# Patient Record
Sex: FEMALE | Race: WHITE | NOT HISPANIC OR LATINO | Employment: OTHER | URBAN - METROPOLITAN AREA
[De-identification: names, ages, dates, MRNs, and addresses within clinical notes are randomized per-mention and may not be internally consistent; named-entity substitution may affect disease eponyms.]

---

## 2017-02-14 RX ORDER — POTASSIUM CHLORIDE 750 MG/1
TABLET, EXTENDED RELEASE ORAL
Qty: 30 TABLET | Refills: 3 | Status: SHIPPED | OUTPATIENT
Start: 2017-02-14 | End: 2017-06-15 | Stop reason: SDUPTHER

## 2017-06-15 RX ORDER — POTASSIUM CHLORIDE 750 MG/1
TABLET, EXTENDED RELEASE ORAL
Qty: 30 TABLET | Refills: 2 | Status: SHIPPED | OUTPATIENT
Start: 2017-06-15 | End: 2017-09-17 | Stop reason: SDUPTHER

## 2017-09-18 RX ORDER — POTASSIUM CHLORIDE 750 MG/1
TABLET, EXTENDED RELEASE ORAL
Qty: 30 TABLET | Refills: 1 | Status: SHIPPED | OUTPATIENT
Start: 2017-09-18 | End: 2017-12-01 | Stop reason: SDUPTHER

## 2017-10-09 ENCOUNTER — OFFICE VISIT (OUTPATIENT)
Dept: CARDIOLOGY | Facility: CLINIC | Age: 82
End: 2017-10-09

## 2017-10-09 VITALS
BODY MASS INDEX: 32.82 KG/M2 | HEIGHT: 63 IN | DIASTOLIC BLOOD PRESSURE: 82 MMHG | WEIGHT: 185.2 LBS | HEART RATE: 60 BPM | SYSTOLIC BLOOD PRESSURE: 138 MMHG | RESPIRATION RATE: 16 BRPM

## 2017-10-09 DIAGNOSIS — G47.33 OBSTRUCTIVE SLEEP APNEA SYNDROME: ICD-10-CM

## 2017-10-09 DIAGNOSIS — I10 ESSENTIAL HYPERTENSION: ICD-10-CM

## 2017-10-09 DIAGNOSIS — I34.0 NON-RHEUMATIC MITRAL REGURGITATION: ICD-10-CM

## 2017-10-09 DIAGNOSIS — E11.9 TYPE 2 DIABETES MELLITUS WITHOUT COMPLICATION, WITHOUT LONG-TERM CURRENT USE OF INSULIN (HCC): ICD-10-CM

## 2017-10-09 DIAGNOSIS — I48.0 PAROXYSMAL ATRIAL FIBRILLATION (HCC): Primary | ICD-10-CM

## 2017-10-09 PROCEDURE — 99214 OFFICE O/P EST MOD 30 MIN: CPT | Performed by: INTERNAL MEDICINE

## 2017-10-09 PROCEDURE — 93000 ELECTROCARDIOGRAM COMPLETE: CPT | Performed by: INTERNAL MEDICINE

## 2017-10-09 RX ORDER — DABIGATRAN ETEXILATE 150 MG/1
150 CAPSULE ORAL 2 TIMES DAILY
Qty: 180 CAPSULE | Refills: 3 | Status: SHIPPED | OUTPATIENT
Start: 2017-10-09 | End: 2017-12-21

## 2017-10-09 RX ORDER — DABIGATRAN ETEXILATE 150 MG/1
150 CAPSULE ORAL 2 TIMES DAILY
Qty: 60 CAPSULE | Refills: 1 | Status: SHIPPED | OUTPATIENT
Start: 2017-10-09 | End: 2017-12-01 | Stop reason: SDUPTHER

## 2017-10-09 NOTE — PROGRESS NOTES
PATIENTINFORMATION    Date of Office Visit: 10/09/2017  Encounter Provider: Monserrat Marsh MD  Place of Service: [facility] CARDIOLOGY  Patient Name: Gela Thompson  : 1934    Subjective:     Encounter Date:10/09/2017      Patient ID: Gela Thompson is a 82 y.o. female.      History of Present Illness    Gela Thompson is  to one my patients, Dhaval Thompson. I saw her in 2011 for complaints of episodes of shortness of breath  which had been occurring for the last couple of months. I did a nuclear stress test which showed she is only able to exercise for 4 minutes 30 seconds on the Saeid protocol, but there was no evidence of ischemia. The echo showed normal LV function with an ejection fraction of 61% and stage I diastolic dysfunction with trace tricuspid regurgitation. She wore a 30-day event monitor during which she had none of her shortness of breath episodes.       She was following up with her primary doctor, Lyly Mora, and complained again of shortness of breath with exertion. Dr. Mora sent her for another echo, which was pretty unremarkable. She also had a CT scan of the chest which was noncontrasted and she had coronary artery calcification. These tests were performed in the summer of .      In the , she continued to complain of dyspnea and so I did a nuclear stress test on her which showed no evidence of ischemia. She had recent repeat echo at [facility] in 2015, which continues to show normal LV systolic function. No comment was made on diastolic function. There was mild aortic regurgitation, moderate mitral regurgitation, and mild tricuspid regurgitation with a normal right ventricular systolic pressure. The prior years' echo showed no evidence of mitral regurgitation.     She comes in today for her yearly appointment.  She's been feeling well.  She denies palpitations, edema,  "lightheadedness, chest pain or fatigue.  She is taking Lasix every other day.  She finds if she takes it daily she gets muscle cramps.  If she goes longer than every other day she gets short of breath.  She did have a TIA about a year ago and a significant stroke in the more remote past.    Review of Systems   Constitution: Negative for fever, malaise/fatigue, weight gain and weight loss.   HENT: Negative for ear pain, hearing loss, nosebleeds and sore throat.    Eyes: Negative for double vision, pain, vision loss in left eye and vision loss in right eye.   Cardiovascular:        See history of present illness.   Respiratory: Positive for shortness of breath. Negative for cough, sleep disturbances due to breathing, snoring and wheezing.    Endocrine: Negative for cold intolerance, heat intolerance and polyuria.   Skin: Positive for itching and rash. Negative for poor wound healing.   Musculoskeletal: Positive for joint pain, joint swelling and myalgias.   Gastrointestinal: Negative for abdominal pain, diarrhea, hematochezia, nausea and vomiting.   Genitourinary: Negative for hematuria and hesitancy.   Neurological: Positive for numbness. Negative for paresthesias and seizures.   Psychiatric/Behavioral: Negative for depression. The patient is not nervous/anxious.            ECG 12 Lead  Date/Time: 10/9/2017 2:52 PM  Performed by: BALDEV PIERCE  Authorized by: BALDEV PIERCE   Comparison: compared with previous ECG from 10/7/2016  Comparison to previous ECG: Atrial fibrillation is new  Rhythm: atrial fibrillation  BPM: 60  T wave flattening noted on lead: Nonspecific ST changes are noted throughout.  Other findings: PRWP  Clinical impression: abnormal ECG               Objective:     /82 (BP Location: Right arm, Patient Position: Sitting, Cuff Size: Adult)  Pulse 60  Resp 16  Ht 63\" (160 cm)  Wt 185 lb 3.2 oz (84 kg)  BMI 32.81 kg/m2 Body mass index is 32.81 kg/(m^2).     Physical Exam "   Constitutional: She appears well-developed.   HENT:   Head: Normocephalic and atraumatic.   Eyes: Conjunctivae and lids are normal. Pupils are equal, round, and reactive to light. Lids are everted and swept, no foreign bodies found.   Neck: Normal range of motion. No JVD present. Carotid bruit is not present. No tracheal deviation present. No thyroid mass present.   Cardiovascular: Normal rate and normal heart sounds.  An irregularly irregular rhythm present.   Pulses:       Dorsalis pedis pulses are 2+ on the right side, and 2+ on the left side.   Pulmonary/Chest: Effort normal and breath sounds normal.   Abdominal: Normal appearance and bowel sounds are normal.   Musculoskeletal: Normal range of motion.   Neurological: She is alert. She has normal strength.   Skin: Skin is warm, dry and intact.   Psychiatric: She has a normal mood and affect. Her behavior is normal.       Procedure      1. Echocardiogram performed 03/08/2012 showed normal LV size and function, EF 61%, stage I  diastolic dysfunction, trace tricuspid regurgitation.  2. Exercise nuclear stress test in March 2012 without evidence of ischemia. She exercised for 4 minutes 30 minutes on the Saeid protocol.  3. Pulmonary function tests, March 2012, were normal.   4. Transesophageal echocardiogram, May 2011, at [facility] showed normal LV systolic function, ejection fraction 50 to 60%, borderline left atrial enlargement, no significant valvular disease, and no cardiac source of embolus identified for her stroke.  5. Echocardiogram on 06/27/2014. Normal left ventricular size with an ejection fraction of 64%. No comment on diastolic function. No valvular heart disease.   6. CT of the chest on 09/18/2014. Heart size was normal and without effusion. Moderate atherosclerosis was noted of the coronary arteries. There was old healed granulomatous disease.   7. Nuclear stress test October 27, 2014. Normal scan showing no evidence of ischemia.  8.  Echo from October 2015 at [facility]. Normal left ventricular systolic function. Ejection fraction 6065%. Mild aortic regurgitation. Moderate mitral regurgitation. Mild tricuspid regurgitation with a right ventricular systolic pressure of 28 mmHg.       Assessment/Plan:       1. Atrial Fibrillation and Atrial Flutter  Assessment  • The patient has paroxysmal atrial fibrillation  • This is non-valvular in etiology  • The patient's CHADS2-VASc score is 7  • A [id number] score of 2 or more is considered a high risk for a thromboembolic event    Plan  • Continue in atrial fibrillation with rate control  • Add dabigatran for antithrombotic therapy  • Continue verapamil for rate control  2.  Dyspnea on exertion.  This is controlled with Lasix.  3.  Hypertension.  Blood pressure controlled today.  4.  Obstructive sleep apnea.  Compliant with CPAP and sign 5.  History of stroke in 2011 and TIA in 2016.  5.  Diabetes.    I started her on Pradaxa today for atrial fibrillation which is new.  She does have a history of falls so I am concerned.  However, she has such a high CHADS2-Vasc score with a TIA a year ago and stroke in 2011 that I feel the benefits outweigh the risks at this point in time.    She is going to follow up with Deja in 6 weeks to make sure she is tolerating Pradaxa. If she is doing fine, I think we can go back to yearly visits.    Orders Placed This Encounter   Procedures   • ECG 12 Lead     This order was created via procedure documentation      Gela Thompson   Home Medication Instructions JR:    Printed on:10/09/17 4821   Medication Information                      BIOTIN PO  Take  by mouth Daily.             Calcium Carb-Cholecalciferol (CALCIUM 1000 + D PO)  Take 1 tablet by mouth Daily.             celecoxib (CELEBREX) 100 MG capsule  Take 1 capsule by mouth Daily.             Cholecalciferol (VITAMIN D PO)  Take  by mouth.             dabigatran etexilate (PRADAXA) 150 MG capsu  Take 1 capsule by  mouth 2 (Two) Times a Day.             dabigatran etexilate (PRADAXA) 150 MG capsu  Take 1 capsule by mouth 2 (Two) Times a Day.             folic acid (FOLVITE) 800 MCG tablet  Take 1 tablet by mouth Daily. As directed             furosemide (LASIX) 20 MG tablet  Take 1 tablet by mouth Daily. As needed             L-Lysine 500 MG tablet  Take 1 tablet by mouth Daily.             metFORMIN (GLUCOPHAGE) 500 MG tablet  Take 500 mg by mouth.             Multiple Vitamin (MULTIVITAMINS PO)  Take 1 tablet by mouth Daily.             potassium chloride (K-DUR,KLOR-CON) 10 MEQ CR tablet  Take 1 tablet by mouth Daily As Needed.             potassium chloride (K-DUR,KLOR-CON) 10 MEQ CR tablet  TAKE ONE TABLET BY MOUTH DAILY AS NEEDED             traMADol (ULTRAM) 50 MG tablet  Take 1 tablet by mouth Daily As Needed.             trandolapril (MAVIK) 4 MG tablet  Take 1 tablet by mouth Daily.             verapamil SR (CALAN-SR) 240 MG CR tablet  Take 1 tablet by mouth Daily.             vitamin B-12 (CYANOCOBALAMIN) 1000 MCG tablet  Take 1 tablet by mouth Daily. As directed                        Monserrat Marsh MD  10/09/17  2:53 PM

## 2017-11-15 RX ORDER — POTASSIUM CHLORIDE 750 MG/1
TABLET, EXTENDED RELEASE ORAL
Qty: 30 TABLET | Refills: 0 | Status: SHIPPED | OUTPATIENT
Start: 2017-11-15 | End: 2017-12-01 | Stop reason: SDUPTHER

## 2017-12-01 ENCOUNTER — OFFICE VISIT (OUTPATIENT)
Dept: CARDIOLOGY | Facility: CLINIC | Age: 82
End: 2017-12-01

## 2017-12-01 VITALS
WEIGHT: 184 LBS | HEART RATE: 61 BPM | DIASTOLIC BLOOD PRESSURE: 68 MMHG | SYSTOLIC BLOOD PRESSURE: 130 MMHG | HEIGHT: 63 IN | BODY MASS INDEX: 32.6 KG/M2

## 2017-12-01 DIAGNOSIS — I48.0 PAROXYSMAL ATRIAL FIBRILLATION (HCC): Primary | ICD-10-CM

## 2017-12-01 PROCEDURE — 93000 ELECTROCARDIOGRAM COMPLETE: CPT | Performed by: NURSE PRACTITIONER

## 2017-12-01 PROCEDURE — 99213 OFFICE O/P EST LOW 20 MIN: CPT | Performed by: NURSE PRACTITIONER

## 2017-12-01 RX ORDER — CHLORAL HYDRATE 500 MG
1280 CAPSULE ORAL DAILY
COMMUNITY
End: 2020-01-03

## 2017-12-01 NOTE — PROGRESS NOTES
Date of Office Visit: 2017  Encounter Provider: ELEUTERIO Banuelos  Place of Service: [facility] CARDIOLOGY  Patient Name: Gela Thompson  :1934    Chief Complaint   Patient presents with   • Atrial Fibrillation   :     HPI: Gela Thompson is a 83 y.o. female comes in today for 6 week follow up. She is a patient of Dr. Marsh. I am seeing her for the first time today and have reviewed her records. She has a history of atrial fibrillation, stroke and hyperlipidemia.     She was seen in  for complaints of shortness of breath. She had a nuclear perfusion study showing no evidence of ischemia. An echocardiogram showed an EF of 61% and grade I diastolic dysfunction with trace tricuspid regurgitation. She wore a 30-day event monitor but did not have any episodes during that time.     In the , she was complaining of dyspnea and her stress test was repeated, echocardiogram again stable.     She came in to see Dr. Marsh at the beginning of October for her yearly appointment. She had been feeling well. She was taking Lasix every other day. She had had a TIA 1 year previous. She was found to be in atrial fibrillation during that office visit. She was started on Pradaxa.     She comes in today reporting that she has been feeling well since starting on the Pradaxa. She was on Plavix before going on Pradaxa due to a history of TIA and stroke. She is asymptomatic of her atrial fibrillation. She has chronic shortness of breath. Denies edema, dizziness, chest pain, orthopnea or PND. No signs of bleeding.           Past Medical History:   Diagnosis Date   • Abnormal echocardiogram    • Anemia    • Dyspnea    • H/O echocardiogram 2014   • Health care maintenance    • History of EKG 10/29/2015   • Hyperlipidemia    • Hypertension    • Non-rheumatic mitral regurgitation    • ROMAN (obstructive sleep apnea)        Past Surgical History:   Procedure Laterality Date   •  "CHOLECYSTECTOMY     • HYSTERECTOMY     • KNEE SURGERY Bilateral    • NEUROPLASTY      deompression median nerve at carpal tunnel bilateral   • SHOULDER SURGERY Left            Review of Systems   Constitution: Negative for fever, malaise/fatigue, weight gain and weight loss.   HENT: Negative for ear pain, hearing loss, nosebleeds and sore throat.    Eyes: Negative for double vision, pain, vision loss in left eye and vision loss in right eye.   Cardiovascular:        See history of present illness.   Respiratory: Positive for shortness of breath. Negative for cough, sleep disturbances due to breathing, snoring and wheezing.    Endocrine: Negative for cold intolerance, heat intolerance and polyuria.   Skin: Positive for itching and rash. Negative for poor wound healing.   Musculoskeletal: Positive for joint pain, joint swelling and myalgias.   Gastrointestinal: Negative for abdominal pain, diarrhea, hematochezia, nausea and vomiting.   Genitourinary: Negative for hematuria and hesitancy.   Neurological: Positive for numbness. Negative for paresthesias and seizures.   Psychiatric/Behavioral: Negative for depression. The patient is not nervous/anxious.      All other systems reviewed and are negative    Allergies   Allergen Reactions   • Morphine    • Other    • Penicillins    • Statins        All aspects of family and social history reviewed.          Objective:     Vitals:    12/01/17 1322   BP: 130/68   BP Location: Left arm   Pulse: 61   Weight: 184 lb (83.5 kg)   Height: 63\" (160 cm)     Body mass index is 32.59 kg/(m^2).    PHYSICAL EXAM:  Physical Exam   Constitutional: She appears well-developed.   HENT:   Head: Normocephalic and atraumatic.   Eyes: Conjunctivae and lids are normal. Pupils are equal, round, and reactive to light. Lids are everted and swept, no foreign bodies found.   Neck: Normal range of motion. No JVD present. Carotid bruit is not present. No tracheal deviation present. No thyroid mass " present.   Cardiovascular: Normal rate and normal heart sounds.  An irregularly irregular rhythm present.   Pulses:       Dorsalis pedis pulses are 2+ on the right side, and 2+ on the left side.   Pulmonary/Chest: Effort normal and breath sounds normal.   Abdominal: Normal appearance and bowel sounds are normal.   Musculoskeletal: Normal range of motion.   Neurological: She is alert. She has normal strength.   Skin: Skin is warm, dry and intact.   Psychiatric: She has a normal mood and affect. Her behavior is normal.         ECG 12 Lead  Date/Time: 12/1/2017 1:49 PM  Performed by: BETO COTO  Authorized by: BETO COTO   Comparison: compared with previous ECG from 10/9/2017  Similar to previous ECG  Rhythm: sinus rhythm  Rate: normal  BPM: 61  Conduction: conduction normal  QRS axis: normal  Clinical impression: abnormal ECG  Comments: Indication: afib                Assessment:       Diagnosis Plan   1. Paroxysmal atrial fibrillation  Holter Monitor - 24 Hour        Orders Placed This Encounter   Procedures   • Holter Monitor - 24 Hour     Standing Status:   Future     Standing Expiration Date:   12/1/2018     Order Specific Question:   Reason for exam?     Answer:   Palpitations       Current Outpatient Prescriptions   Medication Sig Dispense Refill   • BIOTIN PO Take  by mouth Daily.     • Calcium Carb-Cholecalciferol (CALCIUM 1000 + D PO) Take 1 tablet by mouth Daily.     • celecoxib (CELEBREX) 100 MG capsule Take 1 capsule by mouth Daily.     • Cholecalciferol (VITAMIN D PO) Take 400 mg by mouth Daily.     • dabigatran etexilate (PRADAXA) 150 MG capsu Take 1 capsule by mouth 2 (Two) Times a Day. 180 capsule 3   • furosemide (LASIX) 20 MG tablet Take 1 tablet by mouth Daily. As needed 90 tablet 3   • L-Lysine 500 MG tablet Take 1 tablet by mouth Daily.     • metFORMIN (GLUCOPHAGE) 500 MG tablet Take 500 mg by mouth 2 (Two) Times a Day With Meals.     • Multiple Vitamin (MULTIVITAMINS PO) Take 1  tablet by mouth Daily.     • potassium chloride (K-DUR,KLOR-CON) 10 MEQ CR tablet Take 1 tablet by mouth Daily As Needed.     • traMADol (ULTRAM) 50 MG tablet Take 1 tablet by mouth Daily As Needed.     • trandolapril (MAVIK) 4 MG tablet Take 1 tablet by mouth Daily.     • verapamil SR (CALAN-SR) 240 MG CR tablet Take 1 tablet by mouth Daily.     • vitamin B-12 (CYANOCOBALAMIN) 1000 MCG tablet Take 1 tablet by mouth Daily. As directed     • Omega-3 Fatty Acids (FISH OIL) 1000 MG capsule capsule Take 1,280 mg by mouth Daily.       No current facility-administered medications for this visit.             Plan:       Patient comes in today for follow-up after being started on Pradaxa for new onset atrial fibrillation her last office visit.  Her heart rate was controlled at a rate of 60.  She is tolerating Pradaxa well.  She had previously been on Plavix due to a history of stroke in a TIA.  Her EKG today shows normal sinus rhythm with escape beats in between.  On that her place a 24-hour Holter monitor as advised by Dr. turcios to determine if she truly has atrial fibrillation.  If she does not, she may be oh to come off Pradaxa and back on Plavix.        Follow up in office in one year. Will discuss holter after results comes back    As always, it has been a pleasure to participate in this patient's care.      Sincerely,      ELEUTERIO Banuelos

## 2017-12-13 RX ORDER — POTASSIUM CHLORIDE 750 MG/1
TABLET, EXTENDED RELEASE ORAL
Qty: 30 TABLET | Refills: 0 | Status: SHIPPED | OUTPATIENT
Start: 2017-12-13 | End: 2018-01-10 | Stop reason: SDUPTHER

## 2017-12-15 ENCOUNTER — TELEPHONE (OUTPATIENT)
Dept: CARDIOLOGY | Facility: CLINIC | Age: 82
End: 2017-12-15

## 2017-12-21 NOTE — TELEPHONE ENCOUNTER
I called and spoke with her.  I told her that I reviewed her strips with Dr. Gibson who does not feel she has atrial fibrillation.  I told her to discontinue Pradaxa which was causing her GI upset anyway.

## 2018-01-02 RX ORDER — FUROSEMIDE 20 MG/1
TABLET ORAL
Qty: 90 TABLET | Refills: 2 | Status: SHIPPED | OUTPATIENT
Start: 2018-01-02 | End: 2020-05-27 | Stop reason: HOSPADM

## 2018-01-10 RX ORDER — POTASSIUM CHLORIDE 750 MG/1
TABLET, EXTENDED RELEASE ORAL
Qty: 30 TABLET | Refills: 0 | Status: SHIPPED | OUTPATIENT
Start: 2018-01-10 | End: 2018-05-08 | Stop reason: SDUPTHER

## 2018-05-09 RX ORDER — POTASSIUM CHLORIDE 750 MG/1
TABLET, EXTENDED RELEASE ORAL
Qty: 30 TABLET | Refills: 0 | Status: SHIPPED | OUTPATIENT
Start: 2018-05-09 | End: 2018-07-21 | Stop reason: SDUPTHER

## 2018-07-23 RX ORDER — POTASSIUM CHLORIDE 750 MG/1
TABLET, EXTENDED RELEASE ORAL
Qty: 30 TABLET | Refills: 0 | Status: SHIPPED | OUTPATIENT
Start: 2018-07-23 | End: 2020-05-27 | Stop reason: HOSPADM

## 2018-10-12 ENCOUNTER — HOSPITAL ENCOUNTER (EMERGENCY)
Facility: HOSPITAL | Age: 83
Discharge: HOME | End: 2018-10-12
Attending: EMERGENCY MEDICINE | Admitting: EMERGENCY MEDICINE

## 2018-10-12 ENCOUNTER — APPOINTMENT (OUTPATIENT)
Dept: GENERAL RADIOLOGY | Facility: HOSPITAL | Age: 83
End: 2018-10-12

## 2018-10-12 VITALS
RESPIRATION RATE: 17 BRPM | OXYGEN SATURATION: 95 % | TEMPERATURE: 98.2 F | WEIGHT: 183.19 LBS | BODY MASS INDEX: 32.46 KG/M2 | HEART RATE: 55 BPM | SYSTOLIC BLOOD PRESSURE: 160 MMHG | HEIGHT: 63 IN | DIASTOLIC BLOOD PRESSURE: 76 MMHG

## 2018-10-12 DIAGNOSIS — R07.9 CHEST PAIN, UNSPECIFIED TYPE: Primary | ICD-10-CM

## 2018-10-12 LAB
ALBUMIN SERPL-MCNC: 4.6 G/DL (ref 3.5–5.2)
ALBUMIN/GLOB SERPL: 1.5 G/DL
ALP SERPL-CCNC: 88 U/L (ref 39–117)
ALT SERPL W P-5'-P-CCNC: 26 U/L (ref 1–33)
ANION GAP SERPL CALCULATED.3IONS-SCNC: 17.2 MMOL/L
AST SERPL-CCNC: 20 U/L (ref 1–32)
BASOPHILS # BLD AUTO: 0.07 10*3/MM3 (ref 0–0.2)
BASOPHILS NFR BLD AUTO: 0.7 % (ref 0–1.5)
BILIRUB SERPL-MCNC: 0.3 MG/DL (ref 0.1–1.2)
BUN BLD-MCNC: 22 MG/DL (ref 8–23)
BUN/CREAT SERPL: 28.9 (ref 7–25)
CALCIUM SPEC-SCNC: 9.8 MG/DL (ref 8.6–10.5)
CHLORIDE SERPL-SCNC: 102 MMOL/L (ref 98–107)
CO2 SERPL-SCNC: 20.8 MMOL/L (ref 22–29)
CREAT BLD-MCNC: 0.76 MG/DL (ref 0.57–1)
DEPRECATED RDW RBC AUTO: 49 FL (ref 37–54)
EOSINOPHIL # BLD AUTO: 0.28 10*3/MM3 (ref 0–0.7)
EOSINOPHIL NFR BLD AUTO: 3 % (ref 0.3–6.2)
ERYTHROCYTE [DISTWIDTH] IN BLOOD BY AUTOMATED COUNT: 13.4 % (ref 11.7–13)
GFR SERPL CREATININE-BSD FRML MDRD: 73 ML/MIN/1.73
GLOBULIN UR ELPH-MCNC: 3 GM/DL
GLUCOSE BLD-MCNC: 181 MG/DL (ref 65–99)
HCT VFR BLD AUTO: 39.8 % (ref 35.6–45.5)
HGB BLD-MCNC: 13 G/DL (ref 11.9–15.5)
HOLD SPECIMEN: NORMAL
HOLD SPECIMEN: NORMAL
IMM GRANULOCYTES # BLD: 0.03 10*3/MM3 (ref 0–0.03)
IMM GRANULOCYTES NFR BLD: 0.3 % (ref 0–0.5)
LYMPHOCYTES # BLD AUTO: 3.14 10*3/MM3 (ref 0.9–4.8)
LYMPHOCYTES NFR BLD AUTO: 33.1 % (ref 19.6–45.3)
MCH RBC QN AUTO: 32.5 PG (ref 26.9–32)
MCHC RBC AUTO-ENTMCNC: 32.7 G/DL (ref 32.4–36.3)
MCV RBC AUTO: 99.5 FL (ref 80.5–98.2)
MONOCYTES # BLD AUTO: 0.76 10*3/MM3 (ref 0.2–1.2)
MONOCYTES NFR BLD AUTO: 8 % (ref 5–12)
NEUTROPHILS # BLD AUTO: 5.2 10*3/MM3 (ref 1.9–8.1)
NEUTROPHILS NFR BLD AUTO: 54.9 % (ref 42.7–76)
NRBC BLD MANUAL-RTO: 0 /100 WBC (ref 0–0)
NT-PROBNP SERPL-MCNC: 100.8 PG/ML (ref 0–1800)
PLATELET # BLD AUTO: 330 10*3/MM3 (ref 140–500)
PMV BLD AUTO: 10.3 FL (ref 6–12)
POTASSIUM BLD-SCNC: 4.1 MMOL/L (ref 3.5–5.2)
PROT SERPL-MCNC: 7.6 G/DL (ref 6–8.5)
RBC # BLD AUTO: 4 10*6/MM3 (ref 3.9–5.2)
SODIUM BLD-SCNC: 140 MMOL/L (ref 136–145)
TROPONIN T SERPL-MCNC: <0.01 NG/ML (ref 0–0.03)
WBC NRBC COR # BLD: 9.48 10*3/MM3 (ref 4.5–10.7)
WHOLE BLOOD HOLD SPECIMEN: NORMAL
WHOLE BLOOD HOLD SPECIMEN: NORMAL

## 2018-10-12 PROCEDURE — 93010 ELECTROCARDIOGRAM REPORT: CPT | Performed by: INTERNAL MEDICINE

## 2018-10-12 PROCEDURE — 80053 COMPREHEN METABOLIC PANEL: CPT | Performed by: NURSE PRACTITIONER

## 2018-10-12 PROCEDURE — 93005 ELECTROCARDIOGRAM TRACING: CPT | Performed by: EMERGENCY MEDICINE

## 2018-10-12 PROCEDURE — 36415 COLL VENOUS BLD VENIPUNCTURE: CPT

## 2018-10-12 PROCEDURE — 71046 X-RAY EXAM CHEST 2 VIEWS: CPT

## 2018-10-12 PROCEDURE — 85025 COMPLETE CBC W/AUTO DIFF WBC: CPT | Performed by: NURSE PRACTITIONER

## 2018-10-12 PROCEDURE — 84484 ASSAY OF TROPONIN QUANT: CPT | Performed by: NURSE PRACTITIONER

## 2018-10-12 PROCEDURE — 83880 ASSAY OF NATRIURETIC PEPTIDE: CPT | Performed by: NURSE PRACTITIONER

## 2018-10-12 PROCEDURE — 93005 ELECTROCARDIOGRAM TRACING: CPT

## 2018-10-12 PROCEDURE — 99284 EMERGENCY DEPT VISIT MOD MDM: CPT

## 2018-10-12 RX ORDER — ASPIRIN 81 MG/1
324 TABLET, CHEWABLE ORAL ONCE
Status: COMPLETED | OUTPATIENT
Start: 2018-10-12 | End: 2018-10-12

## 2018-10-12 RX ORDER — OMEPRAZOLE 40 MG/1
40 CAPSULE, DELAYED RELEASE ORAL 2 TIMES DAILY
Qty: 60 CAPSULE | Refills: 0 | Status: SHIPPED | OUTPATIENT
Start: 2018-10-12 | End: 2020-01-03

## 2018-10-12 RX ORDER — SODIUM CHLORIDE 0.9 % (FLUSH) 0.9 %
10 SYRINGE (ML) INJECTION AS NEEDED
Status: DISCONTINUED | OUTPATIENT
Start: 2018-10-12 | End: 2018-10-12 | Stop reason: HOSPADM

## 2018-10-12 RX ADMIN — ASPIRIN 324 MG: 81 TABLET, CHEWABLE ORAL at 15:57

## 2018-10-12 NOTE — ED PROVIDER NOTES
" EMERGENCY DEPARTMENT ENCOUNTER    CHIEF COMPLAINT  Chief Complaint: chest pain  History given by: patient  History limited by: nothing  Room Number: 11/11  PMD: Karla Mora MD  Cardiologist: Dr. Monserrat Marsh    HPI:  Pt is a 83 y.o. female who presents to the ED [with her  bedside] complaining of intermittent centralized chest pain that began three days ago. Pt describes the pain as \"tightness and pressure\". Pt reports that the episodes of pain \"usually last five minutes\" but that she had an episode that occurred around 2100 yesterday night which lasted for three hours. Pt states that the pain worsens with eating. Pt reports that she had a large meal about three hours prior to her episode of pain yesterday night. Pt also complains of a mild headache and mild shortness of air. Pt denies fever, abdominal pain, nausea, vomiting, diarrhea, rash, blood in stool, worsening leg pain, or leg swelling. Pt denies smoking or EtOH consumption. Pt has a hx of hypertension and hyperlipidemia. The patient is currently on Plavix d/t a prior stroke. There are no other complaints at this time.    Duration/Onset/Timing: three days/gradual/intermittent  Location: center of chest  Radiation: none specified  Quality: \"tightness and pressure\"  Intensity/Severity: moderate  Associated Symptoms: mild headache and mild shortness of air  Aggravating or Alleviating Factors: pt reports that the pain worsens with eating.  Previous Episodes: none specified      PAST MEDICAL HISTORY  Active Ambulatory Problems     Diagnosis Date Noted   • Anemia 07/22/2013   • Chronic low back pain 10/07/2016   • Diabetes mellitus (CMS/[facility]) 08/22/2013   • Dyslipidemia 10/07/2016   • Gastroesophageal reflux disease 04/07/2015   • Hypertension 10/07/2016   • Mitral valve insufficiency 10/14/2015   • Osteoarthritis 10/07/2016   • Adiposity 10/07/2016   • Obstructive sleep apnea syndrome 10/07/2016   • Cerebrovascular accident (CMS/[facility]) 05/01/2011   • " "Paroxysmal atrial fibrillation (CMS/[facility]) 12/01/2017     Resolved Ambulatory Problems     Diagnosis Date Noted   • No Resolved Ambulatory Problems     Past Medical History:   Diagnosis Date   • Abnormal echocardiogram    • Anemia    • Dyspnea    • H/O echocardiogram 06/27/2014   • Health care maintenance    • History of EKG 10/29/2015   • Hyperlipidemia    • Hypertension    • Non-rheumatic mitral regurgitation    • ROMAN (obstructive sleep apnea)        PAST SURGICAL HISTORY  Past Surgical History:   Procedure Laterality Date   • CHOLECYSTECTOMY     • HYSTERECTOMY     • KNEE SURGERY Bilateral    • NEUROPLASTY      deompression median nerve at carpal tunnel bilateral   • SHOULDER SURGERY Left        FAMILY HISTORY  Family History   Problem Relation Age of Onset   • Heart disease Mother    • Heart attack Mother    • Heart disease Sister        SOCIAL HISTORY  Social History     Social History   • Marital status:      Spouse name: N/A   • Number of children: N/A   • Years of education: N/A     Occupational History   • Not on file.     Social History Main Topics   • Smoking status: Never Smoker   • Smokeless tobacco: Never Used      Comment: caffeine use   • Alcohol use No   • Drug use: No   • Sexual activity: Defer     Other Topics Concern   • Not on file     Social History Narrative   • No narrative on file       ALLERGIES  Morphine; Penicillins; Statins; and Tape    REVIEW OF SYSTEMS  Review of Systems   Constitutional: Negative.  Negative for fever.   HENT: Negative for sore throat.    Eyes: Negative.    Respiratory: Positive for shortness of breath (mild). Negative for cough.    Cardiovascular: Positive for chest pain (\"tightness and pressure\"). Negative for leg swelling.   Gastrointestinal: Negative.  Negative for abdominal pain, blood in stool, diarrhea, nausea and vomiting.   Genitourinary: Negative.  Negative for dysuria.   Musculoskeletal: Negative.  Negative for back pain and myalgias.   Skin: Negative.  " Negative for rash.   Neurological: Positive for headaches (mild).   All other systems reviewed and are negative.      PHYSICAL EXAM  ED Triage Vitals   Temp Heart Rate Resp BP SpO2   10/12/18 1426 10/12/18 1426 10/12/18 1426 10/12/18 1453 10/12/18 1426   98.2 °F (36.8 °C) 95 18 147/56 98 %      Temp src Heart Rate Source Patient Position BP Location FiO2 (%)   10/12/18 1426 10/12/18 1426 10/12/18 1453 10/12/18 1453 --   Tympanic Monitor Sitting Right arm        Physical Exam   Constitutional: No distress.   HENT:   Head: Normocephalic and atraumatic.   Mouth/Throat: Oropharynx is clear and moist.   Eyes: Conjunctivae are normal.   Cardiovascular: Normal rate.  An irregularly irregular rhythm present. Exam reveals no gallop and no friction rub.    No murmur heard.  Pulmonary/Chest: Breath sounds normal. No respiratory distress. She has no decreased breath sounds. She has no wheezes. She has no rhonchi. She has no rales. She exhibits no tenderness.   Abdominal: There is no tenderness.   Musculoskeletal: She exhibits no edema or tenderness.   Neurological: She is alert.   Skin: No rash noted.   Nursing note and vitals reviewed.      LAB RESULTS  Lab Results (last 24 hours)     Procedure Component Value Units Date/Time    CBC & Differential [[id number]] Collected:  10/12/18 1457    Specimen:  Blood Updated:  10/12/18 1527    Narrative:       The following orders were created for panel order CBC & Differential.  Procedure                               Abnormality         Status                     ---------                               -----------         ------                     CBC Auto Differential[[id number]]        Abnormal            Final result                 Please view results for these tests on the individual orders.    Comprehensive Metabolic Panel [[id number]]  (Abnormal) Collected:  10/12/18 1457    Specimen:  Blood Updated:  10/12/18 1536     Glucose 181 (H) mg/dL      BUN 22 mg/dL      Creatinine 0.76  mg/dL      Sodium 140 mmol/L      Potassium 4.1 mmol/L      Chloride 102 mmol/L      CO2 20.8 (L) mmol/L      Calcium 9.8 mg/dL      Total Protein 7.6 g/dL      Albumin 4.60 g/dL      ALT (SGPT) 26 U/L      AST (SGOT) 20 U/L      Alkaline Phosphatase 88 U/L      Total Bilirubin 0.3 mg/dL      eGFR Non African Amer 73 mL/min/1.73      Globulin 3.0 gm/dL      A/G Ratio 1.5 g/dL      BUN/Creatinine Ratio 28.9 (H)     Anion Gap 17.2 mmol/L     Narrative:       The MDRD GFR formula is only valid for adults with stable renal function between ages 18 and 70.    BNP [[id number]]  (Normal) Collected:  10/12/18 1457    Specimen:  Blood Updated:  10/12/18 1541     proBNP 100.8 pg/mL     Narrative:       Among patients with dyspnea, NT-proBNP is highly sensitive for the detection of acute congestive heart failure. In addition NT-proBNP of <300 pg/ml effectively rules out acute congestive heart failure with 99% negative predictive value.    Troponin [[id number]]  (Normal) Collected:  10/12/18 1457    Specimen:  Blood Updated:  10/12/18 1541     Troponin T <0.010 ng/mL     Narrative:       Troponin T Reference Ranges:  Less than 0.03 ng/mL:    Negative for AMI  0.03 to 0.09 ng/mL:      Indeterminant for AMI  Greater than 0.09 ng/mL: Positive for AMI    CBC Auto Differential [[id number]]  (Abnormal) Collected:  10/12/18 1457    Specimen:  Blood Updated:  10/12/18 1527     WBC 9.48 10*3/mm3      RBC 4.00 10*6/mm3      Hemoglobin 13.0 g/dL      Hematocrit 39.8 %      MCV 99.5 (H) fL      MCH 32.5 (H) pg      MCHC 32.7 g/dL      RDW 13.4 (H) %      RDW-SD 49.0 fl      MPV 10.3 fL      Platelets 330 10*3/mm3      Neutrophil % 54.9 %      Lymphocyte % 33.1 %      Monocyte % 8.0 %      Eosinophil % 3.0 %      Basophil % 0.7 %      Immature Grans % 0.3 %      Neutrophils, Absolute 5.20 10*3/mm3      Lymphocytes, Absolute 3.14 10*3/mm3      Monocytes, Absolute 0.76 10*3/mm3      Eosinophils, Absolute 0.28 10*3/mm3      Basophils, Absolute  0.07 10*3/mm3      Immature Grans, Absolute 0.03 10*3/mm3      nRBC 0.0 /100 WBC           I ordered the above labs and reviewed the results    RADIOLOGY  XR Chest 2 View   Final Result   No focal pulmonary consolidation. Borderline heart size.   Tortuous aorta. Follow-up as clinically indicated.       .       This report was finalized on 10/12/2018 3:19 PM by Dr. Chuck Reed M.D.               I ordered the above noted radiological studies. Interpreted by radiologist. Reviewed by me in PACS.       PROCEDURES  Procedures    EKG          Initial EKG interpretation time: 1430  EKG time: 1530  Rhythm/Rate: a-fib, 69  P waves and ME: no P waves noted  QRS, axis: nml; nml  ST and T waves: T wave flattening     Interpreted Contemporaneously by me, independently viewed and is unchanged compared to prior in 12/2017.    HEARTSCORE    History  Highly suspicious              2    Moderately suspicious             1    Slightly or non-suspicious             0    ECG  Significant ST depression              2    Nonspecific repol disturbance            1    Normal                           0    Age  > or = 65                          2     46-65                           1    < or = 45                          0    Risk factors (hypercholesterolemia, HTN, DM, smoking, pos fam hx, obesity)                            > or = to 3 RF for atherosclerotic dx   2    1 or 2                 1    No risk factors                0    Troponin > or = 3x normal limit               2    1-3x normal limit    1    < or = Normal limit    0    Score  0 - 3 is low risk (0.9-1.7% risk of adverse cardiac event)  Score  4 - 6 is moderate risk (12-16.6% risk of adverse cardiac event)  Score  6 - 9 is high risk (50-65% risk of adverse cardiac event)    This patient's HEART score is 5.      PROGRESS AND CONSULTS  ED Course as of Oct 12 1701   Fri Oct 12, 2018   1459 Chest pain for 3 days.   [EP]      ED Course User Index  [EP] Susy Russell,  APRN     1653 Rechecked the patient who is resting comfortably and in NAD. The patient reports that she had an episode of chest pain while she was in the ED but is currently pain free. Discussed the unremarkable Troponin and CXR. Discussed with the patient that her HEART score is a 5 which is moderate risk. Offered the patient the plan for admission for overnight observation but the patient declined. I then discussed the plan to discharge the patient with a prescription for Prilosec BID and to have her f/u with her cardiologist. Advised the patient to return to the ER if her pain returns or worsens. Pt understands and agrees with the plan, all questions answered.    MEDICAL DECISION MAKING  Results were reviewed/discussed with the patient and they were also made aware of online access. Pt also made aware that some labs, such as cultures, will not be resulted during ER visit and follow up with PMD is necessary.     MDM  Number of Diagnoses or Management Options     Amount and/or Complexity of Data Reviewed  Clinical lab tests: ordered and reviewed (Unremarkable)  Tests in the radiology section of CPT®: ordered and reviewed (CXR was unremarkable.)  Tests in the medicine section of CPT®: ordered and reviewed (See procedure notes for EKG interpretation.)  Decide to obtain previous medical records or to obtain history from someone other than the patient: yes  Review and summarize past medical records: yes (The patient had a negative stress test in 2014.)  Independent visualization of images, tracings, or specimens: yes    Patient Progress  Patient progress: stable         DIAGNOSIS  Final diagnoses:   Chest pain, unspecified type       DISPOSITION  DISCHARGE    Patient discharged in stable condition.    Reviewed implications of results, diagnosis, meds, responsibility to follow up, warning signs and symptoms of possible worsening, potential complications and reasons to return to ER, including any new or worsening  symptoms.    Patient/Family voiced understanding of above instructions.    Discussed plan for discharge, as there is no emergent indication for admission. Patient referred to primary care provider for BP management due to today's BP. Pt/family is agreeable and understands need for follow up and repeat testing.  Pt is aware that discharge does not mean that nothing is wrong but it indicates no emergency is present that requires admission and they must continue care with follow-up as given below or physician of their choice.     FOLLOW-UP  Monserrat Marsh MD  [address] [address]  [phone]    Schedule an appointment as soon as possible for a visit            Medication List      New Prescriptions    omeprazole 40 MG capsule  Commonly known as:  priLOSEC  Take 1 capsule by mouth 2 (Two) Times a Day.              Latest Documented Vital Signs:  As of 5:01 PM  BP- 161/74 HR- 60 Temp- 98.2 °F (36.8 °C) (Tympanic) O2 sat- 97%    --  Documentation assistance provided by becky Shearer for Dr. Luz Elena MD.  Information recorded by the scribe was done at my direction and has been verified and validated by me.       Nayla Shearer  10/12/18 1545       Andrae Laguna MD  10/12/18 4568

## 2018-10-12 NOTE — ED PROVIDER NOTES
Subjective   History of Present Illness    Review of Systems    Past Medical History:   Diagnosis Date   • Abnormal echocardiogram    • Anemia    • Dyspnea    • H/O echocardiogram 06/27/2014   • Health care maintenance    • History of EKG 10/29/2015   • Hyperlipidemia    • Hypertension    • Non-rheumatic mitral regurgitation    • ROMAN (obstructive sleep apnea)        Allergies   Allergen Reactions   • Morphine    • Penicillins    • Statins    • Tape Rash       Past Surgical History:   Procedure Laterality Date   • CHOLECYSTECTOMY     • HYSTERECTOMY     • KNEE SURGERY Bilateral    • NEUROPLASTY      deompression median nerve at carpal tunnel bilateral   • SHOULDER SURGERY Left        Family History   Problem Relation Age of Onset   • Heart disease Mother    • Heart attack Mother    • Heart disease Sister        Social History     Social History   • Marital status:      Social History Main Topics   • Smoking status: Never Smoker   • Smokeless tobacco: Never Used      Comment: caffeine use   • Alcohol use No   • Drug use: No   • Sexual activity: Defer     Other Topics Concern   • Not on file         Objective   Physical Exam    Procedures         ED Course  ED Course as of Oct 12 1504   Fri Oct 12, 2018   1459 Chest pain for 3 days.   [EP]      ED Course User Index  [EP] Susy Russell APRN                     [facility]    Final diagnoses:   None       Documentation assistance provided by becky Frey.  Information recorded by the becky was done at my direction and has been verified and validated by me.     Kalli Frey  10/12/18 2443

## 2018-10-12 NOTE — ED TRIAGE NOTES
"Pt reports intermittent chest pain for three days that she does not recognize any significant event that brings on pain. Pain described as tight and \"has pressure to it\". Pt denies shortness of breath or nausea. Pt has not taken Aspirin today but she did her take her Plavix today.   "

## 2018-10-12 NOTE — ED TRIAGE NOTES
Pt reports midsternal chest pain started 3 days ago intermittent. Pt reports pain more constant and severe today. Went to [facility], told to come to ER.

## 2018-12-05 ENCOUNTER — OFFICE VISIT (OUTPATIENT)
Dept: CARDIOLOGY | Facility: CLINIC | Age: 83
End: 2018-12-05

## 2018-12-05 VITALS
BODY MASS INDEX: 32.5 KG/M2 | WEIGHT: 183.4 LBS | HEART RATE: 59 BPM | DIASTOLIC BLOOD PRESSURE: 64 MMHG | RESPIRATION RATE: 16 BRPM | SYSTOLIC BLOOD PRESSURE: 116 MMHG | HEIGHT: 63 IN

## 2018-12-05 DIAGNOSIS — I10 ESSENTIAL HYPERTENSION: ICD-10-CM

## 2018-12-05 DIAGNOSIS — R07.2 PRECORDIAL PAIN: Primary | ICD-10-CM

## 2018-12-05 DIAGNOSIS — K21.9 GASTROESOPHAGEAL REFLUX DISEASE, ESOPHAGITIS PRESENCE NOT SPECIFIED: ICD-10-CM

## 2018-12-05 DIAGNOSIS — I63.9 CEREBROVASCULAR ACCIDENT (CVA), UNSPECIFIED MECHANISM (HCC): ICD-10-CM

## 2018-12-05 DIAGNOSIS — G47.33 OBSTRUCTIVE SLEEP APNEA SYNDROME: ICD-10-CM

## 2018-12-05 PROCEDURE — 93000 ELECTROCARDIOGRAM COMPLETE: CPT | Performed by: INTERNAL MEDICINE

## 2018-12-05 PROCEDURE — 99214 OFFICE O/P EST MOD 30 MIN: CPT | Performed by: INTERNAL MEDICINE

## 2018-12-05 NOTE — PROGRESS NOTES
PATIENTINFORMATION    Date of Office Visit: 2018  Encounter Provider: Monserrat Marsh MD  Place of Service: [facility] CARDIOLOGY  Patient Name: Gela Thompson  : 1934    Subjective:     Encounter Date:2018      Patient ID: Gela Thompson is a 84 y.o. female.      History of Present Illness    I saw her in 2011 for complaints of episodes of shortness of breath  which had been occurring for the last couple of months. I did a nuclear stress test which showed she is only able to exercise for 4 minutes 30 seconds on the Saeid protocol, but there was no evidence of ischemia. The echo showed normal LV function with an ejection fraction of 61% and stage I diastolic dysfunction with trace tricuspid regurgitation. She wore a 30-day event monitor during which she had none of her shortness of breath episodes.       She was following up with her primary doctor, Lyly Mora, and complained again of shortness of breath with exertion. Dr. Mora sent her for another echo, which was pretty unremarkable. She also had a CT scan of the chest which was noncontrasted and she had coronary artery calcification. These tests were performed in the summer of 2014.      In the , she continued to complain of dyspnea and so I did a nuclear stress test on her which showed no evidence of ischemia. She had recent repeat echo at [facility] in 2015, which continues to show normal LV systolic function. No comment was made on diastolic function. There was mild aortic regurgitation, moderate mitral regurgitation, and mild tricuspid regurgitation with a normal right ventricular systolic pressure. The prior years' echo showed no evidence of mitral regurgitation.      She followed up with Deja in Summary 2017 and was doing well on Pradaxa.  She wore a 24 hour monitor in 2017 which was normal.  I had reviewed her strips with   "Mandrola and he did not feel like she had atrial fibrillation and we discontinued Pradaxa, which was causing her GI upset.    She is here for a yearly follow up.  She denies palpitations, shortness of breath, edema, chest pain or fatigue.  She has had some recurrent episodes of vertigo.    She went to the emergency room in October 2018 with chest pain which ended up being acid reflux.  She has not had a recurrence.  She says her balance is off.  She is going to see a physical therapist regarding vertigo.  A lot of success with this therapist in the past.    Review of Systems   Constitution: Negative for fever, malaise/fatigue, weight gain and weight loss.   HENT: Negative for ear pain, hearing loss, nosebleeds and sore throat.    Eyes: Negative for double vision, pain, vision loss in left eye and vision loss in right eye.   Cardiovascular:        See history of present illness.   Respiratory: Negative for cough, shortness of breath, sleep disturbances due to breathing, snoring and wheezing.    Endocrine: Negative for cold intolerance, heat intolerance and polyuria.   Skin: Negative for itching, poor wound healing and rash.   Musculoskeletal: Negative for joint pain, joint swelling and myalgias.   Gastrointestinal: Negative for abdominal pain, diarrhea, hematochezia, nausea and vomiting.   Genitourinary: Negative for hematuria and hesitancy.   Neurological: Negative for numbness, paresthesias and seizures.   Psychiatric/Behavioral: Negative for depression. The patient is not nervous/anxious.            ECG 12 Lead  Date/Time: 12/5/2018 1:41 PM  Performed by: Monserrat Marsh MD  Authorized by: Monserrat Marsh MD   Comparison: compared with previous ECG from 10/12/2018  Similar to previous ECG  Rhythm: sinus rhythm  BPM: 59  Clinical impression: normal ECG               Objective:     /64 (BP Location: Right arm, Patient Position: Sitting, Cuff Size: Adult)   Pulse 59   Resp 16   Ht 160 cm (63\")   Wt 83.2 " kg (183 lb 6.4 oz)   BMI 32.49 kg/m²  Body mass index is 32.49 kg/m².     Physical Exam   Constitutional: She appears well-developed.   HENT:   Head: Normocephalic and atraumatic.   Eyes: Conjunctivae and lids are normal. Pupils are equal, round, and reactive to light. Lids are everted and swept, no foreign bodies found.   Neck: Normal range of motion. No JVD present. Carotid bruit is not present. No tracheal deviation present. No thyroid mass present.   Cardiovascular: Normal rate, regular rhythm and normal heart sounds.   Pulses:       Dorsalis pedis pulses are 2+ on the right side, and 2+ on the left side.   Pulmonary/Chest: Effort normal and breath sounds normal.   Abdominal: Normal appearance and bowel sounds are normal.   Musculoskeletal: Normal range of motion.   Neurological: She is alert. She has normal strength.   Skin: Skin is warm, dry and intact.   Psychiatric: She has a normal mood and affect. Her behavior is normal.   Vitals reviewed.      Lab Review:     1. Echocardiogram performed 03/08/2012 showed normal LV size and function, EF 61%, stage I  diastolic dysfunction, trace tricuspid regurgitation.  2. Exercise nuclear stress test in March 2012 without evidence of ischemia. She exercised for 4 minutes 30 minutes on the Saeid protocol.  3. Pulmonary function tests, March 2012, were normal.   4. Transesophageal echocardiogram, May 2011, at [facility] showed normal LV systolic function, ejection fraction 50 to 60%, borderline left atrial enlargement, no significant valvular disease, and no cardiac source of embolus identified for her stroke.  5. Echocardiogram on 06/27/2014. Normal left ventricular size with an ejection fraction of 64%. No comment on diastolic function. No valvular heart disease.   6. CT of the chest on 09/18/2014. Heart size was normal and without effusion. Moderate atherosclerosis was noted of the coronary arteries. There was old healed granulomatous disease.   7. Nuclear  stress test October 27, 2014. Normal scan showing no evidence of ischemia.  8. Echo from October 2015 at [facility]. Normal left ventricular systolic function. Ejection fraction 6065%. Mild aortic regurgitation. Moderate mitral regurgitation. Mild tricuspid regurgitation with a right ventricular systolic pressure of 28 mmHg.      Assessment/Plan:       1.  Dyspnea on exertion.  This is controlled with Lasix.  She does not have problems with lower extremity edema.  It has been a while since we have looked at her echocardiogram some going to repeat that.  2.  Hypertension.  Blood pressure well-controlled.  3.  Obstructive sleep apnea compliant with CPAP  4.  History of stroke in 2011 with a TIA in 2016  5.  Diabetes.  6.  Chest pain.  Most likely due to acid reflux.  Check echocardiogram.    There was a question of whether or not she was having atrial fibrillation but I reviewed the strips in 2017 with Dr. Gibson and he did not believe she was having atrial fibrillation.    Orders Placed This Encounter   Procedures   • ECG 12 Lead     This order was created via procedure documentation   • Adult Transthoracic Echo Complete W/ Cont if Necessary Per Protocol     Standing Status:   Future     Standing Expiration Date:   12/5/2019     Order Specific Question:   Reason for exam?     Answer:   Chest Pain        Discharge Medications           Accurate as of 12/5/18  2:24 PM. If you have any questions, ask your nurse or doctor.               Continue These Medications      Instructions Start Date   ALLEGRA PO   Oral, Daily      CALCIUM 1000 + D PO   1 tablet, Oral, Daily      CELEBREX 100 MG capsule  Generic drug:  celecoxib   1 capsule, Oral, Daily      fish oil 1000 MG capsule capsule   1,280 mg, Oral, Daily      furosemide 20 MG tablet  Commonly known as:  LASIX   TAKE ONE TABLET BY MOUTH DAILY AS NEEDED      L-Lysine 500 MG tablet tablet   1 tablet, Oral, Daily      MULTIVITAMINS PO   1 tablet, Oral, Daily      omeprazole  40 MG capsule  Commonly known as:  priLOSEC   40 mg, Oral, 2 Times Daily      PLAVIX PO   Oral, Daily      potassium chloride 10 MEQ CR tablet  Commonly known as:  K-DURKLOR-CON   TAKE ONE TABLET BY MOUTH DAILY AS NEEDED      traMADol 50 MG tablet  Commonly known as:  ULTRAM   1 tablet, Oral, Daily PRN      trandolapril 4 MG tablet  Commonly known as:  MAVIK   1 tablet, Oral, Daily      verapamil  MG CR tablet  Commonly known as:  CALAN-SR   1 tablet, Oral, Daily      vitamin B-12 1000 MCG tablet  Commonly known as:  CYANOCOBALAMIN   1 tablet, Oral, Daily, As directed      VITAMIN D PO   400 mg, Oral, Daily                    Monserrat Marsh MD  12/05/18  2:24 PM

## 2018-12-13 ENCOUNTER — HOSPITAL ENCOUNTER (OUTPATIENT)
Dept: CARDIOLOGY | Facility: HOSPITAL | Age: 83
Discharge: HOME | End: 2018-12-13
Attending: INTERNAL MEDICINE | Admitting: INTERNAL MEDICINE

## 2018-12-13 VITALS
HEIGHT: 63 IN | HEART RATE: 68 BPM | WEIGHT: 180 LBS | DIASTOLIC BLOOD PRESSURE: 60 MMHG | BODY MASS INDEX: 31.89 KG/M2 | SYSTOLIC BLOOD PRESSURE: 180 MMHG

## 2018-12-13 DIAGNOSIS — R07.2 PRECORDIAL PAIN: ICD-10-CM

## 2018-12-13 LAB
BH CV ECHO MEAS - ACS: 2.1 CM
BH CV ECHO MEAS - AI DEC SLOPE: 283.4 CM/SEC^2
BH CV ECHO MEAS - AI MAX PG: 74.9 MMHG
BH CV ECHO MEAS - AI MAX VEL: 432.7 CM/SEC
BH CV ECHO MEAS - AI P1/2T: 447.2 MSEC
BH CV ECHO MEAS - AO MAX PG (FULL): 5.1 MMHG
BH CV ECHO MEAS - AO MAX PG: 10.1 MMHG
BH CV ECHO MEAS - AO MEAN PG (FULL): 1.8 MMHG
BH CV ECHO MEAS - AO MEAN PG: 4.1 MMHG
BH CV ECHO MEAS - AO ROOT AREA (BSA CORRECTED): 1.5
BH CV ECHO MEAS - AO ROOT AREA: 6.2 CM^2
BH CV ECHO MEAS - AO ROOT DIAM: 2.8 CM
BH CV ECHO MEAS - AO V2 MAX: 158.7 CM/SEC
BH CV ECHO MEAS - AO V2 MEAN: 91.9 CM/SEC
BH CV ECHO MEAS - AO V2 VTI: 33 CM
BH CV ECHO MEAS - AVA(I,A): 2.9 CM^2
BH CV ECHO MEAS - AVA(I,D): 2.9 CM^2
BH CV ECHO MEAS - AVA(V,A): 2.7 CM^2
BH CV ECHO MEAS - AVA(V,D): 2.7 CM^2
BH CV ECHO MEAS - BSA(HAYCOCK): 1.9 M^2
BH CV ECHO MEAS - BSA: 1.8 M^2
BH CV ECHO MEAS - BZI_BMI: 31.9 KILOGRAMS/M^2
BH CV ECHO MEAS - BZI_METRIC_HEIGHT: 160 CM
BH CV ECHO MEAS - BZI_METRIC_WEIGHT: 81.6 KG
BH CV ECHO MEAS - EDV(CUBED): 183.7 ML
BH CV ECHO MEAS - EDV(MOD-SP2): 54 ML
BH CV ECHO MEAS - EDV(MOD-SP4): 72 ML
BH CV ECHO MEAS - EDV(TEICH): 159.1 ML
BH CV ECHO MEAS - EF(CUBED): 89.4 %
BH CV ECHO MEAS - EF(MOD-SP2): 66.7 %
BH CV ECHO MEAS - EF(MOD-SP4): 70.8 %
BH CV ECHO MEAS - EF(TEICH): 83.2 %
BH CV ECHO MEAS - ESV(CUBED): 19.4 ML
BH CV ECHO MEAS - ESV(MOD-SP2): 18 ML
BH CV ECHO MEAS - ESV(MOD-SP4): 21 ML
BH CV ECHO MEAS - ESV(TEICH): 26.7 ML
BH CV ECHO MEAS - IVS/LVPW: 0.84
BH CV ECHO MEAS - IVSD: 1 CM
BH CV ECHO MEAS - LAT PEAK E' VEL: 8 CM/SEC
BH CV ECHO MEAS - LV DIASTOLIC VOL/BSA (35-75): 38.9 ML/M^2
BH CV ECHO MEAS - LV MASS(C)D: 254 GRAMS
BH CV ECHO MEAS - LV MASS(C)DI: 137.3 GRAMS/M^2
BH CV ECHO MEAS - LV MAX PG: 4.9 MMHG
BH CV ECHO MEAS - LV MEAN PG: 2.3 MMHG
BH CV ECHO MEAS - LV SYSTOLIC VOL/BSA (12-30): 11.4 ML/M^2
BH CV ECHO MEAS - LV V1 MAX: 111.1 CM/SEC
BH CV ECHO MEAS - LV V1 MEAN: 68.3 CM/SEC
BH CV ECHO MEAS - LV V1 VTI: 24.8 CM
BH CV ECHO MEAS - LVIDD: 5.7 CM
BH CV ECHO MEAS - LVIDS: 2.7 CM
BH CV ECHO MEAS - LVLD AP2: 6.6 CM
BH CV ECHO MEAS - LVLD AP4: 6.9 CM
BH CV ECHO MEAS - LVLS AP2: 5.9 CM
BH CV ECHO MEAS - LVLS AP4: 5.9 CM
BH CV ECHO MEAS - LVOT AREA (M): 3.8 CM^2
BH CV ECHO MEAS - LVOT AREA: 3.8 CM^2
BH CV ECHO MEAS - LVOT DIAM: 2.2 CM
BH CV ECHO MEAS - LVPWD: 1.2 CM
BH CV ECHO MEAS - MED PEAK E' VEL: 6 CM/SEC
BH CV ECHO MEAS - MR MAX PG: 35 MMHG
BH CV ECHO MEAS - MR MAX VEL: 295.7 CM/SEC
BH CV ECHO MEAS - MV A DUR: 0.12 SEC
BH CV ECHO MEAS - MV A MAX VEL: 91.2 CM/SEC
BH CV ECHO MEAS - MV DEC SLOPE: 293.1 CM/SEC^2
BH CV ECHO MEAS - MV DEC TIME: 0.3 SEC
BH CV ECHO MEAS - MV E MAX VEL: 99.4 CM/SEC
BH CV ECHO MEAS - MV E/A: 1.1
BH CV ECHO MEAS - MV MAX PG: 4.6 MMHG
BH CV ECHO MEAS - MV MEAN PG: 2.3 MMHG
BH CV ECHO MEAS - MV P1/2T MAX VEL: 90.2 CM/SEC
BH CV ECHO MEAS - MV P1/2T: 90.1 MSEC
BH CV ECHO MEAS - MV V2 MAX: 106.9 CM/SEC
BH CV ECHO MEAS - MV V2 MEAN: 73.3 CM/SEC
BH CV ECHO MEAS - MV V2 VTI: 42.6 CM
BH CV ECHO MEAS - MVA P1/2T LCG: 2.4 CM^2
BH CV ECHO MEAS - MVA(P1/2T): 2.4 CM^2
BH CV ECHO MEAS - MVA(VTI): 2.2 CM^2
BH CV ECHO MEAS - PA MAX PG (FULL): 1.5 MMHG
BH CV ECHO MEAS - PA MAX PG: 5.3 MMHG
BH CV ECHO MEAS - PA V2 MAX: 115.4 CM/SEC
BH CV ECHO MEAS - PULM A REVS DUR: 0.13 SEC
BH CV ECHO MEAS - PULM A REVS VEL: 31.5 CM/SEC
BH CV ECHO MEAS - PULM DIAS VEL: 54.3 CM/SEC
BH CV ECHO MEAS - PULM S/D: 1.1
BH CV ECHO MEAS - PULM SYS VEL: 59.7 CM/SEC
BH CV ECHO MEAS - PVA(V,A): 3 CM^2
BH CV ECHO MEAS - PVA(V,D): 3 CM^2
BH CV ECHO MEAS - QP/QS: 0.77
BH CV ECHO MEAS - RV MAX PG: 3.8 MMHG
BH CV ECHO MEAS - RV MEAN PG: 1.7 MMHG
BH CV ECHO MEAS - RV V1 MAX: 97.5 CM/SEC
BH CV ECHO MEAS - RV V1 MEAN: 60.7 CM/SEC
BH CV ECHO MEAS - RV V1 VTI: 20.3 CM
BH CV ECHO MEAS - RVOT AREA: 3.6 CM^2
BH CV ECHO MEAS - RVOT DIAM: 2.1 CM
BH CV ECHO MEAS - SI(AO): 110 ML/M^2
BH CV ECHO MEAS - SI(CUBED): 88.8 ML/M^2
BH CV ECHO MEAS - SI(LVOT): 51.3 ML/M^2
BH CV ECHO MEAS - SI(MOD-SP2): 19.5 ML/M^2
BH CV ECHO MEAS - SI(MOD-SP4): 27.6 ML/M^2
BH CV ECHO MEAS - SI(TEICH): 71.6 ML/M^2
BH CV ECHO MEAS - SV(AO): 203.3 ML
BH CV ECHO MEAS - SV(CUBED): 164.3 ML
BH CV ECHO MEAS - SV(LVOT): 94.9 ML
BH CV ECHO MEAS - SV(MOD-SP2): 36 ML
BH CV ECHO MEAS - SV(MOD-SP4): 51 ML
BH CV ECHO MEAS - SV(RVOT): 72.8 ML
BH CV ECHO MEAS - SV(TEICH): 132.3 ML
BH CV ECHO MEAS - TAPSE (>1.6): 2.6 CM2
BH CV ECHO MEASUREMENTS AVERAGE E/E' RATIO: 14.2
BH CV XLRA - RV BASE: 2.8 CM
BH CV XLRA - TDI S': 13 CM/SEC
LEFT ATRIUM VOLUME INDEX: 29 ML/M2
LEFT ATRIUM VOLUME: 55 CM3
LV EF 2D ECHO EST: 70 %
MAXIMAL PREDICTED HEART RATE: 136 BPM
SINUS: 3.2 CM
STJ: 2.4 CM
STRESS TARGET HR: 116 BPM

## 2018-12-13 PROCEDURE — 93306 TTE W/DOPPLER COMPLETE: CPT

## 2018-12-13 PROCEDURE — 25010000002 PERFLUTREN (DEFINITY) 8.476 MG IN SODIUM CHLORIDE 0.9 % 10 ML INJECTION: Performed by: INTERNAL MEDICINE

## 2018-12-13 PROCEDURE — 93306 TTE W/DOPPLER COMPLETE: CPT | Performed by: INTERNAL MEDICINE

## 2018-12-13 RX ADMIN — PERFLUTREN 1.5 ML: 6.52 INJECTION, SUSPENSION INTRAVENOUS at 13:19

## 2018-12-14 ENCOUNTER — TELEPHONE (OUTPATIENT)
Dept: CARDIOLOGY | Facility: CLINIC | Age: 83
End: 2018-12-14

## 2018-12-14 NOTE — TELEPHONE ENCOUNTER
· Left ventricular wall thickness is consistent with hypertrophy. Sigmoid-shaped ventricular septum is present.  · Estimated EF = 70%.  · Left ventricular systolic function is normal.  · Left ventricular diastolic dysfunction (grade II) consistent with pseudonormalization.  · There is calcification of the aortic valve.  · Mild aortic valve regurgitation is present.      I called and spoke with her.  Echo really unchanged from prior.  Diastolic dysfunction noted.  Normal systolic function.  No valve disease.  She will keep her usually scheduled follow-up.

## 2020-01-03 ENCOUNTER — TELEPHONE (OUTPATIENT)
Dept: CARDIOLOGY | Facility: CLINIC | Age: 85
End: 2020-01-03

## 2020-01-03 ENCOUNTER — HOSPITAL ENCOUNTER (INPATIENT)
Facility: HOSPITAL | Age: 85
LOS: 4 days | Discharge: HOME | End: 2020-01-07
Attending: EMERGENCY MEDICINE | Admitting: INTERNAL MEDICINE

## 2020-01-03 DIAGNOSIS — I10 UNCONTROLLED HYPERTENSION: Primary | ICD-10-CM

## 2020-01-03 LAB
ALBUMIN SERPL-MCNC: 4.4 G/DL (ref 3.5–5.2)
ALBUMIN/GLOB SERPL: 1.6 G/DL
ALP SERPL-CCNC: 90 U/L (ref 39–117)
ALT SERPL W P-5'-P-CCNC: 23 U/L (ref 1–33)
ANION GAP SERPL CALCULATED.3IONS-SCNC: 12.1 MMOL/L (ref 5–15)
AST SERPL-CCNC: 21 U/L (ref 1–32)
BASOPHILS # BLD AUTO: 0.09 10*3/MM3 (ref 0–0.2)
BASOPHILS NFR BLD AUTO: 1.4 % (ref 0–1.5)
BILIRUB SERPL-MCNC: 0.3 MG/DL (ref 0.2–1.2)
BUN BLD-MCNC: 17 MG/DL (ref 8–23)
BUN/CREAT SERPL: 25.8 (ref 7–25)
CALCIUM SPEC-SCNC: 9.4 MG/DL (ref 8.6–10.5)
CHLORIDE SERPL-SCNC: 103 MMOL/L (ref 98–107)
CO2 SERPL-SCNC: 22.9 MMOL/L (ref 22–29)
CREAT BLD-MCNC: 0.66 MG/DL (ref 0.57–1)
DEPRECATED RDW RBC AUTO: 41.4 FL (ref 37–54)
EOSINOPHIL # BLD AUTO: 0.13 10*3/MM3 (ref 0–0.4)
EOSINOPHIL NFR BLD AUTO: 2 % (ref 0.3–6.2)
ERYTHROCYTE [DISTWIDTH] IN BLOOD BY AUTOMATED COUNT: 12.1 % (ref 12.3–15.4)
GFR SERPL CREATININE-BSD FRML MDRD: 85 ML/MIN/1.73
GLOBULIN UR ELPH-MCNC: 2.8 GM/DL
GLUCOSE BLD-MCNC: 163 MG/DL (ref 65–99)
HCT VFR BLD AUTO: 34.6 % (ref 34–46.6)
HGB BLD-MCNC: 12.1 G/DL (ref 12–15.9)
IMM GRANULOCYTES # BLD AUTO: 0.02 10*3/MM3 (ref 0–0.05)
IMM GRANULOCYTES NFR BLD AUTO: 0.3 % (ref 0–0.5)
LYMPHOCYTES # BLD AUTO: 2 10*3/MM3 (ref 0.7–3.1)
LYMPHOCYTES NFR BLD AUTO: 30.3 % (ref 19.6–45.3)
MCH RBC QN AUTO: 33 PG (ref 26.6–33)
MCHC RBC AUTO-ENTMCNC: 35 G/DL (ref 31.5–35.7)
MCV RBC AUTO: 94.3 FL (ref 79–97)
MONOCYTES # BLD AUTO: 0.59 10*3/MM3 (ref 0.1–0.9)
MONOCYTES NFR BLD AUTO: 8.9 % (ref 5–12)
NEUTROPHILS # BLD AUTO: 3.77 10*3/MM3 (ref 1.7–7)
NEUTROPHILS NFR BLD AUTO: 57.1 % (ref 42.7–76)
NRBC BLD AUTO-RTO: 0 /100 WBC (ref 0–0.2)
PLATELET # BLD AUTO: 298 10*3/MM3 (ref 140–450)
PMV BLD AUTO: 9.9 FL (ref 6–12)
POTASSIUM BLD-SCNC: 3.7 MMOL/L (ref 3.5–5.2)
PROT SERPL-MCNC: 7.2 G/DL (ref 6–8.5)
RBC # BLD AUTO: 3.67 10*6/MM3 (ref 3.77–5.28)
SODIUM BLD-SCNC: 138 MMOL/L (ref 136–145)
WBC NRBC COR # BLD: 6.6 10*3/MM3 (ref 3.4–10.8)

## 2020-01-03 PROCEDURE — 99223 1ST HOSP IP/OBS HIGH 75: CPT | Performed by: INTERNAL MEDICINE

## 2020-01-03 PROCEDURE — 85025 COMPLETE CBC W/AUTO DIFF WBC: CPT | Performed by: PHYSICIAN ASSISTANT

## 2020-01-03 PROCEDURE — 36415 COLL VENOUS BLD VENIPUNCTURE: CPT | Performed by: PHYSICIAN ASSISTANT

## 2020-01-03 PROCEDURE — 80053 COMPREHEN METABOLIC PANEL: CPT | Performed by: PHYSICIAN ASSISTANT

## 2020-01-03 PROCEDURE — 99284 EMERGENCY DEPT VISIT MOD MDM: CPT

## 2020-01-03 PROCEDURE — 25010000002 ENOXAPARIN PER 10 MG: Performed by: INTERNAL MEDICINE

## 2020-01-03 PROCEDURE — 25010000002 HYDRALAZINE PER 20 MG: Performed by: EMERGENCY MEDICINE

## 2020-01-03 RX ORDER — HYDRALAZINE HYDROCHLORIDE 10 MG/1
10 TABLET, FILM COATED ORAL 4 TIMES DAILY
Status: ON HOLD | COMMUNITY
End: 2020-01-07 | Stop reason: SDUPTHER

## 2020-01-03 RX ORDER — HYDRALAZINE HYDROCHLORIDE 20 MG/ML
10 INJECTION INTRAMUSCULAR; INTRAVENOUS EVERY 4 HOURS PRN
Status: DISCONTINUED | OUTPATIENT
Start: 2020-01-03 | End: 2020-01-07 | Stop reason: HOSPADM

## 2020-01-03 RX ORDER — SODIUM CHLORIDE 0.9 % (FLUSH) 0.9 %
10 SYRINGE (ML) INJECTION AS NEEDED
Status: DISCONTINUED | OUTPATIENT
Start: 2020-01-03 | End: 2020-01-07 | Stop reason: HOSPADM

## 2020-01-03 RX ORDER — ONDANSETRON 2 MG/ML
4 INJECTION INTRAMUSCULAR; INTRAVENOUS EVERY 6 HOURS PRN
Status: DISCONTINUED | OUTPATIENT
Start: 2020-01-03 | End: 2020-01-07 | Stop reason: HOSPADM

## 2020-01-03 RX ORDER — SODIUM CHLORIDE 0.9 % (FLUSH) 0.9 %
10 SYRINGE (ML) INJECTION EVERY 12 HOURS SCHEDULED
Status: DISCONTINUED | OUTPATIENT
Start: 2020-01-03 | End: 2020-01-07 | Stop reason: HOSPADM

## 2020-01-03 RX ORDER — CLOPIDOGREL BISULFATE 75 MG/1
75 TABLET ORAL DAILY
Status: DISCONTINUED | OUTPATIENT
Start: 2020-01-04 | End: 2020-01-07 | Stop reason: HOSPADM

## 2020-01-03 RX ORDER — HYDRALAZINE HYDROCHLORIDE 20 MG/ML
10 INJECTION INTRAMUSCULAR; INTRAVENOUS ONCE
Status: COMPLETED | OUTPATIENT
Start: 2020-01-03 | End: 2020-01-03

## 2020-01-03 RX ORDER — HYDRALAZINE HYDROCHLORIDE 25 MG/1
25 TABLET, FILM COATED ORAL EVERY 8 HOURS SCHEDULED
Status: DISCONTINUED | OUTPATIENT
Start: 2020-01-03 | End: 2020-01-05

## 2020-01-03 RX ORDER — CELECOXIB 100 MG/1
100 CAPSULE ORAL DAILY
Status: DISCONTINUED | OUTPATIENT
Start: 2020-01-04 | End: 2020-01-07 | Stop reason: HOSPADM

## 2020-01-03 RX ORDER — ACETAMINOPHEN 325 MG/1
650 TABLET ORAL EVERY 6 HOURS PRN
Status: DISCONTINUED | OUTPATIENT
Start: 2020-01-03 | End: 2020-01-07 | Stop reason: HOSPADM

## 2020-01-03 RX ORDER — TEMAZEPAM 15 MG/1
15 CAPSULE ORAL NIGHTLY PRN
Status: DISCONTINUED | OUTPATIENT
Start: 2020-01-03 | End: 2020-01-07 | Stop reason: HOSPADM

## 2020-01-03 RX ORDER — TRAMADOL HYDROCHLORIDE 50 MG/1
50 TABLET ORAL DAILY PRN
Status: DISCONTINUED | OUTPATIENT
Start: 2020-01-03 | End: 2020-01-07 | Stop reason: HOSPADM

## 2020-01-03 RX ORDER — CHOLECALCIFEROL (VITAMIN D3) 125 MCG
1000 CAPSULE ORAL DAILY
Status: DISCONTINUED | OUTPATIENT
Start: 2020-01-04 | End: 2020-01-07 | Stop reason: HOSPADM

## 2020-01-03 RX ORDER — CETIRIZINE HYDROCHLORIDE 10 MG/1
5 TABLET ORAL DAILY
Status: DISCONTINUED | OUTPATIENT
Start: 2020-01-04 | End: 2020-01-07 | Stop reason: HOSPADM

## 2020-01-03 RX ORDER — LISINOPRIL 40 MG/1
40 TABLET ORAL
Status: DISCONTINUED | OUTPATIENT
Start: 2020-01-04 | End: 2020-01-07 | Stop reason: HOSPADM

## 2020-01-03 RX ORDER — VERAPAMIL HYDROCHLORIDE 240 MG/1
240 TABLET, FILM COATED, EXTENDED RELEASE ORAL NIGHTLY
Status: DISCONTINUED | OUTPATIENT
Start: 2020-01-03 | End: 2020-01-07 | Stop reason: HOSPADM

## 2020-01-03 RX ORDER — SPIRONOLACTONE 25 MG/1
25 TABLET ORAL DAILY
Status: DISCONTINUED | OUTPATIENT
Start: 2020-01-03 | End: 2020-01-06

## 2020-01-03 RX ADMIN — SPIRONOLACTONE 25 MG: 25 TABLET, FILM COATED ORAL at 22:39

## 2020-01-03 RX ADMIN — HYDRALAZINE HYDROCHLORIDE 25 MG: 25 TABLET, FILM COATED ORAL at 22:19

## 2020-01-03 RX ADMIN — VERAPAMIL HYDROCHLORIDE 240 MG: 240 TABLET, FILM COATED, EXTENDED RELEASE ORAL at 22:19

## 2020-01-03 RX ADMIN — HYDRALAZINE HYDROCHLORIDE 10 MG: 20 INJECTION INTRAMUSCULAR; INTRAVENOUS at 18:33

## 2020-01-03 RX ADMIN — SODIUM CHLORIDE, PRESERVATIVE FREE 10 ML: 5 INJECTION INTRAVENOUS at 22:20

## 2020-01-03 RX ADMIN — ENOXAPARIN SODIUM 40 MG: 40 INJECTION SUBCUTANEOUS at 22:39

## 2020-01-03 NOTE — ED NOTES
Patient to er with c/o hypertension was seen at [facility] for this twice for this already. Patient reported she was not happy with their treatment on this matter. Patient reported her b/p is still high today.      Meliza Bridges, RN  01/03/20 1520

## 2020-01-03 NOTE — TELEPHONE ENCOUNTER
01/03/20  2:30 PM  Gela Thompson  1934  Home Phone [phone]   Mobile [phone]       Gela Thompson is a patient of Dr Marsh, who has not been seen in our office in about a year.  She proceeds to tell me that she has been to the ER twice at [place] due to her bp. She said that they bring it down and send her home then it is elevated again the next day.  She has followed up with her PCP and they are suggesting she go back to the Er again today and she said she is not going back to [facility] the issue doesn't get resolved.    Her bp now is 214/84 in the left arm and 209/76 in the right.  She is symptomatic, feeling dizzy and experiencing blurred vision.  She is agreeable to go to [facility] ER so that her cardiologist can be called and this can get resolved.    Thanks  Renetta Frias RN  Triage nurse

## 2020-01-03 NOTE — ED PROVIDER NOTES
EMERGENCY DEPARTMENT ENCOUNTER    Room Number:  15/15  Date of encounter:  1/3/2020  PCP: Karla Mora MD  Historian: patient  Cardiologist: Dr. Marsh      HPI:  Chief Complaint: hypertension  Context: Gela Thompson is a 85 y.o. female who presents to the ED c/o HTN (systolic 180s) that was first noticed while she was at her PCP's office about 4 days ago. Accompanying the HTN, the pt reports having a HA and blurry vision in her R eye. She was seen at [place] and had a MRI brain that was negative acute. Her BP was fine after discharge, but the next morning her BP was again elevated. The pt then went back that day and where she had a her BP controlled and was discharged again. Her BP was elevated again today (204/93), even after taking her hydralazine. She confirms increased anxiety due to this HTN. Denies dizziness, CP, and SOA. Pt also notes L sided neck pain that began yesterday. Denies fever and chills. Denies nausea and vomiting. Non-smoker. Besides, the hydralazine, no other medication changes. There are no other complaints.     MEDICAL RECORD REVIEW  Pt had a MRI brain on 12/30/19 that was negative. She also had a CT head on 12/30/19, which was negative acute. She saw her PCP on 1/2/2020, the note shows that she was start on hydralazine 10 mg TID. She is also on verapamil 240 mg and Lasix 20 mg.     PAST MEDICAL HISTORY  Active Ambulatory Problems     Diagnosis Date Noted   • Anemia 07/22/2013   • Chronic low back pain 10/07/2016   • Diabetes mellitus (CMS/[facility]) 08/22/2013   • Dyslipidemia 10/07/2016   • Gastroesophageal reflux disease 04/07/2015   • Hypertension 10/07/2016   • Mitral valve insufficiency 10/14/2015   • Osteoarthritis 10/07/2016   • Adiposity 10/07/2016   • Obstructive sleep apnea syndrome 10/07/2016   • Cerebrovascular accident (CMS/[facility]) 05/01/2011     Resolved Ambulatory Problems     Diagnosis Date Noted   • No Resolved Ambulatory Problems     Past Medical History:   Diagnosis Date   •  Abnormal echocardiogram    • Chest pain    • Dyspnea    • H/O echocardiogram 06/27/2014   • Health care maintenance    • History of EKG 10/29/2015   • Hyperlipidemia    • Non-rheumatic mitral regurgitation    • ROMAN (obstructive sleep apnea)    • PAF (paroxysmal atrial fibrillation) (CMS/[facility])          PAST SURGICAL HISTORY  Past Surgical History:   Procedure Laterality Date   • CHOLECYSTECTOMY     • HYSTERECTOMY     • KNEE SURGERY Bilateral    • NEUROPLASTY      deompression median nerve at carpal tunnel bilateral   • SHOULDER SURGERY Left          FAMILY HISTORY  Family History   Problem Relation Age of Onset   • Heart disease Mother    • Heart attack Mother    • Heart disease Sister          SOCIAL HISTORY  Social History     Socioeconomic History   • Marital status:      Spouse name: Not on file   • Number of children: Not on file   • Years of education: Not on file   • Highest education level: Not on file   Tobacco Use   • Smoking status: Never Smoker   • Smokeless tobacco: Never Used   • Tobacco comment: caffeine use   Substance and Sexual Activity   • Alcohol use: No   • Drug use: No   • Sexual activity: Defer         ALLERGIES  Morphine; Penicillins; Statins; and Tape        REVIEW OF SYSTEMS  Review of Systems   Constitutional: Negative for fever.   HENT: Negative for sore throat.    Eyes: Positive for visual disturbance (R eye blurriness).   Respiratory: Negative for cough and shortness of breath.    Cardiovascular: Negative for chest pain.        (+) HTN   Gastrointestinal: Negative for abdominal pain, diarrhea and vomiting.   Genitourinary: Negative for dysuria.   Musculoskeletal: Positive for neck pain.   Skin: Negative for rash.   Allergic/Immunologic: Negative.    Neurological: Positive for headaches. Negative for weakness and numbness.   Hematological: Negative.    Psychiatric/Behavioral: The patient is nervous/anxious.    All other systems reviewed and are negative.     PHYSICAL EXAM    I have  reviewed the triage vital signs and nursing notes.    ED Triage Vitals   Temp Heart Rate Resp BP SpO2   01/03/20 1529 01/03/20 1529 01/03/20 1540 01/03/20 1538 01/03/20 1529   98 °F (36.7 °C) 101 16 (!) 199/76 95 %      Temp src Heart Rate Source Patient Position BP Location FiO2 (%)   01/03/20 1529 -- 01/03/20 1538 01/03/20 1538 --   Tympanic  Sitting Left arm          Physical Exam   Constitutional: She is oriented to person, place, and time. No distress.   HENT:   Head: Normocephalic and atraumatic.   Mouth/Throat: Oropharynx is clear and moist.   Eyes: Pupils are equal, round, and reactive to light. EOM are normal.   Neck: Normal range of motion. Neck supple.   Cardiovascular: Normal rate, regular rhythm and normal heart sounds.   Pulmonary/Chest: Effort normal and breath sounds normal. No respiratory distress.   Abdominal: Soft. Bowel sounds are normal. There is no tenderness. There is no rebound and no guarding.   Musculoskeletal: Normal range of motion. She exhibits no edema.   No pedal edema. No calf tenderness.    Lymphadenopathy:     She has no cervical adenopathy.   Neurological: She is alert and oriented to person, place, and time. She has normal sensation and normal strength.   Skin: Skin is warm and dry. No rash noted.   Psychiatric: Mood and affect normal.   Nursing note and vitals reviewed.    LAB RESULTS  Recent Results (from the past 24 hour(s))   Comprehensive Metabolic Panel    Collection Time: 01/03/20  3:57 PM   Result Value Ref Range    Glucose 163 (H) 65 - 99 mg/dL    BUN 17 8 - 23 mg/dL    Creatinine 0.66 0.57 - 1.00 mg/dL    Sodium 138 136 - 145 mmol/L    Potassium 3.7 3.5 - 5.2 mmol/L    Chloride 103 98 - 107 mmol/L    CO2 22.9 22.0 - 29.0 mmol/L    Calcium 9.4 8.6 - 10.5 mg/dL    Total Protein 7.2 6.0 - 8.5 g/dL    Albumin 4.40 3.50 - 5.20 g/dL    ALT (SGPT) 23 1 - 33 U/L    AST (SGOT) 21 1 - 32 U/L    Alkaline Phosphatase 90 39 - 117 U/L    Total Bilirubin 0.3 0.2 - 1.2 mg/dL    eGFR Non   Amer 85 >60 mL/min/1.73    Globulin 2.8 gm/dL    A/G Ratio 1.6 g/dL    BUN/Creatinine Ratio 25.8 (H) 7.0 - 25.0    Anion Gap 12.1 5.0 - 15.0 mmol/L   CBC Auto Differential    Collection Time: 01/03/20  3:57 PM   Result Value Ref Range    WBC 6.60 3.40 - 10.80 10*3/mm3    RBC 3.67 (L) 3.77 - 5.28 10*6/mm3    Hemoglobin 12.1 12.0 - 15.9 g/dL    Hematocrit 34.6 34.0 - 46.6 %    MCV 94.3 79.0 - 97.0 fL    MCH 33.0 26.6 - 33.0 pg    MCHC 35.0 31.5 - 35.7 g/dL    RDW 12.1 (L) 12.3 - 15.4 %    RDW-SD 41.4 37.0 - 54.0 fl    MPV 9.9 6.0 - 12.0 fL    Platelets 298 140 - 450 10*3/mm3    Neutrophil % 57.1 42.7 - 76.0 %    Lymphocyte % 30.3 19.6 - 45.3 %    Monocyte % 8.9 5.0 - 12.0 %    Eosinophil % 2.0 0.3 - 6.2 %    Basophil % 1.4 0.0 - 1.5 %    Immature Grans % 0.3 0.0 - 0.5 %    Neutrophils, Absolute 3.77 1.70 - 7.00 10*3/mm3    Lymphocytes, Absolute 2.00 0.70 - 3.10 10*3/mm3    Monocytes, Absolute 0.59 0.10 - 0.90 10*3/mm3    Eosinophils, Absolute 0.13 0.00 - 0.40 10*3/mm3    Basophils, Absolute 0.09 0.00 - 0.20 10*3/mm3    Immature Grans, Absolute 0.02 0.00 - 0.05 10*3/mm3    nRBC 0.0 0.0 - 0.2 /100 WBC       Ordered the above labs and independently reviewed the results.      PROCEDURES    Procedures      MEDICATIONS GIVEN IN ER    Medications   sodium chloride 0.9 % flush 10 mL (has no administration in time range)   hydrALAZINE (APRESOLINE) injection 10 mg (10 mg Intravenous Given 1/3/20 1213)         PROGRESS, DATA ANALYSIS, CONSULTS, AND MEDICAL DECISION MAKING    All labs have been independently reviewed by me.  All radiology studies have been reviewed by me and discussed with radiologist dictating the report.   EKG's independently viewed and interpreted by me.  Discussion below represents my analysis of pertinent findings related to patient's condition, differential diagnosis, treatment plan and final disposition.         1813- Initial encounter. Informed pt of her unremarkable lab results. Discussed plan to  consult cardiology. Pt understands and agrees with the plan, all questions answered.    1825- Placed call to [facility].     1841- Discussed pt with Dr. Gutierrez (Cardiologist). He states that the pt will need to be admitted since she has failed outpatient treatment for HTN. He will admit the pt.     1852- Rechecked pt. Pt is resting comfortably. Notified pt of my discussion with Dr. Gutierrez. Discussed the plan to admit the pt to Dr. Gutierrez for further evaluation and treatment. Pt understands and agrees with the plan, all questions answered.  --  AS OF 6:52 PM VITALS:    BP - (!) 216/77  HR - 70  TEMP - 98 °F (36.7 °C) (Tympanic)  O2 SATS - 99%  --    DIAGNOSIS  Final diagnoses:   Uncontrolled hypertension         DISPOSITION  ADMISSION     Discussed treatment plan and reason for admission with pt/family and admitting physician.  Pt/family voiced understanding of the plan for admission for further testing/treatment as needed.     --  Documentation assistance provided by becky Reyes for Dr. Jennifer MD.  Information recorded by the becky was done at my direction and has been verified and validated by me.       Saúl Reyes  01/03/20 7737       Hosea Rodriguez MD  01/03/20 9614

## 2020-01-03 NOTE — ED NOTES
Pt states she has had a headache and right eye blurry since Monday was seen at [facility] twice since symptoms began has had ct scans and MRI, she has seen her cardiologist DR Marsh as well. Pt alerta nd orient times 4, NAD, Perrla, equal and unlabored respirations. Blood work ordered all care explained to pt. Awaiting room placement     Uzma Moore RN  01/03/20 2158

## 2020-01-04 ENCOUNTER — APPOINTMENT (OUTPATIENT)
Dept: CARDIOLOGY | Facility: HOSPITAL | Age: 85
End: 2020-01-04

## 2020-01-04 LAB
ANION GAP SERPL CALCULATED.3IONS-SCNC: 13.4 MMOL/L (ref 5–15)
AORTIC DIMENSIONLESS INDEX: 0.8 (DI)
BH CV ECHO MEAS - ACS: 1.7 CM
BH CV ECHO MEAS - AO MAX PG (FULL): 1.2 MMHG
BH CV ECHO MEAS - AO MAX PG: 6.3 MMHG
BH CV ECHO MEAS - AO MEAN PG (FULL): 1 MMHG
BH CV ECHO MEAS - AO MEAN PG: 3 MMHG
BH CV ECHO MEAS - AO ROOT AREA (BSA CORRECTED): 2.1
BH CV ECHO MEAS - AO ROOT AREA: 11.9 CM^2
BH CV ECHO MEAS - AO ROOT DIAM: 3.9 CM
BH CV ECHO MEAS - AO V2 MAX: 125 CM/SEC
BH CV ECHO MEAS - AO V2 MEAN: 80.4 CM/SEC
BH CV ECHO MEAS - AO V2 VTI: 27.1 CM
BH CV ECHO MEAS - AVA(I,A): 2.9 CM^2
BH CV ECHO MEAS - AVA(I,D): 2.9 CM^2
BH CV ECHO MEAS - AVA(V,A): 3.1 CM^2
BH CV ECHO MEAS - AVA(V,D): 3.1 CM^2
BH CV ECHO MEAS - BSA(HAYCOCK): 1.9 M^2
BH CV ECHO MEAS - BSA: 1.8 M^2
BH CV ECHO MEAS - BZI_BMI: 31.6 KILOGRAMS/M^2
BH CV ECHO MEAS - BZI_METRIC_HEIGHT: 160 CM
BH CV ECHO MEAS - BZI_METRIC_WEIGHT: 81 KG
BH CV ECHO MEAS - EDV(CUBED): 157.5 ML
BH CV ECHO MEAS - EDV(MOD-SP2): 30 ML
BH CV ECHO MEAS - EDV(MOD-SP4): 52 ML
BH CV ECHO MEAS - EDV(TEICH): 141.3 ML
BH CV ECHO MEAS - EF(CUBED): 81.1 %
BH CV ECHO MEAS - EF(MOD-SP2): 50 %
BH CV ECHO MEAS - EF(MOD-SP4): 67.3 %
BH CV ECHO MEAS - EF(TEICH): 73.2 %
BH CV ECHO MEAS - ESV(CUBED): 29.8 ML
BH CV ECHO MEAS - ESV(MOD-SP2): 15 ML
BH CV ECHO MEAS - ESV(MOD-SP4): 17 ML
BH CV ECHO MEAS - ESV(TEICH): 37.9 ML
BH CV ECHO MEAS - FS: 42.6 %
BH CV ECHO MEAS - IVS/LVPW: 1
BH CV ECHO MEAS - IVSD: 1 CM
BH CV ECHO MEAS - LAT PEAK E' VEL: 6.2 CM/SEC
BH CV ECHO MEAS - LV DIASTOLIC VOL/BSA (35-75): 28.2 ML/M^2
BH CV ECHO MEAS - LV MASS(C)D: 206.7 GRAMS
BH CV ECHO MEAS - LV MASS(C)DI: 112.2 GRAMS/M^2
BH CV ECHO MEAS - LV MAX PG: 5 MMHG
BH CV ECHO MEAS - LV MEAN PG: 2 MMHG
BH CV ECHO MEAS - LV SYSTOLIC VOL/BSA (12-30): 9.2 ML/M^2
BH CV ECHO MEAS - LV V1 MAX: 112 CM/SEC
BH CV ECHO MEAS - LV V1 MEAN: 69.4 CM/SEC
BH CV ECHO MEAS - LV V1 VTI: 22.8 CM
BH CV ECHO MEAS - LVIDD: 5.4 CM
BH CV ECHO MEAS - LVIDS: 3.1 CM
BH CV ECHO MEAS - LVLD AP2: 5.9 CM
BH CV ECHO MEAS - LVLD AP4: 7 CM
BH CV ECHO MEAS - LVLS AP2: 4.5 CM
BH CV ECHO MEAS - LVLS AP4: 5.1 CM
BH CV ECHO MEAS - LVOT AREA (M): 3.5 CM^2
BH CV ECHO MEAS - LVOT AREA: 3.5 CM^2
BH CV ECHO MEAS - LVOT DIAM: 2.1 CM
BH CV ECHO MEAS - LVPWD: 1 CM
BH CV ECHO MEAS - MED PEAK E' VEL: 6 CM/SEC
BH CV ECHO MEAS - MV A DUR: 137 SEC
BH CV ECHO MEAS - MV A MAX VEL: 69.4 CM/SEC
BH CV ECHO MEAS - MV DEC SLOPE: 491 CM/SEC^2
BH CV ECHO MEAS - MV DEC TIME: 161 SEC
BH CV ECHO MEAS - MV E MAX VEL: 45.1 CM/SEC
BH CV ECHO MEAS - MV E/A: 0.65
BH CV ECHO MEAS - MV MAX PG: 4.2 MMHG
BH CV ECHO MEAS - MV MEAN PG: 2 MMHG
BH CV ECHO MEAS - MV P1/2T MAX VEL: 96.4 CM/SEC
BH CV ECHO MEAS - MV P1/2T: 57.5 MSEC
BH CV ECHO MEAS - MV V2 MAX: 103 CM/SEC
BH CV ECHO MEAS - MV V2 MEAN: 67.3 CM/SEC
BH CV ECHO MEAS - MV V2 VTI: 34.9 CM
BH CV ECHO MEAS - MVA P1/2T LCG: 2.3 CM^2
BH CV ECHO MEAS - MVA(P1/2T): 3.8 CM^2
BH CV ECHO MEAS - MVA(VTI): 2.3 CM^2
BH CV ECHO MEAS - PA ACC TIME: 0.12 SEC
BH CV ECHO MEAS - PA MAX PG (FULL): 1 MMHG
BH CV ECHO MEAS - PA MAX PG: 5.2 MMHG
BH CV ECHO MEAS - PA PR(ACCEL): 26.8 MMHG
BH CV ECHO MEAS - PA V2 MAX: 114 CM/SEC
BH CV ECHO MEAS - PVA(V,A): 2.8 CM^2
BH CV ECHO MEAS - PVA(V,D): 2.8 CM^2
BH CV ECHO MEAS - QP/QS: 0.92
BH CV ECHO MEAS - RV MAX PG: 4.2 MMHG
BH CV ECHO MEAS - RV MEAN PG: 3 MMHG
BH CV ECHO MEAS - RV V1 MAX: 102 CM/SEC
BH CV ECHO MEAS - RV V1 MEAN: 76.7 CM/SEC
BH CV ECHO MEAS - RV V1 VTI: 23.1 CM
BH CV ECHO MEAS - RVOT AREA: 3.1 CM^2
BH CV ECHO MEAS - RVOT DIAM: 2 CM
BH CV ECHO MEAS - SI(AO): 175.7 ML/M^2
BH CV ECHO MEAS - SI(CUBED): 69.3 ML/M^2
BH CV ECHO MEAS - SI(LVOT): 42.9 ML/M^2
BH CV ECHO MEAS - SI(MOD-SP2): 8.1 ML/M^2
BH CV ECHO MEAS - SI(MOD-SP4): 19 ML/M^2
BH CV ECHO MEAS - SI(TEICH): 56.1 ML/M^2
BH CV ECHO MEAS - SV(AO): 323.7 ML
BH CV ECHO MEAS - SV(CUBED): 127.7 ML
BH CV ECHO MEAS - SV(LVOT): 79 ML
BH CV ECHO MEAS - SV(MOD-SP2): 15 ML
BH CV ECHO MEAS - SV(MOD-SP4): 35 ML
BH CV ECHO MEAS - SV(RVOT): 72.6 ML
BH CV ECHO MEAS - SV(TEICH): 103.4 ML
BH CV ECHO MEAS - TAPSE (>1.6): 2.7 CM2
BH CV ECHO MEASUREMENTS AVERAGE E/E' RATIO: 7.39
BH CV XLRA - RV BASE: 2.8 CM
BH CV XLRA - TDI S': 14.5 CM/SEC
BUN BLD-MCNC: 17 MG/DL (ref 8–23)
BUN/CREAT SERPL: 22.7 (ref 7–25)
CALCIUM SPEC-SCNC: 9.5 MG/DL (ref 8.6–10.5)
CHLORIDE SERPL-SCNC: 103 MMOL/L (ref 98–107)
CO2 SERPL-SCNC: 22.6 MMOL/L (ref 22–29)
CREAT BLD-MCNC: 0.75 MG/DL (ref 0.57–1)
DEPRECATED RDW RBC AUTO: 41.1 FL (ref 37–54)
ERYTHROCYTE [DISTWIDTH] IN BLOOD BY AUTOMATED COUNT: 12 % (ref 12.3–15.4)
GFR SERPL CREATININE-BSD FRML MDRD: 73 ML/MIN/1.73
GLUCOSE BLD-MCNC: 124 MG/DL (ref 65–99)
GLUCOSE BLDC GLUCOMTR-MCNC: 136 MG/DL (ref 70–130)
GLUCOSE BLDC GLUCOMTR-MCNC: 172 MG/DL (ref 70–130)
GLUCOSE BLDC GLUCOMTR-MCNC: 182 MG/DL (ref 70–130)
HCT VFR BLD AUTO: 34.2 % (ref 34–46.6)
HGB BLD-MCNC: 12 G/DL (ref 12–15.9)
LEFT ATRIUM VOLUME INDEX: 26 ML/M2
MAXIMAL PREDICTED HEART RATE: 135 BPM
MCH RBC QN AUTO: 32.7 PG (ref 26.6–33)
MCHC RBC AUTO-ENTMCNC: 35.1 G/DL (ref 31.5–35.7)
MCV RBC AUTO: 93.2 FL (ref 79–97)
PLATELET # BLD AUTO: 306 10*3/MM3 (ref 140–450)
PMV BLD AUTO: 10.4 FL (ref 6–12)
POTASSIUM BLD-SCNC: 3.7 MMOL/L (ref 3.5–5.2)
RBC # BLD AUTO: 3.67 10*6/MM3 (ref 3.77–5.28)
SODIUM BLD-SCNC: 139 MMOL/L (ref 136–145)
STRESS TARGET HR: 115 BPM
T4 FREE SERPL-MCNC: 1.21 NG/DL (ref 0.93–1.7)
TSH SERPL DL<=0.05 MIU/L-ACNC: 1.14 UIU/ML (ref 0.27–4.2)
WBC NRBC COR # BLD: 7.58 10*3/MM3 (ref 3.4–10.8)

## 2020-01-04 PROCEDURE — 82962 GLUCOSE BLOOD TEST: CPT

## 2020-01-04 PROCEDURE — 83497 ASSAY OF 5-HIAA: CPT | Performed by: INTERNAL MEDICINE

## 2020-01-04 PROCEDURE — 80048 BASIC METABOLIC PNL TOTAL CA: CPT | Performed by: INTERNAL MEDICINE

## 2020-01-04 PROCEDURE — 81050 URINALYSIS VOLUME MEASURE: CPT | Performed by: INTERNAL MEDICINE

## 2020-01-04 PROCEDURE — 84443 ASSAY THYROID STIM HORMONE: CPT | Performed by: INTERNAL MEDICINE

## 2020-01-04 PROCEDURE — 93306 TTE W/DOPPLER COMPLETE: CPT

## 2020-01-04 PROCEDURE — 93306 TTE W/DOPPLER COMPLETE: CPT | Performed by: INTERNAL MEDICINE

## 2020-01-04 PROCEDURE — 85027 COMPLETE CBC AUTOMATED: CPT | Performed by: INTERNAL MEDICINE

## 2020-01-04 PROCEDURE — 99232 SBSQ HOSP IP/OBS MODERATE 35: CPT | Performed by: NURSE PRACTITIONER

## 2020-01-04 PROCEDURE — 82384 ASSAY THREE CATECHOLAMINES: CPT | Performed by: INTERNAL MEDICINE

## 2020-01-04 PROCEDURE — 63710000001 INSULIN LISPRO (HUMAN) PER 5 UNITS: Performed by: NURSE PRACTITIONER

## 2020-01-04 PROCEDURE — 25010000002 HYDRALAZINE PER 20 MG: Performed by: INTERNAL MEDICINE

## 2020-01-04 PROCEDURE — 25010000002 ENOXAPARIN PER 10 MG: Performed by: INTERNAL MEDICINE

## 2020-01-04 PROCEDURE — 84439 ASSAY OF FREE THYROXINE: CPT | Performed by: INTERNAL MEDICINE

## 2020-01-04 PROCEDURE — 82088 ASSAY OF ALDOSTERONE: CPT | Performed by: INTERNAL MEDICINE

## 2020-01-04 PROCEDURE — 84585 ASSAY OF URINE VMA: CPT | Performed by: INTERNAL MEDICINE

## 2020-01-04 PROCEDURE — 84244 ASSAY OF RENIN: CPT | Performed by: INTERNAL MEDICINE

## 2020-01-04 RX ORDER — NICOTINE POLACRILEX 4 MG
15 LOZENGE BUCCAL
Status: DISCONTINUED | OUTPATIENT
Start: 2020-01-04 | End: 2020-01-07 | Stop reason: HOSPADM

## 2020-01-04 RX ORDER — DEXTROSE MONOHYDRATE 25 G/50ML
25 INJECTION, SOLUTION INTRAVENOUS
Status: DISCONTINUED | OUTPATIENT
Start: 2020-01-04 | End: 2020-01-07 | Stop reason: HOSPADM

## 2020-01-04 RX ADMIN — VERAPAMIL HYDROCHLORIDE 240 MG: 240 TABLET, FILM COATED, EXTENDED RELEASE ORAL at 21:04

## 2020-01-04 RX ADMIN — HYDRALAZINE HYDROCHLORIDE 25 MG: 25 TABLET, FILM COATED ORAL at 06:57

## 2020-01-04 RX ADMIN — INSULIN LISPRO 2 UNITS: 100 INJECTION, SOLUTION INTRAVENOUS; SUBCUTANEOUS at 21:04

## 2020-01-04 RX ADMIN — SODIUM CHLORIDE, PRESERVATIVE FREE 10 ML: 5 INJECTION INTRAVENOUS at 21:05

## 2020-01-04 RX ADMIN — SODIUM CHLORIDE, PRESERVATIVE FREE 10 ML: 5 INJECTION INTRAVENOUS at 08:48

## 2020-01-04 RX ADMIN — HYDRALAZINE HYDROCHLORIDE 25 MG: 25 TABLET, FILM COATED ORAL at 21:04

## 2020-01-04 RX ADMIN — CETIRIZINE HYDROCHLORIDE 5 MG: 10 TABLET, FILM COATED ORAL at 08:47

## 2020-01-04 RX ADMIN — Medication 1000 MCG: at 08:48

## 2020-01-04 RX ADMIN — CLOPIDOGREL 75 MG: 75 TABLET, FILM COATED ORAL at 08:47

## 2020-01-04 RX ADMIN — HYDRALAZINE HYDROCHLORIDE 25 MG: 25 TABLET, FILM COATED ORAL at 13:10

## 2020-01-04 RX ADMIN — HYDRALAZINE HYDROCHLORIDE 10 MG: 20 INJECTION INTRAMUSCULAR; INTRAVENOUS at 16:51

## 2020-01-04 RX ADMIN — INSULIN LISPRO 2 UNITS: 100 INJECTION, SOLUTION INTRAVENOUS; SUBCUTANEOUS at 13:11

## 2020-01-04 RX ADMIN — ENOXAPARIN SODIUM 40 MG: 40 INJECTION SUBCUTANEOUS at 21:05

## 2020-01-04 RX ADMIN — CELECOXIB 100 MG: 100 CAPSULE ORAL at 18:36

## 2020-01-04 RX ADMIN — LISINOPRIL 40 MG: 40 TABLET ORAL at 08:47

## 2020-01-04 NOTE — PROGRESS NOTES
Clinical Pharmacy Services: Medication History    Gela Thompson is a 85 y.o. female presenting to [facility] for Uncontrolled hypertension [I10]    She  has a past medical history of Abnormal echocardiogram, Adiposity, Anemia, Cerebrovascular accident (CMS/[facility]), Chest pain, Chronic low back pain, Diabetes mellitus (CMS/[facility]), Dyslipidemia, Dyspnea, Gastroesophageal reflux disease, H/O echocardiogram (06/27/2014), Health care maintenance, History of EKG (10/29/2015), Hyperlipidemia, Hypertension, Mitral valve insufficiency, Non-rheumatic mitral regurgitation, ROMAN (obstructive sleep apnea), Osteoarthritis, and PAF (paroxysmal atrial fibrillation) (CMS/[facility]).    Allergies as of 01/03/2020 - Reviewed 01/03/2020   Allergen Reaction Noted   • Penicillins Hives 10/05/2016   • Morphine GI Intolerance 10/05/2016   • Statins Myalgia 10/05/2016   • Tape Rash 10/12/2018       Medication information was obtained from: patient at bedside  Pharmacy and Phone Number: [facility] [phone]    Prior to Admission Medications     Prescriptions Last Dose Informant Patient Reported? Taking?    Calcium Carb-Cholecalciferol (CALCIUM 1000 + D PO) Past Week  Yes Yes    Take 1 tablet by mouth Daily.    CBD (cannabidiol) oral oil  Self Yes Yes    Take 0.5 mL by mouth 2 (Two) Times a Day. Taking 1/2 dropper (10 mg) BID for arthritis pain    celecoxib (CELEBREX) 100 MG capsule 1/1/2020  Yes Yes    Take 1 capsule by mouth Daily.    Clopidogrel Bisulfate (PLAVIX PO) 1/3/2020  Yes Yes    Take  by mouth Daily.    hydrALAZINE (APRESOLINE) 10 MG tablet 1/3/2020 Medication Bottle Yes Yes    Take 10 mg by mouth 4 (Four) Times a Day. Patient has been taking three times daily. New medication prescribed on 12/31/19 from [facility] provider    L-Lysine 500 MG tablet 1/2/2020  Yes Yes    Take 1 tablet by mouth Daily.    Multiple Vitamin (MULTIVITAMINS PO) 1/2/2020  Yes Yes    Take 1 tablet by mouth Daily.    traMADol (ULTRAM) 50 MG tablet 1/3/2020  Self Yes Yes    Take 1 tablet by mouth Daily As Needed (Arthritis pain). Patient usually takes 1/2 tab and takes very seldomly    trandolapril (MAVIK) 4 MG tablet 1/3/2020  Yes Yes    Take 1 tablet by mouth Daily.    verapamil SR (CALAN-SR) 240 MG CR tablet 1/2/2020 Self Yes Yes    Take 240 mg by mouth Every Night.    vitamin B-12 (CYANOCOBALAMIN) 1000 MCG tablet 1/3/2020  Yes Yes    Take 1 tablet by mouth Daily. As directed    Fexofenadine HCl (ALLEGRA PO) 1/1/2020  Yes No    Take  by mouth Daily.    furosemide (LASIX) 20 MG tablet More than a month Self No No    TAKE ONE TABLET BY MOUTH DAILY AS NEEDED    Patient taking differently:  Take 20 mg by mouth Daily As Needed (Leg swelling or SOB). Hasn't taken in several months    potassium chloride (K-DUR,KLOR-CON) 10 MEQ CR tablet More than a month Self No No    TAKE ONE TABLET BY MOUTH DAILY AS NEEDED    Patient taking differently:  Take 10 mEq by mouth Daily As Needed (Takes with furosemide).            Medication notes:     This medication list is complete to the best of my knowledge as of 1/3/2020    Please call if questions.    Thank you letting me participate in the care of this patient.   Maikol Carpenter, PharmD  1/3/2020 7:41 PM

## 2020-01-04 NOTE — PLAN OF CARE
Problem: Patient Care Overview  Goal: Plan of Care Review  Outcome: Ongoing (interventions implemented as appropriate)  Flowsheets (Taken 1/4/2020 4598)  Outcome Summary: Pt denies pain, n/v/d. Want down for echo this am. Sinus on monitor. Mildy hypertensive with -160s. 24 hour urine collection in process. No other needs at this time. Will continue to monitor pt.     Problem: Fall Risk (Adult)  Goal: Absence of Fall  Outcome: Ongoing (interventions implemented as appropriate)  Flowsheets (Taken 1/4/2020 0280)  Absence of Fall: making progress toward outcome

## 2020-01-04 NOTE — H&P
Date of Admission: 20     Encounter Provider: Farhan Gutierrez MD    Group of Service: [facility]     Patient Name: Gela Thompson    :1934    Chief complaint:  Headache, blurred vision, elevated blood pressure.    History of Present Illness:      This is a very pleasant 85 year-old white female who normally follows with Dr. Monserrat Marsh in our group.  She has a history of hypertension, stroke in 2011, TIA in 2016, and diabetes.  The patient had been doing relatively well up until recently.  She states that on 2019, she was seen in her physician's office, at which time she was noted to have a systolic blood pressure of over 190.  2 weeks earlier in the same office, her blood pressure had been running in the 130s.  She went to the [facility] emergency department, and was given IV hydralazine.  However, she continued to have issues with headaches and blurred vision after her discharge, and she again went to the [facility] emergency department for treatment.  She was given IV hydralazine and placed on oral hydralazine 10 mg 3 times a day in addition to her verapamil and trandolapril.  However, she again has stated that her blood pressure at home has been significantly elevated with systolic readings above 200 at times.    She presented to the [facility] emergency department, and her blood pressure has been as high as 226/93.  Her blood pressure was similar in both arms.  She does state that she has noticed that her systolic and diastolic readings have a significant gap, and I also noted this on her list of readings in the emergency department (wide pulse pressure).  She has not been more short of breath than usual, and can still perform moderate activity without any difficulty.  She has not had any chest discomfort.  She still has somewhat of a mild headache and some blurred vision on exam, although she states that neither of these symptoms are severe.  She has  been given IV hydralazine in the ER, and is being admitted for further treatment.    Past Medical History:   Diagnosis Date   • Abnormal echocardiogram    • Adiposity    • Anemia    • Cerebrovascular accident (CMS/HCC)    • Chest pain    • Chronic low back pain    • Diabetes mellitus (CMS/HCC)    • Dyslipidemia    • Dyspnea    • Gastroesophageal reflux disease    • H/O echocardiogram 06/27/2014   • Health care maintenance    • History of EKG 10/29/2015   • Hyperlipidemia    • Hypertension    • Mitral valve insufficiency    • Non-rheumatic mitral regurgitation    • ROMAN (obstructive sleep apnea)    • Osteoarthritis    • PAF (paroxysmal atrial fibrillation) (CMS/HCC)          Past Surgical History:   Procedure Laterality Date   • CHOLECYSTECTOMY     • HYSTERECTOMY     • KNEE SURGERY Bilateral    • NEUROPLASTY      deompression median nerve at carpal tunnel bilateral   • SHOULDER SURGERY Left          Allergies   Allergen Reactions   • Penicillins Hives     Patient hasn't taken PCNs for over 30 years.    • Morphine GI Intolerance   • Statins Myalgia     Pt stated that she gets muscle aches all over when she takes statins.    • Tape Rash         No current facility-administered medications on file prior to encounter.      Current Outpatient Medications on File Prior to Encounter   Medication Sig Dispense Refill   • Calcium Carb-Cholecalciferol (CALCIUM 1000 + D PO) Take 1 tablet by mouth Daily.     • CBD (cannabidiol) oral oil Take 0.5 mL by mouth 2 (Two) Times a Day. Taking 1/2 dropper (10 mg) BID for arthritis pain     • celecoxib (CELEBREX) 100 MG capsule Take 1 capsule by mouth Daily.     • Clopidogrel Bisulfate (PLAVIX PO) Take  by mouth Daily.     • hydrALAZINE (APRESOLINE) 10 MG tablet Take 10 mg by mouth 4 (Four) Times a Day. Patient has been taking three times daily. New medication prescribed on 12/31/19 from [facility] provider     • L-Lysine 500 MG tablet Take 1 tablet by mouth Daily.     • Multiple Vitamin  (MULTIVITAMINS PO) Take 1 tablet by mouth Daily.     • traMADol (ULTRAM) 50 MG tablet Take 1 tablet by mouth Daily As Needed (Arthritis pain). Patient usually takes 1/2 tab and takes very seldomly     • trandolapril (MAVIK) 4 MG tablet Take 1 tablet by mouth Daily.     • verapamil SR (CALAN-SR) 240 MG CR tablet Take 240 mg by mouth Every Night.     • vitamin B-12 (CYANOCOBALAMIN) 1000 MCG tablet Take 1 tablet by mouth Daily. As directed     • Fexofenadine HCl (ALLEGRA PO) Take  by mouth Daily.     • furosemide (LASIX) 20 MG tablet TAKE ONE TABLET BY MOUTH DAILY AS NEEDED (Patient taking differently: Take 20 mg by mouth Daily As Needed (Leg swelling or SOB). Hasn't taken in several months) 90 tablet 2   • potassium chloride (K-DUR,KLOR-CON) 10 MEQ CR tablet TAKE ONE TABLET BY MOUTH DAILY AS NEEDED (Patient taking differently: Take 10 mEq by mouth Daily As Needed (Takes with furosemide).) 30 tablet 0   • [DISCONTINUED] Cholecalciferol (VITAMIN D PO) Take 400 mg by mouth Daily.     • [DISCONTINUED] Omega-3 Fatty Acids (FISH OIL) 1000 MG capsule capsule Take 1,280 mg by mouth Daily.     • [DISCONTINUED] omeprazole (priLOSEC) 40 MG capsule Take 1 capsule by mouth 2 (Two) Times a Day. 60 capsule 0         Social History     Socioeconomic History   • Marital status:      Spouse name: Not on file   • Number of children: Not on file   • Years of education: Not on file   • Highest education level: Not on file   Tobacco Use   • Smoking status: Never Smoker   • Smokeless tobacco: Never Used   • Tobacco comment: caffeine use   Substance and Sexual Activity   • Alcohol use: No   • Drug use: No   • Sexual activity: Defer         Family History   Problem Relation Age of Onset   • Heart disease Mother    • Heart attack Mother    • Heart disease Sister        REVIEW OF SYSTEMS:   Pertinent positives were noted in the HPI above.  Otherwise, all other systems were reviewed, and are negative.     Objective:     Vitals:     "01/03/20 1735 01/03/20 1825 01/03/20 1936 01/03/20 2000   BP: (!) 226/93 (!) 216/77 (!) 203/74 (!) 190/75   BP Location:       Patient Position:       Pulse: 80 70 88 84   Resp: 14 18 18 18   Temp:       TempSrc:       SpO2: 98% 99% 97% 97%   Weight:       Height:         Body mass index is 32.35 kg/m².  Flowsheet Rows      First Filed Value   Admission Height  160 cm (63\") Documented at 01/03/2020 1538   Admission Weight  82.8 kg (182 lb 9.6 oz) Documented at 01/03/2020 1538           General:    No acute distress, alert and oriented x4, pleasant                   Head:    Normocephalic, atraumatic.   Eyes:          Conjunctivae and sclerae normal, no icterus, PERRLA   Throat:   No oral lesions, no thrush, oral mucosa moist.    Neck:   Supple, trachea midline.   Lungs:     Clear to auscultation bilaterally     Heart:    Regular rhythm and normal rate.  No murmurs, gallops, or rubs noted.   Abdomen:     Soft, non-tender, non-distended, positive bowel sounds.    Extremities:   No clubbing, cyanosis, or edema.     Pulses:   Pulses palpable and equal bilaterally.    Skin:   No bleeding or rash.   Neuro:   Non-focal.  Moves all extremities well.    Psychiatric:   Normal mood and affect.         Lab Review:                Results from last 7 days   Lab Units 01/03/20  1557   SODIUM mmol/L 138   POTASSIUM mmol/L 3.7   CHLORIDE mmol/L 103   CO2 mmol/L 22.9   BUN mg/dL 17   CREATININE mg/dL 0.66   GLUCOSE mg/dL 163*   CALCIUM mg/dL 9.4         Results from last 7 days   Lab Units 01/03/20  1557   WBC 10*3/mm3 6.60   HEMOGLOBIN g/dL 12.1   HEMATOCRIT % 34.6   PLATELETS 10*3/mm3 298     Results from last 7 days   Lab Units 12/30/19  1446   INR INR 1.0   APTT s 29.7                   EKG (reviewed by me personally): Pending      Assessment:   1. Hypertensive urgency  2. History of stroke in 2011  3. Prior TIA in 2016  4. ROMAN - on CPAP  5. Intermittent wide pulse pressure    Plan:       At this point, the patient has had " several days of persistently elevated severe hypertension with systolic blood pressures higher than 200 at times.  She has had a headache and blurry vision, although neither have been severe.  She has not had any cardiac symptoms or chest pain.    I am going to adjust her regimen.  I will keep her on the verapamil, and the trandolapril will be converted to 40 mg of lisinopril as this is the formulary ACE inhibitor at [facility].  I am going to add spironolactone 25 mg/day, as well as hydralazine 25 mg 3 times per day to her regimen.  I will give her as needed hydralazine for elevated blood pressure.  If it does not respond, I will place her on a Cardene drip.  Given the abrupt change in her blood pressure in the persistent nature of the hypertension, I am going to check a CT angiogram of her renal arteries to assess for renal artery stenosis.  I will also check a secondary hypertension work-up with 24-hour urine for catecholamines and 5-HIAA.  I will also check thyroid function tests.  An aldosterone renin ratio will be obtained, although it is going to be affected by the ACE inhibitor use.  Also, I am going to recheck an echocardiogram specifically to assess for aortic insufficiency as she has quite a wide pulse pressure at times.  I discussed the plan in detail with the patient and her son, and they were in agreement.    Vipul Gutierrez MD

## 2020-01-04 NOTE — PROGRESS NOTES
Cardiology Follow-Up Note      Patient Name: Gela Thompson  Age/Sex: 85 y.o. female  : 1934  MRN: [id number]      Day of Service: 20       Chief Complaint/Follow-up: Hypertensive urgency    Interval History: Hypertension, stroke in , TIA in  and diabetes, ROMAN/CPAP.    S: She feels better today, her headache is gone and her vision is back to normal      Temp:  [97.8 °F (36.6 °C)-98.2 °F (36.8 °C)] 98.2 °F (36.8 °C)  Heart Rate:  [] 83  Resp:  [14-20] 20  BP: (133-226)/(49-93) 164/69     PHYSICAL EXAM:    General Appearance: No acute distress, well developed and well nourished.   Eyes: Conjunctiva and lids: No erythema, swelling, or discharge. Sclera non-icteric.   HENT: Atraumatic, normocephalic. External eyes, ears, and nose normal.   Respiratory: No signs of respiratory distress. Respiration rhythm and depth normal.   Clear to auscultation. No rales, crackles, rhonchi, or wheezing auscultated.   Cardiovascular:  Jugular Venous Pressure: Normal  Heart Rate and Rhythm: Normal, Heart Sounds: Normal S1 and S2. No S3 or S4 noted.  Murmurs: No murmurs noted. No rubs, thrills, or gallops.   Arterial Pulses: Posterior tibialis and dorsalis pedis pulses normal.   Lower Extremities: No edema noted.  Gastrointestinal:  Abdomen soft, non-distended, non-tender.  Musculoskeletal: Normal movement of extremities  Skin: Warm and dry.   Psychiatric: Patient alert and oriented to person, place, and time. Speech and behavior appropriate. Normal mood and affect.       ECG/TELE:           Results from last 7 days   Lab Units 20  0431 20  1557  19  1446   SODIUM mmol/L 139 138  --  141   POTASSIUM mmol/L 3.7 3.7  --  4.2   CHLORIDE mmol/L 103 103  --  103   TOTAL CO2 mmol/L  --   --   --  26   CO2 mmol/L 22.6 22.9  --   --    BUN mg/dL 17 17  --  19   CREATININE mg/dL 0.75 0.66  --  0.5*   GLUCOSE mg/dL 124* 163*   < >  --    CALCIUM mg/dL 9.5 9.4  --  9.6    < > = values in this  interval not displayed.     Results from last 7 days   Lab Units 01/04/20  0431 01/03/20  1557 12/30/19  1446   WBC 10*3/mm3 7.58 6.60 7.70   HEMOGLOBIN g/dL 12.0 12.1 12.6   HEMATOCRIT % 34.2 34.6 37.3   PLATELETS 10*3/mm3 306 298 296     Results from last 7 days   Lab Units 12/30/19  1446   INR INR 1.0         Results from last 7 days   Lab Units 01/04/20  0431   TSH uIU/mL 1.140           Current Medications:   Scheduled Meds:  celecoxib 100 mg Oral Daily   cetirizine 5 mg Oral Daily   clopidogrel 75 mg Oral Daily   enoxaparin 40 mg Subcutaneous Q24H   hydrALAZINE 25 mg Oral Q8H   influenza vaccine 0.5 mL Intramuscular Once   insulin lispro 0-9 Units Subcutaneous 4x Daily With Meals & Nightly   lisinopril 40 mg Oral Q24H   sodium chloride 10 mL Intravenous Q12H   spironolactone 25 mg Oral Daily   verapamil  mg Oral Nightly   vitamin B-12 1,000 mcg Oral Daily           Uncontrolled hypertension       Plan:     -Hypertensive urgency/Uncontrolled HTN, improving, no headache today and vision ok, Renal angiogram pending.  Thyroid functions normal.  Her hydralazine was increased to 25 mg every 8 hours, she has now received 2 doses of that we will continue to monitor for now before making any further adjustments to medications.  -Echo in 2018 showed normal LV function with LVH and mild AI, repeat echo pending  -History of stroke/TIA, clopidogrel continued.  -Diabetes, will hold metformin for now with her CT angiogram pending and cover her with sliding scale insulin.    Vibha Bailey, ELEUTERIO  01/04/20  12:52 PM

## 2020-01-04 NOTE — NURSING NOTE
Pt stated she is on metformin at home.  Not on her home list. Pt doesn't get from pharmacy listed in [employer].  She gets the metformin from a mail order service called [facility] for Medicare Part D proscriiption. Pt has card in her possession. This nurse phoned the mail order service to verify. Pharmacist verified that pt takes metformin 500 mg twice per day. Pt last picked up the prescription in August 2019 for a 90 day supply. Notified ELEUTERIO Esets. Pt has pending CT renal angiogram, so metformin is on hold for now. Will continue to monitor pt.

## 2020-01-04 NOTE — PLAN OF CARE
Problem: Patient Care Overview  Goal: Plan of Care Review  1/4/2020 0351 by Christine Pedersen, RN  Outcome: Ongoing (interventions implemented as appropriate)  Flowsheets  Taken 1/4/2020 0350  Progress: improving  Plan of Care Reviewed With: patient  Taken 1/4/2020 0332  Outcome Summary: Pt. admitted 1/3/2020 for uncontrolled hypertension. Running NSR on heart monitor. Blood pressure was running high systolic of 200's. No complaints of chest pain. Will continue to monitor.   1/4/2020 0332 by Christine Pedersen, RN  Outcome: Ongoing (interventions implemented as appropriate)  Flowsheets (Taken 1/4/2020 0332)  Progress: improving  Plan of Care Reviewed With: patient  Outcome Summary: PT. admitted 1/3/2020 for uncontrolled hypertension. Running NSR on heart monitor. Blood pressure was running

## 2020-01-05 ENCOUNTER — APPOINTMENT (OUTPATIENT)
Dept: CT IMAGING | Facility: HOSPITAL | Age: 85
End: 2020-01-05

## 2020-01-05 LAB
ANION GAP SERPL CALCULATED.3IONS-SCNC: 14.7 MMOL/L (ref 5–15)
BUN BLD-MCNC: 21 MG/DL (ref 8–23)
BUN/CREAT SERPL: 28.4 (ref 7–25)
CALCIUM SPEC-SCNC: 9.1 MG/DL (ref 8.6–10.5)
CHLORIDE SERPL-SCNC: 104 MMOL/L (ref 98–107)
CO2 SERPL-SCNC: 21.3 MMOL/L (ref 22–29)
CREAT BLD-MCNC: 0.74 MG/DL (ref 0.57–1)
GFR SERPL CREATININE-BSD FRML MDRD: 75 ML/MIN/1.73
GLUCOSE BLD-MCNC: 202 MG/DL (ref 65–99)
GLUCOSE BLDC GLUCOMTR-MCNC: 135 MG/DL (ref 70–130)
GLUCOSE BLDC GLUCOMTR-MCNC: 135 MG/DL (ref 70–130)
GLUCOSE BLDC GLUCOMTR-MCNC: 176 MG/DL (ref 70–130)
GLUCOSE BLDC GLUCOMTR-MCNC: 227 MG/DL (ref 70–130)
POTASSIUM BLD-SCNC: 4.1 MMOL/L (ref 3.5–5.2)
SODIUM BLD-SCNC: 140 MMOL/L (ref 136–145)

## 2020-01-05 PROCEDURE — 80048 BASIC METABOLIC PNL TOTAL CA: CPT | Performed by: NURSE PRACTITIONER

## 2020-01-05 PROCEDURE — 25010000002 ENOXAPARIN PER 10 MG: Performed by: INTERNAL MEDICINE

## 2020-01-05 PROCEDURE — 63710000001 INSULIN LISPRO (HUMAN) PER 5 UNITS: Performed by: NURSE PRACTITIONER

## 2020-01-05 PROCEDURE — 25010000002 IOPAMIDOL 61 % SOLUTION: Performed by: INTERNAL MEDICINE

## 2020-01-05 PROCEDURE — 82962 GLUCOSE BLOOD TEST: CPT

## 2020-01-05 PROCEDURE — 99232 SBSQ HOSP IP/OBS MODERATE 35: CPT | Performed by: NURSE PRACTITIONER

## 2020-01-05 PROCEDURE — 74175 CTA ABDOMEN W/CONTRAST: CPT

## 2020-01-05 RX ORDER — HYDRALAZINE HYDROCHLORIDE 50 MG/1
50 TABLET, FILM COATED ORAL EVERY 8 HOURS SCHEDULED
Status: DISCONTINUED | OUTPATIENT
Start: 2020-01-05 | End: 2020-01-07 | Stop reason: HOSPADM

## 2020-01-05 RX ADMIN — SODIUM CHLORIDE, PRESERVATIVE FREE 10 ML: 5 INJECTION INTRAVENOUS at 20:53

## 2020-01-05 RX ADMIN — CELECOXIB 100 MG: 100 CAPSULE ORAL at 09:16

## 2020-01-05 RX ADMIN — INSULIN LISPRO 4 UNITS: 100 INJECTION, SOLUTION INTRAVENOUS; SUBCUTANEOUS at 21:01

## 2020-01-05 RX ADMIN — ENOXAPARIN SODIUM 40 MG: 40 INJECTION SUBCUTANEOUS at 20:50

## 2020-01-05 RX ADMIN — SPIRONOLACTONE 25 MG: 25 TABLET, FILM COATED ORAL at 09:16

## 2020-01-05 RX ADMIN — HYDRALAZINE HYDROCHLORIDE 50 MG: 50 TABLET, FILM COATED ORAL at 16:26

## 2020-01-05 RX ADMIN — SODIUM CHLORIDE, PRESERVATIVE FREE 10 ML: 5 INJECTION INTRAVENOUS at 09:00

## 2020-01-05 RX ADMIN — IOPAMIDOL 95 ML: 612 INJECTION, SOLUTION INTRAVENOUS at 14:22

## 2020-01-05 RX ADMIN — Medication 1000 MCG: at 09:16

## 2020-01-05 RX ADMIN — HYDRALAZINE HYDROCHLORIDE 25 MG: 25 TABLET, FILM COATED ORAL at 06:37

## 2020-01-05 RX ADMIN — CLOPIDOGREL 75 MG: 75 TABLET, FILM COATED ORAL at 09:16

## 2020-01-05 RX ADMIN — INSULIN LISPRO 2 UNITS: 100 INJECTION, SOLUTION INTRAVENOUS; SUBCUTANEOUS at 12:09

## 2020-01-05 RX ADMIN — VERAPAMIL HYDROCHLORIDE 240 MG: 240 TABLET, FILM COATED, EXTENDED RELEASE ORAL at 20:50

## 2020-01-05 RX ADMIN — LISINOPRIL 40 MG: 40 TABLET ORAL at 09:17

## 2020-01-05 RX ADMIN — CETIRIZINE HYDROCHLORIDE 5 MG: 10 TABLET, FILM COATED ORAL at 09:17

## 2020-01-05 RX ADMIN — HYDRALAZINE HYDROCHLORIDE 50 MG: 50 TABLET, FILM COATED ORAL at 21:01

## 2020-01-05 NOTE — PLAN OF CARE
Problem: Patient Care Overview  Goal: Plan of Care Review  Outcome: Ongoing (interventions implemented as appropriate)  Flowsheets (Taken 1/5/2020 0601)  Plan of Care Reviewed With: patient  Outcome Summary: PT SBP in the 120s-150s. 24 hour urine completed last night. CPAP at night. NS-SB on monitor. No complaints at this time. Will cont to monitor.

## 2020-01-05 NOTE — PROGRESS NOTES
Cardiology Follow-Up Note      Patient Name: Gela Thompson  Age/Sex: 85 y.o. female  : 1934  MRN: [id number]      Day of Service: 20       Chief Complaint/Follow-up: Hypertensive Urgency    Interval History: Hypertension, stroke in , TIA in  and diabetes, ROMAN/CPAP    S: Feeling better, says she woke up with a slight headache this morning but it is not bad and her vision is back to normal.      Temp:  [97.6 °F (36.4 °C)-98.3 °F (36.8 °C)] 97.6 °F (36.4 °C)  Heart Rate:  [50-83] 50  Resp:  [16-20] 16  BP: (121-191)/(55-69) 121/55     PHYSICAL EXAM:    General Appearance: No acute distress, well developed and well nourished.   Eyes: Conjunctiva and lids: No erythema, swelling, or discharge. Sclera non-icteric.   HENT: Atraumatic, normocephalic. External eyes, ears, and nose normal.   Respiratory: No signs of respiratory distress. Respiration rhythm and depth normal.   Clear to auscultation. No rales, crackles, rhonchi, or wheezing auscultated.   Cardiovascular:  Jugular Venous Pressure: Normal  Heart Rate and Rhythm: Normal, Heart Sounds: Normal S1 and S2. No S3 or S4 noted.  Murmurs: No murmurs noted. No rubs, thrills, or gallops.   Arterial Pulses: Posterior tibialis and dorsalis pedis pulses normal.   Lower Extremities: No edema noted.  Gastrointestinal:  Abdomen soft, non-distended, non-tender.  Musculoskeletal: Normal movement of extremities  Skin: Warm and dry.   Psychiatric: Patient alert and oriented to person, place, and time. Speech and behavior appropriate. Normal mood and affect.       ECG/TELE:         Results from last 7 days   Lab Units 20  0431 20  1557  19  1446   SODIUM mmol/L 139 138  --  141   POTASSIUM mmol/L 3.7 3.7  --  4.2   CHLORIDE mmol/L 103 103  --  103   TOTAL CO2 mmol/L  --   --   --  26   CO2 mmol/L 22.6 22.9  --   --    BUN mg/dL 17 17  --  19   CREATININE mg/dL 0.75 0.66  --  0.5*   GLUCOSE mg/dL 124* 163*   < >  --    CALCIUM mg/dL  9.5 9.4  --  9.6    < > = values in this interval not displayed.     Results from last 7 days   Lab Units 01/04/20  0431 01/03/20  1557 12/30/19  1446   WBC 10*3/mm3 7.58 6.60 7.70   HEMOGLOBIN g/dL 12.0 12.1 12.6   HEMATOCRIT % 34.2 34.6 37.3   PLATELETS 10*3/mm3 306 298 296     Results from last 7 days   Lab Units 12/30/19  1446   INR INR 1.0         Results from last 7 days   Lab Units 01/04/20  0431   TSH uIU/mL 1.140           Current Medications:   Scheduled Meds:  celecoxib 100 mg Oral Daily   cetirizine 5 mg Oral Daily   clopidogrel 75 mg Oral Daily   enoxaparin 40 mg Subcutaneous Q24H   hydrALAZINE 50 mg Oral Q8H   influenza vaccine 0.5 mL Intramuscular Once   insulin lispro 0-9 Units Subcutaneous 4x Daily With Meals & Nightly   lisinopril 40 mg Oral Q24H   sodium chloride 10 mL Intravenous Q12H   spironolactone 25 mg Oral Daily   verapamil  mg Oral Nightly   vitamin B-12 1,000 mcg Oral Daily     1/4/2020 Echo:    Interpretation Summary     · The left ventricular cavity is mildly dilated.  · Estimated EF appears to be in the range of 61 - 65%.  · Left ventricular wall thickness is consistent with mild concentric hypertrophy  · Left ventricular diastolic dysfunction is noted (grade I) consistent with impaired relaxation  · Normal right ventricular cavity size and systolic function noted.  · Left atrial cavity size is mild-to-moderately dilated.  · Mild aortic valve regurgitation is present.  · Borderline dilation of the aortic root is present.  · There is no evidence of pericardial effusion.          Uncontrolled hypertension       Plan:         -Hypertensive urgency/Uncontrolled HTN, improving, will increase hydralazine  -Thyroid function normal.  -Urine studies pending and Aldosterone/Renin pending.   -Echo in 2018 showed normal LV function with LVH and mild AI, repeat echo basically unchanged.  -History of stroke/TIA, clopidogrel continued.  -Diabetes, will hold metformin for now with her CT  angiogram pending and cover her with sliding scale insulin.    BMP this am pending, still awaiting CT angio, b/p improving, will increase hydralazine, ? Home tomorrow.        Vibha Bailey, APRN  01/05/20  8:16 AM

## 2020-01-06 LAB
ANION GAP SERPL CALCULATED.3IONS-SCNC: 14.7 MMOL/L (ref 5–15)
BUN BLD-MCNC: 20 MG/DL (ref 8–23)
BUN/CREAT SERPL: 32.3 (ref 7–25)
CALCIUM SPEC-SCNC: 9 MG/DL (ref 8.6–10.5)
CHLORIDE SERPL-SCNC: 106 MMOL/L (ref 98–107)
CO2 SERPL-SCNC: 21.3 MMOL/L (ref 22–29)
CREAT BLD-MCNC: 0.62 MG/DL (ref 0.57–1)
GFR SERPL CREATININE-BSD FRML MDRD: 91 ML/MIN/1.73
GLUCOSE BLD-MCNC: 127 MG/DL (ref 65–99)
GLUCOSE BLDC GLUCOMTR-MCNC: 118 MG/DL (ref 70–130)
GLUCOSE BLDC GLUCOMTR-MCNC: 129 MG/DL (ref 70–130)
GLUCOSE BLDC GLUCOMTR-MCNC: 129 MG/DL (ref 70–130)
GLUCOSE BLDC GLUCOMTR-MCNC: 158 MG/DL (ref 70–130)
POTASSIUM BLD-SCNC: 3.9 MMOL/L (ref 3.5–5.2)
SODIUM BLD-SCNC: 142 MMOL/L (ref 136–145)

## 2020-01-06 PROCEDURE — 25010000002 ENOXAPARIN PER 10 MG: Performed by: INTERNAL MEDICINE

## 2020-01-06 PROCEDURE — 25010000002 HYDRALAZINE PER 20 MG: Performed by: INTERNAL MEDICINE

## 2020-01-06 PROCEDURE — 63710000001 INSULIN LISPRO (HUMAN) PER 5 UNITS: Performed by: NURSE PRACTITIONER

## 2020-01-06 PROCEDURE — 99232 SBSQ HOSP IP/OBS MODERATE 35: CPT | Performed by: INTERNAL MEDICINE

## 2020-01-06 PROCEDURE — 80048 BASIC METABOLIC PNL TOTAL CA: CPT | Performed by: NURSE PRACTITIONER

## 2020-01-06 PROCEDURE — 82962 GLUCOSE BLOOD TEST: CPT

## 2020-01-06 RX ORDER — SPIRONOLACTONE 25 MG/1
25 TABLET ORAL ONCE
Status: COMPLETED | OUTPATIENT
Start: 2020-01-06 | End: 2020-01-06

## 2020-01-06 RX ORDER — SPIRONOLACTONE 50 MG/1
50 TABLET, FILM COATED ORAL DAILY
Status: DISCONTINUED | OUTPATIENT
Start: 2020-01-07 | End: 2020-01-07 | Stop reason: HOSPADM

## 2020-01-06 RX ADMIN — INSULIN LISPRO 3 UNITS: 100 INJECTION, SOLUTION INTRAVENOUS; SUBCUTANEOUS at 21:33

## 2020-01-06 RX ADMIN — HYDRALAZINE HYDROCHLORIDE 50 MG: 50 TABLET, FILM COATED ORAL at 21:33

## 2020-01-06 RX ADMIN — LISINOPRIL 40 MG: 40 TABLET ORAL at 09:00

## 2020-01-06 RX ADMIN — HYDRALAZINE HYDROCHLORIDE 50 MG: 50 TABLET, FILM COATED ORAL at 13:26

## 2020-01-06 RX ADMIN — VERAPAMIL HYDROCHLORIDE 240 MG: 240 TABLET, FILM COATED, EXTENDED RELEASE ORAL at 21:33

## 2020-01-06 RX ADMIN — SPIRONOLACTONE 25 MG: 25 TABLET, FILM COATED ORAL at 09:00

## 2020-01-06 RX ADMIN — SODIUM CHLORIDE, PRESERVATIVE FREE 10 ML: 5 INJECTION INTRAVENOUS at 21:36

## 2020-01-06 RX ADMIN — HYDRALAZINE HYDROCHLORIDE 10 MG: 20 INJECTION INTRAMUSCULAR; INTRAVENOUS at 22:24

## 2020-01-06 RX ADMIN — CLOPIDOGREL 75 MG: 75 TABLET, FILM COATED ORAL at 09:00

## 2020-01-06 RX ADMIN — CETIRIZINE HYDROCHLORIDE 5 MG: 10 TABLET, FILM COATED ORAL at 09:00

## 2020-01-06 RX ADMIN — Medication 1000 MCG: at 09:00

## 2020-01-06 RX ADMIN — SPIRONOLACTONE 25 MG: 25 TABLET, FILM COATED ORAL at 13:26

## 2020-01-06 RX ADMIN — ENOXAPARIN SODIUM 40 MG: 40 INJECTION SUBCUTANEOUS at 21:33

## 2020-01-06 RX ADMIN — SODIUM CHLORIDE, PRESERVATIVE FREE 10 ML: 5 INJECTION INTRAVENOUS at 09:01

## 2020-01-06 RX ADMIN — HYDRALAZINE HYDROCHLORIDE 50 MG: 50 TABLET, FILM COATED ORAL at 06:08

## 2020-01-06 RX ADMIN — CELECOXIB 100 MG: 100 CAPSULE ORAL at 09:00

## 2020-01-06 NOTE — PLAN OF CARE
Problem: Patient Care Overview  Goal: Plan of Care Review  Outcome: Ongoing (interventions implemented as appropriate)  Flowsheets (Taken 1/6/2020 0248)  Progress: improving  Plan of Care Reviewed With: patient  Outcome Summary: Pt. was admitted on 1/3/2020 for uncontrolled HTN. Adjusted pt.s medication increasing PO hydralazine and blood pressure has been more controlled. Has PRN hydralazine for systolic above 180. No complaints of chest pain. Running NSR on heart monitor. Pt. should d/c tomorrow. Will continue to monitor.

## 2020-01-06 NOTE — PROGRESS NOTES
Discharge Planning Assessment  [facility]     Patient Name: Gela Thompson  MRN: [id number]  Today's Date: 1/6/2020    Admit Date: 1/3/2020    Discharge Needs Assessment     Row Name 01/06/20 8171       Living Environment    Lives With  spouse    Name(s) of Who Lives With Patient  Dhaval Thompson, spouse (has dementia)    Current Living Arrangements  home/apartment/condo    Primary Care Provided by  self    Provides Primary Care For  spouse    Caregiving Concerns  Spouse with dementia    Family Caregiver if Needed  child(mamie), adult    Family Caregiver Names  Kia Thompson dtr    Quality of Family Relationships  helpful;involved;supportive    Able to Return to Prior Arrangements  yes       Resource/Environmental Concerns    Resource/Environmental Concerns  none       Transition Planning    Patient/Family Anticipates Transition to  home    Patient/Family Anticipated Services at Transition  none    Transportation Anticipated  family or friend will provide       Discharge Needs Assessment    Concerns to be Addressed  no discharge needs identified;denies needs/concerns at this time    Equipment Currently Used at Home  rollator;shower chair;grab bar;bath bench;commode;glucometer;bipap/cpap    Anticipated Changes Related to Illness  none    Equipment Needed After Discharge  none    Provided post acute provider list?  Refused        Discharge Plan     Row Name 01/06/20 6835       Plan    Plan  Home;  Denies needs.     Plan Comments  Spoke with Pt at bedside.  CCP introduced self and role.  Pt confirmed information on face sheet.  Pt stated she is IADL'S, retired & drives.  Pt lives in a house with three entrance steps with her spouse Dhaval Thompson whom has dementia.  Pt reports PCP is Karla Mora MD.  Pt confirms pharmacy as [address] [place].  Pt denies issues with affording her medications.  Pt reports she has used past home health but cannot recall the agency name.  Pt has been to [facility] in  "past but voices she would not return.  Pt has \"several\" rollator walker's at home.  Pt has shower chair, bath bench, glucometer, grab bars, bedside commode and CPAP at home.  Pt cannot recall where she get her CPAP supplies from.  Pt emergency contact is her daughter, Kia Thompson [phone].  Pt plans to return home at discharge.  Pt denies d/c needs.  CCP will continue to follow…ELLEN PINEDA/CCP        Destination      Coordination has not been started for this encounter.      Durable Medical Equipment      Coordination has not been started for this encounter.      Dialysis/Infusion      Coordination has not been started for this encounter.      Home Medical Care      Coordination has not been started for this encounter.      Therapy      Coordination has not been started for this encounter.      Community Resources      Coordination has not been started for this encounter.          Demographic Summary     Row Name 01/06/20 1748       General Information    Admission Type  inpatient    Arrived From  emergency department    Required Notices Provided  Important Message from Medicare    Referral Source  admission list    Reason for Consult  discharge planning    Preferred Language  English     Used During This Interaction  no        Functional Status     Row Name 01/06/20 1748       Functional Status    Usual Activity Tolerance  good    Current Activity Tolerance  good       Functional Status, IADL    Medications  independent    Meal Preparation  independent    Housekeeping  independent    Laundry  independent    Shopping  independent       Mental Status    General Appearance WDL  WDL       Mental Status Summary    Recent Changes in Mental Status/Cognitive Functioning  no changes       Employment/    Employment Status  retired        Psychosocial    No documentation.       Abuse/Neglect    No documentation.       Legal    No documentation.       Substance Abuse    No documentation.       Patient " Forms    No documentation.           Ramila Fisher RN

## 2020-01-06 NOTE — PROGRESS NOTES
"Patient Name: Gela Thompson  :1934  85 y.o.      Patient Care Team:  Karla Mora MD as PCP - General    Interval History:   Admitted for hypertensive urgency.    Subjective:  Following for elevated blood pressure.    Objective   Vital Signs  Temp:  [97.2 °F (36.2 °C)-98.2 °F (36.8 °C)] 97.6 °F (36.4 °C)  Heart Rate:  [54-81] 74  Resp:  [16-18] 17  BP: (132-182)/(44-78) 175/57    Intake/Output Summary (Last 24 hours) at 2020 0919  Last data filed at 2020 0750  Gross per 24 hour   Intake 720 ml   Output --   Net 720 ml     Flowsheet Rows      First Filed Value   Admission Height  160 cm (63\") Documented at 2020 1538   Admission Weight  82.8 kg (182 lb 9.6 oz) Documented at 2020 1538          Physical Exam:   General Appearance:    Alert, cooperative, in no acute distress   Lungs:     Clear to auscultation.  Normal respiratory effort and rate.      Heart:    Regular rhythm and normal rate, normal S1 and S2, no murmurs, gallops or rubs.     Chest Wall:    No abnormalities observed   Abdomen:     Soft, nontender, positive bowel sounds.     Extremities:   no cyanosis, clubbing or edema.  No marked joint deformities.  Adequate musculoskeletal strength.       Results Review:    Results from last 7 days   Lab Units 20  0417   SODIUM mmol/L 142   POTASSIUM mmol/L 3.9   CHLORIDE mmol/L 106   CO2 mmol/L 21.3*   BUN mg/dL 20   CREATININE mg/dL 0.62   GLUCOSE mg/dL 127*   CALCIUM mg/dL 9.0         Results from last 7 days   Lab Units 20  0431   WBC 10*3/mm3 7.58   HEMOGLOBIN g/dL 12.0   HEMATOCRIT % 34.2   PLATELETS 10*3/mm3 306     Results from last 7 days   Lab Units 19  1446   INR INR 1.0   APTT s 29.7                     Medication Review:     celecoxib 100 mg Oral Daily   cetirizine 5 mg Oral Daily   clopidogrel 75 mg Oral Daily   enoxaparin 40 mg Subcutaneous Q24H   hydrALAZINE 50 mg Oral Q8H   influenza vaccine 0.5 mL Intramuscular Once   insulin lispro 0-9 Units " Subcutaneous 4x Daily With Meals & Nightly   lisinopril 40 mg Oral Q24H   sodium chloride 10 mL Intravenous Q12H   spironolactone 25 mg Oral Daily   verapamil  mg Oral Nightly   vitamin B-12 1,000 mcg Oral Daily             Assessment/Plan     1.  Hypertensive urgency.  Her medications were adjusted.  Renal artery angiogram showed some calcification but no stenosis.  Echocardiogram showed an ejection fraction of 60 to 65%.  There was only mild aortic regurgitation.  5 HIAA is in progress.  Catecholamine work-up is in progress.  Nostra/renin labs are pending.  Thyroid labs were normal.  Blood pressures have been labile but overall better control.  -Hydralazine 50 mg every 8 hours.  Status post her first dose.  -Lisinopril 40 mg a day  -Aldactone 25 mg a day  -Verapamil 240 mg a day.  Hydralazine was just increased.  I am going to increase the Aldactone as well.  2.  Obstructive sleep apnea on CPAP  3.  History of stroke in 2011 and a TIA in 2016  4.  Diabetes    Increase Aldactone.  Monitor today.  Hopefully home tomorrow.    Monserrat Marsh MD, [facility]  01/06/20  9:19 AM

## 2020-01-07 VITALS
BODY MASS INDEX: 31.64 KG/M2 | RESPIRATION RATE: 18 BRPM | WEIGHT: 178.57 LBS | DIASTOLIC BLOOD PRESSURE: 67 MMHG | HEART RATE: 72 BPM | HEIGHT: 63 IN | TEMPERATURE: 97.8 F | SYSTOLIC BLOOD PRESSURE: 157 MMHG | OXYGEN SATURATION: 93 %

## 2020-01-07 LAB
ANION GAP SERPL CALCULATED.3IONS-SCNC: 13.5 MMOL/L (ref 5–15)
BASOPHILS # BLD AUTO: 0.12 10*3/MM3 (ref 0–0.2)
BASOPHILS NFR BLD AUTO: 1.7 % (ref 0–1.5)
BUN BLD-MCNC: 19 MG/DL (ref 8–23)
BUN/CREAT SERPL: 29.2 (ref 7–25)
CALCIUM SPEC-SCNC: 9.3 MG/DL (ref 8.6–10.5)
CHLORIDE SERPL-SCNC: 103 MMOL/L (ref 98–107)
CO2 SERPL-SCNC: 23.5 MMOL/L (ref 22–29)
CREAT BLD-MCNC: 0.65 MG/DL (ref 0.57–1)
DEPRECATED RDW RBC AUTO: 40.3 FL (ref 37–54)
EOSINOPHIL # BLD AUTO: 0.28 10*3/MM3 (ref 0–0.4)
EOSINOPHIL NFR BLD AUTO: 3.9 % (ref 0.3–6.2)
ERYTHROCYTE [DISTWIDTH] IN BLOOD BY AUTOMATED COUNT: 11.9 % (ref 12.3–15.4)
GFR SERPL CREATININE-BSD FRML MDRD: 87 ML/MIN/1.73
GLUCOSE BLD-MCNC: 134 MG/DL (ref 65–99)
GLUCOSE BLDC GLUCOMTR-MCNC: 127 MG/DL (ref 70–130)
GLUCOSE BLDC GLUCOMTR-MCNC: 133 MG/DL (ref 70–130)
GLUCOSE BLDC GLUCOMTR-MCNC: 137 MG/DL (ref 70–130)
HCT VFR BLD AUTO: 35.7 % (ref 34–46.6)
HGB BLD-MCNC: 12.6 G/DL (ref 12–15.9)
IMM GRANULOCYTES # BLD AUTO: 0.02 10*3/MM3 (ref 0–0.05)
IMM GRANULOCYTES NFR BLD AUTO: 0.3 % (ref 0–0.5)
LYMPHOCYTES # BLD AUTO: 2.94 10*3/MM3 (ref 0.7–3.1)
LYMPHOCYTES NFR BLD AUTO: 40.8 % (ref 19.6–45.3)
MCH RBC QN AUTO: 33 PG (ref 26.6–33)
MCHC RBC AUTO-ENTMCNC: 35.3 G/DL (ref 31.5–35.7)
MCV RBC AUTO: 93.5 FL (ref 79–97)
MONOCYTES # BLD AUTO: 0.67 10*3/MM3 (ref 0.1–0.9)
MONOCYTES NFR BLD AUTO: 9.3 % (ref 5–12)
NEUTROPHILS # BLD AUTO: 3.18 10*3/MM3 (ref 1.7–7)
NEUTROPHILS NFR BLD AUTO: 44 % (ref 42.7–76)
NRBC BLD AUTO-RTO: 0 /100 WBC (ref 0–0.2)
PLATELET # BLD AUTO: 293 10*3/MM3 (ref 140–450)
PMV BLD AUTO: 10.1 FL (ref 6–12)
POTASSIUM BLD-SCNC: 4 MMOL/L (ref 3.5–5.2)
RBC # BLD AUTO: 3.82 10*6/MM3 (ref 3.77–5.28)
SODIUM BLD-SCNC: 140 MMOL/L (ref 136–145)
WBC NRBC COR # BLD: 7.21 10*3/MM3 (ref 3.4–10.8)

## 2020-01-07 PROCEDURE — 82962 GLUCOSE BLOOD TEST: CPT

## 2020-01-07 PROCEDURE — 99238 HOSP IP/OBS DSCHRG MGMT 30/<: CPT | Performed by: NURSE PRACTITIONER

## 2020-01-07 PROCEDURE — 90686 IIV4 VACC NO PRSV 0.5 ML IM: CPT | Performed by: INTERNAL MEDICINE

## 2020-01-07 PROCEDURE — 85025 COMPLETE CBC W/AUTO DIFF WBC: CPT | Performed by: INTERNAL MEDICINE

## 2020-01-07 PROCEDURE — 80048 BASIC METABOLIC PNL TOTAL CA: CPT | Performed by: NURSE PRACTITIONER

## 2020-01-07 PROCEDURE — 63710000001 INSULIN LISPRO (HUMAN) PER 5 UNITS: Performed by: NURSE PRACTITIONER

## 2020-01-07 PROCEDURE — 25010000002 INFLUENZA VAC SPLIT QUAD 0.5 ML SUSPENSION PREFILLED SYRINGE: Performed by: INTERNAL MEDICINE

## 2020-01-07 PROCEDURE — G0008 ADMIN INFLUENZA VIRUS VAC: HCPCS | Performed by: INTERNAL MEDICINE

## 2020-01-07 RX ORDER — LISINOPRIL 40 MG/1
40 TABLET ORAL
Qty: 30 TABLET | Refills: 5 | Status: ON HOLD | OUTPATIENT
Start: 2020-01-08 | End: 2020-05-27 | Stop reason: SDUPTHER

## 2020-01-07 RX ORDER — HYDRALAZINE HYDROCHLORIDE 50 MG/1
50 TABLET, FILM COATED ORAL 3 TIMES DAILY
Qty: 90 TABLET | Refills: 5 | Status: SHIPPED | OUTPATIENT
Start: 2020-01-07 | End: 2020-07-30

## 2020-01-07 RX ORDER — SPIRONOLACTONE 50 MG/1
50 TABLET, FILM COATED ORAL DAILY
Qty: 30 TABLET | Refills: 5 | Status: SHIPPED | OUTPATIENT
Start: 2020-01-08 | End: 2020-05-27 | Stop reason: HOSPADM

## 2020-01-07 RX ADMIN — CELECOXIB 100 MG: 100 CAPSULE ORAL at 09:37

## 2020-01-07 RX ADMIN — CLOPIDOGREL 75 MG: 75 TABLET, FILM COATED ORAL at 09:37

## 2020-01-07 RX ADMIN — SPIRONOLACTONE 50 MG: 50 TABLET, FILM COATED ORAL at 09:37

## 2020-01-07 RX ADMIN — CETIRIZINE HYDROCHLORIDE 5 MG: 10 TABLET, FILM COATED ORAL at 09:37

## 2020-01-07 RX ADMIN — SODIUM CHLORIDE, PRESERVATIVE FREE 10 ML: 5 INJECTION INTRAVENOUS at 09:38

## 2020-01-07 RX ADMIN — INFLUENZA VIRUS VACCINE 0.5 ML: 15; 15; 15; 15 SUSPENSION INTRAMUSCULAR at 10:55

## 2020-01-07 RX ADMIN — INSULIN LISPRO 2 UNITS: 100 INJECTION, SOLUTION INTRAVENOUS; SUBCUTANEOUS at 06:51

## 2020-01-07 RX ADMIN — HYDRALAZINE HYDROCHLORIDE 50 MG: 50 TABLET, FILM COATED ORAL at 05:26

## 2020-01-07 RX ADMIN — Medication 1000 MCG: at 09:37

## 2020-01-07 RX ADMIN — LISINOPRIL 40 MG: 40 TABLET ORAL at 09:37

## 2020-01-07 NOTE — DISCHARGE SUMMARY
Hospital Discharge    Patient Name: Gela Thompson  Age/Sex: 85 y.o. female  : 1934  MRN: [id number]    Encounter Provider: ELEUTERIO Fagan  Referring Provider: Farhan Gutierrez MD  Place of Service: [facility] CARDIOLOGY  Patient Care Team:  Karla Mora MD as PCP - General         Date of Discharge:  2020   Date of Admit: 1/3/2020    Discharge Condition: Stable  Discharge Diagnosis:    Uncontrolled hypertension      Hospital Course:   Gela Thompson is a 85 y.o. female with a history of HTN, stroke, and diabetes. She presented to the emergency room on 1/3/2020 with complaints of elevated blood pressure (226/93) and head ache. She was recently seen in a [facility] and was treated with Iv hydralazine.    She was admitted for further evaluation and treatment of blood pressure. Her medications were adjusted. Hydralazine was increased to 50 mg TID. Trandolapril was changed to lisinopril as this is the formulary ACE-I at [facility]. I will continue this at discharge. She was also started on spironolactone and this was titrated to 50 mg. She remains on verapamil.     Renal artery angiogram showed some calcification but no stenosis.  Echocardiogram showed an ejection fraction of 60 to 65%.  There was only mild aortic regurgitation.  5 HIAA is in progress.  Catecholamine work-up is in progress.  Nostra/renin labs are pending.  Thyroid labs were normal    Patient's blood pressure today has been -150's. She has only received one dose of the increased dose of spironolactone. I think her blood pressure is in acceptable range for discharge and I suspect it will continue to improve as the medications reach steady state. She has been instructed to keep a blood pressure log and bring it to her follow up appointment.     Given the addition of spironolactone to ACE-I. She will have a BMP in one week. She will follow up with ELEUTERIO Argueta in one week.        Objective:  Temp:  [97.4 °F (36.3 °C)-98.2 °F (36.8 °C)] 97.8 °F (36.6 °C)  Heart Rate:  [63-82] 66  Resp:  [17-18] 18  BP: (132-193)/(50-80) 132/52    Intake/Output Summary (Last 24 hours) at 1/7/2020 1207  Last data filed at 1/6/2020 1600  Gross per 24 hour   Intake 120 ml   Output --   Net 120 ml     Body mass index is 31.64 kg/m².      01/03/20  2156 01/04/20  0527 01/04/20  0803   Weight: 81 kg (178 lb 8 oz) 80.9 kg (178 lb 6.4 oz) 81 kg (178 lb 9.2 oz)     Weight change:     Physical Exam:  Constitutional: She is oriented to person, place, and time. She appears well-developed. She does not appear ill.   Neck: No JVD present. Carotid bruit is not present. No tracheal deviation present. No thyroid mass and no thyromegaly present.   Cardiovascular: Normal rate, regular rhythm and normal heart sounds.    Pulses:       Posterior tibial pulses are 2+ on the right side, and 2+ on the left side.   Pulmonary/Chest: Effort normal and breath sounds normal.   Abdominal: Soft. Normal appearance and bowel sounds are normal. There is no tenderness.   Musculoskeletal: Normal range of motion.        Right shoulder: She exhibits no deformity.        Left shoulder: She exhibits no deformity.   Neurological: She is alert and oriented to person, place, and time. She has normal strength.   Skin: Skin is warm, dry and intact. No rash noted.   Psychiatric: She has a normal mood and affect. Her behavior is normal. Thought content normal.   Vitals reviewed      Procedures Performed  Echo 1/4/2020  · The left ventricular cavity is mildly dilated.  · Estimated EF appears to be in the range of 61 - 65%.  · Left ventricular wall thickness is consistent with mild concentric hypertrophy  · Left ventricular diastolic dysfunction is noted (grade I) consistent with impaired relaxation  · Normal right ventricular cavity size and systolic function noted.  · Left atrial cavity size is mild-to-moderately dilated.  · Mild aortic valve  regurgitation is present.  · Borderline dilation of the aortic root is present.  · There is no evidence of pericardial effusion.       Consults:  Consults     Date and Time Order Name Status Description    1/3/2020 [address] (on-call MD unless specified) Completed           Pertinent Test Results:  Results from last 7 days   Lab Units 01/07/20  0426 01/06/20  0417 01/05/20  0821 01/04/20  0431 01/03/20  1557   SODIUM mmol/L 140 142 140 139 138   POTASSIUM mmol/L 4.0 3.9 4.1 3.7 3.7   CHLORIDE mmol/L 103 106 104 103 103   CO2 mmol/L 23.5 21.3* 21.3* 22.6 22.9   BUN mg/dL 19 20 21 17 17   CREATININE mg/dL 0.65 0.62 0.74 0.75 0.66   GLUCOSE mg/dL 134* 127* 202* 124* 163*   CALCIUM mg/dL 9.3 9.0 9.1 9.5 9.4   AST (SGOT) U/L  --   --   --   --  21   ALT (SGPT) U/L  --   --   --   --  23         Results from last 7 days   Lab Units 01/07/20  0426 01/04/20  0431 01/03/20  1557   WBC 10*3/mm3 7.21 7.58 6.60   HEMOGLOBIN g/dL 12.6 12.0 12.1   HEMATOCRIT % 35.7 34.2 34.6   PLATELETS 10*3/mm3 293 306 298                   Invalid input(s): LDLCALC      Results from last 7 days   Lab Units 01/04/20  0431   TSH uIU/mL 1.140       Discharge Medications     Discharge Medications      New Medications      Instructions Start Date   lisinopril 40 MG tablet  Commonly known as:  PRINIVIL,ZESTRIL  Replaces:  trandolapril 4 MG tablet   40 mg, Oral, Every 24 Hours Scheduled   Start Date:  January 8, 2020     spironolactone 50 MG tablet  Commonly known as:  ALDACTONE   50 mg, Oral, Daily   Start Date:  January 8, 2020        Changes to Medications      Instructions Start Date   furosemide 20 MG tablet  Commonly known as:  LASIX  What changed:    · reasons to take this  · additional instructions   TAKE ONE TABLET BY MOUTH DAILY AS NEEDED      hydrALAZINE 50 MG tablet  Commonly known as:  APRESOLINE  What changed:    · medication strength  · how much to take  · when to take this   50 mg, Oral, 3 Times Daily, Patient has been taking three  times daily. New medication prescribed on 12/31/19 from [facility] provider       potassium chloride 10 MEQ CR tablet  Commonly known as:  K-DUR,KLOR-ATIYA  What changed:  reasons to take this   TAKE ONE TABLET BY MOUTH DAILY AS NEEDED         Continue These Medications      Instructions Start Date   ALLEGRA PO   60 mg, Oral, Daily      CALCIUM 1000 + D PO   1 tablet, Oral, Daily      CBD oral oil  Commonly known as:  cannabidiol   0.5 mL, Oral, 2 Times Daily, Taking 1/2 dropper (10 mg) BID for arthritis pain       CELEBREX 100 MG capsule  Generic drug:  celecoxib   1 capsule, Oral, Daily      L-Lysine 500 MG tablet tablet   1 tablet, Oral, Daily      MULTIVITAMINS PO   1 tablet, Oral, Daily      PLAVIX PO   75 mg, Oral, Daily      traMADol 50 MG tablet  Commonly known as:  ULTRAM   1 tablet, Oral, Daily PRN, Patient usually takes 1/2 tab and takes very seldomly      verapamil  MG CR tablet  Commonly known as:  CALAN-SR   240 mg, Oral, Nightly      vitamin B-12 1000 MCG tablet  Commonly known as:  CYANOCOBALAMIN   1 tablet, Oral, Daily, As directed         Stop These Medications    trandolapril 4 MG tablet  Commonly known as:  MAVIK  Replaced by:  lisinopril 40 MG tablet            Discharge Diet:    Dietary Orders (From admission, onward)     Start     Ordered    01/05/20 2140  Diet Regular; Cardiac, Consistent Carbohydrate  Diet Effective Now     Comments:  Diabetic diet please pt. Complained of a lot of carbs on tray.   Question Answer Comment   Diet Texture / Consistency Regular    Common Modifiers Cardiac    Common Modifiers Consistent Carbohydrate        01/05/20 2140                Activity at Discharge:    Activity Instructions     Measure Blood Pressure      Twice a day or if feeling poorly. Bring log to follow up appointment.           Discharge disposition: home     Discharge Instructions and Follow ups:  No future appointments.  Additional Instructions for the Follow-ups that You Need to Schedule      Discharge Follow-up with Specified Provider: ELEUTERIO Argueta; 1 Week   As directed      To:  ELEUTERIO Argueta    Follow Up:  1 Week         Basic Metabolic Panel    Jan 12, 2020 (Approximate)        Follow-up Information     Karla Mora MD .    Specialty:  Pediatrics  Contact information:  [address] [id number] [phone]                   Test Results Pending at Discharge: BMP in one week.      ELEUTERIO Fagan  01/07/20  12:07 PM

## 2020-01-07 NOTE — PLAN OF CARE
Problem: Patient Care Overview  Goal: Plan of Care Review  Outcome: Ongoing (interventions implemented as appropriate)  Flowsheets  Taken 1/6/2020 0248  Progress: improving  Taken 1/7/2020 0318  Plan of Care Reviewed With: patient  Outcome Summary: Pt. was admitted on 1/3/2020 for uncontrolled hypertension. Adjusted pts. medications again yesterday. Blood pressure ran higher tonight with systolic of 190 gave PRN hydralazine. NSR on heart monitor. No complaints of chest pain. Will continue to monitor.

## 2020-01-08 ENCOUNTER — READMISSION MANAGEMENT (OUTPATIENT)
Dept: CALL CENTER | Facility: HOSPITAL | Age: 85
End: 2020-01-08

## 2020-01-08 NOTE — OUTREACH NOTE
Prep Survey      Responses   Facility patient discharged from?  [facility]   Is patient eligible?  Yes   Discharge diagnosis  Uncontrolled hypertension   Does the patient have one of the following disease processes/diagnoses(primary or secondary)?  Other   Does the patient have Home health ordered?  No   Is there a DME ordered?  No   Prep survey completed?  Yes          Haydee Ospina RN

## 2020-01-09 LAB
5OH-INDOLEACETATE 24H UR-MCNC: 1.6 MG/L
5OH-INDOLEACETATE 24H UR-MRATE: 2.1 MG/24 HR (ref 0–14.9)
ALDOST SERPL-MCNC: <1 NG/DL (ref 0–30)
ALDOST/RENIN PLAS-RTO: <.9 {RATIO} (ref 0–30)
RENIN PLAS-CCNC: 1.06 NG/ML/HR (ref 0.17–5.38)

## 2020-01-10 ENCOUNTER — TELEPHONE (OUTPATIENT)
Dept: CARDIOLOGY | Facility: CLINIC | Age: 85
End: 2020-01-10

## 2020-01-10 ENCOUNTER — READMISSION MANAGEMENT (OUTPATIENT)
Dept: CALL CENTER | Facility: HOSPITAL | Age: 85
End: 2020-01-10

## 2020-01-10 ENCOUNTER — NURSE TRIAGE (OUTPATIENT)
Dept: CALL CENTER | Facility: HOSPITAL | Age: 85
End: 2020-01-10

## 2020-01-10 DIAGNOSIS — I50.9 CONGESTIVE HEART FAILURE, UNSPECIFIED HF CHRONICITY, UNSPECIFIED HEART FAILURE TYPE (HCC): Primary | ICD-10-CM

## 2020-01-10 LAB
DOPAMINE 24H UR-MRATE: 143 UG/24 HR (ref 0–510)
DOPAMINE UR-MCNC: 110 UG/L
EPINEPH 24H UR-MRATE: 4 UG/24 HR (ref 0–20)
EPINEPH UR-MCNC: 3 UG/L
NOREPINEPH 24H UR-MRATE: 38 UG/24 HR (ref 0–135)
NOREPINEPH UR-MCNC: 29 UG/L
VMA 24H UR-MRATE: 2.5 MG/24 HR (ref 0–7.5)
VMA UR-MCNC: 1.9 MG/L

## 2020-01-10 NOTE — TELEPHONE ENCOUNTER
01/10/20  1:56 PM  Gela Thompson  1934  Home Phone [phone]   Mobile [phone]       Gela Thompson is a patient of  with a history of HTN. She was in the ER for high blood pressure readings.     She was stared on Lisinopril 40 mg, Spironolactone 50 mg and Hydralazine 50 mg daily.     She states her blood pressure is much better. She told me her top numbers today was 152 however she couldn't remember the bottom number. However she has been having extreme leg cramps since she started taking her new medications.     She is not having any swelling anywhere and denies any chest pain or SOA.     She also stated that she was taking lidocaine before her ER visit for back pain and wants to know if that could have been the cause for her blood pressure spike. She forgot to tell the ER that she was on lidocaine.     She wants to know if hse needs a med adjustment. She is scheduled to see  1/15/2020.     She can be reached at [phone]    Thanks  ALONZO

## 2020-01-10 NOTE — TELEPHONE ENCOUNTER
"    Reason for Disposition  • [1] Caller requesting NON-URGENT health information AND [2] PCP's office is the best resource    Additional Information  • Negative: [1] Caller is not with the adult (patient) AND [2] reporting urgent symptoms  • Negative: Lab result questions  • Negative: Medication questions  • Negative: Caller can't be reached by phone  • Negative: Caller has already spoken to PCP or another triager  • Negative: RN needs further essential information from caller in order to complete triage  • Negative: Requesting regular office appointment    Answer Assessment - Initial Assessment Questions  1. REASON FOR CALL or QUESTION: \"What is your reason for calling today?\" or \"How can I best help you?\" or \"What question do you have that I can help answer?\"      She was recently in the hospital for high blood pressure and feels that new medication may be causing leg cramps. She was instructed to call cardiologist for further. She also remembered that she was using lidocaine patches for back pain prior to hospital stay and thinks this may be cause of high blood pressure. She was instructed to notify cardiologist of this.    Protocols used: INFORMATION ONLY CALL-ADULT-      "

## 2020-01-10 NOTE — TELEPHONE ENCOUNTER
She can take lidocaine patch. Shouldn't be an issue with BP. Continue same meds. I'm going to put in labs. Keep appt with FÉLIX

## 2020-01-10 NOTE — TELEPHONE ENCOUNTER
I let pt know to keep her appointment and to come in for labs with no medication changes.     Pt understood    Thanks   SW

## 2020-01-10 NOTE — OUTREACH NOTE
Medical Week 1 Survey      Responses   Facility patient discharged from?  [facility]   Does the patient have one of the following disease processes/diagnoses(primary or secondary)?  Other   Is there a successful TCM telephone encounter documented?  No   Week 1 attempt successful?  Yes   Call start time  1435   Call end time  1446   Discharge diagnosis  Uncontrolled hypertension   Meds reviewed with patient/caregiver?  Yes   Is the patient having any side effects they believe may be caused by any medication additions or changes?  Yes   Side effects comments   leg cramps   Does the patient have all medications ordered at discharge?  Yes   Is the patient taking all medications as directed (includes completed medication regime)?  Yes   Does the patient have a primary care provider?   Yes   Does the patient have an appointment with their PCP within 7 days of discharge?  Yes   Has the patient kept scheduled appointments due by today?  N/A   Has home health visited the patient within 72 hours of discharge?  N/A   Psychosocial issues?  No   Did the patient receive a copy of their discharge instructions?  Yes   Nursing interventions  Reviewed instructions with patient   What is the patient's perception of their health status since discharge?  Improving   Is the patient/caregiver able to teach back signs and symptoms related to disease process for when to call PCP?  Yes   Is the patient/caregiver able to teach back signs and symptoms related to disease process for when to call 911?  Yes   Is the patient/caregiver able to teach back the hierarchy of who to call/visit for symptoms/problems? PCP, Specialist, Home health nurse, Urgent Care, ED, 911  Yes   Additional teach back comments  Having bad leg cramps--has notified her MD. Otherwise, feeling fairly good.    Week 1 call completed?  Yes          Leslie Heart RN

## 2020-01-15 ENCOUNTER — OFFICE VISIT (OUTPATIENT)
Dept: CARDIOLOGY | Facility: CLINIC | Age: 85
End: 2020-01-15

## 2020-01-15 ENCOUNTER — LAB (OUTPATIENT)
Dept: LAB | Facility: HOSPITAL | Age: 85
End: 2020-01-15

## 2020-01-15 VITALS
HEART RATE: 86 BPM | SYSTOLIC BLOOD PRESSURE: 132 MMHG | HEIGHT: 63 IN | WEIGHT: 179.4 LBS | DIASTOLIC BLOOD PRESSURE: 60 MMHG | BODY MASS INDEX: 31.79 KG/M2

## 2020-01-15 DIAGNOSIS — G47.33 OBSTRUCTIVE SLEEP APNEA SYNDROME: ICD-10-CM

## 2020-01-15 DIAGNOSIS — I10 ESSENTIAL HYPERTENSION: Primary | ICD-10-CM

## 2020-01-15 DIAGNOSIS — I50.9 CONGESTIVE HEART FAILURE, UNSPECIFIED HF CHRONICITY, UNSPECIFIED HEART FAILURE TYPE (HCC): ICD-10-CM

## 2020-01-15 LAB
ALBUMIN SERPL-MCNC: 4.5 G/DL (ref 3.5–5.2)
ALBUMIN/GLOB SERPL: 1.5 G/DL
ALP SERPL-CCNC: 93 U/L (ref 39–117)
ALT SERPL W P-5'-P-CCNC: 23 U/L (ref 1–33)
ANION GAP SERPL CALCULATED.3IONS-SCNC: 15.9 MMOL/L (ref 5–15)
AST SERPL-CCNC: 20 U/L (ref 1–32)
BILIRUB SERPL-MCNC: 0.2 MG/DL (ref 0.2–1.2)
BUN BLD-MCNC: 25 MG/DL (ref 8–23)
BUN/CREAT SERPL: 27.8 (ref 7–25)
CALCIUM SPEC-SCNC: 9.8 MG/DL (ref 8.6–10.5)
CHLORIDE SERPL-SCNC: 95 MMOL/L (ref 98–107)
CO2 SERPL-SCNC: 24.1 MMOL/L (ref 22–29)
CREAT BLD-MCNC: 0.9 MG/DL (ref 0.57–1)
GFR SERPL CREATININE-BSD FRML MDRD: 60 ML/MIN/1.73
GLOBULIN UR ELPH-MCNC: 3 GM/DL
GLUCOSE BLD-MCNC: 86 MG/DL (ref 65–99)
POTASSIUM BLD-SCNC: 4.6 MMOL/L (ref 3.5–5.2)
PROT SERPL-MCNC: 7.5 G/DL (ref 6–8.5)
SODIUM BLD-SCNC: 135 MMOL/L (ref 136–145)

## 2020-01-15 PROCEDURE — 80053 COMPREHEN METABOLIC PANEL: CPT

## 2020-01-15 PROCEDURE — 36415 COLL VENOUS BLD VENIPUNCTURE: CPT

## 2020-01-15 PROCEDURE — 99214 OFFICE O/P EST MOD 30 MIN: CPT | Performed by: NURSE PRACTITIONER

## 2020-01-15 PROCEDURE — 93000 ELECTROCARDIOGRAM COMPLETE: CPT | Performed by: NURSE PRACTITIONER

## 2020-01-15 NOTE — PROGRESS NOTES
Date of Office Visit: 01/15/2020  Encounter Provider: Charlene Kang, ROBI, ELEUTERIO  Place of Service: [facility] CARDIOLOGY  Patient Name: Gela Thompson  :1934        Subjective:     Chief Complaint:  Follow-up, hypertension with history of hypertensive urgency      History of Present Illness:  Gela Thompson is a 85 y.o. female patient of Dr. Marsh.  This is my first time seeing this patient in the office today and I have reviewed her records.    Patient has a history of hypertension, hypertensive urgency, shortness of breath, diabetes, vertigo, stroke in , TIA in , obstructive sleep apnea on CPAP.    Patient was seen 2011 with complaints of shortness of breath which had been occurring for a few months.  Nuclear stress test showed she was only able to exercise for 4 minutes and 30 seconds on the Saeid protocol but there is no evidence of ischemia.  Echo showed normal LV function with EF of 61% and stage I diastolic dysfunction and trace tricuspid regurgitation.  She wore 30-day event monitor during which she had no shortness of breath episodes.  Later when following up with primary care provider she again complained of shortness of breath with activities.  She was sent for another echo which was unremarkable.  CT scan of the chest was noncontrasted and was done summer 2014 and showed she had some coronary artery calcification.  2014 she complained of dyspnea and a nuclear stress test showed no evidence of ischemia.  Repeat echo 2015 showed normal LV systolic function, no comment was made on diastolic function, mild aortic regurgitation, moderate mitral regurgitation, mild tricuspid regurgitation with normal RVSP were seen.  The prior year's echo had shown no evidence of mitral regurgitation.  She was seen by APRN in  and was on Pradaxa.  She wore a 24-hour monitor 2017 which was normal.  Strips were reviewed by Dr. Gibson who did  not feel that she had atrial fibrillation and Pradaxa was discontinued as it was causing her GI upset.  Patient was seen in the ER October 2018 with chest pain which ended up being acid reflux.    Patient presented to [facility] emergency room 1/3/2020 after having blood pressure readings as high as 226/93.  Blood pressures were similar in both arms.  She had noticed a wide pulse pressure.  She was having more shortness of breath than normal but could still perform moderate activities without difficulty.  She denied chest discomfort.  She reported a mild headache and some blurred vision although reported that neither were severe.  She was given IV hydralazine and admitted for further treatment.  Cardiology was consulted and blood pressure medications were adjusted.  CT of renal artery showed scattered arterial calcifications but no hemodynamically significant renal artery stenosis.  Thyroid function was normal.  Additional testing was run for secondary causes of hypertension.  Echocardiogram showed EF of 60 to 65% with only mild aortic regurgitation.  Blood pressure medications were titrated until adequate blood pressure control was achieved.      Patient presents to office today for follow-up appointment.  Patient reports she has been doing okay since ER discharge.  She is going to go to the lab today after her visit to get CMP done.  She has some chronic shortness of breath with heavier exertion which is at baseline for her and is not new or worsening.  She denies any chest pain, palpitations, racing heartbeat sensation, lower extremity edema, dizziness, syncope, near syncope, falls, or abnormal bleeding.  She has a history of sleep apnea and uses CPAP machine every night.  Her blood pressure in the office today is well controlled at 130/62.  She has a blood pressure log with her in the office today which is hard to decipher but shows blood pressures fluctuating 130s to 180s.  She is getting drastically different  blood pressure readings even when she takes her blood pressure back to back only a minute apart.  She reports that her [employer] blood pressure cuff is about 1-year-old.  She has not changed the batteries recently.  She is going to change the batteries in her blood pressure cuff and have her cuff rechecked for accuracy.  She reports she has an appointment with her primary care provider within the next couple of days and that her primary care provider already reported that they wanted to check her blood pressure cuff so she will have it checked there.  She also has not been checking her blood pressure after medications.  I am going to have her check her blood pressure at least 1 to 2 hours after morning medications and after resting calmly 10 to 15 minutes, keep a log, and call with updated readings in a couple of days or call sooner if she develops any elevated readings at which point we will adjust medications further.         Past Medical History:   Diagnosis Date   • Abnormal echocardiogram    • Adiposity    • Anemia    • Cerebrovascular accident (CMS/[facility])    • Chest pain    • Chronic low back pain    • Diabetes mellitus (CMS/[facility])    • Dyslipidemia    • Dyspnea    • Gastroesophageal reflux disease    • H/O echocardiogram 06/27/2014   • Health care maintenance    • History of EKG 10/29/2015   • Hyperlipidemia    • Hypertension    • Mitral valve insufficiency    • Murmur    • ROMAN (obstructive sleep apnea)    • Osteoarthritis      Past Surgical History:   Procedure Laterality Date   • CHOLECYSTECTOMY     • HYSTERECTOMY     • KNEE SURGERY Bilateral    • NEUROPLASTY      deompression median nerve at carpal tunnel bilateral   • SHOULDER SURGERY Left      Outpatient Medications Prior to Visit   Medication Sig Dispense Refill   • CBD (cannabidiol) oral oil Take 0.5 mL by mouth 2 (Two) Times a Day. Taking 1/2 dropper (10 mg) BID for arthritis pain  - currently only applying to her skin - not taking orally     • Calcium  Carb-Cholecalciferol (CALCIUM 1000 + D PO) Take 1 tablet by mouth Daily.     • celecoxib (CELEBREX) 100 MG capsule Take 1 capsule by mouth Daily.     • Clopidogrel Bisulfate (PLAVIX PO) Take 75 mg by mouth Daily.     • Fexofenadine HCl (ALLEGRA PO) Take 60 mg by mouth Daily.     • furosemide (LASIX) 20 MG tablet TAKE ONE TABLET BY MOUTH DAILY AS NEEDED (Patient taking differently: Take 20 mg by mouth Daily As Needed (Leg swelling or SOB). Hasn't taken in several months) 90 tablet 2   • hydrALAZINE (APRESOLINE) 50 MG tablet Take 1 tablet by mouth 3 (Three) times a day. 90 tablet 5   • L-Lysine 500 MG tablet Take 1 tablet by mouth Daily.     • lisinopril (PRINIVIL,ZESTRIL) 40 MG tablet Take 1 tablet by mouth daily. 30 tablet 5   • Multiple Vitamin (MULTIVITAMINS PO) Take 1 tablet by mouth Daily.     • potassium chloride (K-DUR,KLOR-CON) 10 MEQ CR tablet TAKE ONE TABLET BY MOUTH DAILY AS NEEDED (Patient taking differently: Take 10 mEq by mouth Daily As Needed (Takes with furosemide).) 30 tablet 0   • spironolactone (ALDACTONE) 50 MG tablet Take 1 tablet by mouth daily. 30 tablet 5   • traMADol (ULTRAM) 50 MG tablet Take 1 tablet by mouth Daily As Needed (Arthritis pain). Patient usually takes 1/2 tab and takes very seldomly     • verapamil SR (CALAN-SR) 240 MG CR tablet Take 240 mg by mouth Every Night.     • vitamin B-12 (CYANOCOBALAMIN) 1000 MCG tablet Take 1 tablet by mouth Daily. As directed       No facility-administered medications prior to visit.        Allergies as of 01/15/2020 - Reviewed 01/15/2020   Allergen Reaction Noted   • Penicillins Hives 10/05/2016   • Morphine GI Intolerance 10/05/2016   • Statins Myalgia 10/05/2016   • Tape Rash 10/12/2018     Social History     Socioeconomic History   • Marital status:      Spouse name: Not on file   • Number of children: Not on file   • Years of education: Not on file   • Highest education level: Not on file   Tobacco Use   • Smoking status: Never Smoker  "  • Smokeless tobacco: Never Used   • Tobacco comment: caffeine use   Substance and Sexual Activity   • Alcohol use: No   • Drug use: No   • Sexual activity: Defer     Family History   Problem Relation Age of Onset   • Heart disease Mother    • Heart attack Mother    • Heart disease Sister    • Stroke Father        Review of Systems   Constitution: Positive for malaise/fatigue and weight loss (6 lb). Negative for chills, fever and weight gain.   HENT: Negative for ear pain, hearing loss, nosebleeds and sore throat.    Eyes: Negative for blurred vision, double vision, redness and visual disturbance (\"when BP high\").   Cardiovascular:        SEE HPI.    Respiratory: Positive for cough. Negative for shortness of breath, snoring and wheezing.    Endocrine: Negative for cold intolerance and heat intolerance.   Skin: Positive for itching. Negative for rash and suspicious lesions.   Musculoskeletal: Positive for joint pain, joint swelling and myalgias.   Gastrointestinal: Negative for abdominal pain, diarrhea, hematemesis, melena, nausea and vomiting.   Genitourinary: Positive for frequency. Negative for dysuria and hematuria.   Neurological: Positive for headaches. Negative for dizziness, numbness and seizures.   Psychiatric/Behavioral: Negative for altered mental status and depression. The patient is not nervous/anxious.           Objective:     Vitals:    01/15/20 1401   BP: 132/60   BP Location: Right arm   Pulse: 86   Weight: 81.4 kg (179 lb 6.4 oz)   Height: 160 cm (63\")     Body mass index is 31.78 kg/m².      PHYSICAL EXAM:  Physical Exam   Constitutional: She is oriented to person, place, and time. She appears well-developed and well-nourished. No distress.   HENT:   Head: Normocephalic and atraumatic.   Eyes: Pupils are equal, round, and reactive to light.   Neck: Neck supple. No JVD present. Carotid bruit is not present.   Cardiovascular: Normal rate, regular rhythm, normal heart sounds and intact distal pulses. " Exam reveals no gallop and no friction rub.   No murmur heard.  Pulses:       Radial pulses are 2+ on the right side, and 2+ on the left side.        Posterior tibial pulses are 2+ on the right side, and 2+ on the left side.   Pulmonary/Chest: Effort normal and breath sounds normal. No respiratory distress. She has no wheezes. She has no rales.   Abdominal: Soft. Bowel sounds are normal. She exhibits no distension. There is no tenderness.   Musculoskeletal: She exhibits no edema, tenderness or deformity.   Neurological: She is alert and oriented to person, place, and time.   Skin: Skin is warm and dry. No rash noted. She is not diaphoretic. No erythema.   Psychiatric: She has a normal mood and affect. Her behavior is normal. Judgment normal.           ECG 12 Lead  Date/Time: 1/15/2020 3:30 PM  Performed by: Charlene Kang DNP, APRN  Authorized by: Charlene Kang DNP, APRN   Comparison: compared with previous ECG from 12/5/2018  Rhythm: sinus rhythm  Rate: normal  BPM: 86  Comments: No significant changes from previous ECG              Assessment:       Diagnosis Plan   1. Essential hypertension     2. Obstructive sleep apnea syndrome           Plan:     1. Hypertension: Blood pressure is well controlled in the office today.  She is getting a large fluctuation in readings at home, including readings taken minutes apart.  She did have some elevated readings the day after having high salt meals.  We discussed watching salt intake.  I am not sure her blood pressure machine is accurate.  She is going to change the batteries in her machine and bring it to her upcoming appointment with her primary care provider so the machine can be checked for accuracy.  She is going to start taking blood pressure at least 1 to 2 hours after morning medications and after resting calmly 10 to 15 minutes.  She will keep a log and call with updated readings on Monday or sooner if she has elevated readings.    2. Obstructive sleep  apnea on CPAP therapy: Using nightly.  3. History of stroke in 2011 TIA in 2016  4. Diabetes: Managed by outside providers    Patient to follow-up with ELEUTERIO in 2 weeks and Dr. Marsh in 6 months or sooner if needed for any new, recurrent, or worsening symptoms or other problems/concerns.           Your medication list           Accurate as of January 15, 2020  3:40 PM. If you have any questions, ask your nurse or doctor.               CHANGE how you take these medications      Instructions Last Dose Given Next Dose Due   furosemide 20 MG tablet  Commonly known as:  LASIX  What changed:    · reasons to take this  · additional instructions      TAKE ONE TABLET BY MOUTH DAILY AS NEEDED       potassium chloride 10 MEQ CR tablet  Commonly known as:  K-DUR,KLOR-CON  What changed:  reasons to take this      TAKE ONE TABLET BY MOUTH DAILY AS NEEDED          CONTINUE taking these medications      Instructions Last Dose Given Next Dose Due   ALLEGRA PO      Take 60 mg by mouth Daily.       CALCIUM 1000 + D PO      Take 1 tablet by mouth Daily.       CBD oral oil  Commonly known as:  cannabidiol      Take 0.5 mL by mouth 2 (Two) Times a Day. Taking 1/2 dropper (10 mg) BID for arthritis pain  - currently only applying to her skin - not taking orally       CELEBREX 100 MG capsule  Generic drug:  celecoxib      Take 1 capsule by mouth Daily.       hydrALAZINE 50 MG tablet  Commonly known as:  APRESOLINE      Take 1 tablet by mouth 3 (Three) times a day.       L-Lysine 500 MG tablet tablet      Take 1 tablet by mouth Daily.       lisinopril 40 MG tablet  Commonly known as:  PRINIVIL,ZESTRIL      Take 1 tablet by mouth daily.       MULTIVITAMINS PO      Take 1 tablet by mouth Daily.       PLAVIX PO      Take 75 mg by mouth Daily.       spironolactone 50 MG tablet  Commonly known as:  ALDACTONE      Take 1 tablet by mouth daily.       traMADol 50 MG tablet  Commonly known as:  ULTRAM      Take 1 tablet by mouth Daily As Needed  (Arthritis pain). Patient usually takes 1/2 tab and takes very seldomly       verapamil  MG CR tablet  Commonly known as:  CALAN-SR      Take 240 mg by mouth Every Night.       vitamin B-12 1000 MCG tablet  Commonly known as:  CYANOCOBALAMIN      Take 1 tablet by mouth Daily. As directed              The above medication changes may not have been made by this provider.  Patient's medication list was updated to reflect medications they are currently taking including medication changes made by other providers.            Thanks,    Charlene Kang, ROBI, APRN  01/15/2020             Dictated utilizing Dragon dictation

## 2020-01-17 ENCOUNTER — TELEPHONE (OUTPATIENT)
Dept: CARDIOLOGY | Facility: CLINIC | Age: 85
End: 2020-01-17

## 2020-01-18 ENCOUNTER — READMISSION MANAGEMENT (OUTPATIENT)
Dept: CALL CENTER | Facility: HOSPITAL | Age: 85
End: 2020-01-18

## 2020-01-18 NOTE — OUTREACH NOTE
Medical Week 2 Survey      Responses   Facility patient discharged from?  [facility]   Does the patient have one of the following disease processes/diagnoses(primary or secondary)?  Other   Week 2 attempt successful?  No   Unsuccessful attempts  Attempt 1          Lauren Jeffers RN

## 2020-01-21 ENCOUNTER — READMISSION MANAGEMENT (OUTPATIENT)
Dept: CALL CENTER | Facility: HOSPITAL | Age: 85
End: 2020-01-21

## 2020-01-21 NOTE — OUTREACH NOTE
Medical Week 2 Survey      Responses   Facility patient discharged from?  [facility]   Does the patient have one of the following disease processes/diagnoses(primary or secondary)?  Other   Week 2 attempt successful?  No   Unsuccessful attempts  Attempt 2          Lesley Lobo RN

## 2020-01-23 ENCOUNTER — READMISSION MANAGEMENT (OUTPATIENT)
Dept: CALL CENTER | Facility: HOSPITAL | Age: 85
End: 2020-01-23

## 2020-01-29 ENCOUNTER — OFFICE VISIT (OUTPATIENT)
Dept: CARDIOLOGY | Facility: CLINIC | Age: 85
End: 2020-01-29

## 2020-01-29 VITALS
HEIGHT: 63 IN | WEIGHT: 178.4 LBS | HEART RATE: 68 BPM | SYSTOLIC BLOOD PRESSURE: 140 MMHG | BODY MASS INDEX: 31.61 KG/M2 | DIASTOLIC BLOOD PRESSURE: 60 MMHG

## 2020-01-29 DIAGNOSIS — I10 ESSENTIAL HYPERTENSION: Primary | ICD-10-CM

## 2020-01-29 PROCEDURE — 99213 OFFICE O/P EST LOW 20 MIN: CPT | Performed by: NURSE PRACTITIONER

## 2020-01-29 NOTE — PROGRESS NOTES
Date of Office Visit: 2020  Encounter Provider: Charlene Kang, ROBI, ELEUTERIO  Place of Service: [facility]  Patient Name: Gela Thompson  :1934        Subjective:     Chief Complaint:  Follow-up, hypertension, history of hypertensive urgency.      History of Present Illness:  Gela Thompson is a 85 y.o. female patient of Dr. Marsh.  I am seeing this patient in the office today and I have reviewed her records.    Patient has a history of hypertension, hypertensive urgency, shortness of breath, diabetes, vertigo, stroke in , TIA in , obstructive sleep apnea on CPAP.     Patient was seen 2011 with complaints of shortness of breath which had been occurring for a few months.  Nuclear stress test showed she was only able to exercise for 4 minutes and 30 seconds on the Saeid protocol but there is no evidence of ischemia.  Echo showed normal LV function with EF of 61% and stage I diastolic dysfunction and trace tricuspid regurgitation.  She wore 30-day event monitor during which she had no shortness of breath episodes.  Later when following up with primary care provider she again complained of shortness of breath with activities.  She was sent for another echo which was unremarkable.  CT scan of the chest was noncontrasted and was done summer 2014 and showed she had some coronary artery calcification.  2014 she complained of dyspnea and a nuclear stress test showed no evidence of ischemia.  Repeat echo 2015 showed normal LV systolic function, no comment was made on diastolic function, mild aortic regurgitation, moderate mitral regurgitation, mild tricuspid regurgitation with normal RVSP were seen.  The prior year's echo had shown no evidence of mitral regurgitation.  She was seen by APRN in  and was on Pradaxa.  She wore a 24-hour monitor 2017 which was normal.  Strips were reviewed by Dr. Gibson who did not feel that she had  atrial fibrillation and Pradaxa was discontinued as it was causing her GI upset.  Patient was seen in the ER October 2018 with chest pain which ended up being acid reflux.  Patient presented to [facility] emergency room 1/3/2020 after having blood pressure readings as high as 226/93.  CT of renal artery showed scattered arterial calcifications but no hemodynamically significant renal artery stenosis.  Thyroid function was normal.  Additional testing was run for secondary causes of hypertension.  Echocardiogram showed EF of 60 to 65% with only mild aortic regurgitation.  Blood pressure medications were titrated until adequate blood pressure control was achieved.      Patient presents to office today for follow-up appointment.  Patient reports she has been doing well overall since last visit.  She has a blood pressure log with her in the office today showing blood pressure readings overall better controlled but still higher than goal and still fluctuating at times.  She is asymptomatic.  Denies chest pain, shortness of breath, shortness of breath with exertion, palpitations, racing heartbeat sensation, lower extremity edema, dizziness, syncope, near syncope.  She has blood pressure cuff with her in the office today showing it is accurate and she has recently changed her batteries.        Past Medical History:   Diagnosis Date   • Abnormal echocardiogram    • Adiposity    • Anemia    • Cerebrovascular accident (CMS/HCC)    • Chest pain    • Chronic low back pain    • Diabetes mellitus (CMS/HCC)    • Dyslipidemia    • Dyspnea    • Gastroesophageal reflux disease    • H/O echocardiogram 06/27/2014   • Health care maintenance    • History of EKG 10/29/2015   • Hyperlipidemia    • Hypertension    • Mitral valve insufficiency    • Murmur    • ROMAN (obstructive sleep apnea)    • Osteoarthritis      Past Surgical History:   Procedure Laterality Date   • CHOLECYSTECTOMY     • HYSTERECTOMY     • KNEE SURGERY Bilateral    •  NEUROPLASTY      deompression median nerve at carpal tunnel bilateral   • SHOULDER SURGERY Left      Outpatient Medications Prior to Visit   Medication Sig Dispense Refill   • Calcium Carb-Cholecalciferol (CALCIUM 1000 + D PO) Take 1 tablet by mouth Daily.     • celecoxib (CELEBREX) 100 MG capsule Take 1 capsule by mouth Daily.     • Clopidogrel Bisulfate (PLAVIX PO) Take 75 mg by mouth Daily.     • furosemide (LASIX) 20 MG tablet TAKE ONE TABLET BY MOUTH DAILY AS NEEDED (Patient taking differently: Take 20 mg by mouth Daily As Needed (Leg swelling or SOB). Hasn't taken in several months) 90 tablet 2   • hydrALAZINE (APRESOLINE) 50 MG tablet Take 1 tablet by mouth 3 (Three) times a day. 90 tablet 5   • L-Lysine 500 MG tablet Take 1 tablet by mouth Daily.     • lisinopril (PRINIVIL,ZESTRIL) 40 MG tablet Take 1 tablet by mouth daily. 30 tablet 5   • Multiple Vitamin (MULTIVITAMINS PO) Take 1 tablet by mouth Daily.     • potassium chloride (K-DUR,KLOR-CON) 10 MEQ CR tablet TAKE ONE TABLET BY MOUTH DAILY AS NEEDED (Patient taking differently: Take 10 mEq by mouth Daily As Needed (Takes with furosemide).) 30 tablet 0   • spironolactone (ALDACTONE) 50 MG tablet Take 1 tablet by mouth daily. 30 tablet 5   • traMADol (ULTRAM) 50 MG tablet Take 1 tablet by mouth Daily As Needed (Arthritis pain). Patient usually takes 1/2 tab and takes very seldomly     • verapamil SR (CALAN-SR) 240 MG CR tablet Take 240 mg by mouth Every Night.     • vitamin B-12 (CYANOCOBALAMIN) 1000 MCG tablet Take 1 tablet by mouth Daily. As directed     • CBD (cannabidiol) oral oil Take 0.5 mL by mouth 2 (Two) Times a Day. Taking 1/2 dropper (10 mg) BID for arthritis pain  - currently only applying to her skin - not taking orally     • Fexofenadine HCl (ALLEGRA PO) Take 60 mg by mouth Daily.       No facility-administered medications prior to visit.        Allergies as of 01/29/2020 - Reviewed 01/29/2020   Allergen Reaction Noted   • Penicillins Hives  "10/05/2016   • Morphine GI Intolerance 10/05/2016   • Statins Myalgia 10/05/2016   • Tape Rash 10/12/2018     Social History     Socioeconomic History   • Marital status:      Spouse name: Not on file   • Number of children: Not on file   • Years of education: Not on file   • Highest education level: Not on file   Tobacco Use   • Smoking status: Never Smoker   • Smokeless tobacco: Never Used   • Tobacco comment: caffeine use   Substance and Sexual Activity   • Alcohol use: No   • Drug use: No   • Sexual activity: Defer     Family History   Problem Relation Age of Onset   • Heart disease Mother    • Heart attack Mother    • Heart disease Sister    • Stroke Father        Review of Systems   Constitution: Negative for chills, fever, malaise/fatigue, weight gain and weight loss.   HENT: Negative for ear pain, hearing loss, nosebleeds and sore throat.    Eyes: Negative for blurred vision, double vision, redness, vision loss in left eye and vision loss in right eye.   Cardiovascular:        SEE HPI    Respiratory: Positive for cough. Negative for shortness of breath, snoring and wheezing.    Endocrine: Negative for cold intolerance and heat intolerance.   Skin: Positive for color change. Negative for itching, rash and suspicious lesions.   Musculoskeletal: Positive for joint pain and myalgias. Negative for joint swelling.   Gastrointestinal: Negative for abdominal pain, diarrhea, hematemesis, melena, nausea and vomiting.   Genitourinary: Negative for dysuria, frequency and hematuria.   Neurological: Positive for numbness and paresthesias. Negative for dizziness, headaches and seizures.   Psychiatric/Behavioral: Negative for altered mental status and depression. The patient is not nervous/anxious.           Objective:     Vitals:    01/29/20 1325   BP: 140/60   BP Location: Right arm   Pulse: 68   Weight: 80.9 kg (178 lb 6.4 oz)   Height: 160 cm (63\")     Body mass index is 31.6 kg/m².      PHYSICAL EXAM:  Physical " Exam   Constitutional: She is oriented to person, place, and time. She appears well-developed and well-nourished. No distress.   HENT:   Head: Normocephalic and atraumatic.   Eyes: Pupils are equal, round, and reactive to light.   Neck: Neck supple. No JVD present. Carotid bruit is not present.   Cardiovascular: Normal rate, regular rhythm, normal heart sounds and intact distal pulses. Exam reveals no gallop and no friction rub.   No murmur heard.  Pulmonary/Chest: Effort normal and breath sounds normal. No respiratory distress.   Abdominal: Soft. Bowel sounds are normal. She exhibits no distension. There is no tenderness.   Musculoskeletal: She exhibits no edema, tenderness or deformity.   Neurological: She is alert and oriented to person, place, and time.   Skin: Skin is warm and dry. No rash noted. She is not diaphoretic. No erythema.   Psychiatric: She has a normal mood and affect. Her behavior is normal. Judgment normal.         Assessment:       Diagnosis Plan   1. Essential hypertension  verapamil SR (CALAN-SR) 120 MG CR tablet         Plan:     1. Hypertension: Blood pressure overall better controlled however still higher than goal and still fluctuating.  We will have her continue that to 40 mg of verapamil in the evening but add a 120 mg tablet in the morning as well.  Patient to continue to monitor blood pressure after morning and evening meds and provide updating readings in 2 weeks or sooner if she has issues or concerns.      Patient to keep 7/20/2020 follow-up with Dr. Marsh as scheduled or follow-up sooner if needed for any new, recurrent, or worsening symptoms or other problems/concerns.             Your medication list           Accurate as of January 29, 2020  1:53 PM. If you have any questions, ask your nurse or doctor.               CHANGE how you take these medications      Instructions Last Dose Given Next Dose Due   furosemide 20 MG tablet  Commonly known as:  LASIX  What changed:    · reasons  to take this  · additional instructions      TAKE ONE TABLET BY MOUTH DAILY AS NEEDED       potassium chloride 10 MEQ CR tablet  Commonly known as:  K-DUR,KLOR-CON  What changed:  reasons to take this      TAKE ONE TABLET BY MOUTH DAILY AS NEEDED       verapamil  MG CR tablet  Commonly known as:  CALAN-SR  What changed:  Another medication with the same name was added. Make sure you understand how and when to take each.  Changed by:  Charlene Kang DNP, APRN      Take 240 mg by mouth Every Night.       verapamil  MG CR tablet  Commonly known as:  CALAN-SR  What changed:  You were already taking a medication with the same name, and this prescription was added. Make sure you understand how and when to take each.  Changed by:  Charlene Kang DNP, APRN      Take 1 tablet by mouth Daily. In the morning          CONTINUE taking these medications      Instructions Last Dose Given Next Dose Due   ALLEGRA PO      Take 60 mg by mouth Daily.       CALCIUM 1000 + D PO      Take 1 tablet by mouth Daily.       CBD oral oil  Commonly known as:  cannabidiol      Take 0.5 mL by mouth 2 (Two) Times a Day. Taking 1/2 dropper (10 mg) BID for arthritis pain  - currently only applying to her skin - not taking orally       CELEBREX 100 MG capsule  Generic drug:  celecoxib      Take 1 capsule by mouth Daily.       hydrALAZINE 50 MG tablet  Commonly known as:  APRESOLINE      Take 1 tablet by mouth 3 (Three) times a day.       L-Lysine 500 MG tablet tablet      Take 1 tablet by mouth Daily.       lisinopril 40 MG tablet  Commonly known as:  PRINIVIL,ZESTRIL      Take 1 tablet by mouth daily.       MULTIVITAMINS PO      Take 1 tablet by mouth Daily.       PLAVIX PO      Take 75 mg by mouth Daily.       spironolactone 50 MG tablet  Commonly known as:  ALDACTONE      Take 1 tablet by mouth daily.       traMADol 50 MG tablet  Commonly known as:  ULTRAM      Take 1 tablet by mouth Daily As Needed (Arthritis pain). Patient  usually takes 1/2 tab and takes very seldomly       vitamin B-12 1000 MCG tablet  Commonly known as:  CYANOCOBALAMIN      Take 1 tablet by mouth Daily. As directed             Where to Get Your Medications      These medications were sent to [facility] - [address] [facility] - [phone]  - [phone]   [address] [facility] [address]    Phone:  [phone]   · verapamil  MG CR tablet         The above medication changes may not have been made by this provider.  Patient's medication list was updated to reflect medications they are currently taking including medication changes made by other providers.        Thanks,    Charlene Kang, ROBI, APRN  01/29/2020           Dictated utilizing Dragon dictation

## 2020-02-01 ENCOUNTER — READMISSION MANAGEMENT (OUTPATIENT)
Dept: CALL CENTER | Facility: HOSPITAL | Age: 85
End: 2020-02-01

## 2020-02-01 NOTE — OUTREACH NOTE
Medical Week 4 Survey      Responses   Facility patient discharged from?  [place]   Does the patient have one of the following disease processes/diagnoses(primary or secondary)?  Other   Week 4 attempt successful?  No          Cary Wells RN

## 2020-03-07 DIAGNOSIS — I10 ESSENTIAL HYPERTENSION: ICD-10-CM

## 2020-05-05 ENCOUNTER — APPOINTMENT (OUTPATIENT)
Dept: CT IMAGING | Facility: HOSPITAL | Age: 85
End: 2020-05-05

## 2020-05-05 ENCOUNTER — HOSPITAL ENCOUNTER (EMERGENCY)
Facility: HOSPITAL | Age: 85
Discharge: HOME | End: 2020-05-05
Attending: EMERGENCY MEDICINE | Admitting: EMERGENCY MEDICINE

## 2020-05-05 ENCOUNTER — APPOINTMENT (OUTPATIENT)
Dept: GENERAL RADIOLOGY | Facility: HOSPITAL | Age: 85
End: 2020-05-05

## 2020-05-05 VITALS
DIASTOLIC BLOOD PRESSURE: 55 MMHG | BODY MASS INDEX: 30.99 KG/M2 | TEMPERATURE: 97 F | WEIGHT: 186 LBS | RESPIRATION RATE: 18 BRPM | OXYGEN SATURATION: 99 % | HEART RATE: 57 BPM | SYSTOLIC BLOOD PRESSURE: 148 MMHG | HEIGHT: 65 IN
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
## 2020-05-05 DIAGNOSIS — H57.11 ACUTE RIGHT EYE PAIN: ICD-10-CM

## 2020-05-05 DIAGNOSIS — S02.31XA CLOSED FRACTURE OF RIGHT ORBITAL FLOOR, INITIAL ENCOUNTER (HCC): ICD-10-CM

## 2020-05-05 DIAGNOSIS — S01.111A LACERATION OF RIGHT EYEBROW, INITIAL ENCOUNTER: ICD-10-CM

## 2020-05-05 PROCEDURE — 73562 X-RAY EXAM OF KNEE 3: CPT

## 2020-05-05 PROCEDURE — 0HQ1XZZ REPAIR FACE SKIN, EXTERNAL APPROACH: ICD-10-PCS | Performed by: EMERGENCY MEDICINE

## 2020-05-05 PROCEDURE — 72125 CT NECK SPINE W/O DYE: CPT

## 2020-05-05 PROCEDURE — 99282 EMERGENCY DEPT VISIT SF MDM: CPT

## 2020-05-05 PROCEDURE — 99284 EMERGENCY DEPT VISIT MOD MDM: CPT

## 2020-05-05 PROCEDURE — 73502 X-RAY EXAM HIP UNI 2-3 VIEWS: CPT

## 2020-05-05 PROCEDURE — 70450 CT HEAD/BRAIN W/O DYE: CPT

## 2020-05-05 RX ORDER — PROPARACAINE HYDROCHLORIDE 5 MG/ML
1 SOLUTION/ DROPS OPHTHALMIC ONCE
Status: DISCONTINUED | OUTPATIENT
Start: 2020-05-05 | End: 2020-05-05

## 2020-05-05 RX ORDER — TETRACAINE HYDROCHLORIDE 5 MG/ML
2 SOLUTION OPHTHALMIC ONCE
Status: COMPLETED | OUTPATIENT
Start: 2020-05-05 | End: 2020-05-05

## 2020-05-05 RX ADMIN — TETRACAINE HYDROCHLORIDE 2 DROP: 5 SOLUTION OPHTHALMIC at 18:15

## 2020-05-05 RX ADMIN — Medication 3 ML: at 18:14

## 2020-05-05 NOTE — ED NOTES
Patient was unsteady on her feet, she was placed in a wheel chair. She was taken to 20 feet from the eye chart. She had used only her left eye and was at 20/70 3rd line. Patient stated she broke her glasses when she fell. The glasses she has with her is for close up use when she's on the computer. If she uses both eyes she sees double vision.      Jez Garsia  05/05/20 4615

## 2020-05-05 NOTE — ED PROVIDER NOTES
MD ATTESTATION NOTE    The PRASANTH and I have discussed this patient's history, physical exam, and treatment plan. I have reviewed the documentation and personally had a face to face interaction with the patient. I affirm the PRASANTH documentation and agree with their diagnostics, findings, treatment, plan, and disposition.  The attached note describes my personal findings.    Gela Thompson is a 85 y.o. female who presents to the ED c/o fall and closed head injury.  Patient states she tripped causing her to fall and strike her head on concrete.  Patient denies any loss of consciousness.  Patient does endorse blurry vision, no facial anesthesia.  Patient sustained a laceration superior to her eye.  Patient denies any confusion, no nausea vomiting.  Patient does endorse some mild neck pain, no radicular pain, no weakness or paresthesias.  Patient also complains of pain in her right hip thigh and knee.  Patient states she has not tried to ambulate since the fall.    On exam:  General: NAD  Head: Right periorbital contusion, small abrasion superior to right eyebrow at the lateral edge.  Visual acuity grossly normal, patient appears to have restricted extraocular motion and is unable to look upward with the right eye.  No facial anesthesia.  No gingival lacerations or step-offs.  No trismus or malocclusion.  No raccoon sign, no kwong sign.  ENT: Extraocular motion intact, pupils equal and round reactive to light, moist mucous membranes  Neck: Supple, trachea midline. Cervical spine: No step-offs or deformities, no midline tenderness, bilateral paraspinal tenderness.  5 out of 5 strength with sensation intact light touch bilateral upper extremities.  Cardiac, regular rate and rhythm  Lungs: Clear to auscultation bilaterally  Abdomen: Soft, nontender, no rebound tenderness/guarding/rigidity  Extremities: Moves all extremities well, sensation intact light touch all extremities.  No peripheral edema  Skin: Warm, dry    Medical  Decision Making:  Face mask and gloves were worn throughout the patient encounter, unless additional PPE was worn and specified below. Hand hygiene was performed before entering and after leaving the patient room.      ED Course as of May 06 1906   Tue May 05, 2020   1627 Discussed my concern for ocular entrapment and use of blood thinners in the setting of displaced orbital floor fracture / stranding of fat with hemorrhage/ possible infraorbital nerve involvement as seen on ct scan pt with Dr Washburn who will review her CT scans.     [JS]   1630 Viewed R hip and knee xray on pacs. My finding are no fracture. Pt is s/p Right TKR.     [JS]   1757 IOP :OD 24 with 95% confidence ratio.     [JS]      ED Course User Index  [JS] Monserrat Crooks APRN       Diagnosis  Final diagnoses:   Fall from standing, initial encounter   Closed fracture of right orbital floor, initial encounter (CMS/[facility])   Acute right eye pain   Laceration of right eyebrow, initial encounter        Colby Martinez MD  05/06/20 1906

## 2020-05-05 NOTE — DISCHARGE INSTRUCTIONS
Follow up with Dr Washburn tomorrow as discussed   Call office @ [phone]   Return to the ER if you have worsening pain or vision changes.  Or any new concerns.  Continue care with your primary care physician and have your blood pressure regularly checked and managed. Normal blood pressure is 120/80.     Laceration:  Keep wound clean and dry  Apply bacitracin or polysporin two times daily  No tub baths or swimming until stitches removed and wound well healed.   Stitches to be removed in 5 days. You may return to ED or go to your pcp office or the immediate care center.   Return for signs of infection including worsening pain, swelling, redness, warmth to the skin, or thick drainage from the wound.

## 2020-05-05 NOTE — ED TRIAGE NOTES
Pt to ED from home via ambulance for mechanical fall. Mild periorbital edema to right eye and small laceration to right eyebrow (bleeding controlled). Pt is on plavix. Denies LOC.    Pt masked PTA

## 2020-05-05 NOTE — ED NOTES
The cm and half lac above right has been cleaned. It appears to have adhered back together. Patients right eye socket has swollen considerable since arrival.      Jez Garsia  05/05/20 1124

## 2020-05-05 NOTE — ED PROVIDER NOTES
EMERGENCY DEPARTMENT ENCOUNTER    Room Number:  04/04  Date of encounter:  5/5/2020  PCP: Karla Mora MD  Historian: patient   Full history not obtainable due to: none     HPI:  Chief Complaint: Headache     Context: Gela Thompson is a 85 y.o. female who presents to the ED c/o headache onset at 11am after tripping and sustaining a fall.  The patient reports constant severe throbbing right-sided headache.  Nonradiating.  Nothing improves or worsens the pain.  She has an associated pain in her right eye and abrasion above the right eyebrow.  She also complains of right hip and knee pain.  She denies any visual complaints.  She denies any nausea or dizziness or weakness.  She denies any LOC post fall.  She confirmed that the fall was mechanical in nature and secondary to tripping on a step.    She is anticoagulated on Plavix.    Tdap up to date.   MEDICAL RECORD REVIEW: No recent encounters in epic over last few months.  Her last office visit with cardiology NP Shonda Kang in January of this year.      PAST MEDICAL HISTORY    Active Ambulatory Problems     Diagnosis Date Noted   • Anemia 07/22/2013   • Chronic low back pain 10/07/2016   • Diabetes mellitus (CMS/[facility]) 08/22/2013   • Dyslipidemia 10/07/2016   • Gastroesophageal reflux disease 04/07/2015   • Hypertension 10/07/2016   • Mitral valve insufficiency 10/14/2015   • Osteoarthritis 10/07/2016   • Adiposity 10/07/2016   • Obstructive sleep apnea syndrome 10/07/2016   • Cerebrovascular accident (CMS/[facility]) 05/01/2011   • Uncontrolled hypertension 01/03/2020     Resolved Ambulatory Problems     Diagnosis Date Noted   • No Resolved Ambulatory Problems     Past Medical History:   Diagnosis Date   • Abnormal echocardiogram    • Chest pain    • Dyspnea    • H/O echocardiogram 06/27/2014   • Health care maintenance    • History of EKG 10/29/2015   • Hyperlipidemia    • Murmur    • ROMAN (obstructive sleep apnea)          PAST SURGICAL HISTORY  Past Surgical  History:   Procedure Laterality Date   • CHOLECYSTECTOMY     • HYSTERECTOMY     • KNEE SURGERY Bilateral    • NEUROPLASTY      deompression median nerve at carpal tunnel bilateral   • SHOULDER SURGERY Left          FAMILY HISTORY  Family History   Problem Relation Age of Onset   • Heart disease Mother    • Heart attack Mother    • Heart disease Sister    • Stroke Father          SOCIAL HISTORY  Social History     Socioeconomic History   • Marital status:      Spouse name: Not on file   • Number of children: Not on file   • Years of education: Not on file   • Highest education level: Not on file   Tobacco Use   • Smoking status: Never Smoker   • Smokeless tobacco: Never Used   • Tobacco comment: caffeine use   Substance and Sexual Activity   • Alcohol use: No   • Drug use: No   • Sexual activity: Defer         ALLERGIES  Penicillins; Morphine; Statins; and Tape        REVIEW OF SYSTEMS  Review of Systems   All systems reviewed and marked as negative except as listed in HPI       PHYSICAL EXAM    I have reviewed the triage vital signs and nursing notes.    ED Triage Vitals [05/05/20 1212]   Temp Heart Rate Resp BP SpO2   97 °F (36.1 °C) 66 18 140/60 98 %      Temp src Heart Rate Source Patient Position BP Location FiO2 (%)   Tympanic -- -- -- --       GENERAL: alert well developed, well nourished in no distress  HENT: NC, neck supple, trachea midline, 2cm laceration to right eyebrow superficial, bleeding controlled. Right periorbital hematoma, mild tenderness in this region.   EYES: no scleral icterus, PERRL, normal conjunctiva. Mild entrapment of upgaze right eye.   CV: regular rhythm, regular rate, no murmur  RESPIRATORY: unlabored effort, CTAB  ABDOMEN: soft, non-tender, non-distended, bowel sounds present  MUSCULOSKELETAL: no gross deformity. C T L spine non tender. Intact. Mild tenderness of the right hip and right knee.ROM is preserved.   NEURO: alert,  sensory and motor function of extremities grossly  intact, speech clear, mental status normal/baseline  SKIN: warm, dry, no rash  PSYCH:  Appropriate mood and affect    Vital signs and nursing notes reviewed.    RADIOLOGY  Xr Knee 3 View Right    Result Date: 5/5/2020  XR KNEE 3 VW RIGHT-  INDICATIONS: Trauma  TECHNIQUE: 3 views of the right knee  COMPARISON: None available  FINDINGS:  No acute fracture, erosion, or dislocation is noted. Right knee arthroplasty hardware appears intact. Minimal knee effusion. Arterial calcification is conspicuous. Follow-up/further evaluation can be obtained as indications persist.       As described.  This report was finalized on 5/5/2020 2:51 PM by Dr. Chuck Reed M.D.      Ct Head Without Contrast    Result Date: 5/5/2020  EMERGENCY NONCONTRAST HEAD CT AND NONCONTRAST CERVICAL SPINE CT 05/05/2020  CLINICAL HISTORY: Patient fell, right frontal head injury, headache and neck pain, patient on Plavix.  HEAD CT TECHNIQUE: Spiral CT images were obtained from the base of the skull to the vertex without intravenous contrast. Images were reformatted and submitted in 3 mm thick axial CT section with brain algorithm and 2 mm thick axial CT section with high-resolution bone algorithm and 2 mm thick sagittal and coronal reconstructions were performed and submitted in brain algorithm.  COMPARISON: This is correlated to prior noncontrast head CT from [facility] 03/08/2012.  FINDINGS: There is some mild low-density in the periventricular white matter, consistent with mild small vessel disease. There is a focal area of encephalomalacia involving the posterior superior right frontal lobe measuring 2.3 x 1.5 x 2 cm in size, compatible with an old posterior superior right frontal infarct in the right middle cerebral artery territory. There is an area of low density through the medial left parieto-occipital region. There is no positive mass effect and no significant volume loss. This is likely a subacute infarct in distribution  of medial parietal occipital branches of the left posterior cerebral artery territory, maximally measures up to 6 x 3 x 2.4 cm in anterior posterior, craniocaudal, and medial lateral dimension. The ventricles are normal in size. There is no mass effect, no midline shift, no extra-axial fluid collections are identified. I see no convincing acute intracranial hemorrhage. Patient has a right periorbital hematoma from today's head and facial trauma. There is acute mildly comminuted fracture of the inferior wall of the right orbit, multiple fracture planes extending through the inferior wall of the right orbit with 3 mm displacement of orbital fracture fragment into the superior aspect of the right maxillary sinus and the fracture planes extend into the inferior orbital canal where it could compromise the infraorbital nerve and there is partial opacification of the right maxillary sinus with hemorrhage.      1. Mild small vessel disease in the cerebral white matter. There is a 2.3 x 1.5 x 2 cm old superior right frontal infarct in distribution posterior superior frontal branch of the right middle cerebral artery territory. There is a moderate size subacute infarct in the medial left parieto-occipital region maximally measuring 6 x 3 x 2.4 cm in anterior posterior, craniocaudal, and medial lateral dimension in distribution of the medial parieto-occipital branch of the left posterior cerebral artery territory. 2. There is a right-sided periorbital hematoma from today's head and facial trauma. There is some stranding in the intraconal fat of the right orbit that is likely some strandy hemorrhage within the fat. There is a comminuted acute fracture of the inferior wall of the right orbit with 3 mm inferior displacement of an inferior orbital wall fracture fragment into the superior aspect of the right maxillary sinus along with some protrusion of extraconal fat. Fracture planes extend into the inferior orbital canal, could  compromise the right infraorbital nerve. There is partial opacification of the right maxillary sinus with hemorrhage. The remainder of the head CT is within normal limits.  CERVICAL SPINE CT TECHNIQUE: Spiral CT images were obtained from skull base down to T4 thoracic level and images were reformatted and submitted in 2 mm thick, axial and sagittal CT section with soft tissue algorithm, 1 mm thick axial, sagittal and coronal CT sections with high-resolution bone algorithm.  COMPARISON: There are no prior cervical spine imaging studies from [facility] for comparison.  FINDINGS: The atlantooccipital articulation is normal. There are prominent arthritic changes at the atlantodental interval with marginal spurring off the anterior ring of C1 and the odontoid, some subchondral cyst formation of the odontoid process. Arthritic changes at the articulation of the lateral masses of C1 and C2.  At C2-C3, there is moderate bilateral facet overgrowth, some bony fusion across the facet joints. Posterior disc margin is normal. There is no canal or foraminal narrowing.  At C3-C4, there is mild-to-moderate right and there is severe left facet overgrowth, 2 mm anterolisthesis of C3 on C4, mild posterior endplate spurring. There is no canal narrowing. There is mild right and moderate left bony foraminal narrowing.  At C4-C5, there is mild-to-moderate left and moderate-to-severe right facet overgrowth, 2-3 mm anterolisthesis of C4 on C5, mild posterior endplate spurring and uncovertebral joint hypertrophy. There is mild canal, there is moderate-to-severe right foraminal narrowing.  At C5-C6, there is moderate bilateral facet overgrowth, 1-2 mm anterolisthesis of C5 on C6, mild posterior endplate spurring and uncovertebral joint hypertrophy. There is mild canal and foraminal narrowing.  At C6-C7, there is severe disc space narrowing and degenerative endplate changes, some bony bridging across the endplates, mild  posterior spurring and uncovertebral joint hypertrophy, mild bilateral facet overgrowth, and there is mild canal and moderate bilateral foraminal narrowing.  At C7-T1, there is some mild-to-moderate facet overgrowth, some bony fusion across the facet joints, 2 mm anterolisthesis of C7 on T1. No canal or foraminal narrowing.  No acute fracture seen in the cervical spine.  IMPRESSION: No acute fracture seen in the cervical spine. There is diffuse cervical spondylosis as described in great detail above. Results were communicated to Korina Combs physician assistant in the ER taking care of the patient by telephone 05/05/2020 at 1:30 p.m.    Radiation dose reduction techniques were utilized, including automated exposure control and exposure modulation based on body size.       Ct Cervical Spine Without Contrast    Result Date: 5/5/2020  EMERGENCY NONCONTRAST HEAD CT AND NONCONTRAST CERVICAL SPINE CT 05/05/2020  CLINICAL HISTORY: Patient fell, right frontal head injury, headache and neck pain, patient on Plavix.  HEAD CT TECHNIQUE: Spiral CT images were obtained from the base of the skull to the vertex without intravenous contrast. Images were reformatted and submitted in 3 mm thick axial CT section with brain algorithm and 2 mm thick axial CT section with high-resolution bone algorithm and 2 mm thick sagittal and coronal reconstructions were performed and submitted in brain algorithm.  COMPARISON: This is correlated to prior noncontrast head CT from [facility] 03/08/2012.  FINDINGS: There is some mild low-density in the periventricular white matter, consistent with mild small vessel disease. There is a focal area of encephalomalacia involving the posterior superior right frontal lobe measuring 2.3 x 1.5 x 2 cm in size, compatible with an old posterior superior right frontal infarct in the right middle cerebral artery territory. There is an area of low density through the medial left parieto-occipital  region. There is no positive mass effect and no significant volume loss. This is likely a subacute infarct in distribution of medial parietal occipital branches of the left posterior cerebral artery territory, maximally measures up to 6 x 3 x 2.4 cm in anterior posterior, craniocaudal, and medial lateral dimension. The ventricles are normal in size. There is no mass effect, no midline shift, no extra-axial fluid collections are identified. I see no convincing acute intracranial hemorrhage. Patient has a right periorbital hematoma from today's head and facial trauma. There is acute mildly comminuted fracture of the inferior wall of the right orbit, multiple fracture planes extending through the inferior wall of the right orbit with 3 mm displacement of orbital fracture fragment into the superior aspect of the right maxillary sinus and the fracture planes extend into the inferior orbital canal where it could compromise the infraorbital nerve and there is partial opacification of the right maxillary sinus with hemorrhage.      1. Mild small vessel disease in the cerebral white matter. There is a 2.3 x 1.5 x 2 cm old superior right frontal infarct in distribution posterior superior frontal branch of the right middle cerebral artery territory. There is a moderate size subacute infarct in the medial left parieto-occipital region maximally measuring 6 x 3 x 2.4 cm in anterior posterior, craniocaudal, and medial lateral dimension in distribution of the medial parieto-occipital branch of the left posterior cerebral artery territory. 2. There is a right-sided periorbital hematoma from today's head and facial trauma. There is some stranding in the intraconal fat of the right orbit that is likely some strandy hemorrhage within the fat. There is a comminuted acute fracture of the inferior wall of the right orbit with 3 mm inferior displacement of an inferior orbital wall fracture fragment into the superior aspect of the right  maxillary sinus along with some protrusion of extraconal fat. Fracture planes extend into the inferior orbital canal, could compromise the right infraorbital nerve. There is partial opacification of the right maxillary sinus with hemorrhage. The remainder of the head CT is within normal limits.  CERVICAL SPINE CT TECHNIQUE: Spiral CT images were obtained from skull base down to T4 thoracic level and images were reformatted and submitted in 2 mm thick, axial and sagittal CT section with soft tissue algorithm, 1 mm thick axial, sagittal and coronal CT sections with high-resolution bone algorithm.  COMPARISON: There are no prior cervical spine imaging studies from [facility] for comparison.  FINDINGS: The atlantooccipital articulation is normal. There are prominent arthritic changes at the atlantodental interval with marginal spurring off the anterior ring of C1 and the odontoid, some subchondral cyst formation of the odontoid process. Arthritic changes at the articulation of the lateral masses of C1 and C2.  At C2-C3, there is moderate bilateral facet overgrowth, some bony fusion across the facet joints. Posterior disc margin is normal. There is no canal or foraminal narrowing.  At C3-C4, there is mild-to-moderate right and there is severe left facet overgrowth, 2 mm anterolisthesis of C3 on C4, mild posterior endplate spurring. There is no canal narrowing. There is mild right and moderate left bony foraminal narrowing.  At C4-C5, there is mild-to-moderate left and moderate-to-severe right facet overgrowth, 2-3 mm anterolisthesis of C4 on C5, mild posterior endplate spurring and uncovertebral joint hypertrophy. There is mild canal, there is moderate-to-severe right foraminal narrowing.  At C5-C6, there is moderate bilateral facet overgrowth, 1-2 mm anterolisthesis of C5 on C6, mild posterior endplate spurring and uncovertebral joint hypertrophy. There is mild canal and foraminal narrowing.  At C6-C7,  there is severe disc space narrowing and degenerative endplate changes, some bony bridging across the endplates, mild posterior spurring and uncovertebral joint hypertrophy, mild bilateral facet overgrowth, and there is mild canal and moderate bilateral foraminal narrowing.  At C7-T1, there is some mild-to-moderate facet overgrowth, some bony fusion across the facet joints, 2 mm anterolisthesis of C7 on T1. No canal or foraminal narrowing.  No acute fracture seen in the cervical spine.  IMPRESSION: No acute fracture seen in the cervical spine. There is diffuse cervical spondylosis as described in great detail above. Results were communicated to Korina Comsb physician assistant in the ER taking care of the patient by telephone 05/05/2020 at 1:30 p.m.    Radiation dose reduction techniques were utilized, including automated exposure control and exposure modulation based on body size.       Xr Hip With Or Without Pelvis 2 - 3 View Right    Result Date: 5/5/2020  XR HIP W OR WO PELVIS 2-3 VIEW RIGHT-  INDICATIONS: Trauma  TECHNIQUE: Frontal view of the pelvis, 2 views of the right hip  COMPARISON: None available  FINDINGS:  No acute fracture, erosion, or dislocation is noted. Mild right hip joint space narrowing. Left hip joint space appears preserved. Degenerative changes are seen in the partly included lower lumbar spine. Vascular calcifications are present.       No acute fracture is identified. If there is clinical suspicion for radiographically occult fracture, or to further evaluate the soft tissues, MRI could be considered.    This report was finalized on 5/5/2020 2:54 PM by Dr. Chuck Reed M.D.        I ordered the above noted radiological studies. Independently reviewed by me and discussed with radiologist.  See dictation above for official radiology interpretation.      PROCEDURES    Laceration Repair  Date/Time: 5/5/2020 5:57 PM  Performed by: Monserrat Crooks APRN  Authorized by: Michelle  Colby WHITE MD     Consent:     Consent obtained:  Verbal    Consent given by:  Patient    Risks discussed:  Infection, need for additional repair, poor cosmetic result and pain    Alternatives discussed:  No treatment  Anesthesia (see MAR for exact dosages):     Anesthesia method:  Topical application    Topical anesthetic:  LET  Laceration details:     Location:  Face    Face location:  R eyebrow    Length (cm):  2  Repair type:     Repair type:  Simple  Pre-procedure details:     Preparation:  Patient was prepped and draped in usual sterile fashion and imaging obtained to evaluate for foreign bodies  Exploration:     Hemostasis achieved with:  LET    Wound exploration: wound explored through full range of motion and entire depth of wound probed and visualized    Treatment:     Area cleansed with:  Hibiclens and saline    Amount of cleaning:  Standard    Irrigation solution:  Sterile saline    Irrigation method:  Syringe  Skin repair:     Repair method:  Sutures    Suture size:  6-0    Suture material:  Prolene    Suture technique:  Simple interrupted    Number of sutures:  3  Approximation:     Approximation:  Close  Post-procedure details:     Dressing:  Antibiotic ointment and adhesive bandage            MEDICATIONS GIVEN IN ER    Medications   lidocaine-epinephrine-tetracaine (LET) topical gel 3 mL (has no administration in time range)   tetracaine (ALTACAINE) 0.5 % ophthalmic solution 2 drop (has no administration in time range)         PROGRESS, DATA ANALYSIS, CONSULTS, AND MEDICAL DECISION MAKING    All labs have been independently reviewed by me.  All radiology studies have been reviewed by me.   EKG's independently reviewed by me.  Discussion below represents my analysis of pertinent findings related to patient's condition, differential diagnosis, treatment plan and final disposition.      ED Course as of May 05 1949   Tue May 05, 2020   0467 Discussed my concern for ocular entrapment and use of blood  thinners in the setting of displaced orbital floor fracture / stranding of fat with hemorrhage/ possible infraorbital nerve involvement as seen on ct scan pt with Dr Washburn who will review her CT scans.     [JS]   1630 Viewed R hip and knee xray on pacs. My finding are no fracture. Pt is s/p Right TKR.     [JS]   1757 IOP :OD 24 with 95% confidence ratio.     [JS]      ED Course User Index  [JS] Monserrat Crooks, ELEUTERIO       AS OF 17:59 VITALS:    BP - 148/45  HR - 66  TEMP - 97 °F (36.1 °C) (Tympanic)  02 SATS - 99%        DIAGNOSIS  Final diagnoses:   Fall from standing, initial encounter   Closed fracture of right orbital floor, initial encounter (CMS/[facility])   Acute right eye pain   Laceration of right eyebrow, initial encounter         DISPOSITION  Discharge        Monserrat Crooks, ELEUTERIO  05/05/20 1952

## 2020-05-05 NOTE — ED NOTES
C-collar applied per order, c-spine control maintained. + PMS x4 extremities after application. Gela tolerated well.      Niles Antonio RN  05/05/20 6212

## 2020-05-06 ENCOUNTER — HOSPITAL ENCOUNTER (INPATIENT)
Facility: HOSPITAL | Age: 85
LOS: 6 days | Discharge: NURSING, REHAB OR LONG-TERM CARE | End: 2020-05-12
Attending: EMERGENCY MEDICINE | Admitting: INTERNAL MEDICINE

## 2020-05-06 ENCOUNTER — APPOINTMENT (OUTPATIENT)
Dept: CT IMAGING | Facility: HOSPITAL | Age: 85
End: 2020-05-06

## 2020-05-06 DIAGNOSIS — I63.9 CEREBROVASCULAR ACCIDENT (CVA), UNSPECIFIED MECHANISM (HCC): Primary | ICD-10-CM

## 2020-05-06 DIAGNOSIS — R27.0 ATAXIA: ICD-10-CM

## 2020-05-06 DIAGNOSIS — R53.1 GENERALIZED WEAKNESS: ICD-10-CM

## 2020-05-06 PROBLEM — E87.5 HYPERKALEMIA: Status: ACTIVE | Noted: 2020-05-06

## 2020-05-06 PROBLEM — I10 HTN (HYPERTENSION): Status: ACTIVE | Noted: 2020-05-06

## 2020-05-06 PROBLEM — E11.9 DM II (DIABETES MELLITUS, TYPE II), CONTROLLED: Status: ACTIVE | Noted: 2020-05-06

## 2020-05-06 PROBLEM — N17.9 AKI (ACUTE KIDNEY INJURY): Status: ACTIVE | Noted: 2020-05-06

## 2020-05-06 PROBLEM — D53.9 ANEMIA, MACROCYTIC: Status: ACTIVE | Noted: 2020-05-06

## 2020-05-06 LAB
ABO GROUP BLD: NORMAL
ALBUMIN SERPL-MCNC: 4.2 G/DL (ref 3.5–5.2)
ALBUMIN/GLOB SERPL: 1.6 G/DL
ALP SERPL-CCNC: 62 U/L (ref 39–117)
ALT SERPL W P-5'-P-CCNC: 13 U/L (ref 1–33)
ANION GAP SERPL CALCULATED.3IONS-SCNC: 13.2 MMOL/L (ref 5–15)
AST SERPL-CCNC: 15 U/L (ref 1–32)
BASOPHILS # BLD AUTO: 0.08 10*3/MM3 (ref 0–0.2)
BASOPHILS NFR BLD AUTO: 1.2 % (ref 0–1.5)
BILIRUB SERPL-MCNC: 0.4 MG/DL (ref 0.2–1.2)
BLD GP AB SCN SERPL QL: NEGATIVE
BUN BLD-MCNC: 42 MG/DL (ref 8–23)
BUN/CREAT SERPL: 28.6 (ref 7–25)
CALCIUM SPEC-SCNC: 9.4 MG/DL (ref 8.6–10.5)
CHLORIDE SERPL-SCNC: 104 MMOL/L (ref 98–107)
CO2 SERPL-SCNC: 18.8 MMOL/L (ref 22–29)
CREAT BLD-MCNC: 1.47 MG/DL (ref 0.57–1)
DEPRECATED RDW RBC AUTO: 42.5 FL (ref 37–54)
EOSINOPHIL # BLD AUTO: 0.1 10*3/MM3 (ref 0–0.4)
EOSINOPHIL NFR BLD AUTO: 1.5 % (ref 0.3–6.2)
ERYTHROCYTE [DISTWIDTH] IN BLOOD BY AUTOMATED COUNT: 12 % (ref 12.3–15.4)
GFR SERPL CREATININE-BSD FRML MDRD: 34 ML/MIN/1.73
GLOBULIN UR ELPH-MCNC: 2.7 GM/DL
GLUCOSE BLD-MCNC: 126 MG/DL (ref 65–99)
GLUCOSE BLDC GLUCOMTR-MCNC: 102 MG/DL (ref 70–130)
GLUCOSE BLDC GLUCOMTR-MCNC: 97 MG/DL (ref 70–130)
HCT VFR BLD AUTO: 31.1 % (ref 34–46.6)
HGB BLD-MCNC: 10.7 G/DL (ref 12–15.9)
HOLD SPECIMEN: NORMAL
HOLD SPECIMEN: NORMAL
IMM GRANULOCYTES # BLD AUTO: 0.03 10*3/MM3 (ref 0–0.05)
IMM GRANULOCYTES NFR BLD AUTO: 0.5 % (ref 0–0.5)
INR PPP: 1.06 (ref 0.9–1.1)
LYMPHOCYTES # BLD AUTO: 1.54 10*3/MM3 (ref 0.7–3.1)
LYMPHOCYTES NFR BLD AUTO: 23.5 % (ref 19.6–45.3)
MAGNESIUM SERPL-MCNC: 1.8 MG/DL (ref 1.6–2.4)
MCH RBC QN AUTO: 33.4 PG (ref 26.6–33)
MCHC RBC AUTO-ENTMCNC: 34.4 G/DL (ref 31.5–35.7)
MCV RBC AUTO: 97.2 FL (ref 79–97)
MONOCYTES # BLD AUTO: 0.52 10*3/MM3 (ref 0.1–0.9)
MONOCYTES NFR BLD AUTO: 8 % (ref 5–12)
NEUTROPHILS # BLD AUTO: 4.27 10*3/MM3 (ref 1.7–7)
NEUTROPHILS NFR BLD AUTO: 65.3 % (ref 42.7–76)
NRBC BLD AUTO-RTO: 0 /100 WBC (ref 0–0.2)
PLATELET # BLD AUTO: 300 10*3/MM3 (ref 140–450)
PMV BLD AUTO: 10.2 FL (ref 6–12)
POTASSIUM BLD-SCNC: 5.6 MMOL/L (ref 3.5–5.2)
PROT SERPL-MCNC: 6.9 G/DL (ref 6–8.5)
PROTHROMBIN TIME: 13.6 SECONDS (ref 11.7–14.2)
RBC # BLD AUTO: 3.2 10*6/MM3 (ref 3.77–5.28)
RH BLD: POSITIVE
SODIUM BLD-SCNC: 136 MMOL/L (ref 136–145)
T&S EXPIRATION DATE: NORMAL
WBC NRBC COR # BLD: 6.54 10*3/MM3 (ref 3.4–10.8)
WHOLE BLOOD HOLD SPECIMEN: NORMAL
WHOLE BLOOD HOLD SPECIMEN: NORMAL

## 2020-05-06 PROCEDURE — 80053 COMPREHEN METABOLIC PANEL: CPT | Performed by: EMERGENCY MEDICINE

## 2020-05-06 PROCEDURE — 83735 ASSAY OF MAGNESIUM: CPT | Performed by: EMERGENCY MEDICINE

## 2020-05-06 PROCEDURE — 70496 CT ANGIOGRAPHY HEAD: CPT

## 2020-05-06 PROCEDURE — 99285 EMERGENCY DEPT VISIT HI MDM: CPT

## 2020-05-06 PROCEDURE — 82607 VITAMIN B-12: CPT | Performed by: PSYCHIATRY & NEUROLOGY

## 2020-05-06 PROCEDURE — 86850 RBC ANTIBODY SCREEN: CPT | Performed by: EMERGENCY MEDICINE

## 2020-05-06 PROCEDURE — 85025 COMPLETE CBC W/AUTO DIFF WBC: CPT | Performed by: EMERGENCY MEDICINE

## 2020-05-06 PROCEDURE — 82746 ASSAY OF FOLIC ACID SERUM: CPT | Performed by: PSYCHIATRY & NEUROLOGY

## 2020-05-06 PROCEDURE — 86900 BLOOD TYPING SEROLOGIC ABO: CPT | Performed by: EMERGENCY MEDICINE

## 2020-05-06 PROCEDURE — 93010 ELECTROCARDIOGRAM REPORT: CPT | Performed by: INTERNAL MEDICINE

## 2020-05-06 PROCEDURE — 82962 GLUCOSE BLOOD TEST: CPT

## 2020-05-06 PROCEDURE — 84439 ASSAY OF FREE THYROXINE: CPT | Performed by: PSYCHIATRY & NEUROLOGY

## 2020-05-06 PROCEDURE — 70498 CT ANGIOGRAPHY NECK: CPT

## 2020-05-06 PROCEDURE — 93005 ELECTROCARDIOGRAM TRACING: CPT | Performed by: EMERGENCY MEDICINE

## 2020-05-06 PROCEDURE — 70450 CT HEAD/BRAIN W/O DYE: CPT

## 2020-05-06 PROCEDURE — 86901 BLOOD TYPING SEROLOGIC RH(D): CPT | Performed by: EMERGENCY MEDICINE

## 2020-05-06 PROCEDURE — 85610 PROTHROMBIN TIME: CPT | Performed by: EMERGENCY MEDICINE

## 2020-05-06 PROCEDURE — 0 IOPAMIDOL PER 1 ML: Performed by: HOSPITALIST

## 2020-05-06 RX ORDER — CHOLECALCIFEROL (VITAMIN D3) 125 MCG
1000 CAPSULE ORAL DAILY
Status: DISCONTINUED | OUTPATIENT
Start: 2020-05-07 | End: 2020-05-12 | Stop reason: HOSPADM

## 2020-05-06 RX ORDER — ATORVASTATIN CALCIUM 80 MG/1
80 TABLET, FILM COATED ORAL NIGHTLY
Status: DISCONTINUED | OUTPATIENT
Start: 2020-05-06 | End: 2020-05-06

## 2020-05-06 RX ORDER — SODIUM CHLORIDE 0.9 % (FLUSH) 0.9 %
10 SYRINGE (ML) INJECTION EVERY 12 HOURS SCHEDULED
Status: DISCONTINUED | OUTPATIENT
Start: 2020-05-06 | End: 2020-05-12 | Stop reason: HOSPADM

## 2020-05-06 RX ORDER — ASPIRIN 300 MG/1
SUPPOSITORY RECTAL
Status: COMPLETED
Start: 2020-05-06 | End: 2020-05-06

## 2020-05-06 RX ORDER — VITAMIN E (DL,TOCOPHERYL ACET) 180 MG
1000 CAPSULE ORAL DAILY
COMMUNITY

## 2020-05-06 RX ORDER — ACETAMINOPHEN 325 MG/1
650 TABLET ORAL EVERY 4 HOURS PRN
Status: DISCONTINUED | OUTPATIENT
Start: 2020-05-06 | End: 2020-05-12 | Stop reason: HOSPADM

## 2020-05-06 RX ORDER — SODIUM CHLORIDE 0.9 % (FLUSH) 0.9 %
10 SYRINGE (ML) INJECTION AS NEEDED
Status: DISCONTINUED | OUTPATIENT
Start: 2020-05-06 | End: 2020-05-12 | Stop reason: HOSPADM

## 2020-05-06 RX ORDER — SODIUM CHLORIDE 9 MG/ML
125 INJECTION, SOLUTION INTRAVENOUS CONTINUOUS
Status: DISCONTINUED | OUTPATIENT
Start: 2020-05-06 | End: 2020-05-12 | Stop reason: HOSPADM

## 2020-05-06 RX ORDER — ACETAMINOPHEN 650 MG/1
650 SUPPOSITORY RECTAL EVERY 4 HOURS PRN
Status: DISCONTINUED | OUTPATIENT
Start: 2020-05-06 | End: 2020-05-12 | Stop reason: HOSPADM

## 2020-05-06 RX ORDER — FEXOFENADINE HYDROCHLORIDE 60 MG/1
60 TABLET, FILM COATED ORAL DAILY
COMMUNITY
End: 2021-01-25

## 2020-05-06 RX ORDER — MONTELUKAST SODIUM 10 MG/1
10 TABLET ORAL NIGHTLY
Status: ON HOLD | COMMUNITY
End: 2020-05-12

## 2020-05-06 RX ORDER — NIACIN 100 MG
100 TABLET ORAL
COMMUNITY
End: 2020-06-25

## 2020-05-06 RX ORDER — HYDRALAZINE HYDROCHLORIDE 25 MG/1
25 TABLET, FILM COATED ORAL 3 TIMES DAILY
Status: DISCONTINUED | OUTPATIENT
Start: 2020-05-06 | End: 2020-05-10

## 2020-05-06 RX ORDER — ATORVASTATIN CALCIUM 20 MG/1
40 TABLET, FILM COATED ORAL NIGHTLY
Status: DISCONTINUED | OUTPATIENT
Start: 2020-05-06 | End: 2020-05-08

## 2020-05-06 RX ORDER — ACETAMINOPHEN 160 MG/5ML
650 SOLUTION ORAL EVERY 4 HOURS PRN
Status: DISCONTINUED | OUTPATIENT
Start: 2020-05-06 | End: 2020-05-12 | Stop reason: HOSPADM

## 2020-05-06 RX ORDER — CLOPIDOGREL BISULFATE 75 MG/1
75 TABLET ORAL DAILY
Status: DISCONTINUED | OUTPATIENT
Start: 2020-05-07 | End: 2020-05-08

## 2020-05-06 RX ORDER — ASPIRIN 300 MG/1
300 SUPPOSITORY RECTAL ONCE
Status: COMPLETED | OUTPATIENT
Start: 2020-05-06 | End: 2020-05-06

## 2020-05-06 RX ORDER — EPINEPHRINE 0.3 MG/.3ML
INJECTION SUBCUTANEOUS ONCE AS NEEDED
COMMUNITY

## 2020-05-06 RX ORDER — VERAPAMIL HYDROCHLORIDE 240 MG/1
240 TABLET, FILM COATED, EXTENDED RELEASE ORAL NIGHTLY
Status: DISCONTINUED | OUTPATIENT
Start: 2020-05-06 | End: 2020-05-12 | Stop reason: HOSPADM

## 2020-05-06 RX ADMIN — IOPAMIDOL 95 ML: 755 INJECTION, SOLUTION INTRAVENOUS at 17:19

## 2020-05-06 RX ADMIN — SODIUM CHLORIDE 500 ML: 9 INJECTION, SOLUTION INTRAVENOUS at 17:38

## 2020-05-06 RX ADMIN — ASPIRIN 300 MG: 300 SUPPOSITORY RECTAL at 17:36

## 2020-05-06 RX ADMIN — SODIUM CHLORIDE, PRESERVATIVE FREE 10 ML: 5 INJECTION INTRAVENOUS at 22:15

## 2020-05-06 NOTE — ED TRIAGE NOTES
Pt was seen here yesterday for fall and dx with orbit fx.  CT also showed subacute infarct.  Followed up with PCP (Dr Mora) today who advised pt to return to ER due to still having dizziness.

## 2020-05-06 NOTE — ED NOTES
CT called at this time for STAT CT. Stated they will take her to CT next     Nanda Mcbride, ELLEN  05/06/20 9829

## 2020-05-06 NOTE — ED NOTES
"Pt ambulated at this time per this RN and HILLARY Heck. Pt ambulated with walker with no assistance with no difficulty. Pt states she still feels dizzy and her left eye is blurred since her fall last night which also makes the feeling of dizziness worse. Pt states she feels very uneasy if she \"does not have anything to hold onto while she is walking\". Dr. Gurjit ferrer.      Nanda Mcbride, RN  05/06/20 1490    "

## 2020-05-06 NOTE — ED PROVIDER NOTES
EMERGENCY DEPARTMENT ENCOUNTER    Room Number:  40/40  Date of encounter:  5/6/2020  PCP: Karla Mora MD  Historian: Patient      HPI:  Chief Complaint: Unsteady gait with fall yesterday  A complete HPI/ROS/PMH/PSH/SH/FH are unobtainable due to: Not applicable  Context: Gela Thompson is a 85 y.o. female who presents to the ED c/o patient had an abrupt onset of off-balance notes approximately 1-1/2 weeks ago.  Ever since then she is been off balance when she tries to get up and ambulate.  Patient ambulates with a walker at baseline.  Patient has had a history of remote stroke in the distant past.  Her unsteadiness caused her to fall and hit the right side of her head yesterday.  Patient came to the emergency department was diagnosed with an orbital floor fracture and had a CT scan of the head and CT scan of the cervical spine.  Patient's primary care doctor, Dr. Lyly Mora, saw the results of the CT scan today and referred the patient to the emergency department as she was still symptomatic with dizziness.  Patient also has had some visual changes described as a haziness since the first episode of off-balance notes approximately 1-1/2 weeks.  No speech change, focal weakness or weakness in any extremity.  No chest pain, chest pain, shortness of breath, fever, abdominal pain, or any other complaints.        Previous Episodes: No  Current Symptoms: Off-balance notes    MEDICAL HISTORY REVIEWED  I reviewed the CT scan of the head and cervical spine on this patient that was done yesterday.  The CT scan reveals an old superior right frontal infarct and also has a subacute right middle cerebral artery infarct.  There also is another area of a subacute infarct in the medial left parietal occipital region.  She also had the inferior orbital fracture and hematoma.        PAST MEDICAL HISTORY  Active Ambulatory Problems     Diagnosis Date Noted   • Anemia 07/22/2013   • Chronic low back pain 10/07/2016   • Diabetes  mellitus (CMS/[facility]) 08/22/2013   • Dyslipidemia 10/07/2016   • Gastroesophageal reflux disease 04/07/2015   • Hypertension 10/07/2016   • Mitral valve insufficiency 10/14/2015   • Osteoarthritis 10/07/2016   • Adiposity 10/07/2016   • Obstructive sleep apnea syndrome 10/07/2016   • Cerebrovascular accident (CMS/[facility]) 05/01/2011   • Uncontrolled hypertension 01/03/2020     Resolved Ambulatory Problems     Diagnosis Date Noted   • No Resolved Ambulatory Problems     Past Medical History:   Diagnosis Date   • Abnormal echocardiogram    • Chest pain    • Dyspnea    • H/O echocardiogram 06/27/2014   • Health care maintenance    • History of EKG 10/29/2015   • Hyperlipidemia    • Murmur    • ROMAN (obstructive sleep apnea)          PAST SURGICAL HISTORY  Past Surgical History:   Procedure Laterality Date   • CHOLECYSTECTOMY     • HYSTERECTOMY     • KNEE SURGERY Bilateral    • NEUROPLASTY      deompression median nerve at carpal tunnel bilateral   • SHOULDER SURGERY Left          FAMILY HISTORY  Family History   Problem Relation Age of Onset   • Heart disease Mother    • Heart attack Mother    • Heart disease Sister    • Stroke Father          SOCIAL HISTORY  Social History     Socioeconomic History   • Marital status:      Spouse name: Not on file   • Number of children: Not on file   • Years of education: Not on file   • Highest education level: Not on file   Tobacco Use   • Smoking status: Never Smoker   • Smokeless tobacco: Never Used   • Tobacco comment: caffeine use   Substance and Sexual Activity   • Alcohol use: No   • Drug use: No   • Sexual activity: Defer         ALLERGIES  Penicillins; Morphine; Statins; and Tape        REVIEW OF SYSTEMS  Review of Systems     All systems reviewed and negative except for those discussed in HPI.       PHYSICAL EXAM    I have reviewed the triage vital signs and nursing notes.    ED Triage Vitals   Temp Heart Rate Resp BP SpO2   05/06/20 1252 05/06/20 1252 05/06/20 1252  05/06/20 1300 05/06/20 1252   96.8 °F (36 °C) 58 16 147/47 100 %      Temp src Heart Rate Source Patient Position BP Location FiO2 (%)   05/06/20 1252 -- -- -- --   Tympanic           GENERAL: Elderly female pleasant no acute distress.Vital signs on my initial evaluation unremarkable  HENT: nares patent  Patient has right periorbital contusion and swelling.  Patient has a sutured laceration to right eyebrow.  She has some tenderness to palpation around the right periorbital region.  Patient's extraocular muscles are intact.  Pupils are equal round reactive to light.  There is no septal hematoma seen on nasal exam.  There is no malocclusion in the jaws.  EYES: no scleral icterus, no conjunctival pallor.  NECK: Supple, no meningismus patient does have some tenderness with looking to the left and the right.  No focal midline tenderness with palpation of the cervical spine  CV: regular rhythm, regular rate with intact distal pulses.  No murmur or rub  RESPIRATORY: normal effort and no respiratory distress.  Lungs are clear to auscultation bilaterally  ABDOMEN: soft and non-tender.  MUSCULOSKELETAL: no deformity.  Moves all extremities with no pain  NEURO: alert and appropriate, moves all extremities, follows commands.  Patient's cranial nerves II through XII are grossly intact patient has no focal weakness.  No pronator drift to upper or lower extremities.  SKIN: warm, dry    Vital signs and nursing notes reviewed.        LAB RESULTS  Recent Results (from the past 24 hour(s))   Light Blue Top    Collection Time: 05/06/20  1:28 PM   Result Value Ref Range    Extra Tube hold for add-on    Green Top (Gel)    Collection Time: 05/06/20  1:28 PM   Result Value Ref Range    Extra Tube Hold for add-ons.    Lavender Top    Collection Time: 05/06/20  1:28 PM   Result Value Ref Range    Extra Tube hold for add-on    Gold Top - SST    Collection Time: 05/06/20  1:28 PM   Result Value Ref Range    Extra Tube Hold for add-ons.     Comprehensive Metabolic Panel    Collection Time: 05/06/20  1:28 PM   Result Value Ref Range    Glucose 126 (H) 65 - 99 mg/dL    BUN 42 (H) 8 - 23 mg/dL    Creatinine 1.47 (H) 0.57 - 1.00 mg/dL    Sodium 136 136 - 145 mmol/L    Potassium 5.6 (H) 3.5 - 5.2 mmol/L    Chloride 104 98 - 107 mmol/L    CO2 18.8 (L) 22.0 - 29.0 mmol/L    Calcium 9.4 8.6 - 10.5 mg/dL    Total Protein 6.9 6.0 - 8.5 g/dL    Albumin 4.20 3.50 - 5.20 g/dL    ALT (SGPT) 13 1 - 33 U/L    AST (SGOT) 15 1 - 32 U/L    Alkaline Phosphatase 62 39 - 117 U/L    Total Bilirubin 0.4 0.2 - 1.2 mg/dL    eGFR Non African Amer 34 (L) >60 mL/min/1.73    Globulin 2.7 gm/dL    A/G Ratio 1.6 g/dL    BUN/Creatinine Ratio 28.6 (H) 7.0 - 25.0    Anion Gap 13.2 5.0 - 15.0 mmol/L   Protime-INR    Collection Time: 05/06/20  1:28 PM   Result Value Ref Range    Protime 13.6 11.7 - 14.2 Seconds    INR 1.06 0.90 - 1.10   Magnesium    Collection Time: 05/06/20  1:28 PM   Result Value Ref Range    Magnesium 1.8 1.6 - 2.4 mg/dL   CBC Auto Differential    Collection Time: 05/06/20  1:28 PM   Result Value Ref Range    WBC 6.54 3.40 - 10.80 10*3/mm3    RBC 3.20 (L) 3.77 - 5.28 10*6/mm3    Hemoglobin 10.7 (L) 12.0 - 15.9 g/dL    Hematocrit 31.1 (L) 34.0 - 46.6 %    MCV 97.2 (H) 79.0 - 97.0 fL    MCH 33.4 (H) 26.6 - 33.0 pg    MCHC 34.4 31.5 - 35.7 g/dL    RDW 12.0 (L) 12.3 - 15.4 %    RDW-SD 42.5 37.0 - 54.0 fl    MPV 10.2 6.0 - 12.0 fL    Platelets 300 140 - 450 10*3/mm3    Neutrophil % 65.3 42.7 - 76.0 %    Lymphocyte % 23.5 19.6 - 45.3 %    Monocyte % 8.0 5.0 - 12.0 %    Eosinophil % 1.5 0.3 - 6.2 %    Basophil % 1.2 0.0 - 1.5 %    Immature Grans % 0.5 0.0 - 0.5 %    Neutrophils, Absolute 4.27 1.70 - 7.00 10*3/mm3    Lymphocytes, Absolute 1.54 0.70 - 3.10 10*3/mm3    Monocytes, Absolute 0.52 0.10 - 0.90 10*3/mm3    Eosinophils, Absolute 0.10 0.00 - 0.40 10*3/mm3    Basophils, Absolute 0.08 0.00 - 0.20 10*3/mm3    Immature Grans, Absolute 0.03 0.00 - 0.05 10*3/mm3    nRBC 0.0  0.0 - 0.2 /100 WBC       Ordered the above labs and independently reviewed the results.        RADIOLOGY  No Radiology Exams Resulted Within Past 24 Hours    I ordered the above noted radiological studies. Reviewed by me and discussed with radiologist.  See dictation for official radiology interpretation.      PROCEDURES    Procedures      MEDICATIONS GIVEN IN ER    Medications   sodium chloride 0.9 % flush 10 mL (has no administration in time range)   sodium chloride 0.9 % flush 10 mL (has no administration in time range)   sodium chloride 0.9 % infusion (has no administration in time range)   sodium chloride 0.9 % bolus 500 mL (500 mL Intravenous New Bag 5/6/20 1738)   sodium chloride 0.9 % infusion (has no administration in time range)   iopamidol (ISOVUE-370) 76 % injection 100 mL (95 mL Intravenous Given by Other 5/6/20 1719)   aspirin suppository 300 mg (300 mg Rectal Given 5/6/20 1736)         PROGRESS, DATA ANALYSIS, CONSULTS, AND MEDICAL DECISION MAKING    We are currently under a pandemic from the COVID19 infection.  Patient was wearing a face mask during my evaluation and throughout my encounter. During my whole encounter with this patient I used appropriate personal protective equipment.  This equipment consisted of eye protection, facemask, gown, and gloves.  I applied this equipment before entering the room.    Patient has a subacute infarct x2 that is seen on the CT scan yesterday.  I very likely believe that is the etiology of her off-balance notes.  The symptoms started approximately 10 days ago and is consistent, at least very possibly, with when her symptoms began.    All labs have been independently reviewed by me.  All radiology studies have been reviewed by me and discussed with radiologist dictating the report.   EKG's independently viewed and interpreted by me.  Discussion below represents my analysis of pertinent findings related to patient's condition, differential diagnosis, treatment plan  and final disposition.      ED Course as of May 06 1745   Wed May 06, 2020   1653 Patient called the nurse into the room and said that she counted did not feel right.  She felt that her visual change and abnormality that she had since she became dizzy approximately 10 days ago had become worse.  Patient denies any speech change.  No focal weakness to upper or lower extremities.  On my exam patient had a left homonymous hemianopsia.  She did have some baseline visual change and could not see well diffusely throughout all of her visual fields as well.  She has had no increased headache or pain in her head.  She has no facial droop.  Her pupils are equal round reactive to light and her extraocular muscles are intact.  I have not I asked the lady if this was the same visual changes that she has had since she first started with visual change approximately 9 or 10 days ago when she is persisted.  Abruptly the cane dizzy and has unsteady gait since that time.  She had visual change and abnormality at that time she felt that these visual changes are worse.  Patient took Plavix this morning.  She denies any new pain.  I am going to repeat the CT scan of the head.  Make sure there is no swelling or acute hemorrhage.  I suspect that she might have had another stroke.  I will also consult stroke neurologist.    [MM]   3967 Insert EKG reading.  EKG was done at 1350.  Rate of 50 and fairly regular but could possibly reveal underlying atrial fibrillation.Narrow complexNormal axisDiffuse nonspecific ST and T wave changes.EKG looks very similar to an EKG that she had on October 12, 2018.  No definitive P waves seen at that time as well.    [MM]   1710 I spoke with the stroke neurologist Dr. Torres.  He wants me to do a CT angiogram of the head neck as well.  The GFR is 34.  We will give her a little bit of fluids as well.  If the CT scan of the head is unremarkable we will also give her an aspirin.    [MM]   1726 Attempted to call  her daughter Kia Thompson at [phone].  I left a message on their answering machine.      [MM]   1727 I spoke with    [MM]   1727  my partner, Dr. Yamil Thompson, he is going to continue care and wait for the results of the CT angiogram.  I looked at the repeat CT of the head here.  No acute hemorrhage seen.  I woke with the radiologist and he did not see any hemorrhage.  I suspect this patient has had another stroke on top of her stroke approximately 9 to 10 days ago.    [MM]   1730 I spoke with Dr. Higgins, stroke neurologist again.  We will go ahead and give the patient a fluid bolus as well as an aspirin suppository.  I reviewed the initial results of the CT of the head that was done here.    [MM]   1732 This patient very likely has an acute stroke on a stroke that she had 9 or 10 days ago.  This could be from decreased perfusion or from possible embolic source.  I spoke with the patient informed her my concerns and treatment plan.  The patient's daughter still has not called me back.    [MM]   1733 I spoke with the daughter, Kia Thompson, and informed her the results of the CT scan and my initial clinical impression and treatment plan here.  Patient is not a TPA candidate.  This is acute subacute stroke that happened 79 days ago.  Also the trauma that she had from the fall yesterday.  The CT scan of the head shows a very teeny tiny may be small petechial hemorrhage.  I explained this to Kia.  All questions were answered    [MM]      ED Course User Index  [MM] Woodrow Cagle MD   I informed patient of the results of the tests and CT scan.  Patient is remained stable here in the emergency department.    AS OF 17:45 VITALS:    BP - 167/61  HR - 52  TEMP - 96.8 °F (36 °C) (Tympanic)  02 SATS - 98%        DIAGNOSIS  Final diagnoses:   Cerebrovascular accident (CVA), unspecified mechanism (CMS/HCC)   Ataxia         DISPOSITION  I have reviewed the test results with my patient and explained the current treatment plan.   I answered all of the patient's questions.  The patient will be admitted to monitor bed at this time.  The patient is not hypotensive and is tolerating their current disease condition well enough for a monitored bed at this time.  The patient's current condition does not require intensive care treatment at this time.             Woodrow Cagle MD  05/06/20 [id number]       Woodrow Cagle MD  05/06/20 2564

## 2020-05-06 NOTE — ED NOTES
"Pt reports \"my memory isn't right and occasionally my speech is off-a little slurred. I have vertigo and thought it was that, but they told me I had a stroke. I should've known because my memory was affected\". Pt reports symptoms started about a week and a half ago. Pt is alert and oriented but did not remember the year.      Meliza Mckeon, RN  05/06/20 8629    "

## 2020-05-07 ENCOUNTER — APPOINTMENT (OUTPATIENT)
Dept: MRI IMAGING | Facility: HOSPITAL | Age: 85
End: 2020-05-07

## 2020-05-07 ENCOUNTER — APPOINTMENT (OUTPATIENT)
Dept: CARDIOLOGY | Facility: HOSPITAL | Age: 85
End: 2020-05-07

## 2020-05-07 PROBLEM — I48.91 ATRIAL FIBRILLATION: Status: ACTIVE | Noted: 2020-05-07

## 2020-05-07 LAB
ANION GAP SERPL CALCULATED.3IONS-SCNC: 12.3 MMOL/L (ref 5–15)
BASOPHILS # BLD AUTO: 0.11 10*3/MM3 (ref 0–0.2)
BASOPHILS NFR BLD AUTO: 1.5 % (ref 0–1.5)
BH CV ECHO MEAS - ACS: 1.9 CM
BH CV ECHO MEAS - AI DEC SLOPE: 218 CM/SEC^2
BH CV ECHO MEAS - AI MAX PG: 74 MMHG
BH CV ECHO MEAS - AI MAX VEL: 430 CM/SEC
BH CV ECHO MEAS - AI P1/2T: 577.7 MSEC
BH CV ECHO MEAS - AO MAX PG (FULL): 7.1 MMHG
BH CV ECHO MEAS - AO MAX PG: 10.9 MMHG
BH CV ECHO MEAS - AO MEAN PG (FULL): 3 MMHG
BH CV ECHO MEAS - AO MEAN PG: 5 MMHG
BH CV ECHO MEAS - AO ROOT AREA (BSA CORRECTED): 1.6
BH CV ECHO MEAS - AO ROOT AREA: 7.1 CM^2
BH CV ECHO MEAS - AO ROOT DIAM: 3 CM
BH CV ECHO MEAS - AO V2 MAX: 165 CM/SEC
BH CV ECHO MEAS - AO V2 MEAN: 109 CM/SEC
BH CV ECHO MEAS - AO V2 VTI: 36.7 CM
BH CV ECHO MEAS - ASC AORTA: 3.2 CM
BH CV ECHO MEAS - AVA(I,A): 1.9 CM^2
BH CV ECHO MEAS - AVA(I,D): 1.9 CM^2
BH CV ECHO MEAS - AVA(V,A): 1.8 CM^2
BH CV ECHO MEAS - AVA(V,D): 1.8 CM^2
BH CV ECHO MEAS - BSA(HAYCOCK): 1.9 M^2
BH CV ECHO MEAS - BSA: 1.9 M^2
BH CV ECHO MEAS - BZI_BMI: 30.2 KILOGRAMS/M^2
BH CV ECHO MEAS - BZI_METRIC_HEIGHT: 162.6 CM
BH CV ECHO MEAS - BZI_METRIC_WEIGHT: 79.8 KG
BH CV ECHO MEAS - EDV(CUBED): 64 ML
BH CV ECHO MEAS - EDV(MOD-SP2): 93 ML
BH CV ECHO MEAS - EDV(MOD-SP4): 109 ML
BH CV ECHO MEAS - EDV(TEICH): 70 ML
BH CV ECHO MEAS - EF(CUBED): 72.5 %
BH CV ECHO MEAS - EF(MOD-BP): 60 %
BH CV ECHO MEAS - EF(MOD-SP2): 61.3 %
BH CV ECHO MEAS - EF(MOD-SP4): 58.7 %
BH CV ECHO MEAS - EF(TEICH): 64.8 %
BH CV ECHO MEAS - ESV(CUBED): 17.6 ML
BH CV ECHO MEAS - ESV(MOD-SP2): 36 ML
BH CV ECHO MEAS - ESV(MOD-SP4): 45 ML
BH CV ECHO MEAS - ESV(TEICH): 24.6 ML
BH CV ECHO MEAS - FS: 35 %
BH CV ECHO MEAS - IVS/LVPW: 0.69
BH CV ECHO MEAS - IVSD: 0.9 CM
BH CV ECHO MEAS - LAT PEAK E' VEL: 8.2 CM/SEC
BH CV ECHO MEAS - LV DIASTOLIC VOL/BSA (35-75): 58.8 ML/M^2
BH CV ECHO MEAS - LV MASS(C)D: 145.6 GRAMS
BH CV ECHO MEAS - LV MASS(C)DI: 78.6 GRAMS/M^2
BH CV ECHO MEAS - LV MAX PG: 3.7 MMHG
BH CV ECHO MEAS - LV MEAN PG: 2 MMHG
BH CV ECHO MEAS - LV SYSTOLIC VOL/BSA (12-30): 24.3 ML/M^2
BH CV ECHO MEAS - LV V1 MAX: 96.8 CM/SEC
BH CV ECHO MEAS - LV V1 MEAN: 66.1 CM/SEC
BH CV ECHO MEAS - LV V1 VTI: 22.6 CM
BH CV ECHO MEAS - LVIDD: 4 CM
BH CV ECHO MEAS - LVIDS: 2.6 CM
BH CV ECHO MEAS - LVLD AP2: 7.8 CM
BH CV ECHO MEAS - LVLD AP4: 7.7 CM
BH CV ECHO MEAS - LVLS AP2: 6.4 CM
BH CV ECHO MEAS - LVLS AP4: 6.5 CM
BH CV ECHO MEAS - LVOT AREA (M): 3.1 CM^2
BH CV ECHO MEAS - LVOT AREA: 3.1 CM^2
BH CV ECHO MEAS - LVOT DIAM: 2 CM
BH CV ECHO MEAS - LVPWD: 1.3 CM
BH CV ECHO MEAS - MED PEAK E' VEL: 6.9 CM/SEC
BH CV ECHO MEAS - MV A DUR: 0.22 SEC
BH CV ECHO MEAS - MV A MAX VEL: 96.5 CM/SEC
BH CV ECHO MEAS - MV DEC SLOPE: 480 CM/SEC^2
BH CV ECHO MEAS - MV DEC TIME: 0.25 SEC
BH CV ECHO MEAS - MV E MAX VEL: 89.2 CM/SEC
BH CV ECHO MEAS - MV E/A: 0.92
BH CV ECHO MEAS - MV MAX PG: 5.5 MMHG
BH CV ECHO MEAS - MV MEAN PG: 3 MMHG
BH CV ECHO MEAS - MV P1/2T MAX VEL: 95.6 CM/SEC
BH CV ECHO MEAS - MV P1/2T: 58.3 MSEC
BH CV ECHO MEAS - MV V2 MAX: 117 CM/SEC
BH CV ECHO MEAS - MV V2 MEAN: 74 CM/SEC
BH CV ECHO MEAS - MV V2 VTI: 26.7 CM
BH CV ECHO MEAS - MVA P1/2T LCG: 2.3 CM^2
BH CV ECHO MEAS - MVA(P1/2T): 3.8 CM^2
BH CV ECHO MEAS - MVA(VTI): 2.7 CM^2
BH CV ECHO MEAS - PA ACC TIME: 0.14 SEC
BH CV ECHO MEAS - PA MAX PG (FULL): 2.7 MMHG
BH CV ECHO MEAS - PA MAX PG: 4.6 MMHG
BH CV ECHO MEAS - PA PR(ACCEL): 17.4 MMHG
BH CV ECHO MEAS - PA V2 MAX: 107 CM/SEC
BH CV ECHO MEAS - PI END-D VEL: 90.9 CM/SEC
BH CV ECHO MEAS - PULM A REVS DUR: 0.15 SEC
BH CV ECHO MEAS - PULM A REVS VEL: 26.6 CM/SEC
BH CV ECHO MEAS - PULM DIAS VEL: 56.5 CM/SEC
BH CV ECHO MEAS - PULM S/D: 1
BH CV ECHO MEAS - PULM SYS VEL: 57.4 CM/SEC
BH CV ECHO MEAS - PVA(V,A): 2.4 CM^2
BH CV ECHO MEAS - PVA(V,D): 2.4 CM^2
BH CV ECHO MEAS - QP/QS: 0.79
BH CV ECHO MEAS - RAP SYSTOLE: 3 MMHG
BH CV ECHO MEAS - RV BASE (<4.1) - OBSOLETE: 3.4 CM
BH CV ECHO MEAS - RV LENGTH (<8.5) - OBSOLETE: 6.5 CM
BH CV ECHO MEAS - RV MAX PG: 1.9 MMHG
BH CV ECHO MEAS - RV MEAN PG: 1 MMHG
BH CV ECHO MEAS - RV V1 MAX: 68.7 CM/SEC
BH CV ECHO MEAS - RV V1 MEAN: 49.6 CM/SEC
BH CV ECHO MEAS - RV V1 VTI: 14.7 CM
BH CV ECHO MEAS - RVOT AREA: 3.8 CM^2
BH CV ECHO MEAS - RVOT DIAM: 2.2 CM
BH CV ECHO MEAS - RVSP: 33 MMHG
BH CV ECHO MEAS - SI(AO): 140 ML/M^2
BH CV ECHO MEAS - SI(CUBED): 25.1 ML/M^2
BH CV ECHO MEAS - SI(LVOT): 38.3 ML/M^2
BH CV ECHO MEAS - SI(MOD-SP2): 30.8 ML/M^2
BH CV ECHO MEAS - SI(MOD-SP4): 34.5 ML/M^2
BH CV ECHO MEAS - SI(TEICH): 24.5 ML/M^2
BH CV ECHO MEAS - SV(AO): 259.4 ML
BH CV ECHO MEAS - SV(CUBED): 46.4 ML
BH CV ECHO MEAS - SV(LVOT): 71 ML
BH CV ECHO MEAS - SV(MOD-SP2): 57 ML
BH CV ECHO MEAS - SV(MOD-SP4): 64 ML
BH CV ECHO MEAS - SV(RVOT): 55.9 ML
BH CV ECHO MEAS - SV(TEICH): 45.4 ML
BH CV ECHO MEAS - TAPSE (>1.6): 2.5 CM2
BH CV ECHO MEAS - TR MAX VEL: 274 CM/SEC
BH CV ECHO MEASUREMENTS AVERAGE E/E' RATIO: 11.81
BH CV XLRA - RV BASE: 3.4 CM
BH CV XLRA - RV LENGTH: 6.5 CM
BH CV XLRA - RV MID: 2.4 CM
BH CV XLRA - TDI S': 14.1 CM/SEC
BUN BLD-MCNC: 31 MG/DL (ref 8–23)
BUN/CREAT SERPL: 33.7 (ref 7–25)
CALCIUM SPEC-SCNC: 9.2 MG/DL (ref 8.6–10.5)
CHLORIDE SERPL-SCNC: 106 MMOL/L (ref 98–107)
CHOLEST SERPL-MCNC: 181 MG/DL (ref 0–200)
CO2 SERPL-SCNC: 20.7 MMOL/L (ref 22–29)
CREAT BLD-MCNC: 0.92 MG/DL (ref 0.57–1)
DEPRECATED RDW RBC AUTO: 40.1 FL (ref 37–54)
EOSINOPHIL # BLD AUTO: 0.17 10*3/MM3 (ref 0–0.4)
EOSINOPHIL NFR BLD AUTO: 2.3 % (ref 0.3–6.2)
ERYTHROCYTE [DISTWIDTH] IN BLOOD BY AUTOMATED COUNT: 11.6 % (ref 12.3–15.4)
FOLATE SERPL-MCNC: >20 NG/ML (ref 4.78–24.2)
GFR SERPL CREATININE-BSD FRML MDRD: 58 ML/MIN/1.73
GLUCOSE BLD-MCNC: 85 MG/DL (ref 65–99)
GLUCOSE BLDC GLUCOMTR-MCNC: 100 MG/DL (ref 70–130)
GLUCOSE BLDC GLUCOMTR-MCNC: 139 MG/DL (ref 70–130)
GLUCOSE BLDC GLUCOMTR-MCNC: 86 MG/DL (ref 70–130)
HBA1C MFR BLD: 6.4 % (ref 4.8–5.6)
HCT VFR BLD AUTO: 31.2 % (ref 34–46.6)
HDLC SERPL-MCNC: 36 MG/DL (ref 40–60)
HGB BLD-MCNC: 10.5 G/DL (ref 12–15.9)
IMM GRANULOCYTES # BLD AUTO: 0.02 10*3/MM3 (ref 0–0.05)
IMM GRANULOCYTES NFR BLD AUTO: 0.3 % (ref 0–0.5)
LDLC SERPL CALC-MCNC: 120 MG/DL (ref 0–100)
LDLC/HDLC SERPL: 3.34 {RATIO}
LEFT ATRIUM VOLUME INDEX: 35 ML/M2
LV EF 2D ECHO EST: 60 %
LYMPHOCYTES # BLD AUTO: 2.99 10*3/MM3 (ref 0.7–3.1)
LYMPHOCYTES NFR BLD AUTO: 41.2 % (ref 19.6–45.3)
MAXIMAL PREDICTED HEART RATE: 135 BPM
MCH RBC QN AUTO: 32.2 PG (ref 26.6–33)
MCHC RBC AUTO-ENTMCNC: 33.7 G/DL (ref 31.5–35.7)
MCV RBC AUTO: 95.7 FL (ref 79–97)
MONOCYTES # BLD AUTO: 0.65 10*3/MM3 (ref 0.1–0.9)
MONOCYTES NFR BLD AUTO: 9 % (ref 5–12)
NEUTROPHILS # BLD AUTO: 3.31 10*3/MM3 (ref 1.7–7)
NEUTROPHILS NFR BLD AUTO: 45.7 % (ref 42.7–76)
NRBC BLD AUTO-RTO: 0 /100 WBC (ref 0–0.2)
PA ADP PRP-ACNC: 282 PRU (ref 194–418)
PLATELET # BLD AUTO: 283 10*3/MM3 (ref 140–450)
PMV BLD AUTO: 10.5 FL (ref 6–12)
POTASSIUM BLD-SCNC: 4.7 MMOL/L (ref 3.5–5.2)
RBC # BLD AUTO: 3.26 10*6/MM3 (ref 3.77–5.28)
SODIUM BLD-SCNC: 139 MMOL/L (ref 136–145)
STRESS TARGET HR: 115 BPM
T4 FREE SERPL-MCNC: 1.19 NG/DL (ref 0.93–1.7)
TRIGL SERPL-MCNC: 124 MG/DL (ref 0–150)
TSH SERPL DL<=0.05 MIU/L-ACNC: 1.05 UIU/ML (ref 0.27–4.2)
VIT B12 BLD-MCNC: >2000 PG/ML (ref 211–946)
VLDLC SERPL-MCNC: 24.8 MG/DL (ref 5–40)
WBC NRBC COR # BLD: 7.25 10*3/MM3 (ref 3.4–10.8)

## 2020-05-07 PROCEDURE — 93306 TTE W/DOPPLER COMPLETE: CPT

## 2020-05-07 PROCEDURE — 84443 ASSAY THYROID STIM HORMONE: CPT | Performed by: HOSPITALIST

## 2020-05-07 PROCEDURE — 25010000002 PERFLUTREN (DEFINITY) 8.476 MG IN SODIUM CHLORIDE (PF) 0.9 % 10 ML INJECTION: Performed by: HOSPITALIST

## 2020-05-07 PROCEDURE — 92610 EVALUATE SWALLOWING FUNCTION: CPT | Performed by: SPEECH-LANGUAGE PATHOLOGIST

## 2020-05-07 PROCEDURE — 97166 OT EVAL MOD COMPLEX 45 MIN: CPT | Performed by: OCCUPATIONAL THERAPIST

## 2020-05-07 PROCEDURE — 82962 GLUCOSE BLOOD TEST: CPT

## 2020-05-07 PROCEDURE — 93306 TTE W/DOPPLER COMPLETE: CPT | Performed by: INTERNAL MEDICINE

## 2020-05-07 PROCEDURE — 99221 1ST HOSP IP/OBS SF/LOW 40: CPT | Performed by: PSYCHIATRY & NEUROLOGY

## 2020-05-07 PROCEDURE — 85576 BLOOD PLATELET AGGREGATION: CPT | Performed by: PSYCHIATRY & NEUROLOGY

## 2020-05-07 PROCEDURE — 99222 1ST HOSP IP/OBS MODERATE 55: CPT | Performed by: INTERNAL MEDICINE

## 2020-05-07 PROCEDURE — 85025 COMPLETE CBC W/AUTO DIFF WBC: CPT | Performed by: HOSPITALIST

## 2020-05-07 PROCEDURE — 80061 LIPID PANEL: CPT | Performed by: HOSPITALIST

## 2020-05-07 PROCEDURE — 97535 SELF CARE MNGMENT TRAINING: CPT | Performed by: OCCUPATIONAL THERAPIST

## 2020-05-07 PROCEDURE — 99221 1ST HOSP IP/OBS SF/LOW 40: CPT | Performed by: NURSE PRACTITIONER

## 2020-05-07 PROCEDURE — 83036 HEMOGLOBIN GLYCOSYLATED A1C: CPT | Performed by: HOSPITALIST

## 2020-05-07 PROCEDURE — 97161 PT EVAL LOW COMPLEX 20 MIN: CPT

## 2020-05-07 PROCEDURE — 70551 MRI BRAIN STEM W/O DYE: CPT

## 2020-05-07 PROCEDURE — 80048 BASIC METABOLIC PNL TOTAL CA: CPT | Performed by: HOSPITALIST

## 2020-05-07 RX ORDER — LEVETIRACETAM 5 MG/ML
500 INJECTION INTRAVASCULAR EVERY 12 HOURS SCHEDULED
Status: DISCONTINUED | OUTPATIENT
Start: 2020-05-07 | End: 2020-05-09

## 2020-05-07 RX ADMIN — PERFLUTREN 2 ML: 6.52 INJECTION, SUSPENSION INTRAVENOUS at 10:15

## 2020-05-07 RX ADMIN — SODIUM CHLORIDE, PRESERVATIVE FREE 10 ML: 5 INJECTION INTRAVENOUS at 20:55

## 2020-05-07 RX ADMIN — ATORVASTATIN CALCIUM 40 MG: 20 TABLET, FILM COATED ORAL at 20:55

## 2020-05-07 RX ADMIN — HYDRALAZINE HYDROCHLORIDE 25 MG: 25 TABLET, FILM COATED ORAL at 20:55

## 2020-05-07 RX ADMIN — VERAPAMIL HYDROCHLORIDE 240 MG: 120 TABLET, FILM COATED, EXTENDED RELEASE ORAL at 20:56

## 2020-05-07 RX ADMIN — SODIUM CHLORIDE, PRESERVATIVE FREE 10 ML: 5 INJECTION INTRAVENOUS at 08:40

## 2020-05-07 RX ADMIN — SODIUM CHLORIDE 125 ML/HR: 9 INJECTION, SOLUTION INTRAVENOUS at 23:02

## 2020-05-07 RX ADMIN — SODIUM CHLORIDE 125 ML/HR: 9 INJECTION, SOLUTION INTRAVENOUS at 01:50

## 2020-05-07 NOTE — PLAN OF CARE
Problem: Patient Care Overview  Goal: Plan of Care Review  Flowsheets  Taken 5/7/2020 1014  Plan of Care Reviewed With: patient  Taken 5/7/2020 1032  Outcome Summary: pt 84 yo female, presents w generalized weakness, significantly impaired vision deficits, acute CVA w hx of CVA. pt reports recent fall (noted R orbital bruising) and she broke her prism glasses during fall. This date, pt tolerated ambulation on unit 100ft CGA rollator, needs assist guiding rollator 2' visual deficits on unit. Pt baseline lives w spouse, uses rollator. Pt may benefit from skilled PT acutely to address functional deficits and assist w DC planning- HHC vs rehab pending progress.       ..Patient was wearing a face mask during this therapy encounter. Therapist used appropriate personal protective equipment including mask and gloves.  Mask used was standard procedure mask. Appropriate PPE was worn during the entire therapy session. Hand hygiene was completed before and after therapy session. Patient is not in enhanced droplet precautions.

## 2020-05-07 NOTE — PROGRESS NOTES
Discharge Planning Assessment  [facility]     Patient Name: Gela Thompson  MRN: [id number]  Today's Date: 5/7/2020    Admit Date: 5/6/2020    Discharge Needs Assessment     Row Name 05/07/20 1059       Living Environment    Lives With  spouse;friend(s)    Name(s) of Who Lives With Patient  Dhaval Thompson; Kerrie Buck    Current Living Arrangements  home/apartment/condo    Potentially Unsafe Housing Conditions  other (see comments) no concerns    Primary Care Provided by  self    Provides Primary Care For  spouse    Family Caregiver if Needed  friend(s);child(mamie), adult    Family Caregiver Names  Kerrie Buck; Kia Jay; Andres Jay    Quality of Family Relationships  helpful;supportive       Resource/Environmental Concerns    Resource/Environmental Concerns  home accessibility    Home Accessibility Concerns  stairs to enter home       Transition Planning    Patient/Family Anticipates Transition to  inpatient rehabilitation facility    Transportation Anticipated  family or friend will provide       Discharge Needs Assessment    Readmission Within the Last 30 Days  no previous admission in last 30 days    Concerns to be Addressed  discharge planning    Equipment Needed After Discharge  raised toilet;shower chair;rollator;glucometer    Outpatient/Agency/Support Group Needs  inpatient rehabilitation facility    Discharge Facility/Level of Care Needs  rehabilitation facility    Provided Post Acute Provider List?  N/A    N/A Provider List Comment  Patient declined at this time    Patient's Choice of Community Agency(s)  [facility]        Discharge Plan     Row Name 05/07/20 1101       Plan    Plan  [facility] vs [facility]    Provided Post Acute Provider Quality & Resource List?  N/A    N/A Quality & Resource List Comment  Patient declined at this time    Patient/Family in Agreement with Plan  yes    Plan Comments  CCP met with patient at bedside. CCP role explained and  discharge planning discussed. Face sheet verified and IMM noted. Patient stated her daughter Kia Thompson and son Andres Thompson are her healthcare surrogates but she is unsure where paperwork is; CCP encouraged patient if paperwork was located to send in to [facility]. Patient's PCP is Dr. Karla Mora. Patient lives with her spouse Dhavla Thompson and friend Kerrie Buck in a single level home with a basement with three stairs at the entrance. Patient uses a raised toiled, glucometer, shower chair, and rollator at home. Patient is independent with her ADLs. Patient denies needing assistance with food. Patient has used [facility] for home health in the past and has been to [facility] for subacute rehab. Patient declined HH/SNF list and Medicare.gov ratings at this time and stated she would like a consult for [facility]. CCP requested consult for [facility] from 5N CCP RN. Patient stated if unable to go to [facility] she would like a referral for [facility]. Patient's pharmacy is [email or web address] on [address] and patient is enrolled in meds to [facility]. Patient denies difficulty affording or remembering to take her medications. Patient's family will provide transportation at discharge. CCP will follow for [facility] consult and to determine if SNF is needed. Sirena Carvalho W        Destination      Coordination has not been started for this encounter.      Durable Medical Equipment      Coordination has not been started for this encounter.      Dialysis/Infusion      Coordination has not been started for this encounter.      Home Medical Care      Coordination has not been started for this encounter.      Therapy      Coordination has not been started for this encounter.      Community Resources      Coordination has not been started for this encounter.          Demographic Summary     Row Name 05/07/20 9988       General Information    Admission Type  inpatient    Arrived From  emergency department    Required  Notices Provided  Important Message from Medicare    Referral Source  admission list    Reason for Consult  discharge planning    Preferred Language  English     Used During This Interaction  no        Functional Status     Row Name 05/07/20 1059       Functional Status    Usual Activity Tolerance  good    Current Activity Tolerance  moderate       Functional Status, IADL    Medications  independent    Meal Preparation  independent    Housekeeping  independent    Laundry  independent    Shopping  independent       Mental Status    General Appearance WDL  WDL       Mental Status Summary    Recent Changes in Mental Status/Cognitive Functioning  no changes       Employment/    Employment Status  retired        Psychosocial    No documentation.       Abuse/Neglect    No documentation.       Legal    No documentation.       Substance Abuse    No documentation.       Patient Forms    No documentation.           THEODORA Ventura

## 2020-05-07 NOTE — PLAN OF CARE
Problem: Patient Care Overview  Goal: Plan of Care Review  Outcome: Ongoing (interventions implemented as appropriate)  Flowsheets (Taken 5/7/2020 1407)  Plan of Care Reviewed With: patient  Outcome Summary: clincial swallow evaluation completed.  No s/s aspiration with any consistency.  Good mastication and oral clearance.  REC regular diet, thin liquids, meds as tolerated.  Upright with all po.

## 2020-05-07 NOTE — CONSULTS
Patient Name: Gela Thompson  :1934  85 y.o.    Date of Admission: 2020  Encounter Provider: Monserrat Marsh MD  Date of Encounter Visit: 20  Place of Service: [facility] CARDIOLOGY  Referring Provider: Omar Conde MD  Patient Care Team:  Karla Mora MD as PCP - General      Chief complaint: CVA    Reason for Consult: Stroke/ Atrial fibrillation    History of Present Illness:    Ms Thompson is an 85 year old patient of mine with a history of hypertension, diabetes, stroke  (on Plavix), TIA , and ROMAN (on CPAP). She was seen initially in  with SOA    She was seen by APRN in  and was on Pradaxa.  Holter done in 2017 was normal. Strips were reviewed by Dr. Gibson who did not feel that she had atrial fibrillation and Pradaxa was discontinued as it was causing her GI upset.    In 2018 she was seen in the ED for chest pain.  It was felt to be GERD.     She was seen in 2020 with hypertension and B/P as high as 226/93. ECHO done at that time showed EF 60-65% with mild AI.  CT of renal arteries showed no significant renal artery stenosis.  Her hypertensive medications were titrated until her blood pressure was controlled      She was last seen in the office on 2020.  At the time of that visit her blood pressure was better controlled.  She denied any chest pain, palpitations, or SOA.  She was taking verapamil 120 mg each morning and 240 mg each evening.     She presented to the ED yesterday as directed by her PCP.  She was seen in the ED on 2020 after a fall where she fell and hit her head and diagnosed with orbital floor fracture.  She was discharged home.  She continued to have dizziness and balance issues so it was recommended she return to the ED. B/P was 147/47 on arrival. She complained of some vision changes that had been occurring over the last few days. CTA of head and neck was done which showed  subacute left occipital stroke and unexpected right P2 occlusion. Petechial bleed in the left occipital lobe. Neurology was consulted.  Neurosurgery was consulted regarding timing of starting anticoagulation.  Labs included K 5.6, TSH 1.050, Mag 1.8 on arrival.        EKG in the ED showed possible atrial fibrillation with controlled rate and her telemetry on admission showed atrial fibrillation also.  She is currently in NSR. ECHO done this admission showed EF 60%with normal LV function, negative saline test, LA volume mildly increased, mild to moderate AI with no stenosis, trace TR.      We have been consulted for atrial fibrillation in the setting of this new stroke  She is on 240 mg verapamil in evening and 120 in morning at home          Previous Cardiac Testing   ECHO 05/7/2020  · Left ventricular systolic function is normal. Calculated EF = 60%. Estimated EF was in agreement with the calculated EF. Estimated EF = 60%. Normal left ventricular cavity size and wall thickness noted. All left ventricular wall segments contract normally. Left ventricular diastolic function is normal.  · Left atrial volume is mildly increased. Saline test results are negative.  · The valve was poorly visualized but appears trileaflet. The aortic valve is abnormal in structure. There is moderate calcification of the aortic valve.Mild to moderate aortic valve regurgitation is present. No aortic valve stenosis is present.  · Mild MAC is present.  · The tricuspid valve is grossly normal. Trace tricuspid valve regurgitation is present. Estimated right ventricular systolic pressure from tricuspid regurgitation is normal (<35 mmHg). Calculated right ventricular systolic pressure from tricuspid regurgitation is 33.0 mmHg.  · Limited 2D imaging of cardiac valve            Past Medical History:   Diagnosis Date   • Abnormal echocardiogram    • Adiposity    • Anemia    • Cerebrovascular accident (CMS/HCC)    • Chest pain    • Chronic low back  pain    • Diabetes mellitus (CMS/[facility])    • Dyslipidemia    • Dyspnea    • Gastroesophageal reflux disease    • H/O echocardiogram 06/27/2014   • Health care maintenance    • History of EKG 10/29/2015   • Hyperlipidemia    • Hypertension    • Mitral valve insufficiency    • Murmur    • ROMAN (obstructive sleep apnea)    • Osteoarthritis        Past Surgical History:   Procedure Laterality Date   • CHOLECYSTECTOMY     • HYSTERECTOMY     • KNEE SURGERY Bilateral     TKA   • NEUROPLASTY      deompression median nerve at carpal tunnel bilateral   • SHOULDER SURGERY Left          Prior to Admission medications    Medication Sig Start Date End Date Taking? Authorizing Provider   EPINEPHrine (EPIPEN) 0.3 MG/0.3ML solution auto-injector injection 1 (One) Time As Needed.   Yes Sandi Richardson MD   fexofenadine (ALLEGRA) 60 MG tablet Take 60 mg by mouth Daily.   Yes Sandi Richardson MD   Magnesium Oxide 500 MG capsule Take 1,000 mg by mouth Daily.   Yes Sandi Richardson MD   metFORMIN (GLUCOPHAGE) 500 MG tablet Take 500 mg by mouth 2 (Two) Times a Day With Meals.   Yes Sandi Richardson MD   montelukast (SINGULAIR) 10 MG tablet Take 10 mg by mouth Every Night.   Yes Sandi Richardson MD   niacin 100 MG tablet Take 100 mg by mouth Daily With Breakfast.   Yes Sandi Richardson MD   Calcium Carb-Cholecalciferol (CALCIUM 1000 + D PO) Take 1 tablet by mouth Daily. 10/21/14   Sandi Richardson MD   celecoxib (CELEBREX) 100 MG capsule Take 100 mg by mouth Every Night. 11/28/12   Sandi Richardson MD   Clopidogrel Bisulfate (PLAVIX PO) Take 75 mg by mouth Daily.    Emergency, Nurse Epic, RN   furosemide (LASIX) 20 MG tablet TAKE ONE TABLET BY MOUTH DAILY AS NEEDED  Patient taking differently: Take 20 mg by mouth Daily As Needed (Leg swelling or SOB). Hasn't taken in several months 1/2/18   Monserrat Marsh MD   hydrALAZINE (APRESOLINE) 50 MG tablet Take 1 tablet by mouth 3 (Three) times a day. 1/7/20    Maricruz Salazar APRN   L-Lysine 500 MG tablet Take 1 tablet by mouth Daily. 11/28/12   Sandi Richardson MD   lisinopril (PRINIVIL,ZESTRIL) 40 MG tablet Take 1 tablet by mouth daily. 1/8/20   Maricruz Salazar APRN   Multiple Vitamin (MULTIVITAMINS PO) Take 1 tablet by mouth Daily. 11/28/12   Sandi Richardson MD   potassium chloride (K-DUR,KLOR-CON) 10 MEQ CR tablet TAKE ONE TABLET BY MOUTH DAILY AS NEEDED  Patient taking differently: Take 10 mEq by mouth Daily As Needed (Takes with furosemide). 7/23/18   Monserrat Marsh MD   spironolactone (ALDACTONE) 50 MG tablet Take 1 tablet by mouth daily. 1/8/20   Maricruz Salazar APRN   traMADol (ULTRAM) 50 MG tablet Take 1 tablet by mouth Daily As Needed (Arthritis pain). Patient usually takes 1/2 tab and takes very seldomly 10/21/14   Sandi Richardson MD   verapamil SR (CALAN-SR) 120 MG CR tablet TAKE ONE TABLET BY MOUTH EVERY MORNING 3/9/20   Monserrat Marsh MD   verapamil SR (CALAN-SR) 240 MG CR tablet Take 240 mg by mouth Every Night. 11/28/12   Sandi Richardson MD   vitamin B-12 (CYANOCOBALAMIN) 1000 MCG tablet Take 1 tablet by mouth Daily. As directed 11/28/12   Sandi Richardson MD       Allergies   Allergen Reactions   • Penicillins Hives     Patient hasn't taken PCNs for over 30 years.    • Morphine GI Intolerance   • Statins Myalgia     Pt stated that she gets muscle aches all over when she takes statins.    • Tape Rash       Social History     Socioeconomic History   • Marital status:      Spouse name: Not on file   • Number of children: Not on file   • Years of education: Not on file   • Highest education level: Not on file   Tobacco Use   • Smoking status: Never Smoker   • Smokeless tobacco: Never Used   • Tobacco comment: caffeine use   Substance and Sexual Activity   • Alcohol use: No   • Drug use: No   • Sexual activity: Defer       Family History   Problem Relation Age of Onset   • Heart disease Mother    • Heart attack  Mother    • Heart disease Sister    • Stroke Father        REVIEW OF SYSTEMS:   All other systems reviewed and negative.        Objective:     Vitals:    05/08/20 0314 05/08/20 0600 05/08/20 0727 05/08/20 1338   BP: 135/59  138/80 135/60   BP Location: Right arm  Left arm Right arm   Patient Position: Lying  Lying Lying   Pulse: 54  73 68   Resp: 16  16 18   Temp: 98 °F (36.7 °C)  97.3 °F (36.3 °C) 97.5 °F (36.4 °C)   TempSrc: Oral  Oral Oral   SpO2: 98%  93% 97%   Weight:  79.6 kg (175 lb 6.4 oz)     Height:         Body mass index is 30.11 kg/m².    Intake/Output Summary (Last 24 hours) at 5/8/2020 1406  Last data filed at 5/7/2020 1700  Gross per 24 hour   Intake 240 ml   Output --   Net 240 ml       Constitutional: She is oriented to person, place, and time. She appears well-developed. She does not appear ill.   HENT:   Head: Normocephalic and atraumatic. Head is without contusion.   Right Ear: Hearing normal. No drainage.   Left Ear: Hearing normal. No drainage.   Nose: No nasal deformity. No epistaxis.   Eyes: Lids are normal. Right eye exhibits no exudate. Left eye exhibits no exudate.  Neck: No JVD present. Carotid bruit is not present. No tracheal deviation present. No thyroid mass and no thyromegaly present.   Cardiovascular: Normal rate, regular rhythm and normal heart sounds.    Pulses:       Posterior tibial pulses are 2+ on the right side, and 2+ on the left side.   Pulmonary/Chest: Effort normal and breath sounds normal.   Abdominal: Soft. Normal appearance and bowel sounds are normal. There is no tenderness.   Musculoskeletal: Normal range of motion.        Right shoulder: She exhibits no deformity.        Left shoulder: She exhibits no deformity.   Neurological: She is alert and oriented to person, place, and time. She has normal strength.   Skin: Skin is warm, dry and intact. No rash noted.   Psychiatric: She has a normal mood and affect. Her behavior is normal. Thought content normal.   Vitals  reviewed      Lab Review:     Results from last 7 days   Lab Units 05/07/20  0323 05/06/20  1328   SODIUM mmol/L 139 136   POTASSIUM mmol/L 4.7 5.6*   CHLORIDE mmol/L 106 104   CO2 mmol/L 20.7* 18.8*   BUN mg/dL 31* 42*   CREATININE mg/dL 0.92 1.47*   CALCIUM mg/dL 9.2 9.4   BILIRUBIN mg/dL  --  0.4   ALK PHOS U/L  --  62   ALT (SGPT) U/L  --  13   AST (SGOT) U/L  --  15   GLUCOSE mg/dL 85 126*         Results from last 7 days   Lab Units 05/07/20  0323   WBC 10*3/mm3 7.25   HEMOGLOBIN g/dL 10.5*   HEMATOCRIT % 31.2*   PLATELETS 10*3/mm3 283     Results from last 7 days   Lab Units 05/06/20  1328   INR  1.06     Results from last 7 days   Lab Units 05/06/20  1328   MAGNESIUM mg/dL 1.8     Results from last 7 days   Lab Units 05/07/20  0323   CHOLESTEROL mg/dL 181   TRIGLYCERIDES mg/dL 124   HDL CHOL mg/dL 36*   LDL CHOL mg/dL 120*     CTA head  IMPRESSION:  1. Apparent arterial cutoff of the distal right P2 segment (the side  opposite the area of noted subacute infarct). Scattered calcifications  in the bilateral cervical and intracranial carotid arteries, with  estimated 50% narrowing at the origin of the right ICA, estimated 50%  narrowing at the supraclinoid right ICA, estimated 50% narrowing at the  origin of the right vertebral artery.        2. No acute intracranial hemorrhage or hydrocephalus. Redemonstration of  right frontal lobe encephalomalacia, and left parieto-occipital subacute  infarct, which shows gyriform cortical enhancement, compatible with  postinfarct change. No abnormal enhancement elsewhere in brain.         Telemetry      On admission      EKG on admission      EKG baseline      I personally viewed and interpreted the patient's EKG/Telemetry data.        Assessment and Plan:       1.    Paroxysmal A. fib.  There is been a question of this in the past and I have reviewed some monitor strips with Dr. Gibson but we did not think it was actually atrial fibrillation.  However. Now she probably  needs to be on anticoagulation going forward.  Her renal function looks pretty good so probably Eliquis 5 mg twice a day would be her best option when OK with nephrology  2.  Hypertension.  Blood pressure well-controlled.  3.  Obstructive sleep apnea compliant with CPAP  4.  History of stroke in 2011 with a TIA in 2016  5.  Diabetes.  6.  Chest pain.  Most likely due to acid reflux.    Echocardiogram showed normal LV function with an ejection fraction of 60%.    Monserrat Marsh MD  05/08/20  14:06

## 2020-05-07 NOTE — CONSULTS
[facility] NEUROSURGERY CONSULT NOTE    Patient name: Gela Thompson  Referring Provider: Dr. Torres  Reason for Consultation: [facility]    Patient Care Team:  Karla Mora MD as PCP - General    Chief complaint: vision loss    Subjective .     History of present illness:    Patient is a 85 y.o. right handed female who chronic vertigo episodes every few months for the last 15+ years.  She states that usually she has physical therapy for her neck and her symptoms resolve.  Approximately 10 days ago she had an onset of dizziness that was sudden and lasted for approximately 3 days.  She had associated bilateral blurred vision but no speech changes or weakness.  2 days ago, she was descending stairs in her garage and missed a step and fell forward striking her head on concrete.  She had no associated loss of consciousness nausea or vomiting.  Her headache lasted for 1 day.  She was seen in the emergency room and had CT head with evidence of old infarcts but no acute intracranial hemorrhage.  She had orbital floor fracture.  ER notes indicate they spoke with ophthalmology.  She was discharged home but returned to the emergency room yesterday when her primary care doctor reviewed the CT scan and found findings of subacute to acute infarct noted.  Specifically, the patient's was having persistent symptoms with dizziness as well as complaints of progressive blurring of her vision.  She states that she has difficulty with definition.  She can see shapes and colors but cannot define the object.  She denies any new headaches dizziness palpitations.  She was found to be in atrial fibrillation.  She has no history of cardiac disease.  She has been on Plavix for history of stroke in the past.  Follow-up CT in the ER showed small amount of petechial hemorrhage as well as an MRI today.  We have been consulted to evaluate to this finding.    Review of Systems  Review of Systems   Constitutional: Negative for fever.   HENT: Negative  for trouble swallowing.    Eyes: Positive for visual disturbance.   Cardiovascular: Negative for palpitations.   Gastrointestinal: Negative for nausea and vomiting.   Musculoskeletal: Negative for gait problem.   Neurological: Positive for dizziness and weakness ( Chronic right foot drop). Negative for numbness and headaches.   Hematological: Bruises/bleeds easily.   Psychiatric/Behavioral: Negative for confusion.       History  PAST MEDICAL HISTORY  Past Medical History:   Diagnosis Date   • Abnormal echocardiogram    • Adiposity    • Anemia    • Cerebrovascular accident (CMS/HCC)    • Chest pain    • Chronic low back pain    • Diabetes mellitus (CMS/HCC)    • Dyslipidemia    • Dyspnea    • Gastroesophageal reflux disease    • H/O echocardiogram 06/27/2014   • Health care maintenance    • History of EKG 10/29/2015   • Hyperlipidemia    • Hypertension    • Mitral valve insufficiency    • Murmur    • ROMAN (obstructive sleep apnea)    • Osteoarthritis        PAST SURGICAL HISTORY  Past Surgical History:   Procedure Laterality Date   • CHOLECYSTECTOMY     • HYSTERECTOMY     • KNEE SURGERY Bilateral     TKA   • NEUROPLASTY      deompression median nerve at carpal tunnel bilateral   • SHOULDER SURGERY Left        FAMILY HISTORY  Family History   Problem Relation Age of Onset   • Heart disease Mother    • Heart attack Mother    • Heart disease Sister    • Stroke Father        SOCIAL HISTORY  Social History     Tobacco Use   • Smoking status: Never Smoker   • Smokeless tobacco: Never Used   • Tobacco comment: caffeine use   Substance Use Topics   • Alcohol use: No   • Drug use: No       retired  Lives at home with her     Allergies:  Penicillins; Morphine; Statins; and Tape    MEDICATIONS:  Medications Prior to Admission   Medication Sig Dispense Refill Last Dose   • EPINEPHrine (EPIPEN) 0.3 MG/0.3ML solution auto-injector injection 1 (One) Time As Needed.      • fexofenadine (ALLEGRA) 60 MG tablet Take 60 mg  by mouth Daily.      • Magnesium Oxide 500 MG capsule Take 1,000 mg by mouth Daily.      • metFORMIN (GLUCOPHAGE) 500 MG tablet Take 500 mg by mouth 2 (Two) Times a Day With Meals.      • montelukast (SINGULAIR) 10 MG tablet Take 10 mg by mouth Every Night.      • niacin 100 MG tablet Take 100 mg by mouth Daily With Breakfast.      • Calcium Carb-Cholecalciferol (CALCIUM 1000 + D PO) Take 1 tablet by mouth Daily.   Taking   • celecoxib (CELEBREX) 100 MG capsule Take 100 mg by mouth Every Night.   Taking   • Clopidogrel Bisulfate (PLAVIX PO) Take 75 mg by mouth Daily.   Taking   • furosemide (LASIX) 20 MG tablet TAKE ONE TABLET BY MOUTH DAILY AS NEEDED (Patient taking differently: Take 20 mg by mouth Daily As Needed (Leg swelling or SOB). Hasn't taken in several months) 90 tablet 2 Taking   • hydrALAZINE (APRESOLINE) 50 MG tablet Take 1 tablet by mouth 3 (Three) times a day. 90 tablet 5 Taking   • L-Lysine 500 MG tablet Take 1 tablet by mouth Daily.   Taking   • lisinopril (PRINIVIL,ZESTRIL) 40 MG tablet Take 1 tablet by mouth daily. 30 tablet 5 Taking   • Multiple Vitamin (MULTIVITAMINS PO) Take 1 tablet by mouth Daily.   Taking   • potassium chloride (K-DUR,KLOR-CON) 10 MEQ CR tablet TAKE ONE TABLET BY MOUTH DAILY AS NEEDED (Patient taking differently: Take 10 mEq by mouth Daily As Needed (Takes with furosemide).) 30 tablet 0 Taking   • spironolactone (ALDACTONE) 50 MG tablet Take 1 tablet by mouth daily. 30 tablet 5 Taking   • traMADol (ULTRAM) 50 MG tablet Take 1 tablet by mouth Daily As Needed (Arthritis pain). Patient usually takes 1/2 tab and takes very seldomly   Taking   • verapamil SR (CALAN-SR) 120 MG CR tablet TAKE ONE TABLET BY MOUTH EVERY MORNING 30 tablet 5    • verapamil SR (CALAN-SR) 240 MG CR tablet Take 240 mg by mouth Every Night.   Taking   • vitamin B-12 (CYANOCOBALAMIN) 1000 MCG tablet Take 1 tablet by mouth Daily. As directed   Taking       Current Facility-Administered Medications:   •   acetaminophen (TYLENOL) tablet 650 mg, 650 mg, Oral, Q4H PRN **OR** acetaminophen (TYLENOL) 160 MG/5ML solution 650 mg, 650 mg, Oral, Q4H PRN **OR** acetaminophen (TYLENOL) suppository 650 mg, 650 mg, Rectal, Q4H PRN, Omar Conde MD  •  atorvastatin (LIPITOR) tablet 40 mg, 40 mg, Oral, Nightly, Omar Conde MD  •  clopidogrel (PLAVIX) tablet 75 mg, 75 mg, Oral, Daily, Omar Conde MD, Stopped at 05/07/20 0854  •  hydrALAZINE (APRESOLINE) tablet 25 mg, 25 mg, Oral, TID, Omar Conde MD, Stopped at 05/07/20 0854  •  sodium chloride 0.9 % bolus 500 mL, 500 mL, Intravenous, Once, Omar Conde MD  •  sodium chloride 0.9 % flush 10 mL, 10 mL, Intravenous, PRN, Omar Conde MD  •  [COMPLETED] Insert peripheral IV, , , Once **AND** sodium chloride 0.9 % flush 10 mL, 10 mL, Intravenous, PRN, Omar Conde MD  •  sodium chloride 0.9 % flush 10 mL, 10 mL, Intravenous, Q12H, Omar Conde MD, 10 mL at 05/07/20 0840  •  sodium chloride 0.9 % flush 10 mL, 10 mL, Intravenous, PRN, Omar Conde MD  •  sodium chloride 0.9 % infusion, 125 mL/hr, Intravenous, Continuous, Omar Conde MD, Last Rate: 125 mL/hr at 05/07/20 0150, 125 mL/hr at 05/07/20 0150  •  sodium chloride 0.9 % infusion, 125 mL/hr, Intravenous, Continuous, Omar Conde MD  •  verapamil SR (CALAN-SR) CR tablet 120 mg, 120 mg, Oral, Daily, Omar Conde MD, Stopped at 05/07/20 0855  •  verapamil SR (CALAN-SR) CR tablet 240 mg, 240 mg, Oral, Nightly, Omar Conde MD  •  vitamin B-12 (CYANOCOBALAMIN) tablet 1,000 mcg, 1,000 mcg, Oral, Daily, Omar Conde MD, Stopped at 05/07/20 0855    Objective     Results Review:  LABS:  Results from last 7 days   Lab Units 05/07/20  0323 05/06/20  1328   WBC 10*3/mm3 7.25 6.54   HEMOGLOBIN g/dL 10.5* 10.7*   HEMATOCRIT % 31.2* 31.1*   PLATELETS 10*3/mm3 283 300     Results from last 7 days   Lab Units 05/07/20  0323  05/06/20  1328   SODIUM mmol/L 139 136   POTASSIUM mmol/L 4.7 5.6*   CHLORIDE mmol/L 106 104   CO2 mmol/L 20.7* 18.8*   BUN mg/dL 31* 42*   CREATININE mg/dL 0.92 1.47*   CALCIUM mg/dL 9.2 9.4   BILIRUBIN mg/dL  --  0.4   ALK PHOS U/L  --  62   ALT (SGPT) U/L  --  13   AST (SGOT) U/L  --  15   GLUCOSE mg/dL 85 126*     Results from last 7 days   Lab Units 05/06/20  1328   INR  1.06     Lab Results   Lab Value Date/Time    PTT 29.7 12/30/2019 1446       DIAGNOSTICS:  CTH-very small area of petechial hemorrhage within the left PCA distribution infarct in the occipital lobe    MRI reveals acute right PCA distribution, left MCA distribution, and subacute left PCA distribution infarcts.  The left PCA distribution infarct also has a subtle amount of petechial hemorrhagic transformation within the left occipital lobe    Results Review:   I reviewed the patient's new clinical results.  I personally viewed and interpreted the patient's CT head and MRI brain.  Images also reviewed by discussed with Dr. Mcneill    Vital Signs   Temp:  [97.4 °F (36.3 °C)-98.5 °F (36.9 °C)] 97.4 °F (36.3 °C)  Heart Rate:  [49-79] 79  Resp:  [16-18] 16  BP: (122-167)/(53-75) 144/71    Physical Exam:  Physical Exam   Constitutional: She is oriented to person, place, and time. She appears well-developed and well-nourished.   HENT:   Head: Normocephalic and atraumatic.   Eyes: Pupils are equal, round, and reactive to light. EOM are normal.   Right periorbital ecchymosis and mild swelling; wearing corrective lenses   Neck: Neck supple.   Cardiovascular: Normal rate. An irregularly irregular rhythm present.   Pulmonary/Chest: Effort normal.   Neurological: She is alert and oriented to person, place, and time. She has a normal Finger-Nose-Finger Test.   Skin: Skin is warm and dry.   Psychiatric: She has a normal mood and affect. Her speech is normal and behavior is normal. Judgment normal.   Vitals reviewed.    Neurologic Exam     Mental Status    Oriented to person, place, and time.   Follows 3 step commands.   Attention: normal. Concentration: normal.   Speech: speech is normal   Level of consciousness: alert  Knowledge: good.   Normal comprehension.     Cranial Nerves     CN II   Visual acuity: decreased  Right visual field deficit: Able to count fingers in upper temporal, upper and lower nasal, but is inconsistent.;  Visual field absence lower temporal.  Left visual field deficit: Able to count fingers and upper nasal and upper and lower temporal regions, but inconsistent.  Visual field cut lower nasal.    CN III, IV, VI   Pupils are equal, round, and reactive to light.  Extraocular motions are normal.   CN III: no CN III palsy  CN VI: no CN VI palsy  Nystagmus: none     CN V   Facial sensation intact.     CN VII   Facial expression full, symmetric.     CN VIII   CN VIII normal.     CN IX, X   CN IX normal.   CN X normal.     CN XI   CN XI normal.     CN XII   CN XII normal.     Motor Exam   Right arm pronator drift: absent  Left arm pronator drift: absent    Gait, Coordination, and Reflexes     Gait  Gait: (not tested)    Coordination   Finger to nose coordination: normal      Assessment/Plan       Cerebrovascular accident (CMS/HCC)    ROBERTO (acute kidney injury) (CMS/HCC)    Hyperkalemia    Anemia, macrocytic    DM II (diabetes mellitus, type II), controlled (CMS/[facility])    HTN (hypertension)      PLAN: Patient with small petechial hemorrhagic transformation of left PCA distribution infarct.  Also has multifocal infarcts that are acute to subacute.  Noose onset atrial fibrillation noted on EKG.  Previously on Plavix for prior stroke. On exam she has substantial visual issues with limited visual fields and significant blurring.  No focal motor or speech deficits.  We will follow-up with repeat CT head tomorrow.  If stable, likely can initiate antiplatelet/anticoagulant of choice per neurology.  It does appear that she received aspirin last night following  "admission.  Plavix was held.    I discussed the patients findings and my recommendations with patient and Dr. Osito Gibson, APRN  05/07/20  13:36    \"Dictated utilizing Dragon dictation\".      "

## 2020-05-07 NOTE — PLAN OF CARE
Problem: Patient Care Overview  Goal: Plan of Care Review  Flowsheets  Taken 5/7/2020 1545  Plan of Care Reviewed With: patient  Taken 5/7/2020 1531  Outcome Summary: Pt seen for OT eval following CVA, pt has vision deficits at baseline but uses prism glasses but this date her vision is significantly worse with left HH and RLQ imapirments. Pt needs significant assist for ADLs especially LB due to vision even with cues for scanning. Pt would benefti from OT to address deficits, possibly acute rehab at d/c.     Therapist used appropriate personal protective equipment including mask and gloves.  Mask used was standard procedure mask. Appropriate PPE was worn during the entire therapy session. Hand hygiene was completed before and after therapy session. Patient is not in enhanced droplet precautions.

## 2020-05-07 NOTE — THERAPY EVALUATION
Acute Care - Occupational Therapy Initial Evaluation  [facility]     Patient Name: Gela Thompson  : 1934  MRN: [id number]  Today's Date: 2020  Onset of Illness/Injury or Date of Surgery: 20     Referring Physician: Amaya    Admit Date: 2020       ICD-10-CM ICD-9-CM   1. Cerebrovascular accident (CVA), unspecified mechanism (CMS/HCC) I63.9 434.91   2. Ataxia R27.0 781.3   3. Generalized weakness R53.1 780.79     Patient Active Problem List   Diagnosis   • Anemia   • Chronic low back pain   • Diabetes mellitus (CMS/HCC)   • Dyslipidemia   • Gastroesophageal reflux disease   • Hypertension   • Mitral valve insufficiency   • Osteoarthritis   • Adiposity   • Obstructive sleep apnea syndrome   • Cerebrovascular accident (CMS/HCC)   • Uncontrolled hypertension   • ROBERTO (acute kidney injury) (CMS/[facility])   • Hyperkalemia   • Anemia, macrocytic   • DM II (diabetes mellitus, type II), controlled (CMS/[facility])   • HTN (hypertension)     Past Medical History:   Diagnosis Date   • Abnormal echocardiogram    • Adiposity    • Anemia    • Cerebrovascular accident (CMS/HCC)    • Chest pain    • Chronic low back pain    • Diabetes mellitus (CMS/HCC)    • Dyslipidemia    • Dyspnea    • Gastroesophageal reflux disease    • H/O echocardiogram 2014   • Health care maintenance    • History of EKG 10/29/2015   • Hyperlipidemia    • Hypertension    • Mitral valve insufficiency    • Murmur    • ROMAN (obstructive sleep apnea)    • Osteoarthritis      Past Surgical History:   Procedure Laterality Date   • CHOLECYSTECTOMY     • HYSTERECTOMY     • KNEE SURGERY Bilateral     TKA   • NEUROPLASTY      deompression median nerve at carpal tunnel bilateral   • SHOULDER SURGERY Left           OT ASSESSMENT FLOWSHEET (last 12 hours)      Occupational Therapy Evaluation     Row Name 20 1538                   OT Evaluation Time/Intention    Subjective Information  no complaints  -SG        Document Type  evaluation  -SG         Mode of Treatment  individual therapy;occupational therapy  -SG        Patient Effort  good  -SG        Symptoms Noted During/After Treatment  none  -SG           General Information    Patient Profile Reviewed?  yes  -SG        Patient Observations  alert;cooperative;agree to therapy  -SG        General Observations of Patient  Pt supine in bed  -SG        Prior Level of Function  independent:;ADL's States she falls often  -SG        Equipment Currently Used at Home  rollator  -SG        Pertinent History of Current Functional Problem  CVA  -SG        Existing Precautions/Restrictions  fall  -SG        Limitations/Impairments  visual  -SG        Barriers to Rehab  visual deficit  -SG           Relationship/Environment    Lives With  spouse  -SG           Cognitive Assessment/Intervention- PT/OT    Orientation Status (Cognition)  oriented x 3  -SG        Follows Commands (Cognition)  WFL  -SG        Safety Deficit (Cognitive)  moderate deficit;insight into deficits/self awareness;problem solving  -SG        Cognitive Assessment/Intervention Comment  Significant visual field deficits, LHH and R LQ deficits  -SG           Safety Issues, Functional Mobility    Safety Issues Affecting Function (Mobility)  positioning of assistive device;problem solving;insight into deficits/self awareness  -SG        Impairments Affecting Function (Mobility)  visual/perceptual  -SG           Bed Mobility Assessment/Treatment    Bed Mobility Assessment/Treatment  supine-sit;sit-supine  -SG        Supine-Sit Day (Bed Mobility)  minimum assist (75% patient effort);2 person assist  -SG        Sit-Supine Day (Bed Mobility)  minimum assist (75% patient effort);2 person assist  -SG           Functional Mobility    Functional Mobility- Ind. Level  minimum assist (75% patient effort);2 person assist required  -SG        Functional Mobility- Device  rolling walker  -SG        Functional Mobility- Comment  Walks to bathroom,  back to commode; max A with walker navigation, even with max cues for scanning  -SG           Transfer Assessment/Treatment    Transfer Assessment/Treatment  sit-stand transfer;stand-sit transfer;toilet transfer  -SG           Sit-Stand Transfer    Sit-Stand [place] (Transfers)  moderate assist (50% patient effort);verbal cues  -SG        Assistive Device (Sit-Stand Transfers)  walker, front-wheeled  -SG           Stand-Sit Transfer    Stand-Sit [place] (Transfers)  minimum assist (75% patient effort);verbal cues  -SG        Assistive Device (Stand-Sit Transfers)  walker, front-wheeled  -SG           Toilet Transfer    Type (Toilet Transfer)  sit-stand;stand-sit  -SG        [place] Level (Toilet Transfer)  minimum assist (75% patient effort);verbal cues;nonverbal cues (demo/gesture)  -SG        Assistive Device (Toilet Transfer)  grab bars/safety frame  -SG           ADL Assessment/Intervention    BADL Assessment/Intervention  lower body dressing;grooming;toileting  -SG           Lower Body Dressing Assessment/Training    Comment (Lower Body Dressing)  Max A for LBD  -SG           Grooming Assessment/Training    [place] Level (Grooming)  grooming skills;oral care regimen;hair care, combing/brushing;contact guard assist;verbal cues  -SG        Grooming Position  sink side;supported standing  -SG           Toileting Assessment/Training    [place] Level (Toileting)  toileting skills;adjust/manage clothing;perform perineal hygiene;minimum assist (75% patient effort);verbal cues;nonverbal cues (demo/gesture)  -SG        Assistive Devices (Toileting)  grab bar/safety frame  -SG        Toileting Position  supported sitting;supported standing  -SG           General ROM    GENERAL ROM COMMENTS  BUE WFL  -SG           MMT (Manual Muscle Testing)    General MMT Comments  UE 3+/5  -SG           Positioning and Restraints    Pre-Treatment Position  in bed  -SG        Post Treatment Position  bed  -SG         In Bed  supine;call light within reach;encouraged to call for assist;exit alarm on  -SG           Pain Scale: Numbers Pre/Post-Treatment    Pain Scale: Numbers, Pretreatment  0/10 - no pain  -SG           Wound 05/06/20 1937 Right forehead Laceration    Wound - Properties Group Date first assessed: 05/06/20  - Time first assessed: 1937  - Present on Hospital Admission: Y  -JM Side: Right  - Location: forehead  - Primary Wound Type: Laceration  -JM       Plan of Care Review    Plan of Care Reviewed With  patient  -SG        Outcome Summary  Pt seen for OT eval following CVA, pt has vision deficits at baseline but uses prism glasses but this date her vision is significantly worse with left HH and RLQ imapirments. Pt needs significant assist for ADLs especially LB due to vision even with cues for scanning. Pt would benefti from OT to address deficits, possibly acute rehab at d/c.  -SG           Clinical Impression (OT)    OT Diagnosis  ADLs, vision  -SG        Criteria for Skilled Therapeutic Interventions Met (OT Eval)  yes;treatment indicated  -SG        Rehab Potential (OT Eval)  good, to achieve stated therapy goals  -SG        Therapy Frequency (OT Eval)  3 times/wk  -SG        Care Plan Review (OT)  evaluation/treatment results reviewed;care plan/treatment goals reviewed;patient/other agree to care plan  -SG        Anticipated Discharge Disposition (OT)  inpatient rehabilitation facility  -SG           OT Goals    Transfer Goal Selection (OT)  transfer, OT goal 1  -SG        Grooming Goal Selection (OT)  grooming, OT goal 1  -SG        Functional Mobility Goal Selection (OT)  functional mobility, OT goal 1  -SG        Additional Documentation  Functional Mobility Selection (OT) (Row);Grooming Goal Selection (OT) (Row)  -SG           Transfer Goal 1 (OT)    Activity/Assistive Device (Transfer Goal 1, OT)  sit-to-stand/stand-to-sit;toilet  -SG        [place] Level/Cues Needed (Transfer Goal 1, OT)   standby assist  -SG        Time Frame (Transfer Goal 1, OT)  short term goal (STG);2 weeks  -SG        Progress/Outcome (Transfer Goal 1, OT)  goal ongoing  -SG           Grooming Goal 1 (OT)    Activity/Device (Grooming Goal 1, OT)  grooming skills, all  -SG        [place] (Grooming Goal 1, OT)  contact guard assist  -SG        Time Frame (Grooming Goal 1, OT)  short term goal (STG);2 weeks  -SG        Progress/Outcome (Grooming Goal 1, OT)  goal ongoing  -SG           Functional Mobility Goal 1 (OT)    [place] Level/Cues Needed (Functional Mobility Goal 1, OT)  contact guard assist  -SG        Distance Goal 1 (Functional Mobility, OT)  Bathroom mobility with CGA using AD.  -SG        Time Frame (Functional Mobility Goal 1, OT)  short term goal (STG);2 weeks  -SG        Progress/Outcome (Functional Mobility Goal 1, OT)  goal ongoing  -SG          User Key  (r) = Recorded By, (t) = Taken By, (c) = Cosigned By    Initials Name Effective Dates    Minda Marsh, OTR 12/26/18 -     JM McArdle, Jacklyn, ELLEN 03/06/17 -          Occupational Therapy Education                 Title: PT OT SLP Therapies (In Progress)     Topic: Occupational Therapy (In Progress)     Point: ADL training (Done)     Description:   Instruct learner(s) on proper safety adaptation and remediation techniques during self care or transfers.   Instruct in proper use of assistive devices.              Learning Progress Summary           Patient Acceptance, E,TB, VU,NR by  at 5/7/2020 6739    Comment:  OT role and goals                   Point: Home exercise program (Not Started)     Description:   Instruct learner(s) on appropriate technique for monitoring, assisting and/or progressing therapeutic exercises/activities.              Learner Progress:   Not documented in this visit.          Point: Precautions (Not Started)     Description:   Instruct learner(s) on prescribed precautions during self-care and functional transfers.               Learner Progress:   Not documented in this visit.          Point: Body mechanics (Not Started)     Description:   Instruct learner(s) on proper positioning and spine alignment during self-care, functional mobility activities and/or exercises.              Learner Progress:   Not documented in this visit.                      User Key     Initials Effective Dates Name Provider Type Discipline     12/26/18 -  Minda Husain OTR Occupational Therapist OT                  OT Recommendation and Plan  Outcome Summary/Treatment Plan (OT)  Anticipated Discharge Disposition (OT): inpatient rehabilitation facility  Therapy Frequency (OT Eval): 3 times/wk  Plan of Care Review  Plan of Care Reviewed With: patient  Plan of Care Reviewed With: patient  Outcome Summary: Pt seen for OT eval following CVA, pt has vision deficits at baseline but uses prism glasses but this date her vision is significantly worse with left HH and RLQ imapirments. Pt needs significant assist for ADLs especially LB due to vision even with cues for scanning. Pt would benefti from OT to address deficits, possibly acute rehab at d/c.    Outcome Measures     Row Name 05/07/20 1546 05/07/20 1500 05/07/20 1000       How much help from another person do you currently need...    Turning from your back to your side while in flat bed without using bedrails?  --  --  3  -LH    Moving from lying on back to sitting on the side of a flat bed without bedrails?  --  --  3  -LH    Moving to and from a bed to a chair (including a wheelchair)?  --  --  3  -LH    Standing up from a chair using your arms (e.g., wheelchair, bedside chair)?  --  --  3  -LH    Climbing 3-5 steps with a railing?  --  --  3  -LH    To walk in hospital room?  --  --  3  -LH    AM-PAC 6 Clicks Score (PT)  --  --  18  -LH       How much help from another is currently needed...    Putting on and taking off regular lower body clothing?  --  2  -SG  --    Bathing (including washing, rinsing,  and drying)  --  2  -SG  --    Toileting (which includes using toilet bed pan or urinal)  --  2  -SG  --    Putting on and taking off regular upper body clothing  --  2  -SG  --    Taking care of personal grooming (such as brushing teeth)  --  2  -SG  --    Eating meals  --  2  -SG  --    AM-PAC 6 Clicks Score (OT)  --  12  -SG  --       Modified [place] Scale    Modified [place] Scale  4 - Moderately severe disability.  Unable to walk without assistance, and unable to attend to own bodily needs without assistance.  -SG  --  --       Functional Assessment    Outcome Measure Options  Modified [place]  -  AM-PAC 6 Clicks Daily Activity (OT)  -  AM-PAC 6 Clicks Basic Mobility (PT)  -      User Key  (r) = Recorded By, (t) = Taken By, (c) = Cosigned By    Initials Name Provider Type    Minda Marsh OTR Occupational Therapist     Renetta Calvo, PT Physical Therapist          Time Calculation:   Time Calculation- OT     Row Name 05/07/20 1547             Time Calculation- OT    OT Start Time  1008  -      OT Stop Time  1028  -      OT Time Calculation (min)  20 min  -      Total Timed Code Minutes- OT  12 minute(s)  -      OT Non-Billable Time (min)  20 min  -      OT Received On  05/07/20  -      OT - Next Appointment  05/11/20  -      OT Goal Re-Cert Due Date  05/21/20  -        User Key  (r) = Recorded By, (t) = Taken By, (c) = Cosigned By    Initials Name Provider Type    Minda Marsh OTR Occupational Therapist        Therapy Charges for Today     Code Description Service Date Service Provider Modifiers Qty    94426152623 HC OT EVAL MOD COMPLEXITY 2 5/7/2020 Minda Husain OTR GO 1    48208048596 HC OT SELF CARE/MGMT/TRAIN EA 15 MIN 5/7/2020 Minda Husain OTR GO 1    06271112812 HC OT THER SUPP EA 15 MIN 5/7/2020 Minda Husain OTR GO 1               ISRRAEL Beckwith  5/7/2020

## 2020-05-07 NOTE — THERAPY EVALUATION
Acute Care - Physical Therapy Initial Evaluation  [facility]     Patient Name: Gela Thompson  : 1934  MRN: [id number]  Today's Date: 2020   Onset of Illness/Injury or Date of Surgery: 20  Date of Referral to PT: 20  Referring Physician: Amaya      Admit Date: 2020    Visit Dx:     ICD-10-CM ICD-9-CM   1. Cerebrovascular accident (CVA), unspecified mechanism (CMS/HCC) I63.9 434.91   2. Ataxia R27.0 781.3   3. Generalized weakness R53.1 780.79     Patient Active Problem List   Diagnosis   • Anemia   • Chronic low back pain   • Diabetes mellitus (CMS/HCC)   • Dyslipidemia   • Gastroesophageal reflux disease   • Hypertension   • Mitral valve insufficiency   • Osteoarthritis   • Adiposity   • Obstructive sleep apnea syndrome   • Cerebrovascular accident (CMS/HCC)   • Uncontrolled hypertension   • ROBERTO (acute kidney injury) (CMS/[facility])   • Hyperkalemia   • Anemia, macrocytic   • DM II (diabetes mellitus, type II), controlled (CMS/[facility])   • HTN (hypertension)     Past Medical History:   Diagnosis Date   • Abnormal echocardiogram    • Adiposity    • Anemia    • Cerebrovascular accident (CMS/HCC)    • Chest pain    • Chronic low back pain    • Diabetes mellitus (CMS/HCC)    • Dyslipidemia    • Dyspnea    • Gastroesophageal reflux disease    • H/O echocardiogram 2014   • Health care maintenance    • History of EKG 10/29/2015   • Hyperlipidemia    • Hypertension    • Mitral valve insufficiency    • Murmur    • ROMAN (obstructive sleep apnea)    • Osteoarthritis      Past Surgical History:   Procedure Laterality Date   • CHOLECYSTECTOMY     • HYSTERECTOMY     • KNEE SURGERY Bilateral     TKA   • NEUROPLASTY      deompression median nerve at carpal tunnel bilateral   • SHOULDER SURGERY Left         PT ASSESSMENT (last 12 hours)      Physical Therapy Evaluation     Row Name 20 1014          PT Evaluation Time/Intention    Subjective Information  no complaints  -LH     Document Type   evaluation  -     Mode of Treatment  individual therapy;physical therapy  -     Patient Effort  good  -     Symptoms Noted During/After Treatment  none  -     Row Name 05/07/20 1014          General Information    Patient Profile Reviewed?  yes  -     Onset of Illness/Injury or Date of Surgery  05/06/20  -     Referring Physician  Amaya  -     Patient Observations  alert;cooperative;agree to therapy  -     General Observations of Patient  pt supine in bed no acute distrss  -     Prior Level of Function  independent:  -     Equipment Currently Used at Home  rollator  -     Pertinent History of Current Functional Problem  CVA  -     Existing Precautions/Restrictions  fall;other (see comments) impaired vision  -     Limitations/Impairments  (S) visual L HH with R lower quadrant field cut per MD  -     Risks Reviewed  patient:  -     Benefits Reviewed  patient:  -     Row Name 05/07/20 1014          Relationship/Environment    Lives With  spouse  -     Row Name 05/07/20 1014          Cognitive Assessment/Intervention- PT/OT    Orientation Status (Cognition)  oriented x 3  -     Follows Commands (Cognition)  WFL  -     Row Name 05/07/20 1014          Safety Issues, Functional Mobility    Safety Issues Affecting Function (Mobility)  at risk behavior observed 2' poor vision  -     Impairments Affecting Function (Mobility)  visual/perceptual  -     Row Name 05/07/20 1014          Bed Mobility Assessment/Treatment    Bed Mobility Assessment/Treatment  supine-sit;sit-supine  -     Supine-Sit [place] (Bed Mobility)  not tested  -     Sit-Supine [place] (Bed Mobility)  moderate assist (50% patient effort) assist BLEs  -     Assistive Device (Bed Mobility)  bed rails  -     Comment (Bed Mobility)  pt needed cuieng on which way to lie down (pt initially attempted to lay down toward food of bed)  -     Row Name 05/07/20 1014          Transfer Assessment/Treatment     Transfer Assessment/Treatment  sit-stand transfer;stand-sit transfer  -     Sit-Stand [place] (Transfers)  contact guard;verbal cues;nonverbal cues (demo/gesture)  -     Stand-Sit [place] (Transfers)  contact guard;verbal cues;nonverbal cues (demo/gesture)  -[facility] Name 05/07/20 1014          Sit-Stand Transfer    Assistive Device (Sit-Stand Transfers)  walker, 4-wheeled  -[facility] Name 05/07/20 1014          Stand-Sit Transfer    Assistive Device (Stand-Sit Transfers)  walker, 4-wheeled  -[facility] Name 05/07/20 1014          Gait/Stairs Assessment/Training    [place] Level (Gait)  contact guard;verbal cues;nonverbal cues (demo/gesture)  -     Assistive Device (Gait)  walker, 4-wheeled  -     Distance in Feet (Gait)  100  -LH     Pattern (Gait)  step-through  -     Deviations/Abnormal Patterns (Gait)  royce decreased  -     Bilateral Gait Deviations  forward flexed posture;heel strike decreased  -     Comment (Gait/Stairs)  pt needs visual guidance of rollator to safely navgiate unit 2' poor vision  -[facility] Name 05/07/20 1014          General ROM    GENERAL ROM COMMENTS  BLEs functional  -LH     Row Name 05/07/20 1014          MMT (Manual Muscle Testing)    General MMT Comments  BLEs functional, grossly at least 4/5  -LH     Row Name 05/07/20 1014          Motor Assessment/Intervention    Additional Documentation  Therapeutic Exercise (Group);Therapeutic Exercise Interventions (Group)  -[facility] Name 05/07/20 1014          Vision Assessment/Intervention    Visual Impairment/Limitations  corrective lenses full time;peripheral vision impaired left;peripheral vision impaired right pt wears prism glasses however they broke  -     Visual Field Deficit  homonymous hemianopsia, left;other (see comments) and R lower quadrant per MD-difficult to assess  -[facility] Name 05/07/20 1014          Pain Assessment    Additional Documentation  Pain Scale: Numbers Pre/Post-Treatment  (Group)  -     Row Name 05/07/20 1014          Pain Scale: Numbers Pre/Post-Treatment    Pain Scale: Numbers, Pretreatment  0/10 - no pain  -     Pain Scale: Numbers, Post-Treatment  0/10 - no pain  -     Row Name             Wound 05/06/20 1937 Right forehead Laceration    Wound - Properties Group Date first assessed: 05/06/20  - Time first assessed: 1937  -JM Present on Hospital Admission: Y  -JM Side: Right  - Location: forehead  - Primary Wound Type: Laceration  -JM    Row Name 05/07/20 1014          Plan of Care Review    Plan of Care Reviewed With  patient  -     Row Name 05/07/20 1014          Physical Therapy Clinical Impression    Date of Referral to PT  05/07/20  -     Criteria for Skilled Interventions Met (PT Clinical Impression)  yes  -     Pathology/Pathophysiology Noted (Describe Specifically for Each System)  neuromuscular;musculoskeletal  -     Impairments Found (describe specific impairments)  gait, locomotion, and balance  -     Functional Limitations in Following Categories (Describe Specific Limitations)  self-care;home management  -     Rehab Potential (PT Clinical Summary)  good, to achieve stated therapy goals  -[facility] Name 05/07/20 1014          Physical Therapy Goals    Bed Mobility Goal Selection (PT)  bed mobility, PT goal 1  -     Transfer Goal Selection (PT)  transfer, PT goal 1  -     Gait Training Goal Selection (PT)  gait training, PT goal 1  -[facility] Name 05/07/20 1014          Bed Mobility Goal 1 (PT)    Activity/Assistive Device (Bed Mobility Goal 1, PT)  bed mobility activities, all  -     [place] Level/Cues Needed (Bed Mobility Goal 1, PT)  conditional independence  -     Time Frame (Bed Mobility Goal 1, PT)  2 weeks  -     Row Name 05/07/20 1014          Transfer Goal 1 (PT)    Activity/Assistive Device (Transfer Goal 1, PT)  bed-to-chair/chair-to-bed;sit-to-stand/stand-to-sit;walker, rolling  -     [place] Level/Cues Needed  (Transfer Goal 1, PT)  conditional independence  -     Time Frame (Transfer Goal 1, PT)  2 weeks  -     Row Name 05/07/20 1014          Gait Training Goal 1 (PT)    Activity/Assistive Device (Gait Training Goal 1, PT)  walker, rolling  -     [place] Level (Gait Training Goal 1, PT)  conditional independence  -     Distance (Gait Goal 1, PT)  150  -     Time Frame (Gait Training Goal 1, PT)  2 weeks  -     Row Name 05/07/20 1014          Positioning and Restraints    Pre-Treatment Position  bathroom  -     Post Treatment Position  bed  -     In Bed  supine;call light within reach;encouraged to call for assist;exit alarm on  -       User Key  (r) = Recorded By, (t) = Taken By, (c) = Cosigned By    Initials Name Provider Type     Renetta Calvo, PT Physical Therapist    JM McArdle, Jacklyn, RN Registered Nurse        Physical Therapy Education                 Title: PT OT SLP Therapies (In Progress)     Topic: Physical Therapy (In Progress)     Point: Mobility training (In Progress)     Description:   Instruct learner(s) on safety and technique for assisting patient out of bed, chair or wheelchair.  Instruct in the proper use of assistive devices, such as walker, crutches, cane or brace.              Patient Friendly Description:   It's important to get you on your feet again, but we need to do so in a way that is safe for you. Falling has serious consequences, and your personal safety is the most important thing of all.        When it's time to get out of bed, one of us or a family member will sit next to you on the bed to give you support.     If your doctor or nurse tells you to use a walker, crutches, a cane, or a brace, be sure you use it every time you get out of bed, even if you think you don't need it.    Learning Progress Summary           Patient Acceptance, E, NR by  at 5/7/2020 1031                   Point: Home exercise program (In Progress)     Description:   Instruct learner(s)  on appropriate technique for monitoring, assisting and/or progressing patient with therapeutic exercises and activities.              Learning Progress Summary           Patient Acceptance, E, NR by  at 5/7/2020 1031                   Point: Body mechanics (Not Started)     Description:   Instruct learner(s) on proper positioning and spine alignment for patient and/or caregiver during mobility tasks and/or exercises.              Learner Progress:   Not documented in this visit.          Point: Precautions (Not Started)     Description:   Instruct learner(s) on prescribed precautions during mobility and gait tasks              Learner Progress:   Not documented in this visit.                      User Key     Initials Effective Dates Name Provider Type Discipline     04/03/18 -  Renetta Calvo, PT Physical Therapist PT              PT Recommendation and Plan  Anticipated Discharge Disposition (PT): home with home health, inpatient rehabilitation facility(pending progress )  Planned Therapy Interventions (PT Eval): balance training, bed mobility training, gait training, home exercise program, ROM (range of motion), stair training, strengthening, stretching, transfer training  Therapy Frequency (PT Clinical Impression): daily  Outcome Summary/Treatment Plan (PT)  Anticipated Discharge Disposition (PT): home with home health, inpatient rehabilitation facility(pending progress )  Plan of Care Reviewed With: patient  Outcome Summary: pt presents w generalized weakness, significantly impaired vision deficits, acute CVA w hx of CVA. pt reports recent fall (noted R orbital bruising) and she broke her prism glasses.  Outcome Measures     Row Name 05/07/20 1000             How much help from another person do you currently need...    Turning from your back to your side while in flat bed without using bedrails?  3  -      Moving from lying on back to sitting on the side of a flat bed without bedrails?  3  -      Moving  to and from a bed to a chair (including a wheelchair)?  3  -LH      Standing up from a chair using your arms (e.g., wheelchair, bedside chair)?  3  -LH      Climbing 3-5 steps with a railing?  3  -LH      To walk in hospital room?  3  -      AM-PAC 6 Clicks Score (PT)  18  -         Functional Assessment    Outcome Measure Options  AM-PAC 6 Clicks Basic Mobility (PT)  -        User Key  (r) = Recorded By, (t) = Taken By, (c) = Cosigned By    Initials Name Provider Type     Renetta Calvo, PT Physical Therapist         Time Calculation:   PT Charges     Row Name 05/07/20 [facility]             Time Calculation    Start Time  0944  -      Stop Time  0956  [facility]      Time Calculation (min)  12 min  -      PT Received On  05/07/20  -      PT - Next Appointment  05/08/20  -      PT Goal Re-Cert Due Date  05/21/20  -        User Key  (r) = Recorded By, (t) = Taken By, (c) = Cosigned By    Initials Name Provider Type     Renetta Calvo, PT Physical Therapist        Therapy Charges for Today     Code Description Service Date Service Provider Modifiers Qty    81166777894 HC PT EVAL LOW COMPLEXITY 2 5/7/2020 Renetta Calvo, PT GP 1          PT G-Codes  Outcome Measure Options: AM-PAC 6 Clicks Basic Mobility (PT)  AM-PAC 6 Clicks Score (PT): 18      Renetta Calvo PT  5/7/2020

## 2020-05-07 NOTE — THERAPY EVALUATION
Acute Care - Speech Language Pathology   Swallow Initial Evaluation [facility]     Patient Name: Gela Thompson  : 1934  MRN: [id number]  Today's Date: 2020  Onset of Illness/Injury or Date of Surgery: 20     Referring Physician: Amaya      Admit Date: 2020    Visit Dx:     ICD-10-CM ICD-9-CM   1. Cerebrovascular accident (CVA), unspecified mechanism (CMS/HCC) I63.9 434.91   2. Ataxia R27.0 781.3   3. Generalized weakness R53.1 780.79     Patient Active Problem List   Diagnosis   • Anemia   • Chronic low back pain   • Diabetes mellitus (CMS/HCC)   • Dyslipidemia   • Gastroesophageal reflux disease   • Hypertension   • Mitral valve insufficiency   • Osteoarthritis   • Adiposity   • Obstructive sleep apnea syndrome   • Cerebrovascular accident (CMS/HCC)   • Uncontrolled hypertension   • ROBERTO (acute kidney injury) (CMS/HCC)   • Hyperkalemia   • Anemia, macrocytic   • DM II (diabetes mellitus, type II), controlled (CMS/formerly [facility])   • HTN (hypertension)     Past Medical History:   Diagnosis Date   • Abnormal echocardiogram    • Adiposity    • Anemia    • Cerebrovascular accident (CMS/HCC)    • Chest pain    • Chronic low back pain    • Diabetes mellitus (CMS/HCC)    • Dyslipidemia    • Dyspnea    • Gastroesophageal reflux disease    • H/O echocardiogram 2014   • Health care maintenance    • History of EKG 10/29/2015   • Hyperlipidemia    • Hypertension    • Mitral valve insufficiency    • Murmur    • ROMAN (obstructive sleep apnea)    • Osteoarthritis      Past Surgical History:   Procedure Laterality Date   • CHOLECYSTECTOMY     • HYSTERECTOMY     • KNEE SURGERY Bilateral     TKA   • NEUROPLASTY      deompression median nerve at carpal tunnel bilateral   • SHOULDER SURGERY Left         SWALLOW EVALUATION (last 72 hours)      SLP Adult Swallow Evaluation     Row Name 20 1045                   Rehab Evaluation    Document Type  evaluation  -SA        Subjective Information  no complaints   -SA        Patient Observations  alert;cooperative  -SA        Patient Effort  good  -SA        Symptoms Noted During/After Treatment  none  -SA           General Information    Patient Profile Reviewed  yes  -SA        Pertinent History Of Current Problem  multiple infarcts; h/o CVA, HTN, DM  -SA        Current Method of Nutrition  NPO  -SA        Precautions/Limitations, Vision  other (see comments) bilateral visual field cuts  -SA        Precautions/Limitations, Hearing  WFL;for purposes of eval  -SA        Prior Level of Function-Communication  WFL  -SA        Prior Level of Function-Swallowing  no diet consistency restrictions  -SA        Plans/Goals Discussed with  patient;agreed upon  -SA        Barriers to Rehab  none identified  -SA        Patient's Goals for Discharge  return home  -SA           Pain Assessment    Additional Documentation  Pain Scale: Numbers Pre/Post-Treatment (Group)  -SA           Pain Scale: Numbers Pre/Post-Treatment    Pain Scale: Numbers, Pretreatment  0/10 - no pain  -SA        Pain Scale: Numbers, Post-Treatment  0/10 - no pain  -SA           Oral Musculature and Cranial Nerve Assessment    Oral Motor General Assessment  WFL  -SA           Clinical Swallow Eval    Oral Prep Phase  WFL  -SA        Oral Transit  WFL  -SA        Oral Residue  WFL  -SA        Pharyngeal Phase  no overt signs/symptoms of pharyngeal impairment  -SA        Esophageal Phase  unremarkable  -SA           Clinical Impression    SLP Swallowing Diagnosis  functional pharyngeal phase;functional oral phase  -SA        Functional Impact  no impact on function  -SA        Swallow Criteria for Skilled Therapeutic Interventions Met  no problems identified which require skilled intervention  -SA           Recommendations    Therapy Frequency (Swallow)  evaluation only  -SA        Predicted Duration Therapy Intervention (Days)  until discharge  -SA        SLP Diet Recommendation  regular textures;thin liquids  -SA         Recommended Precautions and Strategies  upright posture during/after eating;small bites of food and sips of liquid  -SA        SLP Rec. for Method of Medication Administration  as tolerated  -        Monitor for Signs of Aspiration  notify SLP if any concerns  -        Anticipated Dischage Disposition  unknown  -          User Key  (r) = Recorded By, (t) = Taken By, (c) = Cosigned By    Initials Name Effective Dates    SA BarksdaleAlanis segovia MS [facility]-SLP 03/07/18 -           EDUCATION  The patient has been educated in the following areas:   Dysphagia (Swallowing Impairment).    SLP Recommendation and Plan  SLP Swallowing Diagnosis: functional pharyngeal phase, functional oral phase  SLP Diet Recommendation: regular textures, thin liquids  Recommended Precautions and Strategies: upright posture during/after eating, small bites of food and sips of liquid  SLP Rec. for Method of Medication Administration: as tolerated     Monitor for Signs of Aspiration: notify SLP if any concerns     Swallow Criteria for Skilled Therapeutic Interventions Met: no problems identified which require skilled intervention  Anticipated Dischage Disposition: unknown     Therapy Frequency (Swallow): evaluation only  Predicted Duration Therapy Intervention (Days): until discharge       Plan of Care Reviewed With: patient  Outcome Summary: clincial swallow evaluation completed.  No s/s aspiration with any consistency.  Good mastication and oral clearance.  REC regular diet, thin liquids, meds as tolerated.  Upright with all po.         SLP Outcome Measures (last 72 hours)      SLP Outcome Measures     Row Name 05/07/20 1400             SLP Outcome Measures    Outcome Measure Used?  Adult NOMS  -SA         Adult FCM Scores    FCM Chosen  Swallowing  -      Swallowing FCM Score  7  -        User Key  (r) = Recorded By, (t) = Taken By, (c) = Cosigned By    Initials Name Effective Dates    SA BarksdaleonAlisaan MS [facility]-SLP 03/07/18 -             Time Calculation:   Time Calculation- SLP     Row Name 05/07/20 1409             Time Calculation- SLP    SLP Start Time  1045  -SA      SLP Received On  05/07/20  -        User Key  (r) = Recorded By, (t) = Taken By, (c) = Cosigned By    Initials Name Provider Type    Alanis Jaquez MS CCC-SLP Speech and Language Pathologist          Therapy Charges for Today     Code Description Service Date Service Provider Modifiers Qty    87583972076 HC ST EVAL ORAL PHARYNG SWALLOW 4 5/7/2020 Alanis Arias MS CCC-RERE GN 1               Alanis Arias MS CCC-RERE  5/7/2020

## 2020-05-07 NOTE — H&P
HISTORY AND PHYSICAL   [facility]        Patient Identification:  Name: Gela Thompson  Age: 85 y.o.  Sex: female  :  1934  MRN: [id number]                     Primary Care Physician: Karla Mora MD    Chief Complaint: Visual changes and falling.    History of Present Illness:   Pleasant 85-year-old female who was actually seen in this facility yesterday after sustaining a fall incurring a right superior orbital fracture and laceration.  She normally walks with a walker.  She had been noting increasing falling.  She had a CAT scan which apparently showed old infarct as well as a subacute infarct.  This was noted after discharge and she was instructed to return to the emergency room for further evaluation and treatment.  On questioning the patient she notes that she had a significant change in her vision about a week and a half ago.  She does notes that she could not see well.  Unable to read adequately etc.  She notes that she has a history of vertigo and this is been worse the last week and a half also.  She has not noted any limb weakness, paresthesias or dysesthesias.    Past Medical History:  Past Medical History:   Diagnosis Date   • Abnormal echocardiogram    • Adiposity    • Anemia    • Cerebrovascular accident (CMS/HCC)    • Chest pain    • Chronic low back pain    • Diabetes mellitus (CMS/HCC)    • Dyslipidemia    • Dyspnea    • Gastroesophageal reflux disease    • H/O echocardiogram 2014   • Health care maintenance    • History of EKG 10/29/2015   • Hyperlipidemia    • Hypertension    • Mitral valve insufficiency    • Murmur    • ROMAN (obstructive sleep apnea)    • Osteoarthritis      Past Surgical History:  Past Surgical History:   Procedure Laterality Date   • CHOLECYSTECTOMY     • HYSTERECTOMY     • KNEE SURGERY Bilateral    • NEUROPLASTY      deompression median nerve at carpal tunnel bilateral   • SHOULDER SURGERY Left       Home Meds:  Medications Prior to Admission    Medication Sig Dispense Refill Last Dose   • Calcium Carb-Cholecalciferol (CALCIUM 1000 + D PO) Take 1 tablet by mouth Daily.   Taking   • celecoxib (CELEBREX) 100 MG capsule Take 1 capsule by mouth Daily.   Taking   • Clopidogrel Bisulfate (PLAVIX PO) Take 75 mg by mouth Daily.   Taking   • furosemide (LASIX) 20 MG tablet TAKE ONE TABLET BY MOUTH DAILY AS NEEDED (Patient taking differently: Take 20 mg by mouth Daily As Needed (Leg swelling or SOB). Hasn't taken in several months) 90 tablet 2 Taking   • hydrALAZINE (APRESOLINE) 50 MG tablet Take 1 tablet by mouth 3 (Three) times a day. 90 tablet 5 Taking   • L-Lysine 500 MG tablet Take 1 tablet by mouth Daily.   Taking   • lisinopril (PRINIVIL,ZESTRIL) 40 MG tablet Take 1 tablet by mouth daily. 30 tablet 5 Taking   • Multiple Vitamin (MULTIVITAMINS PO) Take 1 tablet by mouth Daily.   Taking   • potassium chloride (K-DUR,KLOR-CON) 10 MEQ CR tablet TAKE ONE TABLET BY MOUTH DAILY AS NEEDED (Patient taking differently: Take 10 mEq by mouth Daily As Needed (Takes with furosemide).) 30 tablet 0 Taking   • spironolactone (ALDACTONE) 50 MG tablet Take 1 tablet by mouth daily. 30 tablet 5 Taking   • traMADol (ULTRAM) 50 MG tablet Take 1 tablet by mouth Daily As Needed (Arthritis pain). Patient usually takes 1/2 tab and takes very seldomly   Taking   • verapamil SR (CALAN-SR) 120 MG CR tablet TAKE ONE TABLET BY MOUTH EVERY MORNING 30 tablet 5    • verapamil SR (CALAN-SR) 240 MG CR tablet Take 240 mg by mouth Every Night.   Taking   • vitamin B-12 (CYANOCOBALAMIN) 1000 MCG tablet Take 1 tablet by mouth Daily. As directed   Taking       Allergies:  Allergies   Allergen Reactions   • Penicillins Hives     Patient hasn't taken PCNs for over 30 years.    • Morphine GI Intolerance   • Statins Myalgia     Pt stated that she gets muscle aches all over when she takes statins.    • Tape Rash     Immunizations:  Immunization History   Administered Date(s) Administered   •  FLUARIX/FLUZONE/AFLURIA/FLULAVAL QUAD 2020   • Flu Vaccine Quad PF >36MO 2014, 10/14/2015   • Fluzone High Dose =>65 Years ([facility] ONLY) 09/10/2012   • Influenza TIV (IM) 10/01/2009, 10/14/2011, 2013   • PPD Test 2008   • Pneumococcal Polysaccharide (PPSV23) 10/14/2011   • Zostavax 10/01/2010     Social History:   Social History     Social History Narrative   • Not on file     Social History     Tobacco Use   • Smoking status: Never Smoker   • Smokeless tobacco: Never Used   • Tobacco comment: caffeine use   Substance Use Topics   • Alcohol use: No     Family History:  Family History   Problem Relation Age of Onset   • Heart disease Mother    • Heart attack Mother    • Heart disease Sister    • Stroke Father         Review of Systems  Review of Systems   Constitutional: Negative.    HENT:        As per history of present illness.   Eyes:        As per history of present illness.   Respiratory: Negative.    Cardiovascular: Negative.    Gastrointestinal: Negative.    Endocrine: Negative.    Genitourinary: Negative.    Musculoskeletal: Negative.    Skin:        As per history of present illness.   Neurological:        As per history of present illness.   Hematological: Negative.    Psychiatric/Behavioral: Negative.        Objective:  tMax 24 hrs: Temp (24hrs), Av.8 °F (36.6 °C), Min:96.8 °F (36 °C), Max:98.5 °F (36.9 °C)    Vitals Ranges:   Temp:  [96.8 °F (36 °C)-98.5 °F (36.9 °C)] 98.1 °F (36.7 °C)  Heart Rate:  [49-63] 50  Resp:  [16-18] 16  BP: (122-167)/(47-75) 150/75      Exam:  Physical Exam   Constitutional: She appears well-developed and well-nourished. No distress.   HENT:   Head: Normocephalic and atraumatic.   Right Ear: External ear normal.   Left Ear: External ear normal.   Nose: Nose normal.   Mouth/Throat: Oropharynx is clear and moist.   Eyes: EOM are normal. Right eye exhibits no discharge. Left eye exhibits no discharge. No scleral icterus.   The sclera of the right eye  is mildly irritated   Neck: Neck supple. No JVD present. No tracheal deviation present. No thyromegaly present.   Cardiovascular: Normal rate, regular rhythm, normal heart sounds and intact distal pulses.   Pulmonary/Chest: Effort normal and breath sounds normal. No respiratory distress. She exhibits no tenderness.   Abdominal: Soft. Bowel sounds are normal. She exhibits no distension and no mass. There is no tenderness. There is no rebound and no guarding.   Neurological: She is alert. A cranial nerve deficit is present. She exhibits normal muscle tone. Coordination normal.   There is a dense left homonymous hemianopsia present.  In addition there is also a right lower quadrant visual field defect.   Skin: Skin is warm and dry. She is not diaphoretic.   Psychiatric: She has a normal mood and affect. Her behavior is normal. Judgment and thought content normal.       Data Review:  All labs and radiology reviewed.    Assessment:    Cerebrovascular accident (CMS/[facility]): Proceed with CVA protocol.  With her significant visual field defect she is going to need significant rehab.    ROBERTO (acute kidney injury) (CMS/[facility]): She will need fairly aggressive hydration noting a present low fluid status, elevated BUN/creatinine and having just had a CTA.  Monitor closely.    Hyperkalemia: Hydration with normal saline and monitor closely.  Hold Aldactone and potassium supplements.    Anemia, macrocytic: Check B12, folate and monitor.    DM II (diabetes mellitus, type II), controlled (CMS/[facility]): Provide sliding scale insulin.    HTN (hypertension): Briefly allow permissive hypertension.      Plan:  Please see above.    Omar Conde MD  5/6/2020  20:55    EMR Dragon/Transcription disclaimer:   Much of this encounter note is an electronic transcription/translation of spoken language to printed text. The electronic translation of spoken language may permit erroneous, or at times, nonsensical words or phrases to be inadvertently  transcribed; Although I have reviewed the note for such errors, some may still exist.

## 2020-05-07 NOTE — CONSULTS
Neurology Note    Patient:  Gela Thompson    YOB: 1934    REFERRING PHYSICIAN:  Omar Conde MD    CHIEF COMPLAINT:    Loss of vision    HISTORY OF PRESENT ILLNESS:   The patient is a 85 y.o. female with h/o prior stroke with good recovery several years ago, on Plavix, DM, HTN, HLP, heart murmur, developed unsteady gait and vision changes about 10 days ago, had a fall with head trauma, right orbital fracture on 5/5, CTH showed a subacute left occipital stroke on review after discharge. She was asked to return to the ED last night where CTA showed previously note subacute left occipital stroke and unexpected right P2 occlusion, /53, ECG with question for AF. She got 500 cc NS bolus and aspirin 300 mg pr. The patient reports that her headache is not to bad since the fall. She c/o difficulty seeing, denies focal numbness, weakness, cleared for po intake.    Past Medical History:  Past Medical History:   Diagnosis Date   • Abnormal echocardiogram    • Adiposity    • Anemia    • Cerebrovascular accident (CMS/HCC)    • Chest pain    • Chronic low back pain    • Diabetes mellitus (CMS/HCC)    • Dyslipidemia    • Dyspnea    • Gastroesophageal reflux disease    • H/O echocardiogram 06/27/2014   • Health care maintenance    • History of EKG 10/29/2015   • Hyperlipidemia    • Hypertension    • Mitral valve insufficiency    • Murmur    • ROMAN (obstructive sleep apnea)    • Osteoarthritis        Past Surgical History:  Past Surgical History:   Procedure Laterality Date   • CHOLECYSTECTOMY     • HYSTERECTOMY     • KNEE SURGERY Bilateral     TKA   • NEUROPLASTY      deompression median nerve at carpal tunnel bilateral   • SHOULDER SURGERY Left        Social History:   Social History     Socioeconomic History   • Marital status:      Spouse name: Not on file   • Number of children: Not on file   • Years of education: Not on file   • Highest education level: Not on file   Tobacco Use   •  Smoking status: Never Smoker   • Smokeless tobacco: Never Used   • Tobacco comment: caffeine use   Substance and Sexual Activity   • Alcohol use: No   • Drug use: No   • Sexual activity: Defer        Family History:   Family History   Problem Relation Age of Onset   • Heart disease Mother    • Heart attack Mother    • Heart disease Sister    • Stroke Father        Medications Prior to Admission:    Prior to Admission medications    Medication Sig Start Date End Date Taking? Authorizing Provider   EPINEPHrine (EPIPEN) 0.3 MG/0.3ML solution auto-injector injection 1 (One) Time As Needed.   Yes Sandi Richardson MD   fexofenadine (ALLEGRA) 60 MG tablet Take 60 mg by mouth Daily.   Yes Sandi Richardson MD   Magnesium Oxide 500 MG capsule Take 1,000 mg by mouth Daily.   Yes Sandi Richardson MD   metFORMIN (GLUCOPHAGE) 500 MG tablet Take 500 mg by mouth 2 (Two) Times a Day With Meals.   Yes Sandi Richardson MD   montelukast (SINGULAIR) 10 MG tablet Take 10 mg by mouth Every Night.   Yes Sandi Richardson MD   niacin 100 MG tablet Take 100 mg by mouth Daily With Breakfast.   Yes Sandi Richardson MD   Calcium Carb-Cholecalciferol (CALCIUM 1000 + D PO) Take 1 tablet by mouth Daily. 10/21/14   Sandi Richardson MD   celecoxib (CELEBREX) 100 MG capsule Take 100 mg by mouth Every Night. 11/28/12   Sandi Richardson MD   Clopidogrel Bisulfate (PLAVIX PO) Take 75 mg by mouth Daily.    Emergency, Nurse Epic, RN   furosemide (LASIX) 20 MG tablet TAKE ONE TABLET BY MOUTH DAILY AS NEEDED  Patient taking differently: Take 20 mg by mouth Daily As Needed (Leg swelling or SOB). Hasn't taken in several months 1/2/18   Monserrat Marsh MD   hydrALAZINE (APRESOLINE) 50 MG tablet Take 1 tablet by mouth 3 (Three) times a day. 1/7/20   Maricruz Salazar APRN   L-Lysine 500 MG tablet Take 1 tablet by mouth Daily. 11/28/12   Sandi Richardson MD   lisinopril (PRINIVIL,ZESTRIL) 40 MG tablet Take 1 tablet  by mouth daily. 1/8/20   Maricruz Salazar APRN   Multiple Vitamin (MULTIVITAMINS PO) Take 1 tablet by mouth Daily. 11/28/12   Sandi Richardson MD   potassium chloride (K-DUR,KLOR-CON) 10 MEQ CR tablet TAKE ONE TABLET BY MOUTH DAILY AS NEEDED  Patient taking differently: Take 10 mEq by mouth Daily As Needed (Takes with furosemide). 7/23/18   Monserrat Marsh MD   spironolactone (ALDACTONE) 50 MG tablet Take 1 tablet by mouth daily. 1/8/20   Maricruz Salazar APRN   traMADol (ULTRAM) 50 MG tablet Take 1 tablet by mouth Daily As Needed (Arthritis pain). Patient usually takes 1/2 tab and takes very seldomly 10/21/14   Sandi Richardson MD   verapamil SR (CALAN-SR) 120 MG CR tablet TAKE ONE TABLET BY MOUTH EVERY MORNING 3/9/20   Monserrat Marsh MD   verapamil SR (CALAN-SR) 240 MG CR tablet Take 240 mg by mouth Every Night. 11/28/12   Sandi Richardson MD   vitamin B-12 (CYANOCOBALAMIN) 1000 MCG tablet Take 1 tablet by mouth Daily. As directed 11/28/12   Sandi Richardson MD       Allergies:  Penicillins; Morphine; Statins; and Tape      Review of system  Review of Systems   Eyes: Positive for visual disturbance.   Musculoskeletal: Positive for gait problem.   Neurological: Positive for headaches.       Vitals:    05/07/20 0855   BP: 148/65   Pulse: 68   Resp:    Temp:    SpO2:        Physical exam  Physical Exam   Constitutional: She is oriented to person, place, and time. She appears well-developed and well-nourished.   HENT:   Right periorbital hematoma.   Cardiovascular: Normal rate and regular rhythm.   Pulmonary/Chest: Effort normal.   Neurological: She is alert and oriented to person, place, and time. She has normal strength and normal reflexes. No sensory deficit. She displays no Babinski's sign on the right side. She displays no Babinski's sign on the left side.   Speech clear, bilateral visual field cut with relative sparing of central vision, unable to see fingers until they are right in  front. Left pupil slightly larger, both reactive. No facial droop or limb drift.   Psychiatric: She has a normal mood and affect. Her behavior is normal. Thought content normal.         Lab Results   Component Value Date    WBC 7.25 05/07/2020    HGB 10.5 (L) 05/07/2020    HCT 31.2 (L) 05/07/2020    MCV 95.7 05/07/2020     05/07/2020     Lab Results   Component Value Date    GLUCOSE 85 05/07/2020    BUN 31 (H) 05/07/2020    CREATININE 0.92 05/07/2020    EGFRIFNONA 58 (L) 05/07/2020    BCR 33.7 (H) 05/07/2020    CO2 20.7 (L) 05/07/2020    CALCIUM 9.2 05/07/2020    ALBUMIN 4.20 05/06/2020    LABIL2 1.5 12/30/2019    AST 15 05/06/2020    ALT 13 05/06/2020     TSH  0.270 - 4.200 uIU/mL 1.050         Contains abnormal data Lipid Panel   Order: [id number]   Status:  Final result   Visible to patient:  No (Not Released)   Next appt:  09/02/2020 at 02:00 PM in Cardiology (Monserrat Marsh MD)   Specimen Information: Blood        Component  Ref Range & Units 03:23   Total Cholesterol  0 - 200 mg/dL 181    Triglycerides  0 - 150 mg/dL 124    HDL Cholesterol  40 - 60 mg/dL 36Low     LDL Cholesterol   0 - 100 mg/dL 120High     VLDL Cholesterol  5 - 40 mg/dL 24.8    LDL/HDL Ratio 3.34            Hemoglobin A1C  4.80 - 5.60 % 6.40High          ECG 12 Lead   Order: [id number]   Status:  Final result   Visible to patient:  No (Not Released) Next appt:  09/02/2020 at 02:00 PM in Cardiology (Monserrat Marsh MD)      Narrative & Impression     HEART RATE= 50  bpm  RR Interval= 1206  ms  OK Interval=   ms  P Horizontal Axis=   deg  P Front Axis=   deg  QRSD Interval= 93  ms  QT Interval= 442  ms  QRS Axis= 19  deg  T Wave Axis= 37  deg  - ABNORMAL ECG -  Atrial fibrillation- new   Electronically Signed By: [facility] 06-May-2020 14:30:11  Date and Time of Study: 2020-05-06 13:50:17      Specimen Collected: 05/06/20 13:50           Echo Complete w/ Doppler, Color Flow and Contrast   Order# [id number]   Reading  physician: Gisell Hammonds MD Ordering physician: Omar Conde MD Study date: 20   Patient Information     Patient Name  Gela Thompson MRN  [id number] Sex  Female  (Age)  1934 (85 y.o.)   Admission Information     Admission Date/Time Discharge Date/Time Room/Bed   20  1253  N526/1   Sedation Narrator Report     Sedation Narrator Report   Interpretation Summary     · Left ventricular systolic function is normal. Calculated EF = 60%. Estimated EF was in agreement with the calculated EF. Estimated EF = 60%. Normal left ventricular cavity size and wall thickness noted. All left ventricular wall segments contract normally. Left ventricular diastolic function is normal.  · Left atrial volume is mildly increased. Saline test results are negative.  · The valve was poorly visualized but appears trileaflet. The aortic valve is abnormal in structure. There is moderate calcification of the aortic valve.Mild to moderate aortic valve regurgitation is present. No aortic valve stenosis is present.  · Mild MAC is present.  · The tricuspid valve is grossly normal. Trace tricuspid valve regurgitation is present. Estimated right ventricular systolic pressure from tricuspid regurgitation is normal (<35 mmHg). Calculated right ventricular systolic pressure from tricuspid regurgitation is 33.0 mmHg.  · Limited 2D imaging of cardiac valve            Radiological Studies:    Ct Angiogram Head    Result Date: 2020  CT ANGIOGRAM HEAD-, CT HEAD WO CONTRAST-, CT ANGIOGRAM NECK-  INDICATIONS: Stroke  TECHNIQUE: Radiation dose reduction techniques were utilized, including automated exposure control and exposure modulation based on body size.Noncontrast head CT was performed, followed by enhanced CT angiography of the head and neck. Three-dimensional reconstructions were created, reviewed, and assessed according to NASCET criteria.  COMPARISON: Noncontrast head CT from 2020  FINDINGS:  No acute intracranial  hemorrhage, midline shift or mass effect.  Encephalomalacia is redemonstrated in the right frontal lobe. Subacute infarct change is again apparent in the left parieto-occipital region, with gyriform cortical enhancement in this region compatible with postinfarct enhancement. No abnormal enhancement is noted elsewhere in the brain. Basal ganglia mineralization is present.  Mild periventricular hypodensity suggests chronic small vessel ischemic change in a patient this age.    Ventricles, cisterns, cerebral sulci are unremarkable for patient age.  Right inferior orbital wall fracture is redemonstrated, with small inferior bulging of orbital fat, but no muscular entrapment, no retrobulbar hematoma. Blood and mucosal thickening in the right maxillary sinus.  The visualized paranasal sinuses, orbits, mastoid air cells are otherwise unremarkable.  The CT angiography images show apparent arterial cutoff of the distal right P2 segment (the side opposite the area of noted subacute infarct), for example image 220 of the axial sequence series 1, image 91, 92 sagittal series 4. Scattered calcifications in the bilateral cervical and intracranial carotid arteries, with estimated 50% narrowing at the origin of the right ICA, estimated 50% narrowing at the supraclinoid right ICA, estimated 50% narrowing at the origin of the right vertebral artery, otherwise without high-grade stenosis (0-49% stenosis). The lower aspect of the innominate artery is obscured by attenuation artifact from dense venous opacification, limiting its assessment. No aneurysm, or dissection in the cervical carotid or vertebral arteries, or in the arteries at the base of the brain.   Partly included lung apices appear clear.  Degenerative changes in the cervical spine, better demonstrated on the cervical spine CT from earlier today, are also demonstrated on this exam.       1. Apparent arterial cutoff of the distal right P2 segment (the side opposite the area  of noted subacute infarct). Scattered calcifications in the bilateral cervical and intracranial carotid arteries, with estimated 50% narrowing at the origin of the right ICA, estimated 50% narrowing at the supraclinoid right ICA, estimated 50% narrowing at the origin of the right vertebral artery.   2. No acute intracranial hemorrhage or hydrocephalus. Redemonstration of right frontal lobe encephalomalacia, and left parieto-occipital subacute infarct, which shows gyriform cortical enhancement, compatible with postinfarct change. No abnormal enhancement elsewhere in brain.  For further evaluation, consider MRI.  Discussed by telephone with Dr. Cagle at 1750, Dr. Torres at 1751, 05/06/2020.   This report was finalized on 5/6/2020 5:58 PM by Dr. Chuck Reed M.D.      Xr Knee 3 View Right    Result Date: 5/5/2020  XR KNEE 3 VW RIGHT-  INDICATIONS: Trauma  TECHNIQUE: 3 views of the right knee  COMPARISON: None available  FINDINGS:  No acute fracture, erosion, or dislocation is noted. Right knee arthroplasty hardware appears intact. Minimal knee effusion. Arterial calcification is conspicuous. Follow-up/further evaluation can be obtained as indications persist.       As described.  This report was finalized on 5/5/2020 2:51 PM by Dr. Chuck Reed M.D.      Ct Head Without Contrast    Result Date: 5/6/2020  CT ANGIOGRAM HEAD-, CT HEAD WO CONTRAST-, CT ANGIOGRAM NECK-  INDICATIONS: Stroke  TECHNIQUE: Radiation dose reduction techniques were utilized, including automated exposure control and exposure modulation based on body size.Noncontrast head CT was performed, followed by enhanced CT angiography of the head and neck. Three-dimensional reconstructions were created, reviewed, and assessed according to NASCET criteria.  COMPARISON: Noncontrast head CT from 05/05/2020  FINDINGS:  No acute intracranial hemorrhage, midline shift or mass effect.  Encephalomalacia is redemonstrated in the right frontal lobe.  Subacute infarct change is again apparent in the left parieto-occipital region, with gyriform cortical enhancement in this region compatible with postinfarct enhancement. No abnormal enhancement is noted elsewhere in the brain. Basal ganglia mineralization is present.  Mild periventricular hypodensity suggests chronic small vessel ischemic change in a patient this age.    Ventricles, cisterns, cerebral sulci are unremarkable for patient age.  Right inferior orbital wall fracture is redemonstrated, with small inferior bulging of orbital fat, but no muscular entrapment, no retrobulbar hematoma. Blood and mucosal thickening in the right maxillary sinus.  The visualized paranasal sinuses, orbits, mastoid air cells are otherwise unremarkable.  The CT angiography images show apparent arterial cutoff of the distal right P2 segment (the side opposite the area of noted subacute infarct), for example image 220 of the axial sequence series 1, image 91, 92 sagittal series 4. Scattered calcifications in the bilateral cervical and intracranial carotid arteries, with estimated 50% narrowing at the origin of the right ICA, estimated 50% narrowing at the supraclinoid right ICA, estimated 50% narrowing at the origin of the right vertebral artery, otherwise without high-grade stenosis (0-49% stenosis). The lower aspect of the innominate artery is obscured by attenuation artifact from dense venous opacification, limiting its assessment. No aneurysm, or dissection in the cervical carotid or vertebral arteries, or in the arteries at the base of the brain.   Partly included lung apices appear clear.  Degenerative changes in the cervical spine, better demonstrated on the cervical spine CT from earlier today, are also demonstrated on this exam.       1. Apparent arterial cutoff of the distal right P2 segment (the side opposite the area of noted subacute infarct). Scattered calcifications in the bilateral cervical and intracranial carotid  arteries, with estimated 50% narrowing at the origin of the right ICA, estimated 50% narrowing at the supraclinoid right ICA, estimated 50% narrowing at the origin of the right vertebral artery.   2. No acute intracranial hemorrhage or hydrocephalus. Redemonstration of right frontal lobe encephalomalacia, and left parieto-occipital subacute infarct, which shows gyriform cortical enhancement, compatible with postinfarct change. No abnormal enhancement elsewhere in brain.  For further evaluation, consider MRI.  Discussed by telephone with Dr. Cagle at 1750, Dr. Torres at 1751, 05/06/2020.   This report was finalized on 5/6/2020 5:58 PM by Dr. Chuck Reed M.D.      Ct Head Without Contrast    Result Date: 5/6/2020  EMERGENCY NONCONTRAST HEAD CT AND NONCONTRAST CERVICAL SPINE CT 05/05/2020  CLINICAL HISTORY: Patient fell, right frontal head injury, headache and neck pain, patient on Plavix.  HEAD CT TECHNIQUE: Spiral CT images were obtained from the base of the skull to the vertex without intravenous contrast. Images were reformatted and submitted in 3 mm thick axial CT section with brain algorithm and 2 mm thick axial CT section with high-resolution bone algorithm and 2 mm thick sagittal and coronal reconstructions were performed and submitted in brain algorithm.  COMPARISON: This is correlated to prior noncontrast head CT from [facility] 03/08/2012.  FINDINGS: There is some mild low-density in the periventricular white matter, consistent with mild small vessel disease. There is a focal area of encephalomalacia involving the posterior superior right frontal lobe measuring 2.3 x 1.5 x 2 cm in size, compatible with an old posterior superior right frontal infarct in the right middle cerebral artery territory. There is an area of low density through the medial left parieto-occipital region. There is no positive mass effect and no significant volume loss. This is likely a subacute infarct in  distribution of medial parietal occipital branches of the left posterior cerebral artery territory, maximally measures up to 6 x 3 x 2.4 cm in anterior posterior, craniocaudal, and medial lateral dimension. The ventricles are normal in size. There is no mass effect, no midline shift, no extra-axial fluid collections are identified. I see no convincing acute intracranial hemorrhage. Patient has a right periorbital hematoma from today's head and facial trauma. There is acute mildly comminuted fracture of the inferior wall of the right orbit, multiple fracture planes extending through the inferior wall of the right orbit with 3 mm displacement of orbital fracture fragment into the superior aspect of the right maxillary sinus and the fracture planes extend into the inferior orbital canal where it could compromise the infraorbital nerve and there is partial opacification of the right maxillary sinus with hemorrhage.      1. Mild small vessel disease in the cerebral white matter. There is a 2.3 x 1.5 x 2 cm old superior right frontal infarct in distribution posterior superior frontal branch of the right middle cerebral artery territory. There is a moderate size subacute infarct in the medial left parieto-occipital region maximally measuring 6 x 3 x 2.4 cm in anterior posterior, craniocaudal, and medial lateral dimension in distribution of the medial parieto-occipital branch of the left posterior cerebral artery territory. 2. There is a right-sided periorbital hematoma from today's head and facial trauma. There is some stranding in the intraconal fat of the right orbit that is likely some strandy hemorrhage within the fat. There is a comminuted acute fracture of the inferior wall of the right orbit with 3 mm inferior displacement of an inferior orbital wall fracture fragment into the superior aspect of the right maxillary sinus along with some protrusion of extraconal fat. Fracture planes extend into the inferior orbital  canal, could compromise the right infraorbital nerve. There is partial opacification of the right maxillary sinus with hemorrhage. The remainder of the head CT is within normal limits.  CERVICAL SPINE CT TECHNIQUE: Spiral CT images were obtained from skull base down to T4 thoracic level and images were reformatted and submitted in 2 mm thick, axial and sagittal CT section with soft tissue algorithm, 1 mm thick axial, sagittal and coronal CT sections with high-resolution bone algorithm.  COMPARISON: There are no prior cervical spine imaging studies from [facility] for comparison.  FINDINGS: The atlantooccipital articulation is normal. There are prominent arthritic changes at the atlantodental interval with marginal spurring off the anterior ring of C1 and the odontoid, some subchondral cyst formation of the odontoid process and some thickening and calcification of the transverse ligament with mild-to-moderate canal narrowing the C1 ring level. Arthritic changes at the articulation of the lateral masses of C1 and C2.  At C2-C3, there is moderate bilateral facet overgrowth, some bony fusion across the facet joints. Posterior disc margin is normal. There is no canal or foraminal narrowing.  At C3-C4, there is mild-to-moderate right and there is severe left facet overgrowth, 2 mm anterolisthesis of C3 on C4, mild posterior endplate spurring. There is no canal narrowing. There is mild right and moderate left bony foraminal narrowing.  At C4-C5, there is mild-to-moderate left and moderate-to-severe right facet overgrowth, 2-3 mm anterolisthesis of C4 on C5, mild posterior endplate spurring and uncovertebral joint hypertrophy. There is mild canal, there is moderate-to-severe right foraminal narrowing.  At C5-C6, there is moderate bilateral facet overgrowth, 1-2 mm anterolisthesis of C5 on C6, mild posterior endplate spurring and uncovertebral joint hypertrophy. There is mild canal and foraminal narrowing.  At  C6-C7, there is severe disc space narrowing and degenerative endplate changes, some bony bridging across the endplates, mild posterior spurring and uncovertebral joint hypertrophy, mild bilateral facet overgrowth, and there is mild canal and moderate bilateral foraminal narrowing.  At C7-T1, there is some mild-to-moderate facet overgrowth, some bony fusion across the facet joints, 2 mm anterolisthesis of C7 on T1. No canal or foraminal narrowing.  No acute fracture seen in the cervical spine.  IMPRESSION: No acute fracture seen in the cervical spine. There is diffuse cervical spondylosis as described in great detail above. Results were communicated to Korina Combs, physician assistant in the ER taking care of the patient by telephone 05/05/2020 at 1:30 p.m.    Radiation dose reduction techniques were utilized, including automated exposure control and exposure modulation based on body size.  This report was finalized on 5/6/2020 7:40 AM by Dr. Дмитрий Maharaj M.D.      Ct Angiogram Neck    Result Date: 5/6/2020  CT ANGIOGRAM HEAD-, CT HEAD WO CONTRAST-, CT ANGIOGRAM NECK-  INDICATIONS: Stroke  TECHNIQUE: Radiation dose reduction techniques were utilized, including automated exposure control and exposure modulation based on body size.Noncontrast head CT was performed, followed by enhanced CT angiography of the head and neck. Three-dimensional reconstructions were created, reviewed, and assessed according to NASCET criteria.  COMPARISON: Noncontrast head CT from 05/05/2020  FINDINGS:  No acute intracranial hemorrhage, midline shift or mass effect.  Encephalomalacia is redemonstrated in the right frontal lobe. Subacute infarct change is again apparent in the left parieto-occipital region, with gyriform cortical enhancement in this region compatible with postinfarct enhancement. No abnormal enhancement is noted elsewhere in the brain. Basal ganglia mineralization is present.  Mild periventricular hypodensity suggests  chronic small vessel ischemic change in a patient this age.    Ventricles, cisterns, cerebral sulci are unremarkable for patient age.  Right inferior orbital wall fracture is redemonstrated, with small inferior bulging of orbital fat, but no muscular entrapment, no retrobulbar hematoma. Blood and mucosal thickening in the right maxillary sinus.  The visualized paranasal sinuses, orbits, mastoid air cells are otherwise unremarkable.  The CT angiography images show apparent arterial cutoff of the distal right P2 segment (the side opposite the area of noted subacute infarct), for example image 220 of the axial sequence series 1, image 91, 92 sagittal series 4. Scattered calcifications in the bilateral cervical and intracranial carotid arteries, with estimated 50% narrowing at the origin of the right ICA, estimated 50% narrowing at the supraclinoid right ICA, estimated 50% narrowing at the origin of the right vertebral artery, otherwise without high-grade stenosis (0-49% stenosis). The lower aspect of the innominate artery is obscured by attenuation artifact from dense venous opacification, limiting its assessment. No aneurysm, or dissection in the cervical carotid or vertebral arteries, or in the arteries at the base of the brain.   Partly included lung apices appear clear.  Degenerative changes in the cervical spine, better demonstrated on the cervical spine CT from earlier today, are also demonstrated on this exam.       1. Apparent arterial cutoff of the distal right P2 segment (the side opposite the area of noted subacute infarct). Scattered calcifications in the bilateral cervical and intracranial carotid arteries, with estimated 50% narrowing at the origin of the right ICA, estimated 50% narrowing at the supraclinoid right ICA, estimated 50% narrowing at the origin of the right vertebral artery.   2. No acute intracranial hemorrhage or hydrocephalus. Redemonstration of right frontal lobe encephalomalacia, and  left parieto-occipital subacute infarct, which shows gyriform cortical enhancement, compatible with postinfarct change. No abnormal enhancement elsewhere in brain.  For further evaluation, consider MRI.  Discussed by telephone with Dr. Cagle at 1750, Dr. Torres at 1751, 05/06/2020.   This report was finalized on 5/6/2020 5:58 PM by Dr. Chuck Reed M.D.      Ct Cervical Spine Without Contrast    Result Date: 5/6/2020  EMERGENCY NONCONTRAST HEAD CT AND NONCONTRAST CERVICAL SPINE CT 05/05/2020  CLINICAL HISTORY: Patient fell, right frontal head injury, headache and neck pain, patient on Plavix.  HEAD CT TECHNIQUE: Spiral CT images were obtained from the base of the skull to the vertex without intravenous contrast. Images were reformatted and submitted in 3 mm thick axial CT section with brain algorithm and 2 mm thick axial CT section with high-resolution bone algorithm and 2 mm thick sagittal and coronal reconstructions were performed and submitted in brain algorithm.  COMPARISON: This is correlated to prior noncontrast head CT from [facility] 03/08/2012.  FINDINGS: There is some mild low-density in the periventricular white matter, consistent with mild small vessel disease. There is a focal area of encephalomalacia involving the posterior superior right frontal lobe measuring 2.3 x 1.5 x 2 cm in size, compatible with an old posterior superior right frontal infarct in the right middle cerebral artery territory. There is an area of low density through the medial left parieto-occipital region. There is no positive mass effect and no significant volume loss. This is likely a subacute infarct in distribution of medial parietal occipital branches of the left posterior cerebral artery territory, maximally measures up to 6 x 3 x 2.4 cm in anterior posterior, craniocaudal, and medial lateral dimension. The ventricles are normal in size. There is no mass effect, no midline shift, no extra-axial fluid  collections are identified. I see no convincing acute intracranial hemorrhage. Patient has a right periorbital hematoma from today's head and facial trauma. There is acute mildly comminuted fracture of the inferior wall of the right orbit, multiple fracture planes extending through the inferior wall of the right orbit with 3 mm displacement of orbital fracture fragment into the superior aspect of the right maxillary sinus and the fracture planes extend into the inferior orbital canal where it could compromise the infraorbital nerve and there is partial opacification of the right maxillary sinus with hemorrhage.      1. Mild small vessel disease in the cerebral white matter. There is a 2.3 x 1.5 x 2 cm old superior right frontal infarct in distribution posterior superior frontal branch of the right middle cerebral artery territory. There is a moderate size subacute infarct in the medial left parieto-occipital region maximally measuring 6 x 3 x 2.4 cm in anterior posterior, craniocaudal, and medial lateral dimension in distribution of the medial parieto-occipital branch of the left posterior cerebral artery territory. 2. There is a right-sided periorbital hematoma from today's head and facial trauma. There is some stranding in the intraconal fat of the right orbit that is likely some strandy hemorrhage within the fat. There is a comminuted acute fracture of the inferior wall of the right orbit with 3 mm inferior displacement of an inferior orbital wall fracture fragment into the superior aspect of the right maxillary sinus along with some protrusion of extraconal fat. Fracture planes extend into the inferior orbital canal, could compromise the right infraorbital nerve. There is partial opacification of the right maxillary sinus with hemorrhage. The remainder of the head CT is within normal limits.  CERVICAL SPINE CT TECHNIQUE: Spiral CT images were obtained from skull base down to T4 thoracic level and images were  reformatted and submitted in 2 mm thick, axial and sagittal CT section with soft tissue algorithm, 1 mm thick axial, sagittal and coronal CT sections with high-resolution bone algorithm.  COMPARISON: There are no prior cervical spine imaging studies from [facility] for comparison.  FINDINGS: The atlantooccipital articulation is normal. There are prominent arthritic changes at the atlantodental interval with marginal spurring off the anterior ring of C1 and the odontoid, some subchondral cyst formation of the odontoid process and some thickening and calcification of the transverse ligament with mild-to-moderate canal narrowing the C1 ring level. Arthritic changes at the articulation of the lateral masses of C1 and C2.  At C2-C3, there is moderate bilateral facet overgrowth, some bony fusion across the facet joints. Posterior disc margin is normal. There is no canal or foraminal narrowing.  At C3-C4, there is mild-to-moderate right and there is severe left facet overgrowth, 2 mm anterolisthesis of C3 on C4, mild posterior endplate spurring. There is no canal narrowing. There is mild right and moderate left bony foraminal narrowing.  At C4-C5, there is mild-to-moderate left and moderate-to-severe right facet overgrowth, 2-3 mm anterolisthesis of C4 on C5, mild posterior endplate spurring and uncovertebral joint hypertrophy. There is mild canal, there is moderate-to-severe right foraminal narrowing.  At C5-C6, there is moderate bilateral facet overgrowth, 1-2 mm anterolisthesis of C5 on C6, mild posterior endplate spurring and uncovertebral joint hypertrophy. There is mild canal and foraminal narrowing.  At C6-C7, there is severe disc space narrowing and degenerative endplate changes, some bony bridging across the endplates, mild posterior spurring and uncovertebral joint hypertrophy, mild bilateral facet overgrowth, and there is mild canal and moderate bilateral foraminal narrowing.  At C7-T1, there is  some mild-to-moderate facet overgrowth, some bony fusion across the facet joints, 2 mm anterolisthesis of C7 on T1. No canal or foraminal narrowing.  No acute fracture seen in the cervical spine.  IMPRESSION: No acute fracture seen in the cervical spine. There is diffuse cervical spondylosis as described in great detail above. Results were communicated to Korina Combs, physician assistant in the ER taking care of the patient by telephone 05/05/2020 at 1:30 p.m.    Radiation dose reduction techniques were utilized, including automated exposure control and exposure modulation based on body size.  This report was finalized on 5/6/2020 7:40 AM by Dr. Дмитрий Maharaj M.D.      Mri Brain Without Contrast    Result Date: 5/7/2020  MRI OF THE BRAIN WITHOUT CONTRAST  CLINICAL HISTORY: Loss of peripheral vision in both eyes.  MRI of the brain was obtained with sagittal T1, axial T1, axial FLAIR, axial T2, axial diffusion, and axial susceptibility weighted images.  COMPARISON: Comparison is made to previous CT angiogram of the head dated 05/06/2020.  FINDINGS:  There is an area of restricted diffusion involving the medial aspect of the right temporal lobe as well as the inferior and medial aspect of the right occipital lobe within the right PCA distribution. The findings are compatible with an acute infarct and this infarct measures up to approximately 9.2 x 3.8 cm in greatest axial dimensions. Additionally, there is a small area of restricted diffusion within the superior aspect of the left frontal lobe involving the left superior and middle frontal gyri measuring up to 7 mm in diameter compatible with a focus of acute infarction within the left MCA distribution. Within the lateral aspect of the right frontal lobe, there are findings consistent with a chronic infarct within the right MCA distribution involving the right middle frontal gyrus. This area of encephalomalacia measures up to approximately 2.2 x 1.3 cm in greatest  axial dimensions. There are findings compatible with a subacute infarct within the left PCA distribution involving the medial aspect of the left occipital and parietal lobes. This subacute infarct measures up to approximately 5.5 x 2.6 cm in greatest axial dimensions. On the susceptibility weighted images, there is subtle susceptibility artifact identified within the medial aspect of the left occipital lobe within the subacute left PCA distribution infarct signifying petechial hemorrhagic transformation. Within the left aspect of the brendan, there are findings compatible with a chronic infarct which measures up to approximately 4-5 mm in diameter.  Mild changes of chronic small vessel ischemic phenomena are noted.  Otherwise, the ventricles, sulci, and cisterns are age appropriate. The major intracranial flow related signal voids are unremarkable although the previously identified known occluded distal right P2 segment is not well evaluated.  There is mucosal thickening within the right maxillary sinus and proteinaceous secretions are identified within the right maxillary sinus. Mucosal thickening is seen within the frontal sinus and the ethmoid air cells. Incidental note is made of a left mastoid effusion.       There are findings consistent with an acute infarct within the right PCA distribution involving the medial aspects of the right temporal and occipital lobes as well as the inferior aspect of the right occipital lobe. Additionally, there is a small acute infarct within the left superior and middle frontal gyri involving the left MCA distribution. A subacute infarct within the left PCA distribution is seen involving the medial aspect of the left occipital and parietal lobes with subtle petechial hemorrhagic transformation.  A chronic infarct is seen within the lateral aspect of the right frontal lobe within the right MCA distribution and there is a chronic infarct noted within the basis pontis.  Mild changes of  chronic small vessel ischemic phenomena are noted.  These findings were discussed with Dwayne Torres on 05/07/2020 at approximately 7:56 AM.  Prominent changes of inflammatory paranasal sinus disease are identified within the right maxillary sinus.      Xr Hip With Or Without Pelvis 2 - 3 View Right    Result Date: 5/5/2020  XR HIP W OR WO PELVIS 2-3 VIEW RIGHT-  INDICATIONS: Trauma  TECHNIQUE: Frontal view of the pelvis, 2 views of the right hip  COMPARISON: None available  FINDINGS:  No acute fracture, erosion, or dislocation is noted. Mild right hip joint space narrowing. Left hip joint space appears preserved. Degenerative changes are seen in the partly included lower lumbar spine. Vascular calcifications are present.       No acute fracture is identified. If there is clinical suspicion for radiographically occult fracture, or to further evaluate the soft tissues, MRI could be considered.    This report was finalized on 5/5/2020 2:54 PM by Dr. Chuck Reed M.D.          During this visit the following were done:  Labs Reviewed [x]    Labs Ordered [x]    Radiology Reports Reviewed [x]    Radiology Ordered [x]    EKG, echo, and/or stress test reviewed []    EEG results reviewed  []    EEG reviewed and interpreted per myself   []    Discussed case with neurointerventionalist or neuroradiologist []    Referring Provider Records Reviewed []    ER Records Reviewed [x]    Hospital Records Reviewed [x]    History Obtained From Family []    Radiological images view and Interpreted per myself [x]    Case Discussed with referring provider []     Decision to obtain and request outside records  []        Assessment and Plan     Bilateral acute/subacute occipital and left frontal strokes 22 Afib, new onset. 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 Petechial bleed in left occipital lobe. No TPA on time.Recent fall with head trauma, orbital fracture.   - Observation on telemetry.   - F/U CT head in am.   - T4, b12, folate,  P2Y12.   - MALORIE consult re: timing of starting antiplatelet meds and/or AC.   - Cardiology consult re: AF.   - Fall precautions.   - ST, PT, OT.   - No driving on discharge.    Thanks,              Electronically signed by Reinaldo Torres MD on 5/7/2020 at 10:45

## 2020-05-07 NOTE — CODE DOCUMENTATION
RR called d/t worsening vision. RN scored her at a 7 on her NIH. RR scored her at a 2. After reading ER notes seems like the pt's symptoms are the same as earlier. Primary RN to call neuro and update.

## 2020-05-07 NOTE — PROGRESS NOTES
Name: Gela Thompson ADMIT: 2020   : 1934  PCP: Karla Mora MD    MRN: [id number] LOS: 1 days   AGE/SEX: 85 y.o. female  ROOM: [facility]     Subjective   Subjective   Patient is lying in the bed and has no specific complaints.  Denies nausea, vomiting, abdominal pain, chest pain, palpitations.       Objective   Objective   Vital Signs  Temp:  [97.4 °F (36.3 °C)-98.5 °F (36.9 °C)] 97.4 °F (36.3 °C)  Heart Rate:  [49-79] 79  Resp:  [16-18] 16  BP: (141-167)/(60-75) 144/71  SpO2:  [96 %-99 %] 98 %  on   ;   Device (Oxygen Therapy): room air  Body mass index is 30.21 kg/m².  Physical Exam   Constitutional: She is oriented to person, place, and time. She appears well-developed and well-nourished.   HENT:   Head: Normocephalic.   Nose: Nose normal.   Right periorbital bruising noted   Eyes: Pupils are equal, round, and reactive to light. EOM are normal. No scleral icterus.   Neck: Normal range of motion. Neck supple. No JVD present.   Cardiovascular: Normal rate, regular rhythm, normal heart sounds and intact distal pulses.   Pulmonary/Chest: Effort normal and breath sounds normal. No respiratory distress.   Abdominal: Soft. Bowel sounds are normal.   Musculoskeletal: She exhibits no edema or deformity.   Neurological: She is alert and oriented to person, place, and time.   Skin: Skin is warm and dry. No rash noted.   Psychiatric: She has a normal mood and affect. Her behavior is normal. Thought content normal.         Results Review:       I reviewed the patient's new clinical results.  Results from last 7 days   Lab Units 20  0323 20  1328   WBC 10*3/mm3 7.25 6.54   HEMOGLOBIN g/dL 10.5* 10.7*   PLATELETS 10*3/mm3 283 300     Results from last 7 days   Lab Units 20  0323 20  1328   SODIUM mmol/L 139 136   POTASSIUM mmol/L 4.7 5.6*   CHLORIDE mmol/L 106 104   CO2 mmol/L 20.7* 18.8*   BUN mg/dL 31* 42*   CREATININE mg/dL 0.92 1.47*   GLUCOSE mg/dL 85 126*   Estimated Creatinine  Clearance: 45.7 mL/min (by C-G formula based on SCr of 0.92 mg/dL).  Results from last 7 days   Lab Units 05/06/20  1328   ALBUMIN g/dL 4.20   BILIRUBIN mg/dL 0.4   ALK PHOS U/L 62   AST (SGOT) U/L 15   ALT (SGPT) U/L 13     Results from last 7 days   Lab Units 05/07/20  0323 05/06/20  1328   CALCIUM mg/dL 9.2 9.4   ALBUMIN g/dL  --  4.20   MAGNESIUM mg/dL  --  1.8       Hemoglobin A1C   Date/Time Value Ref Range Status   05/07/2020 0323 6.40 (H) 4.80 - 5.60 % Final     Glucose   Date/Time Value Ref Range Status   05/07/2020 1617 139 (H) 70 - 130 mg/dL Final   05/07/2020 1116 100 70 - 130 mg/dL Final   05/07/2020 0555 86 70 - 130 mg/dL Final   05/06/2020 2000 97 70 - 130 mg/dL Final   05/06/2020 1851 102 70 - 130 mg/dL Final         atorvastatin 40 mg Oral Nightly   clopidogrel 75 mg Oral Daily   hydrALAZINE 25 mg Oral TID   sodium chloride 500 mL Intravenous Once   sodium chloride 10 mL Intravenous Q12H   verapamil  mg Oral Daily   verapamil  mg Oral Nightly   vitamin B-12 1,000 mcg Oral Daily       sodium chloride 125 mL/hr Last Rate: 125 mL/hr (05/07/20 0150)   sodium chloride 125 mL/hr    Diet Regular; Thin; Consistent Carbohydrate, Cardiac       Assessment/Plan     Active Hospital Problems    Diagnosis  POA   • **Cerebrovascular accident (CMS/[facility]) [I63.9]  Yes   • Atrial fibrillation (CMS/[facility]) [I48.91]  Unknown   • ROBERTO (acute kidney injury) (CMS/[facility]) [N17.9]  Yes   • Hyperkalemia [E87.5]  Yes   • Anemia, macrocytic [D53.9]  Yes   • DM II (diabetes mellitus, type II), controlled (CMS/[facility]) [E11.9]  Yes   • HTN (hypertension) [I10]  Yes      Resolved Hospital Problems   No resolved problems to display.     #1 subacute CVA, patient is on Plavix and statins and will continue.  Neurology consult is also been obtained.  Continue with PT/OT assessment.  2.  Acute kidney injury, creatinine is 0.92 today.  Avoid nephrotoxins.  3.  Hyperkalemia has resolved and potassium is 4.7 today.  4.  Glucose intolerance,  blood sugars are fairly well controlled.  5.  Hypertension, on verapamil and hydralazine will be continued.  6.  Right superior orbital fracture, continue with conservative approach.      Daniel Beaver MD  [facility]  05/07/20  16:38

## 2020-05-08 ENCOUNTER — APPOINTMENT (OUTPATIENT)
Dept: NEUROLOGY | Facility: HOSPITAL | Age: 85
End: 2020-05-08

## 2020-05-08 ENCOUNTER — APPOINTMENT (OUTPATIENT)
Dept: CT IMAGING | Facility: HOSPITAL | Age: 85
End: 2020-05-08

## 2020-05-08 ENCOUNTER — TELEPHONE (OUTPATIENT)
Dept: NEUROSURGERY | Facility: CLINIC | Age: 85
End: 2020-05-08

## 2020-05-08 DIAGNOSIS — I63.349 CEREBROVASCULAR ACCIDENT (CVA) DUE TO THROMBOSIS OF CEREBELLAR ARTERY, UNSPECIFIED BLOOD VESSEL LATERALITY (HCC): Primary | ICD-10-CM

## 2020-05-08 LAB
GLUCOSE BLDC GLUCOMTR-MCNC: 122 MG/DL (ref 70–130)
GLUCOSE BLDC GLUCOMTR-MCNC: 125 MG/DL (ref 70–130)
GLUCOSE BLDC GLUCOMTR-MCNC: 126 MG/DL (ref 70–130)
GLUCOSE BLDC GLUCOMTR-MCNC: 201 MG/DL (ref 70–130)

## 2020-05-08 PROCEDURE — 82962 GLUCOSE BLOOD TEST: CPT

## 2020-05-08 PROCEDURE — 99233 SBSQ HOSP IP/OBS HIGH 50: CPT | Performed by: NURSE PRACTITIONER

## 2020-05-08 PROCEDURE — 99232 SBSQ HOSP IP/OBS MODERATE 35: CPT | Performed by: NURSE PRACTITIONER

## 2020-05-08 PROCEDURE — 25010000002 LEVETIRACETAM IN NACL 0.82% 500 MG/100ML SOLUTION: Performed by: PSYCHIATRY & NEUROLOGY

## 2020-05-08 PROCEDURE — 97110 THERAPEUTIC EXERCISES: CPT

## 2020-05-08 PROCEDURE — 70450 CT HEAD/BRAIN W/O DYE: CPT

## 2020-05-08 PROCEDURE — 95816 EEG AWAKE AND DROWSY: CPT

## 2020-05-08 PROCEDURE — 95816 EEG AWAKE AND DROWSY: CPT | Performed by: PSYCHIATRY & NEUROLOGY

## 2020-05-08 RX ORDER — SODIUM CHLORIDE 0.9 % (FLUSH) 0.9 %
10 SYRINGE (ML) INJECTION AS NEEDED
Status: DISCONTINUED | OUTPATIENT
Start: 2020-05-08 | End: 2020-05-12 | Stop reason: HOSPADM

## 2020-05-08 RX ORDER — ASPIRIN 325 MG
325 TABLET ORAL DAILY
Status: DISCONTINUED | OUTPATIENT
Start: 2020-05-09 | End: 2020-05-12 | Stop reason: HOSPADM

## 2020-05-08 RX ORDER — ROSUVASTATIN CALCIUM 40 MG/1
40 TABLET, COATED ORAL NIGHTLY
Status: DISCONTINUED | OUTPATIENT
Start: 2020-05-08 | End: 2020-05-12 | Stop reason: HOSPADM

## 2020-05-08 RX ORDER — SODIUM CHLORIDE 0.9 % (FLUSH) 0.9 %
10 SYRINGE (ML) INJECTION EVERY 12 HOURS SCHEDULED
Status: DISCONTINUED | OUTPATIENT
Start: 2020-05-08 | End: 2020-05-12 | Stop reason: HOSPADM

## 2020-05-08 RX ADMIN — VERAPAMIL HYDROCHLORIDE 120 MG: 120 TABLET, FILM COATED, EXTENDED RELEASE ORAL at 09:31

## 2020-05-08 RX ADMIN — CLOPIDOGREL 75 MG: 75 TABLET, FILM COATED ORAL at 09:31

## 2020-05-08 RX ADMIN — LEVETIRACETAM 500 MG: 5 INJECTION INTRAVENOUS at 09:31

## 2020-05-08 RX ADMIN — ROSUVASTATIN CALCIUM 40 MG: 40 TABLET, FILM COATED ORAL at 20:30

## 2020-05-08 RX ADMIN — HYDRALAZINE HYDROCHLORIDE 25 MG: 25 TABLET, FILM COATED ORAL at 20:30

## 2020-05-08 RX ADMIN — LEVETIRACETAM 500 MG: 5 INJECTION INTRAVENOUS at 00:23

## 2020-05-08 RX ADMIN — HYDRALAZINE HYDROCHLORIDE 25 MG: 25 TABLET, FILM COATED ORAL at 17:20

## 2020-05-08 RX ADMIN — SODIUM CHLORIDE 125 ML/HR: 9 INJECTION, SOLUTION INTRAVENOUS at 20:33

## 2020-05-08 RX ADMIN — SODIUM CHLORIDE, PRESERVATIVE FREE 10 ML: 5 INJECTION INTRAVENOUS at 20:31

## 2020-05-08 RX ADMIN — VERAPAMIL HYDROCHLORIDE 240 MG: 120 TABLET, FILM COATED, EXTENDED RELEASE ORAL at 20:30

## 2020-05-08 RX ADMIN — SODIUM CHLORIDE, PRESERVATIVE FREE 10 ML: 5 INJECTION INTRAVENOUS at 09:32

## 2020-05-08 RX ADMIN — LEVETIRACETAM 500 MG: 5 INJECTION INTRAVENOUS at 20:30

## 2020-05-08 RX ADMIN — Medication 1000 MCG: at 09:30

## 2020-05-08 RX ADMIN — HYDRALAZINE HYDROCHLORIDE 25 MG: 25 TABLET, FILM COATED ORAL at 09:30

## 2020-05-08 NOTE — TELEPHONE ENCOUNTER
----- Message from Tomás Mcneill MD sent at 5/8/2020  2:22 PM EDT -----  Regarding: RE: f/u  Please set up.  ----- Message -----  From: Criss Gibson APRN  Sent: 5/8/2020   1:26 PM EDT  To: Tomás Mcneill MD  Subject: f/u                                              Patient had stroke resulting in petechial hemorrhagic transformation.  Needs follow-up CT and appointment in 1 week with APC.

## 2020-05-08 NOTE — TELEPHONE ENCOUNTER
Per Dr WHITTINGTON we can do a televisit after the CT scan since it is hard to coordinate at CT scan same day as appt due to Covid restrictions at the hospital     Patient scheduled for CT of head 5.15.20 at 11:30 am, she needs to arrive at 11 am and then go home.  Dr WHITTINGTON said he will call her with results.    Carole, her nurse at [facility] will inform her of appts and that Dr WHITTINGTON will call her with results

## 2020-05-08 NOTE — PLAN OF CARE
Problem: Fall Risk (Adult)  Goal: Identify Related Risk Factors and Signs and Symptoms  Outcome: Ongoing (interventions implemented as appropriate)  Flowsheets (Taken 5/8/2020 8511)  Related Risk Factors (Fall Risk): history of falls; impaired vision; environment unfamiliar  Signs and Symptoms (Fall Risk): presence of risk factors  Note:   Pt AAOx4 with VSS. C/o seeing flashing lights on the L side twice during night shift. MD notified. IV Keppra and EEG ordered. CT and EEG scheduled for the morning.   Goal: Absence of Fall  Outcome: Ongoing (interventions implemented as appropriate)     Problem: Patient Care Overview  Goal: Plan of Care Review  Outcome: Ongoing (interventions implemented as appropriate)  Goal: Individualization and Mutuality  Outcome: Ongoing (interventions implemented as appropriate)  Goal: Discharge Needs Assessment  Outcome: Ongoing (interventions implemented as appropriate)  Goal: Interprofessional Rounds/Family Conf  Outcome: Ongoing (interventions implemented as appropriate)     Problem: Stroke (Ischemic) (Adult)  Goal: Signs and Symptoms of Listed Potential Problems Will be Absent, Minimized or Managed (Stroke)  Outcome: Ongoing (interventions implemented as appropriate)

## 2020-05-08 NOTE — CONSULTS
Pt has hx of DMT2 states  also has DM. Takes Metformin-denies any negative side effects. States tests BG daily in AM. A1C 6.4% which indicates good control for someone with diabetes. States she and her  do their own cooking and meal planning and tend to eat a meat and at least 2 vegetables at meal time. Eats sweets from time to time but in moderation. Denies any questions or concerns related to diabetes at this time.

## 2020-05-08 NOTE — PROGRESS NOTES
DOS: 2020  NAME: Gela Thompson   : 1934  PCP: Karla Mora MD    Chief Complaint   Patient presents with   • Stroke        Stroke    Subjective: Pt seen in follow up, however the problem is new to me.  She was sitting up on side of the bed eating as I enter the room.  She states she is feeling better this morning.  She had an episode of where she was just overwhelmed with emotion last night.  Still remains with blurred vision bilaterally.  Denies any new weakness, numbness, speech or visual disturbances, or headaches.    Objective:  Vital signs:      Vitals:    20 2321 20 0314 20 0600 20 0727   BP: 146/59 135/59  138/80   BP Location: Right arm Right arm  Left arm   Patient Position: Lying Lying  Lying   Pulse: 75 54  73   Resp: 18 16  16   Temp: 97.8 °F (36.6 °C) 98 °F (36.7 °C)  97.3 °F (36.3 °C)   TempSrc: Oral Oral  Oral   SpO2: 93% 98%  93%   Weight:   79.6 kg (175 lb 6.4 oz)    Height:           Current Facility-Administered Medications:   •  acetaminophen (TYLENOL) tablet 650 mg, 650 mg, Oral, Q4H PRN **OR** acetaminophen (TYLENOL) 160 MG/5ML solution 650 mg, 650 mg, Oral, Q4H PRN **OR** acetaminophen (TYLENOL) suppository 650 mg, 650 mg, Rectal, Q4H PRN, Omar Conde MD  •  clopidogrel (PLAVIX) tablet 75 mg, 75 mg, Oral, Daily, Omar Conde MD, 75 mg at 2031  •  hydrALAZINE (APRESOLINE) tablet 25 mg, 25 mg, Oral, TID, Omar Conde MD, 25 mg at 2030  •  levETIRAcetam in NaCl 0.82% (KEPPRA) IVPB 500 mg, 500 mg, Intravenous, Q12H, Reinaldo Torres MD, 500 mg at 20  •  rosuvastatin (CRESTOR) tablet 40 mg, 40 mg, Oral, Nightly, Cortes, Chani, APRN  •  sodium chloride 0.9 % bolus 500 mL, 500 mL, Intravenous, Once, Omar Conde MD  •  sodium chloride 0.9 % flush 10 mL, 10 mL, Intravenous, PRN, Omar Conde MD  •  [COMPLETED] Insert peripheral IV, , , Once **AND** sodium chloride 0.9 % flush 10 mL,  10 mL, Intravenous, PRN, Omar Conde MD  •  sodium chloride 0.9 % flush 10 mL, 10 mL, Intravenous, Q12H, Omar Conde MD, 10 mL at 05/08/20 0932  •  sodium chloride 0.9 % flush 10 mL, 10 mL, Intravenous, PRN, Omar Conde MD  •  sodium chloride 0.9 % flush 10 mL, 10 mL, Intravenous, Q12H, Chani Cortes, APRN, 10 mL at 05/08/20 0932  •  sodium chloride 0.9 % flush 10 mL, 10 mL, Intravenous, PRN, Chani Cortes, APRN  •  sodium chloride 0.9 % infusion, 125 mL/hr, Intravenous, Continuous, Omar Conde MD, Last Rate: 125 mL/hr at 05/07/20 0150, 125 mL/hr at 05/07/20 0150  •  sodium chloride 0.9 % infusion, 125 mL/hr, Intravenous, Continuous, Omar Conde MD, Last Rate: 125 mL/hr at 05/07/20 2302, 125 mL/hr at 05/07/20 2302  •  verapamil SR (CALAN-SR) CR tablet 120 mg, 120 mg, Oral, Daily, Omar Conde MD, 120 mg at 05/08/20 0931  •  verapamil SR (CALAN-SR) CR tablet 240 mg, 240 mg, Oral, Nightly, Omar Conde MD, 240 mg at 05/07/20 2056  •  vitamin B-12 (CYANOCOBALAMIN) tablet 1,000 mcg, 1,000 mcg, Oral, Daily, Omar Conde MD, 1,000 mcg at 05/08/20 0930    PRN meds  •  acetaminophen **OR** acetaminophen **OR** acetaminophen  •  sodium chloride  •  [COMPLETED] Insert peripheral IV **AND** sodium chloride  •  sodium chloride  •  sodium chloride    No current facility-administered medications on file prior to encounter.      Current Outpatient Medications on File Prior to Encounter   Medication Sig   • EPINEPHrine (EPIPEN) 0.3 MG/0.3ML solution auto-injector injection 1 (One) Time As Needed.   • fexofenadine (ALLEGRA) 60 MG tablet Take 60 mg by mouth Daily.   • Magnesium Oxide 500 MG capsule Take 1,000 mg by mouth Daily.   • metFORMIN (GLUCOPHAGE) 500 MG tablet Take 500 mg by mouth 2 (Two) Times a Day With Meals.   • montelukast (SINGULAIR) 10 MG tablet Take 10 mg by mouth Every Night.   • niacin 100 MG tablet Take 100 mg by mouth Daily With Breakfast.   •  Calcium Carb-Cholecalciferol (CALCIUM 1000 + D PO) Take 1 tablet by mouth Daily.   • celecoxib (CELEBREX) 100 MG capsule Take 100 mg by mouth Every Night.   • Clopidogrel Bisulfate (PLAVIX PO) Take 75 mg by mouth Daily.   • furosemide (LASIX) 20 MG tablet TAKE ONE TABLET BY MOUTH DAILY AS NEEDED (Patient taking differently: Take 20 mg by mouth Daily As Needed (Leg swelling or SOB). Hasn't taken in several months)   • hydrALAZINE (APRESOLINE) 50 MG tablet Take 1 tablet by mouth 3 (Three) times a day.   • L-Lysine 500 MG tablet Take 1 tablet by mouth Daily.   • lisinopril (PRINIVIL,ZESTRIL) 40 MG tablet Take 1 tablet by mouth daily.   • Multiple Vitamin (MULTIVITAMINS PO) Take 1 tablet by mouth Daily.   • potassium chloride (K-DUR,KLOR-CON) 10 MEQ CR tablet TAKE ONE TABLET BY MOUTH DAILY AS NEEDED (Patient taking differently: Take 10 mEq by mouth Daily As Needed (Takes with furosemide).)   • spironolactone (ALDACTONE) 50 MG tablet Take 1 tablet by mouth daily.   • traMADol (ULTRAM) 50 MG tablet Take 1 tablet by mouth Daily As Needed (Arthritis pain). Patient usually takes 1/2 tab and takes very seldomly   • verapamil SR (CALAN-SR) 120 MG CR tablet TAKE ONE TABLET BY MOUTH EVERY MORNING   • verapamil SR (CALAN-SR) 240 MG CR tablet Take 240 mg by mouth Every Night.   • vitamin B-12 (CYANOCOBALAMIN) 1000 MCG tablet Take 1 tablet by mouth Daily. As directed       General appearance: chronically ill appearing elderly female, pleasant, NAD, alert and cooperative, well groomed  HEENT: Normocephalic, right orbital bruising, PERRL, tenderness  COR: RRR  Resp: Even and unlabored  Extremities: No obvious edema  Skin: warm, dry    Neurological:   MS: oriented x3, recent/remote memory intact, normal attention/concentration, language intact, no neglect, normal fund of knowledge  CN: visual acuity grossly abnormal, visual field loss R >L, PERRL, could look to the left and right but hard time tracking my hand, facial sensation equal,  no facial droop, hearing symmetric, palate elevates symmetrically, shoulder shrug equal, tongue midline  Motor: 5/5 in upper, 4/5 BLE  Reflexes: 1+ in all 4 ext.  Sensory: light touch sensation intact in all 4 ext.  Coordination: Normal finger to nose test  Gait and station: mild-moderate ataxia   Rapid alternating movements: normal finger to thumb tap    Laboratory results:  Lab Results   Component Value Date    TSH 1.050 05/07/2020     Lab Results   Component Value Date    HGBA1C 6.40 (H) 05/07/2020     Lab Results   Component Value Date    GBNLGDEX08 >2,000 (H) 05/06/2020     Lab Results   Component Value Date    CHOL 181 05/07/2020    CHLPL 238 (H) 09/19/2019    CHLPL 252 (H) 02/14/2019    CHLPL 254 (H) 11/30/2018     Lab Results   Component Value Date    TRIG 124 05/07/2020    TRIG 132 09/19/2019    TRIG 109 02/14/2019     Lab Results   Component Value Date    HDL 36 (L) 05/07/2020    HDL 52 09/19/2019    HDL 58 02/14/2019     Lab Results   Component Value Date     (H) 05/07/2020     (H) 09/19/2019     (H) 02/14/2019     Lab Results   Component Value Date    WBC 7.25 05/07/2020    HGB 10.5 (L) 05/07/2020    HCT 31.2 (L) 05/07/2020    MCV 95.7 05/07/2020     05/07/2020     Lab Results   Component Value Date    GLUCOSE 85 05/07/2020    BUN 31 (H) 05/07/2020    CREATININE 0.92 05/07/2020    EGFRIFNONA 58 (L) 05/07/2020    BCR 33.7 (H) 05/07/2020    K 4.7 05/07/2020    CO2 20.7 (L) 05/07/2020    CALCIUM 9.2 05/07/2020    ALBUMIN 4.20 05/06/2020    LABIL2 1.5 12/30/2019    AST 15 05/06/2020    ALT 13 05/06/2020     Lab Results   Component Value Date    PTT 29.7 12/30/2019     Lab Results   Component Value Date    INR 1.06 05/06/2020    INR 1.0 12/30/2019    PROTIME 13.6 05/06/2020    PROTIME 11.1 12/30/2019     Brief Urine Lab Results     None          Review and interpretation of imaging:  Ct Angiogram Head    Result Date: 5/6/2020  1. Apparent arterial cutoff of the distal right P2  segment (the side opposite the area of noted subacute infarct). Scattered calcifications in the bilateral cervical and intracranial carotid arteries, with estimated 50% narrowing at the origin of the right ICA, estimated 50% narrowing at the supraclinoid right ICA, estimated 50% narrowing at the origin of the right vertebral artery.   2. No acute intracranial hemorrhage or hydrocephalus. Redemonstration of right frontal lobe encephalomalacia, and left parieto-occipital subacute infarct, which shows gyriform cortical enhancement, compatible with postinfarct change. No abnormal enhancement elsewhere in brain.  For further evaluation, consider MRI.  Discussed by telephone with Dr. Cagle at 1750, Dr. Torres at 1751, 05/06/2020.   This report was finalized on 5/6/2020 5:58 PM by Dr. Chuck Reed M.D.      Xr Knee 3 View Right    Result Date: 5/5/2020   As described.  This report was finalized on 5/5/2020 2:51 PM by Dr. Chuck Reed M.D.      Ct Head Without Contrast    Result Date: 5/8/2020   1. Evolving infarct identified within the right occipital lobe. No definite hemorrhagic transformation is seen.  Radiation dose reduction techniques were utilized, including automated exposure control and exposure modulation based on body size.  This report was finalized on 5/8/2020 5:17 AM by Dr. Lizeth Cortes M.D.      Ct Head Without Contrast    Result Date: 5/6/2020  1. Apparent arterial cutoff of the distal right P2 segment (the side opposite the area of noted subacute infarct). Scattered calcifications in the bilateral cervical and intracranial carotid arteries, with estimated 50% narrowing at the origin of the right ICA, estimated 50% narrowing at the supraclinoid right ICA, estimated 50% narrowing at the origin of the right vertebral artery.   2. No acute intracranial hemorrhage or hydrocephalus. Redemonstration of right frontal lobe encephalomalacia, and left parieto-occipital subacute infarct, which  shows gyriform cortical enhancement, compatible with postinfarct change. No abnormal enhancement elsewhere in brain.  For further evaluation, consider MRI.  Discussed by telephone with Dr. Cagle at 1750, Dr. Torres at 1751, 05/06/2020.   This report was finalized on 5/6/2020 5:58 PM by Dr. Chuck Reed M.D.      Ct Head Without Contrast    Result Date: 5/6/2020  1. Mild small vessel disease in the cerebral white matter. There is a 2.3 x 1.5 x 2 cm old superior right frontal infarct in distribution posterior superior frontal branch of the right middle cerebral artery territory. There is a moderate size subacute infarct in the medial left parieto-occipital region maximally measuring 6 x 3 x 2.4 cm in anterior posterior, craniocaudal, and medial lateral dimension in distribution of the medial parieto-occipital branch of the left posterior cerebral artery territory. 2. There is a right-sided periorbital hematoma from today's head and facial trauma. There is some stranding in the intraconal fat of the right orbit that is likely some strandy hemorrhage within the fat. There is a comminuted acute fracture of the inferior wall of the right orbit with 3 mm inferior displacement of an inferior orbital wall fracture fragment into the superior aspect of the right maxillary sinus along with some protrusion of extraconal fat. Fracture planes extend into the inferior orbital canal, could compromise the right infraorbital nerve. There is partial opacification of the right maxillary sinus with hemorrhage. The remainder of the head CT is within normal limits.  CERVICAL SPINE CT TECHNIQUE: Spiral CT images were obtained from skull base down to T4 thoracic level and images were reformatted and submitted in 2 mm thick, axial and sagittal CT section with soft tissue algorithm, 1 mm thick axial, sagittal and coronal CT sections with high-resolution bone algorithm.  COMPARISON: There are no prior cervical spine imaging studies  from [facility] for comparison.  FINDINGS: The atlantooccipital articulation is normal. There are prominent arthritic changes at the atlantodental interval with marginal spurring off the anterior ring of C1 and the odontoid, some subchondral cyst formation of the odontoid process and some thickening and calcification of the transverse ligament with mild-to-moderate canal narrowing the C1 ring level. Arthritic changes at the articulation of the lateral masses of C1 and C2.  At C2-C3, there is moderate bilateral facet overgrowth, some bony fusion across the facet joints. Posterior disc margin is normal. There is no canal or foraminal narrowing.  At C3-C4, there is mild-to-moderate right and there is severe left facet overgrowth, 2 mm anterolisthesis of C3 on C4, mild posterior endplate spurring. There is no canal narrowing. There is mild right and moderate left bony foraminal narrowing.  At C4-C5, there is mild-to-moderate left and moderate-to-severe right facet overgrowth, 2-3 mm anterolisthesis of C4 on C5, mild posterior endplate spurring and uncovertebral joint hypertrophy. There is mild canal, there is moderate-to-severe right foraminal narrowing.  At C5-C6, there is moderate bilateral facet overgrowth, 1-2 mm anterolisthesis of C5 on C6, mild posterior endplate spurring and uncovertebral joint hypertrophy. There is mild canal and foraminal narrowing.  At C6-C7, there is severe disc space narrowing and degenerative endplate changes, some bony bridging across the endplates, mild posterior spurring and uncovertebral joint hypertrophy, mild bilateral facet overgrowth, and there is mild canal and moderate bilateral foraminal narrowing.  At C7-T1, there is some mild-to-moderate facet overgrowth, some bony fusion across the facet joints, 2 mm anterolisthesis of C7 on T1. No canal or foraminal narrowing.  No acute fracture seen in the cervical spine.  IMPRESSION: No acute fracture seen in the cervical  spine. There is diffuse cervical spondylosis as described in great detail above. Results were communicated to Korina Combs, physician assistant in the ER taking care of the patient by telephone 05/05/2020 at 1:30 p.m.    Radiation dose reduction techniques were utilized, including automated exposure control and exposure modulation based on body size.  This report was finalized on 5/6/2020 7:40 AM by Dr. Дмитрий Maharaj M.D.      Ct Angiogram Neck    Result Date: 5/6/2020  1. Apparent arterial cutoff of the distal right P2 segment (the side opposite the area of noted subacute infarct). Scattered calcifications in the bilateral cervical and intracranial carotid arteries, with estimated 50% narrowing at the origin of the right ICA, estimated 50% narrowing at the supraclinoid right ICA, estimated 50% narrowing at the origin of the right vertebral artery.   2. No acute intracranial hemorrhage or hydrocephalus. Redemonstration of right frontal lobe encephalomalacia, and left parieto-occipital subacute infarct, which shows gyriform cortical enhancement, compatible with postinfarct change. No abnormal enhancement elsewhere in brain.  For further evaluation, consider MRI.  Discussed by telephone with Dr. Cagle at 1750, Dr. Torres at 1751, 05/06/2020.   This report was finalized on 5/6/2020 5:58 PM by Dr. Chuck Reed M.D.      Ct Cervical Spine Without Contrast    Result Date: 5/6/2020  1. Mild small vessel disease in the cerebral white matter. There is a 2.3 x 1.5 x 2 cm old superior right frontal infarct in distribution posterior superior frontal branch of the right middle cerebral artery territory. There is a moderate size subacute infarct in the medial left parieto-occipital region maximally measuring 6 x 3 x 2.4 cm in anterior posterior, craniocaudal, and medial lateral dimension in distribution of the medial parieto-occipital branch of the left posterior cerebral artery territory. 2. There is a right-sided  periorbital hematoma from today's head and facial trauma. There is some stranding in the intraconal fat of the right orbit that is likely some strandy hemorrhage within the fat. There is a comminuted acute fracture of the inferior wall of the right orbit with 3 mm inferior displacement of an inferior orbital wall fracture fragment into the superior aspect of the right maxillary sinus along with some protrusion of extraconal fat. Fracture planes extend into the inferior orbital canal, could compromise the right infraorbital nerve. There is partial opacification of the right maxillary sinus with hemorrhage. The remainder of the head CT is within normal limits.  CERVICAL SPINE CT TECHNIQUE: Spiral CT images were obtained from skull base down to T4 thoracic level and images were reformatted and submitted in 2 mm thick, axial and sagittal CT section with soft tissue algorithm, 1 mm thick axial, sagittal and coronal CT sections with high-resolution bone algorithm.  COMPARISON: There are no prior cervical spine imaging studies from [facility] for comparison.  FINDINGS: The atlantooccipital articulation is normal. There are prominent arthritic changes at the atlantodental interval with marginal spurring off the anterior ring of C1 and the odontoid, some subchondral cyst formation of the odontoid process and some thickening and calcification of the transverse ligament with mild-to-moderate canal narrowing the C1 ring level. Arthritic changes at the articulation of the lateral masses of C1 and C2.  At C2-C3, there is moderate bilateral facet overgrowth, some bony fusion across the facet joints. Posterior disc margin is normal. There is no canal or foraminal narrowing.  At C3-C4, there is mild-to-moderate right and there is severe left facet overgrowth, 2 mm anterolisthesis of C3 on C4, mild posterior endplate spurring. There is no canal narrowing. There is mild right and moderate left bony foraminal narrowing.   At C4-C5, there is mild-to-moderate left and moderate-to-severe right facet overgrowth, 2-3 mm anterolisthesis of C4 on C5, mild posterior endplate spurring and uncovertebral joint hypertrophy. There is mild canal, there is moderate-to-severe right foraminal narrowing.  At C5-C6, there is moderate bilateral facet overgrowth, 1-2 mm anterolisthesis of C5 on C6, mild posterior endplate spurring and uncovertebral joint hypertrophy. There is mild canal and foraminal narrowing.  At C6-C7, there is severe disc space narrowing and degenerative endplate changes, some bony bridging across the endplates, mild posterior spurring and uncovertebral joint hypertrophy, mild bilateral facet overgrowth, and there is mild canal and moderate bilateral foraminal narrowing.  At C7-T1, there is some mild-to-moderate facet overgrowth, some bony fusion across the facet joints, 2 mm anterolisthesis of C7 on T1. No canal or foraminal narrowing.  No acute fracture seen in the cervical spine.  IMPRESSION: No acute fracture seen in the cervical spine. There is diffuse cervical spondylosis as described in great detail above. Results were communicated to Korina Combs physician assistant in the ER taking care of the patient by telephone 05/05/2020 at 1:30 p.m.    Radiation dose reduction techniques were utilized, including automated exposure control and exposure modulation based on body size.  This report was finalized on 5/6/2020 7:40 AM by Dr. Дмитрий Maharaj M.D.      Mri Brain Without Contrast    Result Date: 5/7/2020   There are findings consistent with an acute infarct within the right PCA distribution involving the medial aspects of the right temporal and occipital lobes as well as the inferior aspect of the right occipital lobe. Additionally, there is a small acute infarct within the left superior and middle frontal gyri involving the left MCA distribution. A subacute infarct within the left PCA distribution is seen involving the medial  aspect of the left occipital and parietal lobes with subtle petechial hemorrhagic transformation.  A chronic infarct is seen within the lateral aspect of the right frontal lobe within the right MCA distribution and there is a chronic infarct noted within the basis pontis.  Mild changes of chronic small vessel ischemic phenomena are noted.  These findings were discussed with Dwayne Torres on 05/07/2020 at approximately 7:56 AM.  Prominent changes of inflammatory paranasal sinus disease are identified within the right maxillary sinus.  This report was finalized on 5/7/2020 12:05 PM by Dr. Nitin Mcnair M.D.      Xr Hip With Or Without Pelvis 2 - 3 View Right    Result Date: 5/5/2020   No acute fracture is identified. If there is clinical suspicion for radiographically occult fracture, or to further evaluate the soft tissues, MRI could be considered.    This report was finalized on 5/5/2020 2:54 PM by Dr. Chuck Reed M.D.      Results for orders placed during the hospital encounter of 05/06/20   Transthoracic Echo Complete With Contrast if Necessary Per Protocol    Narrative · Left ventricular systolic function is normal. Calculated EF = 60%.   Estimated EF was in agreement with the calculated EF. Estimated EF = 60%.   Normal left ventricular cavity size and wall thickness noted. All left   ventricular wall segments contract normally. Left ventricular diastolic   function is normal.  · Left atrial volume is mildly increased. Saline test results are   negative.  · The valve was poorly visualized but appears trileaflet. The aortic valve   is abnormal in structure. There is moderate calcification of the aortic   valve.Mild to moderate aortic valve regurgitation is present. No aortic   valve stenosis is present.  · Mild MAC is present.  · The tricuspid valve is grossly normal. Trace tricuspid valve   regurgitation is present. Estimated right ventricular systolic pressure   from tricuspid regurgitation is normal (<35  mmHg). Calculated right   ventricular systolic pressure from tricuspid regurgitation is 33.0 mmHg.  · Limited 2D imaging of cardiac valve                  Impression/Assessment:  This is a 85-year-old female with past medical history of stroke on Plavix 75 mg PTA, diabetes, hyperlipidemia, hypertension, ROMAN, chronic vertigo who presented to the hospital on 5/5/2020 with complaints of sudden onset of dizziness x10 days ago that lasted a few days with associated bilateral blurred vision, unsteady gait, and suffering a mechanical fall and hitting her head which caused her to have a headache that lasted for a day.  She had an initial CT head/Cspine on 5/5 that revealed a left parietal occipital subacute infarct, old right MCA infarct, right-sided periorbital hematoma and an acute right orbital fracture with right maxillary sinus hemorrhage, no acute fracture, diffuse cervical spondylosis.  She was discharged home per ER note, however she was asked to return on 5/6 by her PCP after he reviewed patient's CT and saw findings of a subacute to acute infarct.  Patient was also having persistent symptoms with dizziness and progressive blurred vision stating that she was seeing shapes and colors but could not define the object.     Upon arrival to the ED on 5/6 she obtained a CTA that again revealed the subacute left occipital stroke but also an unexpected right P2 occlusion.  She received a 500 cc NS bolus and aspirin 300 mg rectally. BP on arrival 147/47, HR 58. .  EKG revealed atrial fibrillation which was a new onset.  Temp 96.8.  She was not a TPA candidate due to time criteria.    Diagnosis: Bilateral acute/subacute occipital and left frontal infarcts secondary to new onset atrial fibrillation with petechial bleed within the left occipital lobe infarct bed, recent fall with head trauma, right orbital fracture, hypertension, hyperlipidemia, diabetes    Work up to date:   5/5 CTH WO: left parietal occipital subacute  "infarct, old right MCA infarct, right-sided periorbital hematoma and an acute right orbital fracture with right maxillary sinus hemorrhage, mild small vessel disease.  5/5 CT Cspine WO: No acute fracture, diffuse cervical spondylosis.  5/6 CTA H/N: Arterial cutoff of the distal right P2 segment, scattered calcifications in the bilateral cervical and intracranial carotid arteries with approximately 50% narrowing of the origin of the right ICA, approximately 50% narrowing at the supraclinoid right ICA, estimated 50% narrowing at the origin of the right vertebral artery, redemonstration of the left parietal occipital subacute infarct which shows gyriform cortical enhancement compatible with post infarct change, and chronic right frontal lobe infarct.  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  5/7 2D echo: LA volume mildly increased, saline test negative, LV function normal, EF 60%, limited 2D echo of valves, no AV stenosis present, moderate calcification of AV.  5/8 CTH WO: Evolving right occipital lobe infarct with no convincing evidence of hemorrhagic transformation, some mass-effect upon the occipital horn of the right lateral ventricle, subacute infarct again seen within the left PCA territory.  Labs: A1c 6.40, , TSH 1.050, free T4 1.19, folate > 20.00, B12 > 2000, P2Y12 282, Mg 1.8    Plan:  Neurosx has cleared her for A/C or A/P therapy but given the size of her strokes I do not think it would be safe to start A/C at this time would need to wait at least 2 weeks and repeat CTH to assess, as well as pt endorses at least 1 fall a month due to gait imbalance and states this fall \"was not the worst\" therefore will need to weigh the risks and benefits especially now that she has severe visual loss bilaterally " from strokes. I think she would be a better candidate for a watchman device. Will await cardiology recs.  Repeat CTH in a.m.  P2Y12 282, she reports daily compliance and appears Plavix was held for the past 2 days so unsure if she is a Plavix non-responder. Will d/c Plavix and start ASA 325mg.  EEG pending, she was started on Keppra last night for concerns for seizure.  D/C Lipitor, start Crestor 40mg   Falls precautions  Neurochecks per stroke protocol  Okay to normalize BP slowly  Stroke Education  NATHAN/SCDs  PT/OT/ST  No driving until she is cleared by her opthalmologist or occupational driving rehab.  Follow up with me in 3 months for stroke follow up.  Therapies as written. CCP for discharge planning. Call RRT for any acute neurological changes. We will continue to follow and advise.    Case discussed with patient, Criss MCCLELLAN, and Dr. Torres, and he agrees with plan above.   ELEUTERIO Obando

## 2020-05-08 NOTE — NURSING NOTE
Twice, pt reported seeing flashes of light in the L eye. MD notified. Keppra 500 mg IVPB ordered for tonight and EEG ordered for the morning. Will CTM.

## 2020-05-08 NOTE — PROGRESS NOTES
Continued Stay Note  [facility]     Patient Name: Gela Thompson  MRN: [id number]  Today's Date: 5/8/2020    Admit Date: 5/6/2020    Discharge Plan     Row Name 05/08/20 1519       Plan    Plan  [facility] when medically ready    Plan Comments  Spoke with [facility], anticipates acceptance once medically ready. Mel navraro/ccp        Discharge Codes    No documentation.             Tamia Ivory, RN

## 2020-05-08 NOTE — SIGNIFICANT NOTE
05/08/20 1325   Rehab Time/Intention   Evaluation Not Performed other (see comments)  (Discussed with RN, duplicate order. BSE completed 5/7/20 recommending regular and thins. ST to s/o, please re-consult as warranted.)   Rehab Treatment   Discipline speech language pathologist

## 2020-05-08 NOTE — PROGRESS NOTES
Name: Gela Thompson ADMIT: 2020   : 1934  PCP: Karla Mora MD    MRN: [id number] LOS: 2 days   AGE/SEX: 85 y.o. female  ROOM: [facility]     Subjective   Subjective   Patient is lying in the bed and has no specific complaints.  Denies nausea, vomiting, abdominal pain, chest pain, palpitations.       Objective   Objective   Vital Signs  Temp:  [97.3 °F (36.3 °C)-98.3 °F (36.8 °C)] 97.5 °F (36.4 °C)  Heart Rate:  [54-80] 68  Resp:  [16-18] 18  BP: (133-146)/(59-80) 135/60  SpO2:  [93 %-98 %] 97 %  on   ;   Device (Oxygen Therapy): room air  Body mass index is 30.11 kg/m².  Physical Exam   Constitutional: She is oriented to person, place, and time. She appears well-developed and well-nourished.   HENT:   Head: Normocephalic.   Nose: Nose normal.   Right periorbital bruising noted   Eyes: Pupils are equal, round, and reactive to light. EOM are normal. No scleral icterus.   Neck: Normal range of motion. Neck supple. No JVD present.   Cardiovascular: Normal rate, regular rhythm, normal heart sounds and intact distal pulses.   Pulmonary/Chest: Effort normal and breath sounds normal. No respiratory distress.   Abdominal: Soft. Bowel sounds are normal.   Musculoskeletal: She exhibits no edema or deformity.   Neurological: She is alert and oriented to person, place, and time.   Skin: Skin is warm and dry. No rash noted.   Psychiatric: She has a normal mood and affect. Her behavior is normal. Thought content normal.         Results Review:       I reviewed the patient's new clinical results.  Results from last 7 days   Lab Units 20  0323 20  1328   WBC 10*3/mm3 7.25 6.54   HEMOGLOBIN g/dL 10.5* 10.7*   PLATELETS 10*3/mm3 283 300     Results from last 7 days   Lab Units 20  0323 20  1328   SODIUM mmol/L 139 136   POTASSIUM mmol/L 4.7 5.6*   CHLORIDE mmol/L 106 104   CO2 mmol/L 20.7* 18.8*   BUN mg/dL 31* 42*   CREATININE mg/dL 0.92 1.47*   GLUCOSE mg/dL 85 126*   Estimated Creatinine  Clearance: 45.7 mL/min (by C-G formula based on SCr of 0.92 mg/dL).  Results from last 7 days   Lab Units 05/06/20  1328   ALBUMIN g/dL 4.20   BILIRUBIN mg/dL 0.4   ALK PHOS U/L 62   AST (SGOT) U/L 15   ALT (SGPT) U/L 13     Results from last 7 days   Lab Units 05/07/20  0323 05/06/20  1328   CALCIUM mg/dL 9.2 9.4   ALBUMIN g/dL  --  4.20   MAGNESIUM mg/dL  --  1.8       Hemoglobin A1C   Date/Time Value Ref Range Status   05/07/2020 0323 6.40 (H) 4.80 - 5.60 % Final     Glucose   Date/Time Value Ref Range Status   05/08/2020 1646 126 70 - 130 mg/dL Final   05/08/2020 1203 125 70 - 130 mg/dL Final   05/08/2020 0553 122 70 - 130 mg/dL Final   05/07/2020 1617 139 (H) 70 - 130 mg/dL Final   05/07/2020 1116 100 70 - 130 mg/dL Final   05/07/2020 0555 86 70 - 130 mg/dL Final   05/06/2020 2000 97 70 - 130 mg/dL Final         [START ON 5/9/2020] aspirin 325 mg Oral Daily   hydrALAZINE 25 mg Oral TID   levETIRAcetam 500 mg Intravenous Q12H   rosuvastatin 40 mg Oral Nightly   sodium chloride 500 mL Intravenous Once   sodium chloride 10 mL Intravenous Q12H   sodium chloride 10 mL Intravenous Q12H   verapamil  mg Oral Daily   verapamil  mg Oral Nightly   vitamin B-12 1,000 mcg Oral Daily       sodium chloride 125 mL/hr Last Rate: 125 mL/hr (05/07/20 0150)   sodium chloride 125 mL/hr Last Rate: 125 mL/hr (05/07/20 2302)   Diet Regular; Thin; Consistent Carbohydrate, Cardiac       Assessment/Plan     Active Hospital Problems    Diagnosis  POA   • **Cerebrovascular accident (CMS/HCC) [I63.9]  Yes   • Atrial fibrillation (CMS/HCC) [I48.91]  Unknown   • ROBERTO (acute kidney injury) (CMS/HCC) [N17.9]  Yes   • Hyperkalemia [E87.5]  Yes   • Anemia, macrocytic [D53.9]  Yes   • DM II (diabetes mellitus, type II), controlled (CMS/HCC) [E11.9]  Yes   • HTN (hypertension) [I10]  Yes      Resolved Hospital Problems   No resolved problems to display.     1.  Subacute CVA, patient is on asa and statins and will continue.Appreciate  Neurology input.  Will continue with PT/OT evaluation and recommendations.  Most likely will need rehab placement.  Patient did have left occipital petechial hemorrhage therefore off Plavix.  She also developed a right occipital infarct with some mass effect upon the right occipital horn and is currently on IV Keppra.  Continue to monitor her very closely in the hospital.  2.  Acute kidney injury,  Creatinine is back to normal and avoid nephrotoxins.  3.  Hyperkalemia has resolved.  4.  Glucose intolerance, blood sugars are fairly well controlled.  5.  Hypertension, on verapamil and hydralazine will be continued.  6.  Right superior orbital fracture, continue with conservative approach.      Daniel Beaver MD  [facility]  05/08/20  17:13

## 2020-05-08 NOTE — PROGRESS NOTES
[facility]UROSURGERY PROGRESS NOTE    PATIENT IDENTIFICATION:   Name:  Gela Thompson      MRN:  [id number]     85 y.o.  female               CC: stroke with petechial ICH      Subjective     Interval History:  Repeat CTH.  Denies headache.  States that vision is no better/no worse.    ROS:  HEENT: No headache, significant vision difficulties.  Mild lightheadedness  GI: No nausea, vomiting, no swallow difficulties  Neuro: No numbness or tingling; imbalance    Objective     Vital signs in last 24 hours:  Temp:  [97.3 °F (36.3 °C)-98.3 °F (36.8 °C)] 97.3 °F (36.3 °C)  Heart Rate:  [54-80] 73  Resp:  [16-18] 16  BP: (133-146)/(59-80) 138/80      Intake/Output this shift:  No intake/output data recorded.      Intake/Output last 3 shifts:  I/O last 3 completed shifts:  In: 480 [P.O.:480]  Out: -     LABS:  Results from last 7 days   Lab Units 05/07/20  0323 05/06/20  1328   WBC 10*3/mm3 7.25 6.54   HEMOGLOBIN g/dL 10.5* 10.7*   HEMATOCRIT % 31.2* 31.1*   PLATELETS 10*3/mm3 283 300     Results from last 7 days   Lab Units 05/07/20  0323 05/06/20  1328   SODIUM mmol/L 139 136   POTASSIUM mmol/L 4.7 5.6*   CHLORIDE mmol/L 106 104   CO2 mmol/L 20.7* 18.8*   BUN mg/dL 31* 42*   CREATININE mg/dL 0.92 1.47*   CALCIUM mg/dL 9.2 9.4   BILIRUBIN mg/dL  --  0.4   ALK PHOS U/L  --  62   ALT (SGPT) U/L  --  13   AST (SGOT) U/L  --  15   GLUCOSE mg/dL 85 126*     Results from last 7 days   Lab Units 05/06/20  1328   INR  1.06        IMAGING STUDIES:  CTH- stable small area of petechial hemorrhage within known left occipital infarct. Now visible area of evolving infarct right occipital lobe with mass effect upon right occipital horn that I do not appreciate on prior CT; however, radiologist feels it is unchanged from prior study.  No hydrocephalus.  Fourth ventricle widely patent.    I personally viewed and interpreted the patient's CTH. Also reviewed by and discussed with Dr. Mcneill.    Meds reviewed/changed: Yes    Current  Facility-Administered Medications:   •  acetaminophen (TYLENOL) tablet 650 mg, 650 mg, Oral, Q4H PRN **OR** acetaminophen (TYLENOL) 160 MG/5ML solution 650 mg, 650 mg, Oral, Q4H PRN **OR** acetaminophen (TYLENOL) suppository 650 mg, 650 mg, Rectal, Q4H PRN, Omar Conde MD  •  clopidogrel (PLAVIX) tablet 75 mg, 75 mg, Oral, Daily, Omar Conde MD, 75 mg at 05/08/20 0931  •  hydrALAZINE (APRESOLINE) tablet 25 mg, 25 mg, Oral, TID, Omar Conde MD, 25 mg at 05/08/20 0930  •  levETIRAcetam in NaCl 0.82% (KEPPRA) IVPB 500 mg, 500 mg, Intravenous, Q12H, Reinaldo Torres MD, 500 mg at 05/08/20 0931  •  rosuvastatin (CRESTOR) tablet 40 mg, 40 mg, Oral, Nightly, Florence Cortesa, APRN  •  sodium chloride 0.9 % bolus 500 mL, 500 mL, Intravenous, Once, Omar Conde MD  •  sodium chloride 0.9 % flush 10 mL, 10 mL, Intravenous, PRN, Omar Conde MD  •  [COMPLETED] Insert peripheral IV, , , Once **AND** sodium chloride 0.9 % flush 10 mL, 10 mL, Intravenous, PRN, Omar Conde MD  •  sodium chloride 0.9 % flush 10 mL, 10 mL, Intravenous, Q12H, Omar Conde MD, 10 mL at 05/08/20 0932  •  sodium chloride 0.9 % flush 10 mL, 10 mL, Intravenous, PRN, Omar Conde MD  •  sodium chloride 0.9 % flush 10 mL, 10 mL, Intravenous, Q12H, Chani Cortes, APRN, 10 mL at 05/08/20 0932  •  sodium chloride 0.9 % flush 10 mL, 10 mL, Intravenous, PRN, Florence Cortesa, APRN  •  sodium chloride 0.9 % infusion, 125 mL/hr, Intravenous, Continuous, Omar Conde MD, Last Rate: 125 mL/hr at 05/07/20 0150, 125 mL/hr at 05/07/20 0150  •  sodium chloride 0.9 % infusion, 125 mL/hr, Intravenous, Continuous, Omar Conde MD, Last Rate: 125 mL/hr at 05/07/20 2302, 125 mL/hr at 05/07/20 2302  •  verapamil SR (CALAN-SR) CR tablet 120 mg, 120 mg, Oral, Daily, Omar Conde MD, 120 mg at 05/08/20 0931  •  verapamil SR (CALAN-SR) CR tablet 240 mg, 240 mg, Oral, Nightly, Amaya,  "Omar ARELLANO MD, 240 mg at 05/07/20 2056  •  vitamin B-12 (CYANOCOBALAMIN) tablet 1,000 mcg, 1,000 mcg, Oral, Daily, Omar Conde MD, 1,000 mcg at 05/08/20 0930      Physical Exam:    General:   Awake, alert, oriented x3. Speech clear with no aphasia  HEENT:    Right periorbital ecchymosis  CN II:    Left HH and right lower quadrantanopsia; central     vision intact to movement but not specific finger     Counting-poor acuity  CN III IV VI: Extraocular movements are full , PERRL, no nystagmus  CN VII: Facial movements are symmetric. No weakness.      Motor: No drift   Station and Gait: Observed patient walking to bathroom with Rollator and assist x1.   Coordination:  Finger to nose test shows no dysmetria.     Assessment/Plan     ASSESSMENT:      Cerebrovascular accident (CMS/HCC)    ROBERTO (acute kidney injury) (CMS/[facility])    Hyperkalemia    Anemia, macrocytic    DM II (diabetes mellitus, type II), controlled (CMS/[facility])    HTN (hypertension)    Atrial fibrillation (CMS/[facility])      PLAN: Neurologic exam stable.  Left occipital petechial hemorrhage stable.  She does now have evidence of right occipital infarct on CT with some mass-effect upon right occipital horn.  With regard to the petechial hemorrhage, patient may begin antiplatelet or anticoagulant at discretion of neurology.  She does have a fairly significant fall risk as well with regard to her imbalance and her visual issues.  We will plan follow-up CT in 2 weeks or we will follow up Monday if she is still here. Please call with questions or concerns over the weekend.    I discussed the patients findings and my recommendations with patient, nursing staff and Chani Gamez       LOS: 2 days       Criss Gibson, APRN  5/8/2020  10:33    \"Dictated utilizing Dragon dictation\".      "

## 2020-05-08 NOTE — PLAN OF CARE
Problem: Patient Care Overview  Goal: Plan of Care Review  Flowsheets  Taken 5/8/2020 0936 by Madisyn Contreras RN  Plan of Care Reviewed With: patient  Taken 5/8/2020 1000 by Renetta Calvo, PT  Outcome Summary: pt pleasant and cooperative for PT. pt reports she had episode of crying last night because she was scared and cant see. pt ambulated 110ft CGA rollator on unit, needs assist for guiding rollator around environmental obstables 2' poor vision. recommend [facility] rehab vs [facility] pending progress/pt preference.   ..Patient was wearing a face mask during this therapy encounter. Therapist used appropriate personal protective equipment including mask and gloves.  Mask used was standard procedure mask. Appropriate PPE was worn during the entire therapy session. Hand hygiene was completed before and after therapy session. Patient is not in enhanced droplet precautions.

## 2020-05-08 NOTE — THERAPY TREATMENT NOTE
Acute Care - Physical Therapy Treatment Note  [facility]     Patient Name: Gela Thompson  : 1934  MRN: [id number]  Today's Date: 2020  Onset of Illness/Injury or Date of Surgery: 20  Date of Referral to PT: 20  Referring Physician: Amaya    Admit Date: 2020    Visit Dx:    ICD-10-CM ICD-9-CM   1. Cerebrovascular accident (CVA), unspecified mechanism (CMS/HCC) I63.9 434.91   2. Ataxia R27.0 781.3   3. Generalized weakness R53.1 780.79     Patient Active Problem List   Diagnosis   • Anemia   • Chronic low back pain   • Diabetes mellitus (CMS/HCC)   • Dyslipidemia   • Gastroesophageal reflux disease   • Hypertension   • Mitral valve insufficiency   • Osteoarthritis   • Adiposity   • Obstructive sleep apnea syndrome   • Cerebrovascular accident (CMS/HCC)   • Uncontrolled hypertension   • ROBERTO (acute kidney injury) (CMS/HCC)   • Hyperkalemia   • Anemia, macrocytic   • DM II (diabetes mellitus, type II), controlled (CMS/HCC)   • HTN (hypertension)   • Atrial fibrillation (CMS/HCC)       Therapy Treatment    Rehabilitation Treatment Summary     Row Name 2056             Treatment Time/Intention    Discipline  physical therapist  -      Document Type  therapy note (daily note)  -      Subjective Information  no complaints  -      Mode of Treatment  individual therapy;physical therapy  -      Therapy Frequency (PT Clinical Impression)  daily  -LH      Patient Effort  good  -      Existing Precautions/Restrictions  fall  -      Patient Response to Treatment  poor vision, pt does have glasses bedside- make sure she has them on  -LH      Recorded by [] Renetta Calvo, PT 2070      Row Name 2019             Cognitive Assessment/Intervention- PT/OT    Orientation Status (Cognition)  oriented x 3  -LH      Follows Commands (Cognition)  WFL  -      Safety Deficit (Cognitive)  mild deficit  -LH      Recorded by [] Renetta Calvo, PT 2021      Row  Name 05/08/20 0956             Safety Issues, Functional Mobility    Impairments Affecting Function (Mobility)  (S) visual/perceptual  -LH      Recorded by [] Renetta Calvo, PT 05/08/20 0959      Row Name 05/08/20 0956             Bed Mobility Assessment/Treatment    Supine-Sit Mason (Bed Mobility)  other (see comments) pt seated EOB  -LH      Sit-Supine Mason (Bed Mobility)  moderate assist (50% patient effort) assist BLEs  -LH      Recorded by [] Renetta Calvo, PT 05/08/20 0959      Row Name 05/08/20 0956             Sit-Stand Transfer    Sit-Stand Mason (Transfers)  contact guard;verbal cues;nonverbal cues (demo/gesture)  -      Assistive Device (Sit-Stand Transfers)  walker, 4-wheeled  -LH      Recorded by [] Renetta Calvo, PT 05/08/20 0959      Row Name 05/08/20 0956             Stand-Sit Transfer    Stand-Sit Mason (Transfers)  contact guard;verbal cues;nonverbal cues (demo/gesture)  -      Assistive Device (Stand-Sit Transfers)  walker, 4-wheeled  -LH      Recorded by [] Renetta Calvo, PT 05/08/20 0959      Row Name 05/08/20 0956             Gait/Stairs Assessment/Training    Mason Level (Gait)  contact guard  -      Assistive Device (Gait)  walker, 4-wheeled  -LH      Distance in Feet (Gait)  110  -LH      Pattern (Gait)  step-through  -LH      Deviations/Abnormal Patterns (Gait)  royce decreased  -LH      Bilateral Gait Deviations  forward flexed posture;heel strike decreased  -      Comment (Gait/Stairs)  pt needs visual guidance of rollator to safely navgiate unit around environmental obstacles 2' poor vision  -LH      Recorded by [] Renetta Calvo, PT 05/08/20 0959      Row Name 05/08/20 0956             Positioning and Restraints    Pre-Treatment Position  in bed  -LH      Post Treatment Position  bed  -LH      In Bed  supine;call light within reach;encouraged to call for assist;exit alarm on;with nsg  -LH      Recorded by [] Renetta Calvo, PT  05/08/20 0959      Row Name 05/08/20 0956             Pain Scale: Numbers Pre/Post-Treatment    Pain Scale: Numbers, Pretreatment  0/10 - no pain  -      Pain Scale: Numbers, Post-Treatment  0/10 - no pain  -      Recorded by [] Renetta Calvo, PT 05/08/20 0959      Row Name                Wound 05/06/20 1937 Right forehead Laceration    Wound - Properties Group Date first assessed: 05/06/20 [] Time first assessed: 1937 [JM] Present on Hospital Admission: Y [JM] Side: Right [JM] Location: forehead [JM] Primary Wound Type: Laceration [JM] Recorded by:  [] McArdle, Jacklyn, RN 05/07/20 0156      User Key  (r) = Recorded By, (t) = Taken By, (c) = Cosigned By    Initials Name Effective Dates Discipline     Renetta Calvo, PT 04/03/18 -  PT    JM McArdle, Jacklyn, RN 03/06/17 -  Nurse          Wound 05/06/20 1937 Right forehead Laceration (Active)   Dressing Appearance dried drainage;dry;intact 5/8/2020  9:36 AM   Closure Sutures 5/8/2020  9:36 AM   Base closed/resurfaced 5/8/2020  9:36 AM   Periwound ecchymotic 5/8/2020  9:36 AM   Periwound Temperature warm 5/8/2020  9:36 AM   Periwound Skin Turgor soft 5/8/2020  9:36 AM   Edges rolled/closed 5/8/2020  6:09 AM   Drainage Characteristics/Odor serosanguineous;bleeding controlled 5/8/2020  9:36 AM   Drainage Amount small 5/8/2020  9:36 AM   Dressing Care, Wound gauze 5/8/2020  9:36 AM           Physical Therapy Education                 Title: PT OT SLP Therapies (In Progress)     Topic: Physical Therapy (In Progress)     Point: Mobility training (In Progress)     Description:   Instruct learner(s) on safety and technique for assisting patient out of bed, chair or wheelchair.  Instruct in the proper use of assistive devices, such as walker, crutches, cane or brace.              Patient Friendly Description:   It's important to get you on your feet again, but we need to do so in a way that is safe for you. Falling has serious consequences, and your personal safety  is the most important thing of all.        When it's time to get out of bed, one of us or a family member will sit next to you on the bed to give you support.     If your doctor or nurse tells you to use a walker, crutches, a cane, or a brace, be sure you use it every time you get out of bed, even if you think you don't need it.    Learning Progress Summary           Patient Acceptance, E, NR by  at 5/8/2020 0959    Acceptance, E, NR by  at 5/7/2020 1031                   Point: Home exercise program (In Progress)     Description:   Instruct learner(s) on appropriate technique for monitoring, assisting and/or progressing patient with therapeutic exercises and activities.              Learning Progress Summary           Patient Acceptance, E, NR by  at 5/8/2020 0959    Acceptance, E, NR by  at 5/7/2020 1031                   Point: Body mechanics (Not Started)     Description:   Instruct learner(s) on proper positioning and spine alignment for patient and/or caregiver during mobility tasks and/or exercises.              Learner Progress:   Not documented in this visit.          Point: Precautions (Not Started)     Description:   Instruct learner(s) on prescribed precautions during mobility and gait tasks              Learner Progress:   Not documented in this visit.                      User Key     Initials Effective Dates Name Provider Type Discipline     04/03/18 -  Renetta Calvo, PT Physical Therapist PT                PT Recommendation and Plan  Anticipated Discharge Disposition (PT): home with home health, inpatient rehabilitation facility(pending progress )  Planned Therapy Interventions (PT Eval): balance training, bed mobility training, gait training, home exercise program, ROM (range of motion), stair training, strengthening, stretching, transfer training  Therapy Frequency (PT Clinical Impression): daily  Outcome Summary/Treatment Plan (PT)  Anticipated Discharge Disposition (PT): home with home  [facility], inpatient rehabilitation facility(pending progress )  Plan of Care Reviewed With: patient  Outcome Summary: pt pleasant and cooperative for PT. pt reports she had episode of crying last night because she was scared and cant see. pt ambulated 110ft CGA rollator on unit, needs assist for guiding rollator around environmental obstables 2' poor vision. recommend [facility] rehab vs HHC pending progress/pt preference.  Outcome Measures     Row Name 05/08/20 1000 05/07/20 1546 05/07/20 1500       How much help from another person do you currently need...    Turning from your back to your side while in flat bed without using bedrails?  3  -LH  --  --    Moving from lying on back to sitting on the side of a flat bed without bedrails?  3  -LH  --  --    Moving to and from a bed to a chair (including a wheelchair)?  3  -LH  --  --    Standing up from a chair using your arms (e.g., wheelchair, bedside chair)?  3  -LH  --  --    Climbing 3-5 steps with a railing?  3  -LH  --  --    To walk in hospital room?  3  -LH  --  --    AM-PAC 6 Clicks Score (PT)  18  -LH  --  --       How much help from another is currently needed...    Putting on and taking off regular lower body clothing?  --  --  2  -SG    Bathing (including washing, rinsing, and drying)  --  --  2  -SG    Toileting (which includes using toilet bed pan or urinal)  --  --  2  -SG    Putting on and taking off regular upper body clothing  --  --  2  -SG    Taking care of personal grooming (such as brushing teeth)  --  --  2  -SG    Eating meals  --  --  2  -SG    AM-PAC 6 Clicks Score (OT)  --  --  12  -SG       Modified [place] Scale    Modified [place] Scale  --  4 - Moderately severe disability.  Unable to walk without assistance, and unable to attend to own bodily needs without assistance.  -SG  --       Functional Assessment    Outcome Measure Options  AM-PAC 6 Clicks Basic Mobility (PT)  -LH  Modified Patrice  -SG  AM-PAC 6 Clicks Daily Activity (OT)  -SG    Row Name  05/07/20 1000             How much help from another person do you currently need...    Turning from your back to your side while in flat bed without using bedrails?  3  -LH      Moving from lying on back to sitting on the side of a flat bed without bedrails?  3  -LH      Moving to and from a bed to a chair (including a wheelchair)?  3  -LH      Standing up from a chair using your arms (e.g., wheelchair, bedside chair)?  3  -LH      Climbing 3-5 steps with a railing?  3  -LH      To walk in hospital room?  3  -      AM-PAC 6 Clicks Score (PT)  18  -         Functional Assessment    Outcome Measure Options  AM-PAC 6 Clicks Basic Mobility (PT)  -        User Key  (r) = Recorded By, (t) = Taken By, (c) = Cosigned By    Initials Name Provider Type     Minda Husain, OTR Occupational Therapist     Renetta Calvo, PT Physical Therapist         Time Calculation:   PT Charges     Row Name 05/08/20 1001             Time Calculation    Start Time  0850  -      Stop Time  0906  -      Time Calculation (min)  16 min  -      PT Received On  05/08/20  -      PT - Next Appointment  05/09/20  -        User Key  (r) = Recorded By, (t) = Taken By, (c) = Cosigned By    Initials Name Provider Type     Renetta Calvo, PT Physical Therapist        Therapy Charges for Today     Code Description Service Date Service Provider Modifiers Qty    44096720247 HC PT EVAL LOW COMPLEXITY 2 5/7/2020 Renetta Calvo, PT GP 1    63312190115 HC PT THER PROC EA 15 MIN 5/8/2020 Renetta Calvo, PT GP 1          PT G-Codes  Outcome Measure Options: AM-PAC 6 Clicks Basic Mobility (PT)  AM-PAC 6 Clicks Score (PT): 18  AM-PAC 6 Clicks Score (OT): 12  Modified Patrice Scale: 4 - Moderately severe disability.  Unable to walk without assistance, and unable to attend to own bodily needs without assistance.    Renetta Calvo PT  5/8/2020

## 2020-05-08 NOTE — NURSING NOTE
The PCA reported that, around 4 am, the pt was trying to get out of bed and seemed confused. When I checked on her, she was asleep. She left for CT with transport at approx 5 am and returned shortly after. When I walked in to change the dressing over her R eye, she was crying. When I asked if she was okay, she said she was scared, blind, and alone. She said she didn't know where she was. I explained that she was in the hospital because she had a stroke and she said she knew that. I explained that she'd just had a CT, which she also knew. I explained again that she'd be having an EEG to check for sz activity because she'd reported seeing flashing lights on the L side twice last night and she said she remembered that. She continued to cry but was reassured that she was safe here at the hospital and would be well taken care of. Will CTM.

## 2020-05-09 ENCOUNTER — APPOINTMENT (OUTPATIENT)
Dept: CT IMAGING | Facility: HOSPITAL | Age: 85
End: 2020-05-09

## 2020-05-09 LAB
GLUCOSE BLDC GLUCOMTR-MCNC: 101 MG/DL (ref 70–130)
GLUCOSE BLDC GLUCOMTR-MCNC: 144 MG/DL (ref 70–130)
GLUCOSE BLDC GLUCOMTR-MCNC: 161 MG/DL (ref 70–130)
GLUCOSE BLDC GLUCOMTR-MCNC: 174 MG/DL (ref 70–130)

## 2020-05-09 PROCEDURE — 99233 SBSQ HOSP IP/OBS HIGH 50: CPT | Performed by: NURSE PRACTITIONER

## 2020-05-09 PROCEDURE — 25010000002 LEVETIRACETAM IN NACL 0.82% 500 MG/100ML SOLUTION: Performed by: PSYCHIATRY & NEUROLOGY

## 2020-05-09 PROCEDURE — 99232 SBSQ HOSP IP/OBS MODERATE 35: CPT | Performed by: INTERNAL MEDICINE

## 2020-05-09 PROCEDURE — 70450 CT HEAD/BRAIN W/O DYE: CPT

## 2020-05-09 PROCEDURE — 82962 GLUCOSE BLOOD TEST: CPT

## 2020-05-09 PROCEDURE — 97110 THERAPEUTIC EXERCISES: CPT

## 2020-05-09 RX ORDER — LEVETIRACETAM 250 MG/1
250 TABLET ORAL EVERY 12 HOURS SCHEDULED
Status: DISCONTINUED | OUTPATIENT
Start: 2020-05-09 | End: 2020-05-09

## 2020-05-09 RX ADMIN — ASPIRIN 325 MG: 325 TABLET ORAL at 08:52

## 2020-05-09 RX ADMIN — VERAPAMIL HYDROCHLORIDE 120 MG: 120 TABLET, FILM COATED, EXTENDED RELEASE ORAL at 08:52

## 2020-05-09 RX ADMIN — VERAPAMIL HYDROCHLORIDE 240 MG: 120 TABLET, FILM COATED, EXTENDED RELEASE ORAL at 20:03

## 2020-05-09 RX ADMIN — SODIUM CHLORIDE, PRESERVATIVE FREE 10 ML: 5 INJECTION INTRAVENOUS at 20:04

## 2020-05-09 RX ADMIN — HYDRALAZINE HYDROCHLORIDE 25 MG: 25 TABLET, FILM COATED ORAL at 20:03

## 2020-05-09 RX ADMIN — HYDRALAZINE HYDROCHLORIDE 25 MG: 25 TABLET, FILM COATED ORAL at 15:24

## 2020-05-09 RX ADMIN — LEVETIRACETAM 500 MG: 5 INJECTION INTRAVENOUS at 08:52

## 2020-05-09 RX ADMIN — HYDRALAZINE HYDROCHLORIDE 25 MG: 25 TABLET, FILM COATED ORAL at 08:52

## 2020-05-09 RX ADMIN — SODIUM CHLORIDE, PRESERVATIVE FREE 10 ML: 5 INJECTION INTRAVENOUS at 08:53

## 2020-05-09 RX ADMIN — Medication 1000 MCG: at 08:52

## 2020-05-09 NOTE — PROGRESS NOTES
Name: Gela Thompson ADMIT: 2020   : 1934  PCP: Karla Mora MD    MRN: [id number] LOS: 3 days   AGE/SEX: 85 y.o. female  ROOM: [facility]     Subjective   Subjective   Patient is lying in the bed and has no specific complaints.  Denies nausea, vomiting, abdominal pain, chest pain, palpitations.       Objective   Objective   Vital Signs  Temp:  [97.5 °F (36.4 °C)-98.4 °F (36.9 °C)] 97.9 °F (36.6 °C)  Heart Rate:  [51-70] 51  Resp:  [18] 18  BP: (141-160)/(56-64) 145/64  SpO2:  [96 %] 96 %  on   ;   Device (Oxygen Therapy): room air  Body mass index is 29.95 kg/m².  Physical Exam   Constitutional: She is oriented to person, place, and time. She appears well-developed and well-nourished.   HENT:   Head: Normocephalic.   Nose: Nose normal.   Right periorbital bruising noted   Eyes: Pupils are equal, round, and reactive to light. EOM are normal. No scleral icterus.   Neck: Normal range of motion. Neck supple. No JVD present.   Cardiovascular: Normal rate, regular rhythm, normal heart sounds and intact distal pulses.   Pulmonary/Chest: Effort normal and breath sounds normal. No respiratory distress.   Abdominal: Soft. Bowel sounds are normal.   Musculoskeletal: She exhibits no edema or deformity.   Neurological: She is alert and oriented to person, place, and time.   Skin: Skin is warm and dry. No rash noted.   Psychiatric: She has a normal mood and affect. Her behavior is normal. Thought content normal.         Results Review:       I reviewed the patient's new clinical results.  Results from last 7 days   Lab Units 20  0323 20  1328   WBC 10*3/mm3 7.25 6.54   HEMOGLOBIN g/dL 10.5* 10.7*   PLATELETS 10*3/mm3 283 300     Results from last 7 days   Lab Units 20  0323 20  1328   SODIUM mmol/L 139 136   POTASSIUM mmol/L 4.7 5.6*   CHLORIDE mmol/L 106 104   CO2 mmol/L 20.7* 18.8*   BUN mg/dL 31* 42*   CREATININE mg/dL 0.92 1.47*   GLUCOSE mg/dL 85 126*   Estimated Creatinine  Clearance: 45.5 mL/min (by C-G formula based on SCr of 0.92 mg/dL).  Results from last 7 days   Lab Units 05/06/20  1328   ALBUMIN g/dL 4.20   BILIRUBIN mg/dL 0.4   ALK PHOS U/L 62   AST (SGOT) U/L 15   ALT (SGPT) U/L 13     Results from last 7 days   Lab Units 05/07/20  0323 05/06/20  1328   CALCIUM mg/dL 9.2 9.4   ALBUMIN g/dL  --  4.20   MAGNESIUM mg/dL  --  1.8       Hemoglobin A1C   Date/Time Value Ref Range Status   05/07/2020 0323 6.40 (H) 4.80 - 5.60 % Final     Glucose   Date/Time Value Ref Range Status   05/09/2020 1608 144 (H) 70 - 130 mg/dL Final   05/09/2020 1114 161 (H) 70 - 130 mg/dL Final   05/09/2020 0617 101 70 - 130 mg/dL Final   05/08/2020 2102 201 (H) 70 - 130 mg/dL Final   05/08/2020 1646 126 70 - 130 mg/dL Final   05/08/2020 1203 125 70 - 130 mg/dL Final   05/08/2020 0553 122 70 - 130 mg/dL Final         aspirin 325 mg Oral Daily   hydrALAZINE 25 mg Oral TID   rosuvastatin 40 mg Oral Nightly   sodium chloride 500 mL Intravenous Once   sodium chloride 10 mL Intravenous Q12H   sodium chloride 10 mL Intravenous Q12H   verapamil  mg Oral Daily   verapamil  mg Oral Nightly   vitamin B-12 1,000 mcg Oral Daily       sodium chloride 125 mL/hr Last Rate: 125 mL/hr (05/07/20 0150)   sodium chloride 125 mL/hr Last Rate: 125 mL/hr (05/08/20 2033)   Diet Regular; Thin; Consistent Carbohydrate, Cardiac       Assessment/Plan     Active Hospital Problems    Diagnosis  POA   • **Cerebrovascular accident (CMS/HCC) [I63.9]  Yes   • Atrial fibrillation (CMS/HCC) [I48.91]  Unknown   • ROBERTO (acute kidney injury) (CMS/HCC) [N17.9]  Yes   • Hyperkalemia [E87.5]  Yes   • Anemia, macrocytic [D53.9]  Yes   • DM II (diabetes mellitus, type II), controlled (CMS/HCC) [E11.9]  Yes   • HTN (hypertension) [I10]  Yes      Resolved Hospital Problems   No resolved problems to display.     1.  Subacute CVA, patient is on asa and statins and will continue.Appreciate Neurology input.  Will continue with PT/OT evaluation  and recommendations.  Most likely will need rehab placement.  Patient did have left occipital petechial hemorrhage therefore off Plavix.  She also developed a right occipital infarct with some mass effect upon the right occipital horn and is currently on IV Keppra.  Continue to monitor her very closely in the hospital.  Most likely will need rehab placement.  2.  Acute kidney injury,  Creatinine is back to normal and avoid nephrotoxins.  3.  Hyperkalemia has resolved.  4.  Glucose intolerance, blood sugars are fairly well controlled.  5.  Hypertension, on verapamil and hydralazine will be continued.  Blood pressure is reasonably well controlled.  6.  Right superior orbital fracture, continue with conservative approach.      Daniel Beaver MD  [facility]  05/09/20  17:48

## 2020-05-09 NOTE — PLAN OF CARE
Vitals stable. Repeat ct planned for tomorrow. Pt still c/o of seeing flashes in eyes. Very tearful today.    Problem: Fall Risk (Adult)  Goal: Identify Related Risk Factors and Signs and Symptoms  Outcome: Ongoing (interventions implemented as appropriate)  Goal: Absence of Fall  Outcome: Ongoing (interventions implemented as appropriate)  Goal: Identify Related Risk Factors and Signs and Symptoms  Outcome: Ongoing (interventions implemented as appropriate)  Goal: Absence of Fall  Outcome: Ongoing (interventions implemented as appropriate)     Problem: Patient Care Overview  Goal: Plan of Care Review  Outcome: Ongoing (interventions implemented as appropriate)  Goal: Individualization and Mutuality  Outcome: Ongoing (interventions implemented as appropriate)  Goal: Discharge Needs Assessment  Outcome: Ongoing (interventions implemented as appropriate)  Goal: Interprofessional Rounds/Family Conf  Outcome: Ongoing (interventions implemented as appropriate)     Problem: Stroke (Ischemic) (Adult)  Goal: Signs and Symptoms of Listed Potential Problems Will be Absent, Minimized or Managed (Stroke)  Outcome: Ongoing (interventions implemented as appropriate)

## 2020-05-09 NOTE — PLAN OF CARE
Problem: Patient Care Overview  Goal: Plan of Care Review  Outcome: Ongoing (interventions implemented as appropriate)  Flowsheets (Taken 5/9/2020 8671)  Progress: no change  Plan of Care Reviewed With: patient  Outcome Summary: Continue on NIH (3).Pt denies any pain or discomfort. Assist x1 to bsc.Continue on fall f/u.Will continue to monitor.     Problem: Fall Risk (Adult)  Goal: Identify Related Risk Factors and Signs and Symptoms  Outcome: Ongoing (interventions implemented as appropriate)     Problem: Fall Risk (Adult)  Goal: Absence of Fall  Outcome: Ongoing (interventions implemented as appropriate)     Problem: Fall Risk (Adult)  Goal: Identify Related Risk Factors and Signs and Symptoms  Outcome: Ongoing (interventions implemented as appropriate)     Problem: Patient Care Overview  Goal: Individualization and Mutuality  Outcome: Ongoing (interventions implemented as appropriate)     Problem: Stroke (Ischemic) (Adult)  Goal: Signs and Symptoms of Listed Potential Problems Will be Absent, Minimized or Managed (Stroke)  Outcome: Ongoing (interventions implemented as appropriate)

## 2020-05-09 NOTE — NURSING NOTE
Discussion with daughter Kia.  Desires acute rehab for her mother. Will follow for medical clearance.

## 2020-05-09 NOTE — PROGRESS NOTES
DOS: 2020  NAME: Gela Thompson   : 1934  PCP: Karla Mora MD    Chief Complaint   Patient presents with   • Stroke        Stroke    Subjective: No acute events overnight.  She slept well last night.  Denies any new weakness, numbness, speech or visual disturbances, or headaches.  Remains with visual disturbance seeing flashing lights intermittently in bilateral eyes and shapes of items.  Was able to read the time on the clock today.    Objective:  Vital signs:      Vitals:    20 1949 20 2344 20 0500 20 0715   BP: 141/64 141/62  160/56   BP Location: Right arm Right arm  Right arm   Patient Position: Lying Lying  Lying   Pulse: 70 68  60   Resp: 18 18  18   Temp: 98.4 °F (36.9 °C) 98.4 °F (36.9 °C)  97.5 °F (36.4 °C)   TempSrc: Oral Oral  Oral   SpO2: 96% 96%     Weight:   79.2 kg (174 lb 8 oz)    Height:           Current Facility-Administered Medications:   •  acetaminophen (TYLENOL) tablet 650 mg, 650 mg, Oral, Q4H PRN **OR** acetaminophen (TYLENOL) 160 MG/5ML solution 650 mg, 650 mg, Oral, Q4H PRN **OR** acetaminophen (TYLENOL) suppository 650 mg, 650 mg, Rectal, Q4H PRN, Omar Conde MD  •  aspirin tablet 325 mg, 325 mg, Oral, Daily, Chani Cortes APRN, 325 mg at 20 0852  •  hydrALAZINE (APRESOLINE) tablet 25 mg, 25 mg, Oral, TID, Omar Conde MD, 25 mg at 20 0852  •  levETIRAcetam in NaCl 0.82% (KEPPRA) IVPB 500 mg, 500 mg, Intravenous, Q12H, Reinaldo Torres MD, 500 mg at 20 0852  •  rosuvastatin (CRESTOR) tablet 40 mg, 40 mg, Oral, Nightly, Chani Cortes APRN, 40 mg at 20  •  sodium chloride 0.9 % bolus 500 mL, 500 mL, Intravenous, Once, Omar Conde MD  •  sodium chloride 0.9 % flush 10 mL, 10 mL, Intravenous, PRN, Omar Conde MD  •  [COMPLETED] Insert peripheral IV, , , Once **AND** sodium chloride 0.9 % flush 10 mL, 10 mL, Intravenous, PRN, Omar Conde MD  •  sodium chloride 0.9 %  flush 10 mL, 10 mL, Intravenous, Q12H, Omar Conde MD, 10 mL at 05/08/20 2031  •  sodium chloride 0.9 % flush 10 mL, 10 mL, Intravenous, PRN, Omar Conde MD  •  sodium chloride 0.9 % flush 10 mL, 10 mL, Intravenous, Q12H, Chani Cortes, APRN, 10 mL at 05/09/20 0853  •  sodium chloride 0.9 % flush 10 mL, 10 mL, Intravenous, PRN, Chani Cortes, APRN  •  sodium chloride 0.9 % infusion, 125 mL/hr, Intravenous, Continuous, Omar Conde MD, Last Rate: 125 mL/hr at 05/07/20 0150, 125 mL/hr at 05/07/20 0150  •  sodium chloride 0.9 % infusion, 125 mL/hr, Intravenous, Continuous, Omar Conde MD, Last Rate: 125 mL/hr at 05/08/20 2033, 125 mL/hr at 05/08/20 2033  •  verapamil SR (CALAN-SR) CR tablet 120 mg, 120 mg, Oral, Daily, Omar Conde MD, 120 mg at 05/09/20 0852  •  verapamil SR (CALAN-SR) CR tablet 240 mg, 240 mg, Oral, Nightly, Omar Conde MD, 240 mg at 05/08/20 2030  •  vitamin B-12 (CYANOCOBALAMIN) tablet 1,000 mcg, 1,000 mcg, Oral, Daily, Omar Conde MD, 1,000 mcg at 05/09/20 0852    PRN meds  •  acetaminophen **OR** acetaminophen **OR** acetaminophen  •  sodium chloride  •  [COMPLETED] Insert peripheral IV **AND** sodium chloride  •  sodium chloride  •  sodium chloride    No current facility-administered medications on file prior to encounter.      Current Outpatient Medications on File Prior to Encounter   Medication Sig   • EPINEPHrine (EPIPEN) 0.3 MG/0.3ML solution auto-injector injection 1 (One) Time As Needed.   • fexofenadine (ALLEGRA) 60 MG tablet Take 60 mg by mouth Daily.   • Magnesium Oxide 500 MG capsule Take 1,000 mg by mouth Daily.   • metFORMIN (GLUCOPHAGE) 500 MG tablet Take 500 mg by mouth 2 (Two) Times a Day With Meals.   • montelukast (SINGULAIR) 10 MG tablet Take 10 mg by mouth Every Night.   • niacin 100 MG tablet Take 100 mg by mouth Daily With Breakfast.   • Calcium Carb-Cholecalciferol (CALCIUM 1000 + D PO) Take 1 tablet by mouth  Daily.   • celecoxib (CELEBREX) 100 MG capsule Take 100 mg by mouth Every Night.   • Clopidogrel Bisulfate (PLAVIX PO) Take 75 mg by mouth Daily.   • furosemide (LASIX) 20 MG tablet TAKE ONE TABLET BY MOUTH DAILY AS NEEDED (Patient taking differently: Take 20 mg by mouth Daily As Needed (Leg swelling or SOB). Hasn't taken in several months)   • hydrALAZINE (APRESOLINE) 50 MG tablet Take 1 tablet by mouth 3 (Three) times a day.   • L-Lysine 500 MG tablet Take 1 tablet by mouth Daily.   • lisinopril (PRINIVIL,ZESTRIL) 40 MG tablet Take 1 tablet by mouth daily.   • Multiple Vitamin (MULTIVITAMINS PO) Take 1 tablet by mouth Daily.   • potassium chloride (K-DUR,KLOR-CON) 10 MEQ CR tablet TAKE ONE TABLET BY MOUTH DAILY AS NEEDED (Patient taking differently: Take 10 mEq by mouth Daily As Needed (Takes with furosemide).)   • spironolactone (ALDACTONE) 50 MG tablet Take 1 tablet by mouth daily.   • traMADol (ULTRAM) 50 MG tablet Take 1 tablet by mouth Daily As Needed (Arthritis pain). Patient usually takes 1/2 tab and takes very seldomly   • verapamil SR (CALAN-SR) 120 MG CR tablet TAKE ONE TABLET BY MOUTH EVERY MORNING   • verapamil SR (CALAN-SR) 240 MG CR tablet Take 240 mg by mouth Every Night.   • vitamin B-12 (CYANOCOBALAMIN) 1000 MCG tablet Take 1 tablet by mouth Daily. As directed       General appearance: chronically ill appearing elderly female, pleasant, NAD, alert and cooperative, well groomed  HEENT: Normocephalic, right orbital bruising, PERRL, tenderness  COR: RRR  Resp: Even and unlabored  Extremities: No obvious edema  Skin: warm, dry     Neurological:   MS: oriented x3, recent/remote memory intact, normal attention/concentration, language intact, no neglect, normal fund of knowledge  CN: visual acuity grossly abnormal, visual field loss R >L, PERRL, could look to the left and right with direction but hard time tracking my hand, facial sensation equal, no facial droop, hearing symmetric, palate elevates  symmetrically, shoulder shrug equal, tongue midline  Motor: 5/5 in upper, 4/5 BLE  Reflexes: 1+ in all 4 ext.  Sensory: light touch sensation intact in all 4 ext.  Coordination: Normal finger to nose test  Gait and station: mild-moderate ataxia   Rapid alternating movements: normal finger to thumb tap    Physical exam performed, changes noted.    Laboratory results:  Lab Results   Component Value Date    TSH 1.050 05/07/2020     Lab Results   Component Value Date    HGBA1C 6.40 (H) 05/07/2020     Lab Results   Component Value Date    ACPPIOIT68 >2,000 (H) 05/06/2020     Lab Results   Component Value Date    CHOL 181 05/07/2020    CHLPL 238 (H) 09/19/2019    CHLPL 252 (H) 02/14/2019    CHLPL 254 (H) 11/30/2018     Lab Results   Component Value Date    TRIG 124 05/07/2020    TRIG 132 09/19/2019    TRIG 109 02/14/2019     Lab Results   Component Value Date    HDL 36 (L) 05/07/2020    HDL 52 09/19/2019    HDL 58 02/14/2019     Lab Results   Component Value Date     (H) 05/07/2020     (H) 09/19/2019     (H) 02/14/2019     Lab Results   Component Value Date    WBC 7.25 05/07/2020    HGB 10.5 (L) 05/07/2020    HCT 31.2 (L) 05/07/2020    MCV 95.7 05/07/2020     05/07/2020     Lab Results   Component Value Date    GLUCOSE 85 05/07/2020    BUN 31 (H) 05/07/2020    CREATININE 0.92 05/07/2020    EGFRIFNONA 58 (L) 05/07/2020    BCR 33.7 (H) 05/07/2020    K 4.7 05/07/2020    CO2 20.7 (L) 05/07/2020    CALCIUM 9.2 05/07/2020    ALBUMIN 4.20 05/06/2020    LABIL2 1.5 12/30/2019    AST 15 05/06/2020    ALT 13 05/06/2020     Lab Results   Component Value Date    PTT 29.7 12/30/2019     Lab Results   Component Value Date    INR 1.06 05/06/2020    INR 1.0 12/30/2019    PROTIME 13.6 05/06/2020    PROTIME 11.1 12/30/2019     Brief Urine Lab Results     None          Review and interpretation of imaging:  Ct Angiogram Head    Result Date: 5/6/2020  1. Apparent arterial cutoff of the distal right P2 segment (the  side opposite the area of noted subacute infarct). Scattered calcifications in the bilateral cervical and intracranial carotid arteries, with estimated 50% narrowing at the origin of the right ICA, estimated 50% narrowing at the supraclinoid right ICA, estimated 50% narrowing at the origin of the right vertebral artery.   2. No acute intracranial hemorrhage or hydrocephalus. Redemonstration of right frontal lobe encephalomalacia, and left parieto-occipital subacute infarct, which shows gyriform cortical enhancement, compatible with postinfarct change. No abnormal enhancement elsewhere in brain.  For further evaluation, consider MRI.  Discussed by telephone with Dr. Cagle at 1750, Dr. Torres at 1751, 05/06/2020.   This report was finalized on 5/6/2020 5:58 PM by Dr. Chuck Reed M.D.      Xr Knee 3 View Right    Result Date: 5/5/2020   As described.  This report was finalized on 5/5/2020 2:51 PM by Dr. Chuck Reed M.D.      Ct Head Without Contrast    Result Date: 5/9/2020   Evolving post infarct change in right more than left occipital lobes, with tiny hyperdensities compatible with petechial hemorrhage in both occipital lobes, continued close follow-up advised.  Discussed by telephone with patient's nurse, Nina, at 0856, 05/10/2020.  This report was finalized on 5/9/2020 8:59 AM by Dr. Chuck Reed M.D.      Ct Head Without Contrast    Result Date: 5/8/2020   1. Evolving infarct identified within the right occipital lobe. No definite hemorrhagic transformation is seen.  Radiation dose reduction techniques were utilized, including automated exposure control and exposure modulation based on body size.  This report was finalized on 5/8/2020 5:17 AM by Dr. Lizeth Cortes M.D.      Ct Head Without Contrast    Result Date: 5/6/2020  1. Apparent arterial cutoff of the distal right P2 segment (the side opposite the area of noted subacute infarct). Scattered calcifications in the bilateral  cervical and intracranial carotid arteries, with estimated 50% narrowing at the origin of the right ICA, estimated 50% narrowing at the supraclinoid right ICA, estimated 50% narrowing at the origin of the right vertebral artery.   2. No acute intracranial hemorrhage or hydrocephalus. Redemonstration of right frontal lobe encephalomalacia, and left parieto-occipital subacute infarct, which shows gyriform cortical enhancement, compatible with postinfarct change. No abnormal enhancement elsewhere in brain.  For further evaluation, consider MRI.  Discussed by telephone with Dr. Cagle at 1750, Dr. Torres at 1751, 05/06/2020.   This report was finalized on 5/6/2020 5:58 PM by Dr. Chuck Reed M.D.      Ct Head Without Contrast    Result Date: 5/6/2020  1. Mild small vessel disease in the cerebral white matter. There is a 2.3 x 1.5 x 2 cm old superior right frontal infarct in distribution posterior superior frontal branch of the right middle cerebral artery territory. There is a moderate size subacute infarct in the medial left parieto-occipital region maximally measuring 6 x 3 x 2.4 cm in anterior posterior, craniocaudal, and medial lateral dimension in distribution of the medial parieto-occipital branch of the left posterior cerebral artery territory. 2. There is a right-sided periorbital hematoma from today's head and facial trauma. There is some stranding in the intraconal fat of the right orbit that is likely some strandy hemorrhage within the fat. There is a comminuted acute fracture of the inferior wall of the right orbit with 3 mm inferior displacement of an inferior orbital wall fracture fragment into the superior aspect of the right maxillary sinus along with some protrusion of extraconal fat. Fracture planes extend into the inferior orbital canal, could compromise the right infraorbital nerve. There is partial opacification of the right maxillary sinus with hemorrhage. The remainder of the head CT is  within normal limits.  CERVICAL SPINE CT TECHNIQUE: Spiral CT images were obtained from skull base down to T4 thoracic level and images were reformatted and submitted in 2 mm thick, axial and sagittal CT section with soft tissue algorithm, 1 mm thick axial, sagittal and coronal CT sections with high-resolution bone algorithm.  COMPARISON: There are no prior cervical spine imaging studies from [facility] for comparison.  FINDINGS: The atlantooccipital articulation is normal. There are prominent arthritic changes at the atlantodental interval with marginal spurring off the anterior ring of C1 and the odontoid, some subchondral cyst formation of the odontoid process and some thickening and calcification of the transverse ligament with mild-to-moderate canal narrowing the C1 ring level. Arthritic changes at the articulation of the lateral masses of C1 and C2.  At C2-C3, there is moderate bilateral facet overgrowth, some bony fusion across the facet joints. Posterior disc margin is normal. There is no canal or foraminal narrowing.  At C3-C4, there is mild-to-moderate right and there is severe left facet overgrowth, 2 mm anterolisthesis of C3 on C4, mild posterior endplate spurring. There is no canal narrowing. There is mild right and moderate left bony foraminal narrowing.  At C4-C5, there is mild-to-moderate left and moderate-to-severe right facet overgrowth, 2-3 mm anterolisthesis of C4 on C5, mild posterior endplate spurring and uncovertebral joint hypertrophy. There is mild canal, there is moderate-to-severe right foraminal narrowing.  At C5-C6, there is moderate bilateral facet overgrowth, 1-2 mm anterolisthesis of C5 on C6, mild posterior endplate spurring and uncovertebral joint hypertrophy. There is mild canal and foraminal narrowing.  At C6-C7, there is severe disc space narrowing and degenerative endplate changes, some bony bridging across the endplates, mild posterior spurring and uncovertebral  joint hypertrophy, mild bilateral facet overgrowth, and there is mild canal and moderate bilateral foraminal narrowing.  At C7-T1, there is some mild-to-moderate facet overgrowth, some bony fusion across the facet joints, 2 mm anterolisthesis of C7 on T1. No canal or foraminal narrowing.  No acute fracture seen in the cervical spine.  IMPRESSION: No acute fracture seen in the cervical spine. There is diffuse cervical spondylosis as described in great detail above. Results were communicated to Korina Combs, physician assistant in the ER taking care of the patient by telephone 05/05/2020 at 1:30 p.m.    Radiation dose reduction techniques were utilized, including automated exposure control and exposure modulation based on body size.  This report was finalized on 5/6/2020 7:40 AM by Dr. Дмитрий Maharaj M.D.      Ct Angiogram Neck    Result Date: 5/6/2020  1. Apparent arterial cutoff of the distal right P2 segment (the side opposite the area of noted subacute infarct). Scattered calcifications in the bilateral cervical and intracranial carotid arteries, with estimated 50% narrowing at the origin of the right ICA, estimated 50% narrowing at the supraclinoid right ICA, estimated 50% narrowing at the origin of the right vertebral artery.   2. No acute intracranial hemorrhage or hydrocephalus. Redemonstration of right frontal lobe encephalomalacia, and left parieto-occipital subacute infarct, which shows gyriform cortical enhancement, compatible with postinfarct change. No abnormal enhancement elsewhere in brain.  For further evaluation, consider MRI.  Discussed by telephone with Dr. Cagle at 1750, Dr. Torres at 1751, 05/06/2020.   This report was finalized on 5/6/2020 5:58 PM by Dr. Chuck Reed M.D.      Ct Cervical Spine Without Contrast    Result Date: 5/6/2020  1. Mild small vessel disease in the cerebral white matter. There is a 2.3 x 1.5 x 2 cm old superior right frontal infarct in distribution posterior  superior frontal branch of the right middle cerebral artery territory. There is a moderate size subacute infarct in the medial left parieto-occipital region maximally measuring 6 x 3 x 2.4 cm in anterior posterior, craniocaudal, and medial lateral dimension in distribution of the medial parieto-occipital branch of the left posterior cerebral artery territory. 2. There is a right-sided periorbital hematoma from today's head and facial trauma. There is some stranding in the intraconal fat of the right orbit that is likely some strandy hemorrhage within the fat. There is a comminuted acute fracture of the inferior wall of the right orbit with 3 mm inferior displacement of an inferior orbital wall fracture fragment into the superior aspect of the right maxillary sinus along with some protrusion of extraconal fat. Fracture planes extend into the inferior orbital canal, could compromise the right infraorbital nerve. There is partial opacification of the right maxillary sinus with hemorrhage. The remainder of the head CT is within normal limits.  CERVICAL SPINE CT TECHNIQUE: Spiral CT images were obtained from skull base down to T4 thoracic level and images were reformatted and submitted in 2 mm thick, axial and sagittal CT section with soft tissue algorithm, 1 mm thick axial, sagittal and coronal CT sections with high-resolution bone algorithm.  COMPARISON: There are no prior cervical spine imaging studies from [facility] for comparison.  FINDINGS: The atlantooccipital articulation is normal. There are prominent arthritic changes at the atlantodental interval with marginal spurring off the anterior ring of C1 and the odontoid, some subchondral cyst formation of the odontoid process and some thickening and calcification of the transverse ligament with mild-to-moderate canal narrowing the C1 ring level. Arthritic changes at the articulation of the lateral masses of C1 and C2.  At C2-C3, there is moderate  bilateral facet overgrowth, some bony fusion across the facet joints. Posterior disc margin is normal. There is no canal or foraminal narrowing.  At C3-C4, there is mild-to-moderate right and there is severe left facet overgrowth, 2 mm anterolisthesis of C3 on C4, mild posterior endplate spurring. There is no canal narrowing. There is mild right and moderate left bony foraminal narrowing.  At C4-C5, there is mild-to-moderate left and moderate-to-severe right facet overgrowth, 2-3 mm anterolisthesis of C4 on C5, mild posterior endplate spurring and uncovertebral joint hypertrophy. There is mild canal, there is moderate-to-severe right foraminal narrowing.  At C5-C6, there is moderate bilateral facet overgrowth, 1-2 mm anterolisthesis of C5 on C6, mild posterior endplate spurring and uncovertebral joint hypertrophy. There is mild canal and foraminal narrowing.  At C6-C7, there is severe disc space narrowing and degenerative endplate changes, some bony bridging across the endplates, mild posterior spurring and uncovertebral joint hypertrophy, mild bilateral facet overgrowth, and there is mild canal and moderate bilateral foraminal narrowing.  At C7-T1, there is some mild-to-moderate facet overgrowth, some bony fusion across the facet joints, 2 mm anterolisthesis of C7 on T1. No canal or foraminal narrowing.  No acute fracture seen in the cervical spine.  IMPRESSION: No acute fracture seen in the cervical spine. There is diffuse cervical spondylosis as described in great detail above. Results were communicated to Korina Combs, physician assistant in the ER taking care of the patient by telephone 05/05/2020 at 1:30 p.m.    Radiation dose reduction techniques were utilized, including automated exposure control and exposure modulation based on body size.  This report was finalized on 5/6/2020 7:40 AM by Dr. Дмитрий Maharaj M.D.      Mri Brain Without Contrast    Result Date: 5/7/2020   There are findings consistent with an  acute infarct within the right PCA distribution involving the medial aspects of the right temporal and occipital lobes as well as the inferior aspect of the right occipital lobe. Additionally, there is a small acute infarct within the left superior and middle frontal gyri involving the left MCA distribution. A subacute infarct within the left PCA distribution is seen involving the medial aspect of the left occipital and parietal lobes with subtle petechial hemorrhagic transformation.  A chronic infarct is seen within the lateral aspect of the right frontal lobe within the right MCA distribution and there is a chronic infarct noted within the basis pontis.  Mild changes of chronic small vessel ischemic phenomena are noted.  These findings were discussed with Dwayne Torres on 05/07/2020 at approximately 7:56 AM.  Prominent changes of inflammatory paranasal sinus disease are identified within the right maxillary sinus.  This report was finalized on 5/7/2020 12:05 PM by Dr. Nitin Mcnair M.D.      Xr Hip With Or Without Pelvis 2 - 3 View Right    Result Date: 5/5/2020   No acute fracture is identified. If there is clinical suspicion for radiographically occult fracture, or to further evaluate the soft tissues, MRI could be considered.    This report was finalized on 5/5/2020 2:54 PM by Dr. Chuck Reed M.D.      Results for orders placed during the hospital encounter of 05/06/20   Transthoracic Echo Complete With Contrast if Necessary Per Protocol    Narrative · Left ventricular systolic function is normal. Calculated EF = 60%.   Estimated EF was in agreement with the calculated EF. Estimated EF = 60%.   Normal left ventricular cavity size and wall thickness noted. All left   ventricular wall segments contract normally. Left ventricular diastolic   function is normal.  · Left atrial volume is mildly increased. Saline test results are   negative.  · The valve was poorly visualized but appears trileaflet. The aortic  valve   is abnormal in structure. There is moderate calcification of the aortic   valve.Mild to moderate aortic valve regurgitation is present. No aortic   valve stenosis is present.  · Mild MAC is present.  · The tricuspid valve is grossly normal. Trace tricuspid valve   regurgitation is present. Estimated right ventricular systolic pressure   from tricuspid regurgitation is normal (<35 mmHg). Calculated right   ventricular systolic pressure from tricuspid regurgitation is 33.0 mmHg.  · Limited 2D imaging of cardiac valve          Impression/Assessment:  This is a 85-year-old female with past medical history of stroke on Plavix 75 mg PTA, diabetes, hyperlipidemia, hypertension, ROMAN, chronic vertigo who presented to the hospital on 5/5/2020 with complaints of sudden onset of dizziness x10 days ago that lasted a few days with associated bilateral blurred vision, unsteady gait, and suffering a mechanical fall and hitting her head which caused her to have a headache that lasted for a day.  She had an initial CT head/Cspine on 5/5 that revealed a left parietal occipital subacute infarct, old right MCA infarct, right-sided periorbital hematoma and an acute right orbital fracture with right maxillary sinus hemorrhage, no acute fracture, diffuse cervical spondylosis.  She was discharged home per ER note, however she was asked to return on 5/6 by her PCP after he reviewed patient's CT and saw findings of a subacute to acute infarct.  Patient was also having persistent symptoms with dizziness and progressive blurred vision stating that she was seeing shapes and colors but could not define the object.      Upon arrival to the ED on 5/6 she obtained a CTA that again revealed the subacute left occipital stroke but also an unexpected right P2 occlusion.  She received a 500 cc NS bolus and aspirin 300 mg rectally. BP on arrival 147/47, HR 58. .  EKG revealed atrial fibrillation which was a new onset.  Temp 96.8.  She was  not a TPA candidate due to time criteria.     Diagnosis: Bilateral acute/subacute occipital and left frontal infarcts secondary to new onset atrial fibrillation with petechial bleed in bilateral occipital lobes with associated mild mass effect, recent fall with head trauma, right orbital fracture, hypertension, hyperlipidemia, diabetes     Work up to date:   5/5 CTH WO: left parietal occipital subacute infarct, old right MCA infarct, right-sided periorbital hematoma and an acute right orbital fracture with right maxillary sinus hemorrhage, mild small vessel disease.  5/5 CT Cspine WO: No acute fracture, diffuse cervical spondylosis.  5/6 CTA H/N: Arterial cutoff of the distal right P2 segment, scattered calcifications in the bilateral cervical and intracranial carotid arteries with approximately 50% narrowing of the origin of the right ICA, approximately 50% narrowing at the supraclinoid right ICA, estimated 50% narrowing at the origin of the right vertebral artery, redemonstration of the left parietal occipital subacute infarct which shows gyriform cortical enhancement compatible with post infarct change, and chronic right frontal lobe infarct.  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  5/7 2D echo: LA volume mildly increased, saline test negative, LV function normal, EF 60%, limited 2D echo of valves, no AV stenosis present, moderate calcification of AV.  5/8 CTH WO: Evolving right occipital lobe infarct with no convincing evidence of hemorrhagic transformation, some mass-effect upon the occipital horn of the right lateral ventricle, subacute infarct again seen within the left PCA territory.  5/8 EEG: No focal or epileptiform activity seen but the posterior rhythm was rather low in amplitude.  5/9 CTH WO:  Evolving post infarct change in right more than left occipital lobes with petechial hemorrhage in both occipital lobes with associated mild mass-effect., continued close follow-up advised.  Labs: A1c 6.40, , TSH 1.050, free T4 1.19, folate > 20.00, B12 > 2000, P2Y12 282, Mg 1.8    Plan:  Will check another CT in a.m. If stable and pt with no neurological changes will likely just plan for repeat CTH in 2 weeks to assess for evolution of petechial hemorrhage.   Again she is high risk for starting A/C given large size of strokes with hemorrhagic transformation and high risk of recurrent falls as she self reports she falls at least once a month, I think she should be considered for a watchman device.   ASA 325mg  Crestor 40mg,   EEG with no seizure activity, discontinue Keppra today.   Neurochecks per stroke protocol  Normalize BP slowly  Stroke Education  NATHAN/SCDs  PT/OT/ST  No driving until she is cleared by her ophthalmologist or occupational driving rehab.  Follow-up with me in 3 months for stroke follow-up.  Therapies as written. CCP for discharge planning. Call RRT for any acute neurological changes. We will continue to follow and advise.    Case discussed with patient and Dr. Torres, and he agrees with plan above.  ELEUTERIO Obando

## 2020-05-09 NOTE — PLAN OF CARE
Problem: Patient Care Overview  Goal: Plan of Care Review  Outcome: Ongoing (interventions implemented as appropriate)  Flowsheets (Taken 5/9/2020 1210)  Plan of Care Reviewed With: patient  Outcome Summary: Pt very agreeable and making improvements w/ gait of 120' req CGA, 4ww, and verbal guidance 2/2 to pt's vision deficits. PT will continue to progress as pt tolerates.     Patient was wearing a face mask during this therapy encounter. Therapist used appropriate personal protective equipment including mask and gloves.  Mask used was standard procedure mask. Appropriate PPE was worn during the entire therapy session. Hand hygiene was completed before and after therapy session. Patient is not in enhanced droplet precautions.

## 2020-05-09 NOTE — THERAPY TREATMENT NOTE
Patient Name: Gela Thompson  : 1934    MRN: [id number]                              Today's Date: 2020       Admit Date: 2020    Visit Dx:     ICD-10-CM ICD-9-CM   1. Cerebrovascular accident (CVA), unspecified mechanism (CMS/HCC) I63.9 434.91   2. Ataxia R27.0 781.3   3. Generalized weakness R53.1 780.79     Patient Active Problem List   Diagnosis   • Anemia   • Chronic low back pain   • Diabetes mellitus (CMS/HCC)   • Dyslipidemia   • Gastroesophageal reflux disease   • Hypertension   • Mitral valve insufficiency   • Osteoarthritis   • Adiposity   • Obstructive sleep apnea syndrome   • Cerebrovascular accident (CMS/HCC)   • Uncontrolled hypertension   • ROBERTO (acute kidney injury) (CMS/HCC)   • Hyperkalemia   • Anemia, macrocytic   • DM II (diabetes mellitus, type II), controlled (CMS/HCC)   • HTN (hypertension)   • Atrial fibrillation (CMS/HCC)     Past Medical History:   Diagnosis Date   • Abnormal echocardiogram    • Adiposity    • Anemia    • Cerebrovascular accident (CMS/HCC)    • Chest pain    • Chronic low back pain    • Diabetes mellitus (CMS/HCC)    • Dyslipidemia    • Dyspnea    • Gastroesophageal reflux disease    • H/O echocardiogram 2014   • Health care maintenance    • History of EKG 10/29/2015   • Hyperlipidemia    • Hypertension    • Mitral valve insufficiency    • Murmur    • ROMAN (obstructive sleep apnea)    • Osteoarthritis      Past Surgical History:   Procedure Laterality Date   • CHOLECYSTECTOMY     • HYSTERECTOMY     • KNEE SURGERY Bilateral     TKA   • NEUROPLASTY      deompression median nerve at carpal tunnel bilateral   • SHOULDER SURGERY Left      General Information     Row Name 20 1205          PT Evaluation Time/Intention    Document Type  therapy note (daily note)  -PH     Mode of Treatment  physical therapy  -     Row Name 20 1204          Safety Issues, Functional Mobility    Impairments Affecting Function (Mobility)  visual/perceptual  -PH      Comment, Safety Issues/Impairments (Mobility)  pt reporting vertigo  -PH       User Key  (r) = Recorded By, (t) = Taken By, (c) = Cosigned By    Initials Name Provider Type    PH Lisbet Gomez PTA Physical Therapy Assistant        Mobility     Row Name 05/09/20 1205          Bed Mobility Assessment/Treatment    Bed Mobility Assessment/Treatment  supine-sit  -PH     Supine-Sit [place] (Bed Mobility)  supervision;verbal cues  -PH     Assistive Device (Bed Mobility)  bed rails  -PH     Row Name 05/09/20 1205          Transfer Assessment/Treatment    Comment (Transfers)  Spoke to pt about getting L brake repaired on 4ww when she gets discharged w/ pt agreeing she was going to get it done.   -PH     Row Name 05/09/20 1205          Sit-Stand Transfer    Sit-Stand [place] (Transfers)  contact guard;verbal cues  -PH     Assistive Device (Sit-Stand Transfers)  walker, 4-wheeled  -PH     Row Name 05/09/20 1205          Gait/Stairs Assessment/Training    Gait/Stairs Assessment/Training  gait/ambulation independence  -PH     [place] Level (Gait)  contact guard  -PH     Assistive Device (Gait)  walker, 4-wheeled  -PH     Distance in Feet (Gait)  120'  -PH     Pattern (Gait)  step-through  -PH     Deviations/Abnormal Patterns (Gait)  royce decreased  -PH     Bilateral Gait Deviations  forward flexed posture;heel strike decreased  -PH     Comment (Gait/Stairs)  verbal guidance for direction and steering around obstacles 2/2 pt poor vision.  -PH       User Key  (r) = Recorded By, (t) = Taken By, (c) = Cosigned By    Initials Name Provider Type    PH Lisbet Gomez PTA Physical Therapy Assistant        Obj/Interventions     Row Name 05/09/20 1208          Therapeutic Exercise    Lower Extremity (Therapeutic Exercise)  LAQ (long arc quad), bilateral;marching while seated;gluteal sets;heel slides, bilateral;quad sets, bilateral;SLR (straight leg raise), bilateral  -PH     Lower Extremity Range of  Motion (Therapeutic Exercise)  ankle dorsiflexion/plantar flexion, bilateral;hip abduction/adduction, bilateral  -PH     Exercise Type (Therapeutic Exercise)  AROM (active range of motion)  -PH     Position (Therapeutic Exercise)  seated;other (see comments) reclined in chair  -PH     Sets/Reps (Therapeutic Exercise)  1/10  -PH     Row Name 05/09/20 1208          Static Sitting Balance    Level of [place] (Unsupported Sitting, Static Balance)  supervision  -PH     Sitting Position (Unsupported Sitting, Static Balance)  sitting on edge of bed  -PH     Time Able to Maintain Position (Unsupported Sitting, Static Balance)  2 to 3 minutes  -       User Key  (r) = Recorded By, (t) = Taken By, (c) = Cosigned By    Initials Name Provider Type     Lisbet Gomez PTA Physical Therapy Assistant        Goals/Plan    No documentation.       Clinical Impression     Row Name 05/09/20 1210          Pain Scale: Numbers Pre/Post-Treatment    Pain Scale: Numbers, Pretreatment  0/10 - no pain  -     Pain Scale: Numbers, Post-Treatment  0/10 - no pain  -PH     Row Name 05/09/20 1210          Plan of Care Review    Plan of Care Reviewed With  patient  -     Progress  improving  -PH     Outcome Summary  Pt very agreeable and making improvements w/ gait of 120' req CGA, 4ww, and verbal guidance 2/2 to pt's vision deficits. PT will continue to progress as pt tolerates.   -PH     Row Name 05/09/20 1210          Positioning and Restraints    Pre-Treatment Position  in bed  -PH     Post Treatment Position  chair  -PH     In Chair  reclined;call light within reach;encouraged to call for assist;exit alarm on  -PH       User Key  (r) = Recorded By, (t) = Taken By, (c) = Cosigned By    Initials Name Provider Type     Lisbet Gomez PTA Physical Therapy Assistant        Outcome Measures     Row Name 05/09/20 1212          How much help from another person do you currently need...    Turning from your back to your  side while in flat bed without using bedrails?  4  -PH     Moving from lying on back to sitting on the side of a flat bed without bedrails?  4  -PH     Moving to and from a bed to a chair (including a wheelchair)?  3  -PH     Standing up from a chair using your arms (e.g., wheelchair, bedside chair)?  3  -PH     Climbing 3-5 steps with a railing?  3  -PH     To walk in hospital room?  3  -PH     AM-PAC 6 Clicks Score (PT)  20  -PH     Row Name 05/09/20 1212          Functional Assessment    Outcome Measure Options  AM-PAC 6 Clicks Basic Mobility (PT)  -PH       User Key  (r) = Recorded By, (t) = Taken By, (c) = Cosigned By    Initials Name Provider Type    Lisbet Murrieta PTA Physical Therapy Assistant        Physical Therapy Education                 Title: PT OT SLP Therapies (In Progress)     Topic: Physical Therapy (Done)     Point: Mobility training (Done)     Description:   Instruct learner(s) on safety and technique for assisting patient out of bed, chair or wheelchair.  Instruct in the proper use of assistive devices, such as walker, crutches, cane or brace.              Patient Friendly Description:   It's important to get you on your feet again, but we need to do so in a way that is safe for you. Falling has serious consequences, and your personal safety is the most important thing of all.        When it's time to get out of bed, one of us or a family member will sit next to you on the bed to give you support.     If your doctor or nurse tells you to use a walker, crutches, a cane, or a brace, be sure you use it every time you get out of bed, even if you think you don't need it.    Learning Progress Summary           Patient Acceptance, E,D, VU,DU by  at 5/9/2020 1212    Acceptance, E, NR by  at 5/8/2020 0959    Acceptance, E, NR by  at 5/7/2020 1031                   Point: Home exercise program (Done)     Description:   Instruct learner(s) on appropriate technique for monitoring,  assisting and/or progressing patient with therapeutic exercises and activities.              Learning Progress Summary           Patient Acceptance, E,D, VU,DU by  at 5/9/2020 1212    Acceptance, E, NR by  at 5/8/2020 0959    Acceptance, E, NR by  at 5/7/2020 1031                   Point: Body mechanics (Done)     Description:   Instruct learner(s) on proper positioning and spine alignment for patient and/or caregiver during mobility tasks and/or exercises.              Learning Progress Summary           Patient Acceptance, E,D, VU,DU by  at 5/9/2020 1212                   Point: Precautions (Done)     Description:   Instruct learner(s) on prescribed precautions during mobility and gait tasks              Learning Progress Summary           Patient Acceptance, E,D, VU,DU by  at 5/9/2020 1212                               User Key     Initials Effective Dates Name Provider Type [facility] 04/03/18 -  Renetta Calvo, PT Physical Therapist PT     08/20/19 -  Lisbet Gomez PTA Physical Therapy Assistant PT              PT Recommendation and Plan     Outcome Summary/Treatment Plan (PT)  Anticipated Discharge Disposition (PT): home with home health  Plan of Care Reviewed With: patient  Progress: improving  Outcome Summary: Pt very agreeable and making improvements w/ gait of 120' req CGA, 4ww, and verbal guidance 2/2 to pt's vision deficits. PT will continue to progress as pt tolerates.      Time Calculation:   PT Charges     Row Name 05/09/20 1213             Time Calculation    Start Time  1042  -PH      Stop Time  1111  -      Time Calculation (min)  29 min  -      PT Received On  05/09/20  -      PT - Next Appointment  05/11/20  -        User Key  (r) = Recorded By, (t) = Taken By, (c) = Cosigned By    Initials Name Provider Type     Lisbet Gomez PTA Physical Therapy Assistant        Therapy Charges for Today     Code Description Service Date Service Provider Modifiers  Qty    [id number]  PT THER PROC EA 15 MIN 5/9/2020 Lisbet Gomez, PTA GP 2          PT G-Codes  Outcome Measure Options: AM-PAC 6 Clicks Basic Mobility (PT)  AM-PAC 6 Clicks Score (PT): 20  AM-PAC 6 Clicks Score (OT): 12  Modified [place] Scale: 4 - Moderately severe disability.  Unable to walk without assistance, and unable to attend to own bodily needs without assistance.    Lisbet Gomez, TIGIST  5/9/2020

## 2020-05-09 NOTE — PROGRESS NOTES
"Patient Name: Gela Thompson  :1934  85 y.o.      Patient Care Team:  Karla Mora MD as PCP - General    Interval History:   Still not on anticoagulation.    Subjective:  Following for stroke and likely atrial fibrillation    Objective   Vital Signs  Temp:  [97.5 °F (36.4 °C)-98.4 °F (36.9 °C)] 97.5 °F (36.4 °C)  Heart Rate:  [60-70] 60  Resp:  [18] 18  BP: (135-160)/(56-64) 160/56    Intake/Output Summary (Last 24 hours) at 2020 1201  Last data filed at 2020 0825  Gross per 24 hour   Intake 1360 ml   Output --   Net 1360 ml     Flowsheet Rows      First Filed Value   Admission Height  160 cm (63\") Documented at 2020 1252   Admission Weight  80.3 kg (177 lb) Documented at 2020 1321          Physical Exam:   General Appearance:    Alert, cooperative, in no acute distress   Lungs:     Clear to auscultation.  Normal respiratory effort and rate.      Heart:    Regular rhythm and normal rate, normal S1 and S2, no murmurs, gallops or rubs.     Chest Wall:    No abnormalities observed   Abdomen:     Soft, nontender, positive bowel sounds.     Extremities:   no cyanosis, clubbing or edema.  No marked joint deformities.  Adequate musculoskeletal strength.       Results Review:    Results from last 7 days   Lab Units 20  0323   SODIUM mmol/L 139   POTASSIUM mmol/L 4.7   CHLORIDE mmol/L 106   CO2 mmol/L 20.7*   BUN mg/dL 31*   CREATININE mg/dL 0.92   GLUCOSE mg/dL 85   CALCIUM mg/dL 9.2         Results from last 7 days   Lab Units 20  0323   WBC 10*3/mm3 7.25   HEMOGLOBIN g/dL 10.5*   HEMATOCRIT % 31.2*   PLATELETS 10*3/mm3 283     Results from last 7 days   Lab Units 20  1328   INR  1.06     Results from last 7 days   Lab Units 20  0323   CHOLESTEROL mg/dL 181     Results from last 7 days   Lab Units 20  1328   MAGNESIUM mg/dL 1.8     Results from last 7 days   Lab Units 20  0323   CHOLESTEROL mg/dL 181   TRIGLYCERIDES mg/dL 124   HDL CHOL mg/dL 36*   LDL " CHOL mg/dL 120*         Medication Review:     aspirin 325 mg Oral Daily   hydrALAZINE 25 mg Oral TID   levETIRAcetam 250 mg Oral Q12H   rosuvastatin 40 mg Oral Nightly   sodium chloride 500 mL Intravenous Once   sodium chloride 10 mL Intravenous Q12H   sodium chloride 10 mL Intravenous Q12H   verapamil  mg Oral Daily   verapamil  mg Oral Nightly   vitamin B-12 1,000 mcg Oral Daily          sodium chloride 125 mL/hr Last Rate: 125 mL/hr (05/07/20 0150)   sodium chloride 125 mL/hr Last Rate: 125 mL/hr (05/08/20 2033)       Assessment/Plan     1.    Paroxysmal A. fib.  There is been a question of this in the past and I have reviewed some monitor strips with Dr. Gibson but we did not think it was actually atrial fibrillation.  However. Now she probably needs to be on anticoagulation going forward.  Her renal function looks pretty good so probably Eliquis 5 mg twice a day would be her best option when OK with nephrology.  I looked at her telemetry while I was in there talking to her and she is having some sinus beats but there is also some irregularity to it so I think that Eliquis going forward is going to be good for her.  2.  Hypertension.    3.  Obstructive sleep apnea compliant with CPAP  4.  History of stroke in 2011 with a TIA in 2016  5.  Diabetes.  6.  Chest pain.  Echocardiogram showed normal LV function with an ejection fraction of 60%.  Not currently having an issue with chest pain.      Okay with discharge home or to rehab.  Follow-up with me in about 6 weeks.    Monserrat Marsh MD, [facility]  05/09/20  12:01

## 2020-05-10 ENCOUNTER — APPOINTMENT (OUTPATIENT)
Dept: CT IMAGING | Facility: HOSPITAL | Age: 85
End: 2020-05-10

## 2020-05-10 LAB
GLUCOSE BLDC GLUCOMTR-MCNC: 101 MG/DL (ref 70–130)
GLUCOSE BLDC GLUCOMTR-MCNC: 156 MG/DL (ref 70–130)
GLUCOSE BLDC GLUCOMTR-MCNC: 172 MG/DL (ref 70–130)
GLUCOSE BLDC GLUCOMTR-MCNC: 95 MG/DL (ref 70–130)

## 2020-05-10 PROCEDURE — 82962 GLUCOSE BLOOD TEST: CPT

## 2020-05-10 PROCEDURE — 99232 SBSQ HOSP IP/OBS MODERATE 35: CPT | Performed by: NURSE PRACTITIONER

## 2020-05-10 PROCEDURE — 70450 CT HEAD/BRAIN W/O DYE: CPT

## 2020-05-10 RX ORDER — HYDRALAZINE HYDROCHLORIDE 50 MG/1
50 TABLET, FILM COATED ORAL 3 TIMES DAILY
Status: DISCONTINUED | OUTPATIENT
Start: 2020-05-10 | End: 2020-05-12 | Stop reason: HOSPADM

## 2020-05-10 RX ADMIN — VERAPAMIL HYDROCHLORIDE 240 MG: 120 TABLET, FILM COATED, EXTENDED RELEASE ORAL at 21:08

## 2020-05-10 RX ADMIN — SODIUM CHLORIDE 125 ML/HR: 9 INJECTION, SOLUTION INTRAVENOUS at 03:40

## 2020-05-10 RX ADMIN — HYDRALAZINE HYDROCHLORIDE 50 MG: 50 TABLET, FILM COATED ORAL at 15:17

## 2020-05-10 RX ADMIN — VERAPAMIL HYDROCHLORIDE 120 MG: 120 TABLET, FILM COATED, EXTENDED RELEASE ORAL at 08:12

## 2020-05-10 RX ADMIN — HYDRALAZINE HYDROCHLORIDE 50 MG: 50 TABLET, FILM COATED ORAL at 21:00

## 2020-05-10 RX ADMIN — HYDRALAZINE HYDROCHLORIDE 25 MG: 25 TABLET, FILM COATED ORAL at 08:12

## 2020-05-10 RX ADMIN — SODIUM CHLORIDE, PRESERVATIVE FREE 10 ML: 5 INJECTION INTRAVENOUS at 21:08

## 2020-05-10 RX ADMIN — SODIUM CHLORIDE 125 ML/HR: 9 INJECTION, SOLUTION INTRAVENOUS at 14:00

## 2020-05-10 RX ADMIN — Medication 1000 MCG: at 08:12

## 2020-05-10 RX ADMIN — SODIUM CHLORIDE, PRESERVATIVE FREE 10 ML: 5 INJECTION INTRAVENOUS at 08:12

## 2020-05-10 RX ADMIN — ASPIRIN 325 MG: 325 TABLET ORAL at 08:12

## 2020-05-10 RX ADMIN — SODIUM CHLORIDE 125 ML/HR: 9 INJECTION, SOLUTION INTRAVENOUS at 21:09

## 2020-05-10 NOTE — PLAN OF CARE
Vitals stable. No c/o pain. Still unable to see much and still seeing flashes in her left eye- neuro aware. Dr Beaver aware of blood pressure- hydralazine increased from 25 to 50mg. Up with assistance to the bedside commode.    Problem: Fall Risk (Adult)  Goal: Identify Related Risk Factors and Signs and Symptoms  Outcome: Ongoing (interventions implemented as appropriate)  Goal: Absence of Fall  Outcome: Ongoing (interventions implemented as appropriate)  Goal: Identify Related Risk Factors and Signs and Symptoms  Outcome: Ongoing (interventions implemented as appropriate)  Goal: Absence of Fall  Outcome: Ongoing (interventions implemented as appropriate)     Problem: Patient Care Overview  Goal: Plan of Care Review  Outcome: Ongoing (interventions implemented as appropriate)  Goal: Individualization and Mutuality  Outcome: Ongoing (interventions implemented as appropriate)  Goal: Discharge Needs Assessment  Outcome: Ongoing (interventions implemented as appropriate)  Goal: Interprofessional Rounds/Family Conf  Outcome: Ongoing (interventions implemented as appropriate)     Problem: Stroke (Ischemic) (Adult)  Goal: Signs and Symptoms of Listed Potential Problems Will be Absent, Minimized or Managed (Stroke)  Outcome: Ongoing (interventions implemented as appropriate)

## 2020-05-10 NOTE — PROGRESS NOTES
DOS: 5/10/2020  NAME: Gela Thompson   : 1934  PCP: Karla Mora MD    Chief Complaint   Patient presents with   • Stroke        Stroke    Subjective: No acute events overnight.  She is doing okay still seeing episodes of flashing lights in the left eye especially if she looks to the right and distorted images.  Denies any new weakness, numbness, speech or visual disturbances, or headaches.    Objective:  Vital signs:      Vitals:    20 2324 05/10/20 0601 05/10/20 0700 05/10/20 0711   BP: 175/74   176/68   BP Location: Right arm   Right arm   Patient Position: Lying   Lying   Pulse: 76  63    Resp: 18   16   Temp: 98.3 °F (36.8 °C)   98.1 °F (36.7 °C)   TempSrc: Oral   Oral   SpO2: 98%      Weight:  83.3 kg (183 lb 9.6 oz)     Height:           Current Facility-Administered Medications:   •  acetaminophen (TYLENOL) tablet 650 mg, 650 mg, Oral, Q4H PRN **OR** acetaminophen (TYLENOL) 160 MG/5ML solution 650 mg, 650 mg, Oral, Q4H PRN **OR** acetaminophen (TYLENOL) suppository 650 mg, 650 mg, Rectal, Q4H PRN, Omar Conde MD  •  aspirin tablet 325 mg, 325 mg, Oral, Daily, Cortes, Chani, APRN, 325 mg at 05/10/20 0812  •  hydrALAZINE (APRESOLINE) tablet 25 mg, 25 mg, Oral, TID, Omar Conde MD, 25 mg at 05/10/20 0812  •  rosuvastatin (CRESTOR) tablet 40 mg, 40 mg, Oral, Nightly, Cortes, Chani, APRN, 40 mg at 20  •  sodium chloride 0.9 % bolus 500 mL, 500 mL, Intravenous, Once, mOar Conde MD  •  sodium chloride 0.9 % flush 10 mL, 10 mL, Intravenous, PRN, Omar Conde MD  •  [COMPLETED] Insert peripheral IV, , , Once **AND** sodium chloride 0.9 % flush 10 mL, 10 mL, Intravenous, PRN, Omar Conde MD  •  sodium chloride 0.9 % flush 10 mL, 10 mL, Intravenous, Q12H, Omar Conde MD, 10 mL at 20  •  sodium chloride 0.9 % flush 10 mL, 10 mL, Intravenous, PRN, Omar Conde MD  •  sodium chloride 0.9 % flush 10 mL, 10 mL,  Intravenous, Q12H, Chani Cortes APRN, 10 mL at 05/10/20 0812  •  sodium chloride 0.9 % flush 10 mL, 10 mL, Intravenous, PRN, Chani Cortes APRN  •  sodium chloride 0.9 % infusion, 125 mL/hr, Intravenous, Continuous, Omar Conde MD, Last Rate: 125 mL/hr at 05/07/20 0150, 125 mL/hr at 05/07/20 0150  •  sodium chloride 0.9 % infusion, 125 mL/hr, Intravenous, Continuous, Omar Conde MD, Last Rate: 125 mL/hr at 05/10/20 0340, 125 mL/hr at 05/10/20 0340  •  verapamil SR (CALAN-SR) CR tablet 120 mg, 120 mg, Oral, Daily, Omar Conde MD, 120 mg at 05/10/20 0812  •  verapamil SR (CALAN-SR) CR tablet 240 mg, 240 mg, Oral, Nightly, Omar Conde MD, 240 mg at 05/09/20 2003  •  vitamin B-12 (CYANOCOBALAMIN) tablet 1,000 mcg, 1,000 mcg, Oral, Daily, Omar Conde MD, 1,000 mcg at 05/10/20 0812    PRN meds  •  acetaminophen **OR** acetaminophen **OR** acetaminophen  •  sodium chloride  •  [COMPLETED] Insert peripheral IV **AND** sodium chloride  •  sodium chloride  •  sodium chloride    No current facility-administered medications on file prior to encounter.      Current Outpatient Medications on File Prior to Encounter   Medication Sig   • EPINEPHrine (EPIPEN) 0.3 MG/0.3ML solution auto-injector injection 1 (One) Time As Needed.   • fexofenadine (ALLEGRA) 60 MG tablet Take 60 mg by mouth Daily.   • Magnesium Oxide 500 MG capsule Take 1,000 mg by mouth Daily.   • metFORMIN (GLUCOPHAGE) 500 MG tablet Take 500 mg by mouth 2 (Two) Times a Day With Meals.   • montelukast (SINGULAIR) 10 MG tablet Take 10 mg by mouth Every Night.   • niacin 100 MG tablet Take 100 mg by mouth Daily With Breakfast.   • Calcium Carb-Cholecalciferol (CALCIUM 1000 + D PO) Take 1 tablet by mouth Daily.   • celecoxib (CELEBREX) 100 MG capsule Take 100 mg by mouth Every Night.   • Clopidogrel Bisulfate (PLAVIX PO) Take 75 mg by mouth Daily.   • furosemide (LASIX) 20 MG tablet TAKE ONE TABLET BY MOUTH DAILY AS NEEDED  (Patient taking differently: Take 20 mg by mouth Daily As Needed (Leg swelling or SOB). Hasn't taken in several months)   • hydrALAZINE (APRESOLINE) 50 MG tablet Take 1 tablet by mouth 3 (Three) times a day.   • L-Lysine 500 MG tablet Take 1 tablet by mouth Daily.   • lisinopril (PRINIVIL,ZESTRIL) 40 MG tablet Take 1 tablet by mouth daily.   • Multiple Vitamin (MULTIVITAMINS PO) Take 1 tablet by mouth Daily.   • potassium chloride (K-DUR,KLOR-CON) 10 MEQ CR tablet TAKE ONE TABLET BY MOUTH DAILY AS NEEDED (Patient taking differently: Take 10 mEq by mouth Daily As Needed (Takes with furosemide).)   • spironolactone (ALDACTONE) 50 MG tablet Take 1 tablet by mouth daily.   • traMADol (ULTRAM) 50 MG tablet Take 1 tablet by mouth Daily As Needed (Arthritis pain). Patient usually takes 1/2 tab and takes very seldomly   • verapamil SR (CALAN-SR) 120 MG CR tablet TAKE ONE TABLET BY MOUTH EVERY MORNING   • verapamil SR (CALAN-SR) 240 MG CR tablet Take 240 mg by mouth Every Night.   • vitamin B-12 (CYANOCOBALAMIN) 1000 MCG tablet Take 1 tablet by mouth Daily. As directed       General appearance: chronically ill appearing elderly female, pleasant, NAD, alert and cooperative, well groomed  HEENT: Normocephalic, right orbital bruising, PERRL, tenderness  COR: RRR  Resp: Even and unlabored  Extremities: No obvious edema  Skin: warm, dry     Neurological:   MS: oriented x3, recent/remote memory intact, normal attention/concentration, language intact, no neglect, normal fund of knowledge  CN: visual acuity grossly abnormal, visual field loss R >L, PERRL, could look to the left and right with direction but hard time tracking my hand, facial sensation equal, no facial droop, hearing symmetric, palate elevates symmetrically, shoulder shrug equal, tongue midline  Motor: 5/5 in upper, 4/5 BLE  Reflexes: 1+ in all 4 ext.  Sensory: light touch sensation intact in all 4 ext.  Coordination: Normal finger to nose test  Gait and  station: mild-moderate ataxia   Rapid alternating movements: normal finger to thumb tap    Physical exam performed, no changes noted.    Laboratory results:  Lab Results   Component Value Date    TSH 1.050 05/07/2020     Lab Results   Component Value Date    HGBA1C 6.40 (H) 05/07/2020     Lab Results   Component Value Date    TJRPVXZA09 >2,000 (H) 05/06/2020     Lab Results   Component Value Date    CHOL 181 05/07/2020    CHLPL 238 (H) 09/19/2019    CHLPL 252 (H) 02/14/2019    CHLPL 254 (H) 11/30/2018     Lab Results   Component Value Date    TRIG 124 05/07/2020    TRIG 132 09/19/2019    TRIG 109 02/14/2019     Lab Results   Component Value Date    HDL 36 (L) 05/07/2020    HDL 52 09/19/2019    HDL 58 02/14/2019     Lab Results   Component Value Date     (H) 05/07/2020     (H) 09/19/2019     (H) 02/14/2019     Lab Results   Component Value Date    WBC 7.25 05/07/2020    HGB 10.5 (L) 05/07/2020    HCT 31.2 (L) 05/07/2020    MCV 95.7 05/07/2020     05/07/2020     Lab Results   Component Value Date    GLUCOSE 85 05/07/2020    BUN 31 (H) 05/07/2020    CREATININE 0.92 05/07/2020    EGFRIFNONA 58 (L) 05/07/2020    BCR 33.7 (H) 05/07/2020    K 4.7 05/07/2020    CO2 20.7 (L) 05/07/2020    CALCIUM 9.2 05/07/2020    ALBUMIN 4.20 05/06/2020    LABIL2 1.5 12/30/2019    AST 15 05/06/2020    ALT 13 05/06/2020     Lab Results   Component Value Date    PTT 29.7 12/30/2019     Lab Results   Component Value Date    INR 1.06 05/06/2020    INR 1.0 12/30/2019    PROTIME 13.6 05/06/2020    PROTIME 11.1 12/30/2019     Brief Urine Lab Results     None          Review and interpretation of imaging:  Ct Angiogram Head    Result Date: 5/6/2020  1. Apparent arterial cutoff of the distal right P2 segment (the side opposite the area of noted subacute infarct). Scattered calcifications in the bilateral cervical and intracranial carotid arteries, with estimated 50% narrowing at the origin of the right ICA, estimated 50%  narrowing at the supraclinoid right ICA, estimated 50% narrowing at the origin of the right vertebral artery.   2. No acute intracranial hemorrhage or hydrocephalus. Redemonstration of right frontal lobe encephalomalacia, and left parieto-occipital subacute infarct, which shows gyriform cortical enhancement, compatible with postinfarct change. No abnormal enhancement elsewhere in brain.  For further evaluation, consider MRI.  Discussed by telephone with Dr. Cagle at 1750, Dr. Torres at 1751, 05/06/2020.   This report was finalized on 5/6/2020 5:58 PM by Dr. Chuck Reed M.D.      Xr Knee 3 View Right    Result Date: 5/5/2020   As described.  This report was finalized on 5/5/2020 2:51 PM by Dr. Chuck Reed M.D.      Ct Head Without Contrast    Result Date: 5/10/2020   IMPRESSION:  No significant change.  This report was finalized on 5/10/2020 6:08 AM by Dr. Chuck Reed M.D.      Ct Head Without Contrast    Result Date: 5/9/2020   Evolving post infarct change in right more than left occipital lobes, with tiny hyperdensities compatible with petechial hemorrhage in both occipital lobes, continued close follow-up advised.  Discussed by telephone with patient's nurse, Nina, at 0856, 05/10/2020.  This report was finalized on 5/9/2020 8:59 AM by Dr. Chuck Reed M.D.      Ct Head Without Contrast    Result Date: 5/8/2020   1. Evolving infarct identified within the right occipital lobe. No definite hemorrhagic transformation is seen.  Radiation dose reduction techniques were utilized, including automated exposure control and exposure modulation based on body size.  This report was finalized on 5/8/2020 5:17 AM by Dr. Lizeth Cortes M.D.      Ct Head Without Contrast    Result Date: 5/6/2020  1. Apparent arterial cutoff of the distal right P2 segment (the side opposite the area of noted subacute infarct). Scattered calcifications in the bilateral cervical and intracranial carotid arteries,  with estimated 50% narrowing at the origin of the right ICA, estimated 50% narrowing at the supraclinoid right ICA, estimated 50% narrowing at the origin of the right vertebral artery.   2. No acute intracranial hemorrhage or hydrocephalus. Redemonstration of right frontal lobe encephalomalacia, and left parieto-occipital subacute infarct, which shows gyriform cortical enhancement, compatible with postinfarct change. No abnormal enhancement elsewhere in brain.  For further evaluation, consider MRI.  Discussed by telephone with Dr. Cagle at 1750, Dr. Torres at 1751, 05/06/2020.   This report was finalized on 5/6/2020 5:58 PM by Dr. Chuck Reed M.D.      Ct Head Without Contrast    Result Date: 5/6/2020  1. Mild small vessel disease in the cerebral white matter. There is a 2.3 x 1.5 x 2 cm old superior right frontal infarct in distribution posterior superior frontal branch of the right middle cerebral artery territory. There is a moderate size subacute infarct in the medial left parieto-occipital region maximally measuring 6 x 3 x 2.4 cm in anterior posterior, craniocaudal, and medial lateral dimension in distribution of the medial parieto-occipital branch of the left posterior cerebral artery territory. 2. There is a right-sided periorbital hematoma from today's head and facial trauma. There is some stranding in the intraconal fat of the right orbit that is likely some strandy hemorrhage within the fat. There is a comminuted acute fracture of the inferior wall of the right orbit with 3 mm inferior displacement of an inferior orbital wall fracture fragment into the superior aspect of the right maxillary sinus along with some protrusion of extraconal fat. Fracture planes extend into the inferior orbital canal, could compromise the right infraorbital nerve. There is partial opacification of the right maxillary sinus with hemorrhage. The remainder of the head CT is within normal limits.  CERVICAL SPINE CT  TECHNIQUE: Spiral CT images were obtained from skull base down to T4 thoracic level and images were reformatted and submitted in 2 mm thick, axial and sagittal CT section with soft tissue algorithm, 1 mm thick axial, sagittal and coronal CT sections with high-resolution bone algorithm.  COMPARISON: There are no prior cervical spine imaging studies from [facility] for comparison.  FINDINGS: The atlantooccipital articulation is normal. There are prominent arthritic changes at the atlantodental interval with marginal spurring off the anterior ring of C1 and the odontoid, some subchondral cyst formation of the odontoid process and some thickening and calcification of the transverse ligament with mild-to-moderate canal narrowing the C1 ring level. Arthritic changes at the articulation of the lateral masses of C1 and C2.  At C2-C3, there is moderate bilateral facet overgrowth, some bony fusion across the facet joints. Posterior disc margin is normal. There is no canal or foraminal narrowing.  At C3-C4, there is mild-to-moderate right and there is severe left facet overgrowth, 2 mm anterolisthesis of C3 on C4, mild posterior endplate spurring. There is no canal narrowing. There is mild right and moderate left bony foraminal narrowing.  At C4-C5, there is mild-to-moderate left and moderate-to-severe right facet overgrowth, 2-3 mm anterolisthesis of C4 on C5, mild posterior endplate spurring and uncovertebral joint hypertrophy. There is mild canal, there is moderate-to-severe right foraminal narrowing.  At C5-C6, there is moderate bilateral facet overgrowth, 1-2 mm anterolisthesis of C5 on C6, mild posterior endplate spurring and uncovertebral joint hypertrophy. There is mild canal and foraminal narrowing.  At C6-C7, there is severe disc space narrowing and degenerative endplate changes, some bony bridging across the endplates, mild posterior spurring and uncovertebral joint hypertrophy, mild bilateral facet  overgrowth, and there is mild canal and moderate bilateral foraminal narrowing.  At C7-T1, there is some mild-to-moderate facet overgrowth, some bony fusion across the facet joints, 2 mm anterolisthesis of C7 on T1. No canal or foraminal narrowing.  No acute fracture seen in the cervical spine.  IMPRESSION: No acute fracture seen in the cervical spine. There is diffuse cervical spondylosis as described in great detail above. Results were communicated to Korina Combs, physician assistant in the ER taking care of the patient by telephone 05/05/2020 at 1:30 p.m.    Radiation dose reduction techniques were utilized, including automated exposure control and exposure modulation based on body size.  This report was finalized on 5/6/2020 7:40 AM by Dr. Дмитрий Maharaj M.D.      Ct Angiogram Neck    Result Date: 5/6/2020  1. Apparent arterial cutoff of the distal right P2 segment (the side opposite the area of noted subacute infarct). Scattered calcifications in the bilateral cervical and intracranial carotid arteries, with estimated 50% narrowing at the origin of the right ICA, estimated 50% narrowing at the supraclinoid right ICA, estimated 50% narrowing at the origin of the right vertebral artery.   2. No acute intracranial hemorrhage or hydrocephalus. Redemonstration of right frontal lobe encephalomalacia, and left parieto-occipital subacute infarct, which shows gyriform cortical enhancement, compatible with postinfarct change. No abnormal enhancement elsewhere in brain.  For further evaluation, consider MRI.  Discussed by telephone with Dr. Cagle at 1750, Dr. Torres at 1751, 05/06/2020.   This report was finalized on 5/6/2020 5:58 PM by Dr. Chuck Reed M.D.      Ct Cervical Spine Without Contrast    Result Date: 5/6/2020  1. Mild small vessel disease in the cerebral white matter. There is a 2.3 x 1.5 x 2 cm old superior right frontal infarct in distribution posterior superior frontal branch of the right  middle cerebral artery territory. There is a moderate size subacute infarct in the medial left parieto-occipital region maximally measuring 6 x 3 x 2.4 cm in anterior posterior, craniocaudal, and medial lateral dimension in distribution of the medial parieto-occipital branch of the left posterior cerebral artery territory. 2. There is a right-sided periorbital hematoma from today's head and facial trauma. There is some stranding in the intraconal fat of the right orbit that is likely some strandy hemorrhage within the fat. There is a comminuted acute fracture of the inferior wall of the right orbit with 3 mm inferior displacement of an inferior orbital wall fracture fragment into the superior aspect of the right maxillary sinus along with some protrusion of extraconal fat. Fracture planes extend into the inferior orbital canal, could compromise the right infraorbital nerve. There is partial opacification of the right maxillary sinus with hemorrhage. The remainder of the head CT is within normal limits.  CERVICAL SPINE CT TECHNIQUE: Spiral CT images were obtained from skull base down to T4 thoracic level and images were reformatted and submitted in 2 mm thick, axial and sagittal CT section with soft tissue algorithm, 1 mm thick axial, sagittal and coronal CT sections with high-resolution bone algorithm.  COMPARISON: There are no prior cervical spine imaging studies from [facility] for comparison.  FINDINGS: The atlantooccipital articulation is normal. There are prominent arthritic changes at the atlantodental interval with marginal spurring off the anterior ring of C1 and the odontoid, some subchondral cyst formation of the odontoid process and some thickening and calcification of the transverse ligament with mild-to-moderate canal narrowing the C1 ring level. Arthritic changes at the articulation of the lateral masses of C1 and C2.  At C2-C3, there is moderate bilateral facet overgrowth, some bony  fusion across the facet joints. Posterior disc margin is normal. There is no canal or foraminal narrowing.  At C3-C4, there is mild-to-moderate right and there is severe left facet overgrowth, 2 mm anterolisthesis of C3 on C4, mild posterior endplate spurring. There is no canal narrowing. There is mild right and moderate left bony foraminal narrowing.  At C4-C5, there is mild-to-moderate left and moderate-to-severe right facet overgrowth, 2-3 mm anterolisthesis of C4 on C5, mild posterior endplate spurring and uncovertebral joint hypertrophy. There is mild canal, there is moderate-to-severe right foraminal narrowing.  At C5-C6, there is moderate bilateral facet overgrowth, 1-2 mm anterolisthesis of C5 on C6, mild posterior endplate spurring and uncovertebral joint hypertrophy. There is mild canal and foraminal narrowing.  At C6-C7, there is severe disc space narrowing and degenerative endplate changes, some bony bridging across the endplates, mild posterior spurring and uncovertebral joint hypertrophy, mild bilateral facet overgrowth, and there is mild canal and moderate bilateral foraminal narrowing.  At C7-T1, there is some mild-to-moderate facet overgrowth, some bony fusion across the facet joints, 2 mm anterolisthesis of C7 on T1. No canal or foraminal narrowing.  No acute fracture seen in the cervical spine.  IMPRESSION: No acute fracture seen in the cervical spine. There is diffuse cervical spondylosis as described in great detail above. Results were communicated to Korina Combs, physician assistant in the ER taking care of the patient by telephone 05/05/2020 at 1:30 p.m.    Radiation dose reduction techniques were utilized, including automated exposure control and exposure modulation based on body size.  This report was finalized on 5/6/2020 7:40 AM by Dr. Дмитрий Maharaj M.D.      Mri Brain Without Contrast    Result Date: 5/7/2020   There are findings consistent with an acute infarct within the right PCA  distribution involving the medial aspects of the right temporal and occipital lobes as well as the inferior aspect of the right occipital lobe. Additionally, there is a small acute infarct within the left superior and middle frontal gyri involving the left MCA distribution. A subacute infarct within the left PCA distribution is seen involving the medial aspect of the left occipital and parietal lobes with subtle petechial hemorrhagic transformation.  A chronic infarct is seen within the lateral aspect of the right frontal lobe within the right MCA distribution and there is a chronic infarct noted within the basis pontis.  Mild changes of chronic small vessel ischemic phenomena are noted.  These findings were discussed with Dwayne Torres on 05/07/2020 at approximately 7:56 AM.  Prominent changes of inflammatory paranasal sinus disease are identified within the right maxillary sinus.  This report was finalized on 5/7/2020 12:05 PM by Dr. Nitin Mcnair M.D.      Xr Hip With Or Without Pelvis 2 - 3 View Right    Result Date: 5/5/2020   No acute fracture is identified. If there is clinical suspicion for radiographically occult fracture, or to further evaluate the soft tissues, MRI could be considered.    This report was finalized on 5/5/2020 2:54 PM by Dr. Chuck Reed M.D.      Results for orders placed during the hospital encounter of 05/06/20   Transthoracic Echo Complete With Contrast if Necessary Per Protocol    Narrative · Left ventricular systolic function is normal. Calculated EF = 60%.   Estimated EF was in agreement with the calculated EF. Estimated EF = 60%.   Normal left ventricular cavity size and wall thickness noted. All left   ventricular wall segments contract normally. Left ventricular diastolic   function is normal.  · Left atrial volume is mildly increased. Saline test results are   negative.  · The valve was poorly visualized but appears trileaflet. The aortic valve   is abnormal in structure.  There is moderate calcification of the aortic   valve.Mild to moderate aortic valve regurgitation is present. No aortic   valve stenosis is present.  · Mild MAC is present.  · The tricuspid valve is grossly normal. Trace tricuspid valve   regurgitation is present. Estimated right ventricular systolic pressure   from tricuspid regurgitation is normal (<35 mmHg). Calculated right   ventricular systolic pressure from tricuspid regurgitation is 33.0 mmHg.  · Limited 2D imaging of cardiac valve          Impression/Assessment:  This is a 85-year-old female with past medical history of stroke on Plavix 75 mg PTA, diabetes, hyperlipidemia, hypertension, ROMAN, chronic vertigo who presented to the hospital on 5/5/2020 with complaints of sudden onset of dizziness x10 days ago that lasted a few days with associated bilateral blurred vision, unsteady gait, and suffering a mechanical fall and hitting her head which caused her to have a headache that lasted for a day.  She had an initial CT head/Cspine on 5/5 that revealed a left parietal occipital subacute infarct, old right MCA infarct, right-sided periorbital hematoma and an acute right orbital fracture with right maxillary sinus hemorrhage, no acute fracture, diffuse cervical spondylosis.  She was discharged home per ER note, however she was asked to return on 5/6 by her PCP after he reviewed patient's CT and saw findings of a subacute to acute infarct.  Patient was also having persistent symptoms with dizziness and progressive blurred vision stating that she was seeing shapes and colors but could not define the object.      Upon arrival to the ED on 5/6 she obtained a CTA that again revealed the subacute left occipital stroke but also an unexpected right P2 occlusion.  She received a 500 cc NS bolus and aspirin 300 mg rectally. BP on arrival 147/47, HR 58. .  EKG revealed atrial fibrillation which was a new onset.  Temp 96.8.  She was not a TPA candidate due to time  criteria.     Diagnosis: Bilateral acute/subacute occipital and left frontal infarcts secondary to new onset atrial fibrillation with petechial bleed in bilateral occipital lobes with associated mild mass effect, recent fall with head trauma, right orbital fracture, hypertension, hyperlipidemia, diabetes     Work up to date:   5/5 CTH WO: left parietal occipital subacute infarct, old right MCA infarct, right-sided periorbital hematoma and an acute right orbital fracture with right maxillary sinus hemorrhage, mild small vessel disease.  5/5 CT Cspine WO: No acute fracture, diffuse cervical spondylosis.  5/6 CTA H/N: Arterial cutoff of the distal right P2 segment, scattered calcifications in the bilateral cervical and intracranial carotid arteries with approximately 50% narrowing of the origin of the right ICA, approximately 50% narrowing at the supraclinoid right ICA, estimated 50% narrowing at the origin of the right vertebral artery, redemonstration of the left parietal occipital subacute infarct which shows gyriform cortical enhancement compatible with post infarct change, and chronic right frontal lobe infarct.  5/6 MRI brain WO: Acute right PCA, left MCA, and subacute left PCA infarcts with subtle petechial hemorrhagic transformation within the left occipital and parietal lobes, chronic right frontal and basis pontis infarct, mild small vessel disease.  5/7 2D echo: LA volume mildly increased, saline test negative, LV function normal, EF 60%, limited 2D echo of valves, no AV stenosis present, moderate calcification of AV.  5/8 CTH WO: Evolving right occipital lobe infarct with no convincing evidence of hemorrhagic transformation, some mass-effect upon the occipital horn of the right lateral ventricle, subacute infarct again seen within the left PCA territory.  5/8 EEG: No focal or epileptiform activity seen but the posterior rhythm was rather low in amplitude.  5/9 CTH WO: Evolving post infarct change in right  more than left occipital lobes with petechial hemorrhage in both occipital lobes with associated mild mass-effect., continued close follow-up advised.  5/10 CTH WO: No change with bilateral petechial hemorrhages within the occipital lobes, mild mass-effect at the right occipital infarct with no change, no new areas of infarct.  Labs: A1c 6.40, , TSH 1.050, free T4 1.19, folate > 20.00, B12 > 2000, P2Y12 282, Mg 1.8    Plan:  Repeat CTH today stable. Pt has f/u CTH scheduled on 5/15 per neurosurgery to f/u on petechial bleeds; She will need to wait at least 2 weeks before resuming A/C given the large size of her strokes and again we will need to assess how patient is recovering to see if she is safe to resume given her high risk of falls. She may be a better candidate for a watchman device. Will discuss with neurosurgery today if we can push the scan out for 2 weeks.  For now continue ASA 325mg, P2Y12 was 282 but was hard to say if patient is a nonresponder or due to Plavix being held for 2 days.  Crestor 40mg, .  Neurochecks per stroke protocol  Normalize BP  Stroke Education  NATHAN/SCDs  PT/OT/ST  No driving until she is cleared by her ophthalmologist or occupational driving rehab.  Follow-up with me in 3 months for stroke follow-up.  Therapies as written. CCP for discharge planning. Call RRT for any acute neurological changes. We will sign off, will see again per request.    Addenda 1302: Discussed with Criss MCCLELLAN, they will follow up with pt tomorrow and review CTH and determine if she needs any further follow up with them. Will plan to repeat CTH in 2 weeks (5/26) and then discuss if patient is safe to start A/C. After speaking with daughter today via phone she states pt falls more than once a month and had a really bad fall a few years back because of her gait imbalance.     Case discussed with patient, RN, daughter via phone, Criss MCCLELLAN, and Dr. Torres, and he agrees with plan  above.  Chani Cortes, APRN

## 2020-05-10 NOTE — PROGRESS NOTES
Continued Stay Note  [facility]     Patient Name: Gela Thompson  MRN: [id number]  Today's Date: 5/10/2020    Admit Date: 5/6/2020    Discharge Plan     Row Name 05/10/20 1148       Plan    Plan  [facility] when ready for DC...............Dedrick HUGHES    Patient/Family in Agreement with Plan  yes    Plan Comments  Call from ELLEN Wood, states daughter Kia requesting call from CCP. Call to daughter at [phone], introduced self and role. Dtr voiced frustration about lack of communication about discharge planning process, states information is being given to patient but she is unable to relay most of it due to her CVA. All questions answered and provided daughter with CCP contact information, note in handoff for CCP to follow-up with dtr tomorrow by phone with any updates. CCP will follow...........Dedrick Wiseman, ELLEN

## 2020-05-10 NOTE — NURSING NOTE
Patient's purse returned to family. Brought down to family at main entrance by myself and security Mabel. Family in return brought patient a basket of flowers, 6 shirts, food and a duffle bag up for patient.

## 2020-05-10 NOTE — PROGRESS NOTES
Name: Gela Thompson ADMIT: 2020   : 1934  PCP: Karla Mora MD    MRN: [id number] LOS: 4 days   AGE/SEX: 85 y.o. female  ROOM: [facility]     Subjective   Subjective   Patient is lying in the bed and has no specific complaints.  Denies nausea, vomiting, abdominal pain, chest pain, palpitations.  Her vision has been poor but not changed since this admission.       Objective   Objective   Vital Signs  Temp:  [97.5 °F (36.4 °C)-98.3 °F (36.8 °C)] 97.8 °F (36.6 °C)  Heart Rate:  [63-76] 73  Resp:  [16-18] 16  BP: (173-183)/(68-78) 180/68  SpO2:  [93 %-99 %] 98 %  on   ;   Device (Oxygen Therapy): room air  Body mass index is 31.51 kg/m².  Physical Exam   Constitutional: She is oriented to person, place, and time. She appears well-developed and well-nourished.   HENT:   Head: Normocephalic.   Nose: Nose normal.   Right periorbital bruising noted   Eyes: Pupils are equal, round, and reactive to light. EOM are normal. No scleral icterus.   Neck: Normal range of motion. Neck supple. No JVD present.   Cardiovascular: Normal rate, regular rhythm, normal heart sounds and intact distal pulses.   Pulmonary/Chest: Effort normal and breath sounds normal. No respiratory distress.   Abdominal: Soft. Bowel sounds are normal.   Musculoskeletal: She exhibits no edema or deformity.   Neurological: She is alert and oriented to person, place, and time.   Skin: Skin is warm and dry. No rash noted.   Psychiatric: She has a normal mood and affect. Her behavior is normal. Thought content normal.         Results Review:       I reviewed the patient's new clinical results.  Results from last 7 days   Lab Units 20  0323 20  1328   WBC 10*3/mm3 7.25 6.54   HEMOGLOBIN g/dL 10.5* 10.7*   PLATELETS 10*3/mm3 283 300     Results from last 7 days   Lab Units 20  0323 20  1328   SODIUM mmol/L 139 136   POTASSIUM mmol/L 4.7 5.6*   CHLORIDE mmol/L 106 104   CO2 mmol/L 20.7* 18.8*   BUN mg/dL 31* 42*   CREATININE  mg/dL 0.92 1.47*   GLUCOSE mg/dL 85 126*   Estimated Creatinine Clearance: 46.7 mL/min (by C-G formula based on SCr of 0.92 mg/dL).  Results from last 7 days   Lab Units 05/06/20  1328   ALBUMIN g/dL 4.20   BILIRUBIN mg/dL 0.4   ALK PHOS U/L 62   AST (SGOT) U/L 15   ALT (SGPT) U/L 13     Results from last 7 days   Lab Units 05/07/20  0323 05/06/20  1328   CALCIUM mg/dL 9.2 9.4   ALBUMIN g/dL  --  4.20   MAGNESIUM mg/dL  --  1.8       Glucose   Date/Time Value Ref Range Status   05/10/2020 1607 156 (H) 70 - 130 mg/dL Final   05/10/2020 1106 95 70 - 130 mg/dL Final   05/10/2020 0636 101 70 - 130 mg/dL Final   05/09/2020 2053 174 (H) 70 - 130 mg/dL Final   05/09/2020 1608 144 (H) 70 - 130 mg/dL Final   05/09/2020 1114 161 (H) 70 - 130 mg/dL Final   05/09/2020 0617 101 70 - 130 mg/dL Final         aspirin 325 mg Oral Daily   hydrALAZINE 50 mg Oral TID   rosuvastatin 40 mg Oral Nightly   sodium chloride 500 mL Intravenous Once   sodium chloride 10 mL Intravenous Q12H   sodium chloride 10 mL Intravenous Q12H   verapamil  mg Oral Daily   verapamil  mg Oral Nightly   vitamin B-12 1,000 mcg Oral Daily       sodium chloride 125 mL/hr Last Rate: 125 mL/hr (05/07/20 0150)   sodium chloride 125 mL/hr Last Rate: 125 mL/hr (05/10/20 1400)   Diet Regular; Thin; Consistent Carbohydrate, Cardiac       Assessment/Plan     Active Hospital Problems    Diagnosis  POA   • **Cerebrovascular accident (CMS/HCC) [I63.9]  Yes   • Atrial fibrillation (CMS/HCC) [I48.91]  Unknown   • ROBERTO (acute kidney injury) (CMS/HCC) [N17.9]  Yes   • Hyperkalemia [E87.5]  Yes   • Anemia, macrocytic [D53.9]  Yes   • DM II (diabetes mellitus, type II), controlled (CMS/HCC) [E11.9]  Yes   • HTN (hypertension) [I10]  Yes      Resolved Hospital Problems   No resolved problems to display.     1.  Subacute CVA, patient is on asa and statins and will continue.Appreciate Neurology input.  Will continue with PT/OT evaluation and recommendations.   Patient did  have left occipital petechial hemorrhage therefore off Plavix.  She also developed a right occipital infarct with some mass effect upon the right occipital horn and is currently off  Keppra.  Continue to monitor her very closely in the hospital.  Most likely will need rehab placement.  Of note echo revealed normal ejection fraction.  No arrhythmia noted during this hospital stay.  2.  Acute kidney injury,  Creatinine is back to normal and avoid nephrotoxins.  3.  Hyperkalemia has resolved.  4.  Glucose intolerance, blood sugars are fairly well controlled.  5.  Hypertension, on verapamil and hydralazine has been further advanced to 50 mg p.o. 3 times daily.  6.  Right superior orbital fracture, continue with conservative approach.      Daniel Beaver MD  [facility]  05/10/20  17:47

## 2020-05-10 NOTE — PLAN OF CARE
Problem: Patient Care Overview  Goal: Plan of Care Review  Outcome: Ongoing (interventions implemented as appropriate)  Flowsheets (Taken 5/10/2020 0551)  Progress: no change  Plan of Care Reviewed With: patient  Outcome Summary: Continue on NIH (2). Pt able to sleep most of the night. Pt denies any pain or discomfort. Assist x1 to the bathroom with walker. Will continue to monitor.     Problem: Patient Care Overview  Goal: Individualization and Mutuality  Outcome: Ongoing (interventions implemented as appropriate)     Problem: Fall Risk (Adult)  Goal: Identify Related Risk Factors and Signs and Symptoms  Outcome: Ongoing (interventions implemented as appropriate)     Problem: Fall Risk (Adult)  Goal: Absence of Fall  Outcome: Ongoing (interventions implemented as appropriate)     Problem: Stroke (Ischemic) (Adult)  Goal: Signs and Symptoms of Listed Potential Problems Will be Absent, Minimized or Managed (Stroke)  Outcome: Ongoing (interventions implemented as appropriate)

## 2020-05-10 NOTE — NURSING NOTE
Acute Inpt Rehab Note     Reviewed with Dr. Davalos.  Note no changes in head CT, however, SBP has been elevated past couple readings and different from her trend when looking back.  Consider PRN BP medication and see how patient tolerates for 24 hrs before transfer to rehab where 3 hours of therapy will be performed daily. Primary RN, Nina, notified.    Thank you,   Ban Moore RN   Rehab Admission RN

## 2020-05-11 ENCOUNTER — TELEPHONE (OUTPATIENT)
Dept: NEUROLOGY | Facility: CLINIC | Age: 85
End: 2020-05-11

## 2020-05-11 ENCOUNTER — TELEPHONE (OUTPATIENT)
Dept: NEUROSURGERY | Facility: CLINIC | Age: 85
End: 2020-05-11

## 2020-05-11 DIAGNOSIS — I63.349 CEREBROVASCULAR ACCIDENT (CVA) DUE TO THROMBOSIS OF CEREBELLAR ARTERY, UNSPECIFIED BLOOD VESSEL LATERALITY (HCC): Primary | ICD-10-CM

## 2020-05-11 LAB
GLUCOSE BLDC GLUCOMTR-MCNC: 101 MG/DL (ref 70–130)
GLUCOSE BLDC GLUCOMTR-MCNC: 117 MG/DL (ref 70–130)
GLUCOSE BLDC GLUCOMTR-MCNC: 221 MG/DL (ref 70–130)
GLUCOSE BLDC GLUCOMTR-MCNC: 95 MG/DL (ref 70–130)

## 2020-05-11 PROCEDURE — 97110 THERAPEUTIC EXERCISES: CPT

## 2020-05-11 PROCEDURE — 94799 UNLISTED PULMONARY SVC/PX: CPT

## 2020-05-11 PROCEDURE — 99232 SBSQ HOSP IP/OBS MODERATE 35: CPT | Performed by: NURSE PRACTITIONER

## 2020-05-11 PROCEDURE — 94660 CPAP INITIATION&MGMT: CPT

## 2020-05-11 PROCEDURE — 97535 SELF CARE MNGMENT TRAINING: CPT

## 2020-05-11 PROCEDURE — 82962 GLUCOSE BLOOD TEST: CPT

## 2020-05-11 RX ORDER — ASPIRIN 325 MG
325 TABLET ORAL DAILY
Status: CANCELLED | OUTPATIENT
Start: 2020-05-12

## 2020-05-11 RX ORDER — CHOLECALCIFEROL (VITAMIN D3) 125 MCG
1000 CAPSULE ORAL DAILY
Status: CANCELLED | OUTPATIENT
Start: 2020-05-12

## 2020-05-11 RX ORDER — HYDRALAZINE HYDROCHLORIDE 50 MG/1
50 TABLET, FILM COATED ORAL 3 TIMES DAILY
Status: CANCELLED | OUTPATIENT
Start: 2020-05-11

## 2020-05-11 RX ORDER — VERAPAMIL HYDROCHLORIDE 240 MG/1
240 TABLET, FILM COATED, EXTENDED RELEASE ORAL NIGHTLY
Status: CANCELLED | OUTPATIENT
Start: 2020-05-11

## 2020-05-11 RX ADMIN — SODIUM CHLORIDE, PRESERVATIVE FREE 10 ML: 5 INJECTION INTRAVENOUS at 21:25

## 2020-05-11 RX ADMIN — Medication 1000 MCG: at 10:47

## 2020-05-11 RX ADMIN — VERAPAMIL HYDROCHLORIDE 120 MG: 120 TABLET, FILM COATED, EXTENDED RELEASE ORAL at 10:47

## 2020-05-11 RX ADMIN — VERAPAMIL HYDROCHLORIDE 240 MG: 120 TABLET, FILM COATED, EXTENDED RELEASE ORAL at 21:23

## 2020-05-11 RX ADMIN — SODIUM CHLORIDE, PRESERVATIVE FREE 10 ML: 5 INJECTION INTRAVENOUS at 10:48

## 2020-05-11 RX ADMIN — SODIUM CHLORIDE 125 ML/HR: 9 INJECTION, SOLUTION INTRAVENOUS at 05:10

## 2020-05-11 RX ADMIN — HYDRALAZINE HYDROCHLORIDE 50 MG: 50 TABLET, FILM COATED ORAL at 21:24

## 2020-05-11 RX ADMIN — ASPIRIN 325 MG: 325 TABLET ORAL at 10:47

## 2020-05-11 RX ADMIN — HYDRALAZINE HYDROCHLORIDE 50 MG: 50 TABLET, FILM COATED ORAL at 18:00

## 2020-05-11 RX ADMIN — SODIUM CHLORIDE 125 ML/HR: 9 INJECTION, SOLUTION INTRAVENOUS at 21:23

## 2020-05-11 RX ADMIN — HYDRALAZINE HYDROCHLORIDE 50 MG: 50 TABLET, FILM COATED ORAL at 10:47

## 2020-05-11 NOTE — THERAPY TREATMENT NOTE
Patient Name: Gela Thompson  : 1934    MRN: [id number]                              Today's Date: 2020       Admit Date: 2020    Visit Dx:     ICD-10-CM ICD-9-CM   1. Cerebrovascular accident (CVA), unspecified mechanism (CMS/HCC) I63.9 434.91   2. Ataxia R27.0 781.3   3. Generalized weakness R53.1 780.79     Patient Active Problem List   Diagnosis   • Anemia   • Chronic low back pain   • Diabetes mellitus (CMS/HCC)   • Dyslipidemia   • Gastroesophageal reflux disease   • Hypertension   • Mitral valve insufficiency   • Osteoarthritis   • Adiposity   • Obstructive sleep apnea syndrome   • Cerebrovascular accident (CMS/HCC)   • Uncontrolled hypertension   • ROBERTO (acute kidney injury) (CMS/HCC)   • Hyperkalemia   • Anemia, macrocytic   • DM II (diabetes mellitus, type II), controlled (CMS/HCC)   • HTN (hypertension)   • Atrial fibrillation (CMS/HCC)     Past Medical History:   Diagnosis Date   • Abnormal echocardiogram    • Adiposity    • Anemia    • Cerebrovascular accident (CMS/HCC)    • Chest pain    • Chronic low back pain    • Diabetes mellitus (CMS/HCC)    • Dyslipidemia    • Dyspnea    • Gastroesophageal reflux disease    • H/O echocardiogram 2014   • Health care maintenance    • History of EKG 10/29/2015   • Hyperlipidemia    • Hypertension    • Mitral valve insufficiency    • Murmur    • ROMAN (obstructive sleep apnea)    • Osteoarthritis      Past Surgical History:   Procedure Laterality Date   • CHOLECYSTECTOMY     • HYSTERECTOMY     • KNEE SURGERY Bilateral     TKA   • NEUROPLASTY      deompression median nerve at carpal tunnel bilateral   • SHOULDER SURGERY Left      General Information     Row Name 20 0952          PT Evaluation Time/Intention    Document Type  therapy note (daily note)  -PC     Mode of Treatment  physical therapy  -PC     Row Name 20 0952          General Information    Existing Precautions/Restrictions  fall  -PC     Row Name 20 0952           Safety Issues, Functional Mobility    Safety Issues Affecting Function (Mobility)  insight into deficits/self awareness  -PC     Impairments Affecting Function (Mobility)  visual/perceptual  -PC       User Key  (r) = Recorded By, (t) = Taken By, (c) = Cosigned By    Initials Name Provider Type    PC Zoë Love, PT Physical Therapist        Mobility     Row Name 05/11/20 0953          Bed Mobility Assessment/Treatment    Supine-Sit [place] (Bed Mobility)  supervision  -PC     Row Name 05/11/20 0953          Sit-Stand Transfer    Sit-Stand [place] (Transfers)  contact guard;verbal cues;nonverbal cues (demo/gesture)  -     Assistive Device (Sit-Stand Transfers)  walker, 4-wheeled  -PC     Row Name 05/11/20 0953          Gait/Stairs Assessment/Training    Gait/Stairs Assessment/Training  gait/ambulation independence;gait/ambulation assistive device  -PC     [place] Level (Gait)  contact guard  -PC     Assistive Device (Gait)  walker, 4-wheeled  -PC     Distance in Feet (Gait)  150 ft, needs frequent verbal cues for direction, running into objects  -PC     Bilateral Gait Deviations  forward flexed posture;heel strike decreased  -PC     Right Sided Gait Deviations  heel strike decreased  -PC       User Key  (r) = Recorded By, (t) = Taken By, (c) = Cosigned By    Initials Name Provider Type    PC Zoë Love PT Physical Therapist        Obj/Interventions    No documentation.       Goals/Plan    No documentation.       Clinical Impression     Row Name 05/11/20 0958          Pain Scale: Numbers Pre/Post-Treatment    Pain Scale: Numbers, Pretreatment  0/10 - no pain  -PC     Row Name 05/11/20 0958          Plan of Care Review    Progress  improving  -PC     Outcome Summary  pt showing progress with strength and mobility, main limitation is visual deficit  -PC     Row Name 05/11/20 0958          Positioning and Restraints    Pre-Treatment Position  in bed  -PC     Post Treatment Position  chair  -PC      In Chair  reclined;call light within reach;encouraged to call for assist;exit alarm on  -PC       User Key  (r) = Recorded By, (t) = Taken By, (c) = Cosigned By    Initials Name Provider Type    PC Zoë Love PT Physical Therapist        Outcome Measures     Row Name 05/11/20 1000          How much help from another person do you currently need...    Turning from your back to your side while in flat bed without using bedrails?  4  -PC     Moving from lying on back to sitting on the side of a flat bed without bedrails?  4  -PC     Moving to and from a bed to a chair (including a wheelchair)?  3  -PC     Standing up from a chair using your arms (e.g., wheelchair, bedside chair)?  3  -PC     Climbing 3-5 steps with a railing?  3  -PC     To walk in hospital room?  3  -PC     AM-PAC 6 Clicks Score (PT)  20  -PC     Row Name 05/11/20 1000          Modified Tiro Scale    Modified Tiro Scale  3 - Moderate disability.  Requiring some help, but able to walk without assistance.  -PC       User Key  (r) = Recorded By, (t) = Taken By, (c) = Cosigned By    Initials Name Provider Type    PC Zoë Love PT Physical Therapist        Physical Therapy Education                 Title: PT OT SLP Therapies (In Progress)     Topic: Physical Therapy (In Progress)     Point: Mobility training (In Progress)     Description:   Instruct learner(s) on safety and technique for assisting patient out of bed, chair or wheelchair.  Instruct in the proper use of assistive devices, such as walker, crutches, cane or brace.              Patient Friendly Description:   It's important to get you on your feet again, but we need to do so in a way that is safe for you. Falling has serious consequences, and your personal safety is the most important thing of all.        When it's time to get out of bed, one of us or a family member will sit next to you on the bed to give you support.     If your doctor or nurse tells you to use a walker,  crutches, a cane, or a brace, be sure you use it every time you get out of bed, even if you think you don't need it.    Learning Progress Summary           Patient Acceptance, E,D, NR by  at 5/11/2020 1001    Acceptance, E,TB,D, VU,DU,NR by  at 5/10/2020 2341    Acceptance, E,D, VU,DU by  at 5/9/2020 1212    Acceptance, E, NR by  at 5/8/2020 0959    Acceptance, E, NR by  at 5/7/2020 1031                   Point: Home exercise program (In Progress)     Description:   Instruct learner(s) on appropriate technique for monitoring, assisting and/or progressing patient with therapeutic exercises and activities.              Learning Progress Summary           Patient Acceptance, E,D, NR by  at 5/11/2020 1001    Acceptance, E,TB,D, VU,DU,NR by  at 5/10/2020 2341    Acceptance, E,D, VU,DU by  at 5/9/2020 1212    Acceptance, E, NR by  at 5/8/2020 0959    Acceptance, E, NR by  at 5/7/2020 1031                   Point: Body mechanics (In Progress)     Description:   Instruct learner(s) on proper positioning and spine alignment for patient and/or caregiver during mobility tasks and/or exercises.              Learning Progress Summary           Patient Acceptance, E,D, NR by  at 5/11/2020 1001    Acceptance, E,TB,D, VU,DU,NR by  at 5/10/2020 2341    Acceptance, E,D, VU,DU by  at 5/9/2020 1212                   Point: Precautions (In Progress)     Description:   Instruct learner(s) on prescribed precautions during mobility and gait tasks              Learning Progress Summary           Patient Acceptance, E,D, NR by  at 5/11/2020 1001    Acceptance, E,TB,D, VU,DU,NR by  at 5/10/2020 2341    Acceptance, E,D, VU,DU by  at 5/9/2020 1212                               User Key     Initials Effective Dates Name Provider Type Discipline     04/03/18 -  Renetta Calvo, PT Physical Therapist PT     04/03/18 -  Zoë Love, PT Physical Therapist PT     04/06/17 -  Ingrid Prcie, RN Registered Nurse  Nurse    PH 08/20/19 -  Lisbet Gomez PTA Physical Therapy Assistant PT              PT Recommendation and Plan     Plan of Care Reviewed With: patient  Progress: improving  Outcome Summary: pt showing progress with strength and mobility, main limitation is visual deficit     Time Calculation:   PT Charges     Row Name 05/11/20 1002             Time Calculation    Start Time  0843  -PC      Stop Time  0900  -PC      Time Calculation (min)  17 min  -PC      PT Received On  05/11/20  -PC      PT - Next Appointment  05/12/20  -PC        User Key  (r) = Recorded By, (t) = Taken By, (c) = Cosigned By    Initials Name Provider Type    PC Zoë Love, PT Physical Therapist        Therapy Charges for Today     Code Description Service Date Service Provider Modifiers Qty    80638702842 HC PT THER PROC EA 15 MIN 5/11/2020 Zoë Love, PT GP 1          PT G-Codes  Outcome Measure Options: AM-PAC 6 Clicks Basic Mobility (PT)  AM-PAC 6 Clicks Score (PT): 20  AM-PAC 6 Clicks Score (OT): 12  Modified Patrice Scale: 3 - Moderate disability.  Requiring some help, but able to walk without assistance.    Zoë Love, PT  5/11/2020

## 2020-05-11 NOTE — PROGRESS NOTES
"DAILY PROGRESS NOTE  [facility]    Patient Identification:  Name: Gela Thompson  Age: 85 y.o.  Sex: female  :  1934  MRN: [id number]         Primary Care Physician: Karla Mora MD    Subjective: patient is doing well;   Interval History: follow up for hypertension, cva, anemia, anjum, diabetes, a fib, obesity    Objective:    Scheduled Meds:  aspirin 325 mg Oral Daily   hydrALAZINE 50 mg Oral TID   rosuvastatin 40 mg Oral Nightly   sodium chloride 500 mL Intravenous Once   sodium chloride 10 mL Intravenous Q12H   sodium chloride 10 mL Intravenous Q12H   verapamil  mg Oral Daily   verapamil  mg Oral Nightly   vitamin B-12 1,000 mcg Oral Daily     Continuous Infusions:  sodium chloride 125 mL/hr Last Rate: 125 mL/hr (05/10/20 2109)   sodium chloride 125 mL/hr Last Rate: 125 mL/hr (20)       Vital signs in last 24 hours:  Temp:  [98.1 °F (36.7 °C)-98.6 °F (37 °C)] 98.1 °F (36.7 °C)  Heart Rate:  [51-70] 68  Resp:  [17-18] 18  BP: (148-183)/(58-72) 148/59    Intake/Output:    Intake/Output Summary (Last 24 hours) at 2020 1746  Last data filed at 2020 1300  Gross per 24 hour   Intake 21885.58 ml   Output --   Net 24245.58 ml       Exam:  /59 (BP Location: Right arm, Patient Position: Sitting)   Pulse 68   Temp 98.1 °F (36.7 °C) (Oral)   Resp 18   Ht 162.6 cm (64\")   Wt 83.3 kg (183 lb 9.6 oz)   SpO2 95%   BMI 31.51 kg/m²     General Appearance:    Alert, cooperative, no distress, AAOx3; chronically ill-appearing                          Head:    Normocephalic, without obvious abnormality, atraumatic                           Eyes:    PERRL, conjunctiva/corneas clear, EOM's intact, both eyes                         Throat:   Lips, tongue, gums normal; oral mucosa pink and moist                           Neck:   Supple, symmetrical, trachea midline, no JVD                         Lungs:    Decreased breath sounds bilaterally, respirations " unlabored                 Chest Wall:    No tenderness or deformity                          Heart:    Regular rate and rhythm, S1 and S2 normal, no murmur,no  Rub                                      or gallop                  Abdomen:     Soft, non-tender, bowel sounds active, no masses, no                                                        organomegaly                  Extremities:   Extremities normal, atraumatic, no cyanosis or edema                        Pulses:   Pulses palpable in all extremities                            Skin:   Skin is warm and dry,  no rashes or palpable lesions                        Data Review:  Labs in chart were reviewed.  No results found for: WBC, HGB, HCT, PLT  No results found for: NA, K, CL, CO2, BUN, CREATININE, GLUCOSE  No results found for: CALCIUM, MG, PHOS  No results found for: AST, ALT, ALKPHOS  No results found for: APTT, INR  Patient Active Problem List   Diagnosis Code   • Anemia D64.9   • Chronic low back pain M54.5, G89.29   • Diabetes mellitus (CMS/HCC) E11.9   • Dyslipidemia E78.5   • Gastroesophageal reflux disease K21.9   • Hypertension I10   • Mitral valve insufficiency I34.0   • Osteoarthritis M19.90   • Adiposity E66.9   • Obstructive sleep apnea syndrome G47.33   • Cerebrovascular accident (CMS/[facility]) I63.9   • Uncontrolled hypertension I10   • ROBERTO (acute kidney injury) (CMS/[facility]) N17.9   • Hyperkalemia E87.5   • Anemia, macrocytic D53.9   • DM II (diabetes mellitus, type II), controlled (CMS/[facility]) E11.9   • HTN (hypertension) I10   • Atrial fibrillation (CMS/[facility]) I48.91       Assessment:    Cerebrovascular accident (CMS/[facility])    ROBERTO (acute kidney injury) (CMS/[facility])    Hyperkalemia    Anemia, macrocytic    DM II (diabetes mellitus, type II), controlled (CMS/[facility])    HTN (hypertension)    Atrial fibrillation (CMS/[facility])      Plan:  Will continue to monitor  Transfer to rehab is planned but not available today  plavix to be resumed  Appreciate help from all  Check  labs in   Expect transfer tomorrow  Medium risk    Macrina Aden MD  5/11/2020  17:46

## 2020-05-11 NOTE — PLAN OF CARE
Problem: Patient Care Overview  Goal: Plan of Care Review  Outcome: Ongoing (interventions implemented as appropriate)  Flowsheets  Taken 5/11/2020 1001  Plan of Care Reviewed With: patient  Taken 5/11/2020 0931  Outcome Summary: pt showing progress with strength and mobility, main limitation is visual deficit

## 2020-05-11 NOTE — NURSING NOTE
Acute Inpt Rehab Note     Discussed case with Dr. Liang, once internal medicine feels blood pressures are stable can admit to rehab.      Spoke with Blanca FOUNTAIN, plan for admission to rehab tomorrow 5/12 if blood pressure remains stable.      Thanks,  Ban Moore RN   Rehab Admission RN

## 2020-05-11 NOTE — TELEPHONE ENCOUNTER
Spoke with Kia.  Let her know that if patient remains in the hospital, that CT will just be done while she is inpatient.  Gave her date, time and location of appt.

## 2020-05-11 NOTE — PROGRESS NOTES
NEUROSURGERY PROGRESS NOTE     LOS: 5 days   Patient Care Team:  Karla Mora MD as PCP - General    Chief Complaint: Follow-up visit for subacute CVA    Subjective     Interval History:     Doing well other than some difficulty with vision.  Patient reports that she has been wearing prism glasses since the age of 12.    History taken from: patient chart    Objective      Vital Signs  Temp:  [97.8 °F (36.6 °C)-98.6 °F (37 °C)] 98.1 °F (36.7 °C)  Heart Rate:  [51-70] 62  Resp:  [16-18] 18  BP: (166-183)/(58-72) 178/63  Body mass index is 31.51 kg/m².      Physical Exam    AA&O x 3  Resolving bruising noted to R face  EOM's intact  Follows commands x 4  No drift. No dysmetria      Results Review:     I reviewed the patient's new clinical results.    Labs:    Lab Results (last 24 hours)     Procedure Component Value Units Date/Time    POC Glucose Once [[id number]]  (Abnormal) Collected:  05/10/20 1607    Specimen:  Blood Updated:  05/10/20 1608     Glucose 156 mg/dL     POC Glucose Once [[id number]]  (Abnormal) Collected:  05/10/20 2031    Specimen:  Blood Updated:  05/10/20 2032     Glucose 172 mg/dL     POC Glucose Once [[id number]]  (Normal) Collected:  05/11/20 0619    Specimen:  Blood Updated:  05/11/20 0621     Glucose 95 mg/dL     POC Glucose Once [[id number]]  (Normal) Collected:  05/11/20 1058    Specimen:  Blood Updated:  05/11/20 1100     Glucose 101 mg/dL           Current Medications:   Scheduled Meds:  aspirin 325 mg Oral Daily   hydrALAZINE 50 mg Oral TID   rosuvastatin 40 mg Oral Nightly   sodium chloride 500 mL Intravenous Once   sodium chloride 10 mL Intravenous Q12H   sodium chloride 10 mL Intravenous Q12H   verapamil  mg Oral Daily   verapamil  mg Oral Nightly   vitamin B-12 1,000 mcg Oral Daily     Continuous Infusions:  sodium chloride 125 mL/hr Last Rate: 125 mL/hr (05/10/20 2109)   sodium chloride 125 mL/hr Last Rate: 125 mL/hr (05/11/20 0510)       Assessment/Plan        Cerebrovascular accident (CMS/HCC)    ROBERTO (acute kidney injury) (CMS/HCC)    Hyperkalemia    Anemia, macrocytic    DM II (diabetes mellitus, type II), controlled (CMS/HCC)    HTN (hypertension)    Atrial fibrillation (CMS/HCC)  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 Mcneill has reviewed the CT scans and I have discussed exam findings with him.  Dr. Mcneill is not too concerned about the visual difficulties given that the stroke involves both occipital regions  From a neurosurgical standpoint, Dr. Mcneill has cleared the patient to resume Plavix  We will see the patient back in the office in 2 weeks with repeat head CT (already ordered by Neurology for 5/26/2020).   Please call Neurosurgery if there are any further questions or concerns.      Renetta Flores, ELEUTERIO  05/11/20  11:32

## 2020-05-11 NOTE — TELEPHONE ENCOUNTER
I called and spoke with patients daughter regarding patient repeat CT head. I made her aware of scheduled appointment date and time. Kia asked what happens if patient is still inpatient at the hospital on 5/26.

## 2020-05-11 NOTE — PLAN OF CARE
BP's elevated, heart rate sinus elton low 42, NIH 2 for vision, assist x1 with Rolator to bathroom, will CTM

## 2020-05-11 NOTE — PLAN OF CARE
Problem: Patient Care Overview  Goal: Plan of Care Review  Outcome: Ongoing (interventions implemented as appropriate)  Flowsheets (Taken 5/11/2020 1018)  Plan of Care Reviewed With: patient  Note:   Pt seen for ADL and bathroom mobility with guiding assistance and cueing due to vision.        Patient was wearing a face mask during this therapy encounter. Therapist used appropriate personal protective equipment including mask and gloves.  Mask used was standard procedure mask. Appropriate PPE was worn during the entire therapy session. Hand hygiene was completed before and after therapy session. Patient is not in enhanced droplet precautions.

## 2020-05-11 NOTE — PROGRESS NOTES
Continued Stay Note  [facility]     Patient Name: Gela Thompson  MRN: [id number]  Today's Date: 5/11/2020    Admit Date: 5/6/2020    Discharge Plan     Row Name 05/11/20 1347       Plan    Plan  [facility] Rehab plan for tomorrow pending continued medical readiness    Patient/Family in Agreement with Plan  yes    Plan Comments  Spoke with Saadia and Dr. Aden, per April [facility] Re[facility] requests BP need more controlled for dc to rehab to participate in therapies. Anticipate dc tomorrow to rehab. CCP called daughter Kia [phone], updated her on anticipated dc tomorrow pending BP controlled. IMM notice given and she requests CCP to email to her at [email or web address]         Discharge Codes    No documentation.             Tamia Ivory RN

## 2020-05-11 NOTE — THERAPY TREATMENT NOTE
Acute Care - Occupational Therapy Progress Note  [facility]     Patient Name: Gela Thompsno  : 1934  MRN: [id number]  Today's Date: 2020  Onset of Illness/Injury or Date of Surgery: 20     Referring Physician: Amaya    Admit Date: 2020       ICD-10-CM ICD-9-CM   1. Cerebrovascular accident (CVA), unspecified mechanism (CMS/HCC) I63.9 434.91   2. Ataxia R27.0 781.3   3. Generalized weakness R53.1 780.79     Patient Active Problem List   Diagnosis   • Anemia   • Chronic low back pain   • Diabetes mellitus (CMS/HCC)   • Dyslipidemia   • Gastroesophageal reflux disease   • Hypertension   • Mitral valve insufficiency   • Osteoarthritis   • Adiposity   • Obstructive sleep apnea syndrome   • Cerebrovascular accident (CMS/HCC)   • Uncontrolled hypertension   • ROBERTO (acute kidney injury) (CMS/HCC)   • Hyperkalemia   • Anemia, macrocytic   • DM II (diabetes mellitus, type II), controlled (CMS/[facility])   • HTN (hypertension)   • Atrial fibrillation (CMS/HCC)     Past Medical History:   Diagnosis Date   • Abnormal echocardiogram    • Adiposity    • Anemia    • Cerebrovascular accident (CMS/HCC)    • Chest pain    • Chronic low back pain    • Diabetes mellitus (CMS/HCC)    • Dyslipidemia    • Dyspnea    • Gastroesophageal reflux disease    • H/O echocardiogram 2014   • Health care maintenance    • History of EKG 10/29/2015   • Hyperlipidemia    • Hypertension    • Mitral valve insufficiency    • Murmur    • ROMAN (obstructive sleep apnea)    • Osteoarthritis      Past Surgical History:   Procedure Laterality Date   • CHOLECYSTECTOMY     • HYSTERECTOMY     • KNEE SURGERY Bilateral     TKA   • NEUROPLASTY      deompression median nerve at carpal tunnel bilateral   • SHOULDER SURGERY Left        Therapy Treatment    Rehabilitation Treatment Summary     Row Name 20 0957             Treatment Time/Intention    Discipline  occupational therapist  -WAYLON      Patient/Family Observations  sitting in  "chair  -LE      Care Plan Review  care plan/treatment goals reviewed  -LE      Recorded by [LE] Renetta Ward, OTR 05/11/20 1018      Row Name 05/11/20 1018             ADL Assessment/Intervention    BADL Assessment/Intervention  feeding  -LE      Recorded by [LE] Renetta Ward, OTR 05/11/20 1020      Row Name 05/11/20 0957             Lower Body Dressing Assessment/Training    Comment (Lower Body Dressing)  -- unable to reach feet to attempt.  h/o \"bad knees' that limit  -LE      Recorded by [LE] Renetta Ward, OTR 05/11/20 1018      Row Name 05/11/20 0957             Grooming Assessment/Training    Archer Level (Grooming)  set up;supervision hand gel  -LE      Grooming Position  supported sitting  -LE      Recorded by [LE] Renetta Ward, OTR 05/11/20 1018      Row Name 05/11/20 0957             Self-Feeding Assessment/Training    Comment (Feeding)  -- reports self feeding is \"a mess\" due to vision  -LE      Recorded by [LE] Renetta Ward, OTR 05/11/20 1020      Row Name 05/11/20 0957             Toileting Assessment/Training    Archer Level (Toileting)  perform perineal hygiene;set up;supervision;verbal cues  -LE      Assistive Devices (Toileting)  grab bar/safety frame  -LE      Toileting Position  supported standing;supported sitting guiding assistance to locate toilet paper  -LE      Recorded by [LE] Renetta Ward, OTR 05/11/20 1018      Row Name 05/11/20 0957             General ROM    GENERAL ROM COMMENTS  -- arthritic changes limit B grasp  -LE      Recorded by [LE] Renetta Ward, OTR 05/11/20 1018      Row Name 05/11/20 0957             Positioning and Restraints    Pre-Treatment Position  sitting in chair/recliner  -LE      Post Treatment Position  chair  -LE      In Bed  sitting;call light within reach;encouraged to call for assist;exit alarm on  -LE      Recorded by [LE] Renetta Ward, OTR 05/11/20 1018      Row Name 05/11/20 0957             Pain Scale: Numbers Pre/Post-Treatment    Pain Scale: " Numbers, Pretreatment  0/10 - no pain  -LE      Recorded by [LE] Renetta Ward, OTR 05/11/20 1018      Row Name 05/11/20 0957             Vision Assessment/Intervention    Visual Impairment/Limitations  visual/perceptual impairments present limits   -LE      Visual Field Deficit  other (see comments) impaired quadrant L worse than R.  cues for scanning  -LE      Recorded by [LE] Renetta Ward, OTR 05/11/20 1018      Row Name                Wound 05/06/20 1937 Right forehead Laceration    Wound - Properties Group Date first assessed: 05/06/20 [JM] Time first assessed: 1937 [JM] Present on Hospital Admission: Y [JM] Side: Right [JM] Location: forehead [JM] Primary Wound Type: Laceration [JM] Recorded by:  [JM] McArdle, Jacklyn, RN 05/07/20 0156    Row Name 05/11/20 0957             Plan of Care Review    Plan of Care Reviewed With  patient  -LE      Outcome Summary  Pt seen for ADL and bathroom mobility.  Pt requires guiding assistance and verbal and auditory cues due to impaired vision.   -LE      Recorded by [LE] Renetta Ward, OTR 05/11/20 1018        User Key  (r) = Recorded By, (t) = Taken By, (c) = Cosigned By    Initials Name Effective Dates Discipline    LE Renetta Ward, OTR 06/08/18 -  OT    JM McArdle, Jacklyn, RN 03/06/17 -  Nurse        Wound 05/06/20 1937 Right forehead Laceration (Active)   Dressing Appearance dry;intact 5/11/2020 12:05 AM   Closure Sutures 5/11/2020 12:05 AM   Base closed/resurfaced 5/11/2020 12:05 AM   Periwound ecchymotic 5/11/2020 12:05 AM   Periwound Temperature warm 5/11/2020 12:05 AM   Periwound Skin Turgor soft 5/11/2020 12:05 AM   Edges rolled/closed 5/11/2020 12:05 AM   Drainage Characteristics/Odor serosanguineous;bleeding controlled 5/11/2020 12:05 AM   Drainage Amount none 5/11/2020 12:05 AM   Dressing Care, Wound gauze 5/10/2020  9:00 PM       Occupational Therapy Education                 Title: PT OT SLP Therapies (In Progress)     Topic: Occupational Therapy (Done)      Point: ADL training (Done)     Description:   Instruct learner(s) on proper safety adaptation and remediation techniques during self care or transfers.   Instruct in proper use of assistive devices.              Learning Progress Summary           Patient Acceptance, E,TB,D, VU,DU,NR by  at 5/10/2020 2341    Acceptance, E,TB, VU,NR by  at 5/7/2020 1545    Comment:  OT role and goals                   Point: Home exercise program (Done)     Description:   Instruct learner(s) on appropriate technique for monitoring, assisting and/or progressing therapeutic exercises/activities.              Learning Progress Summary           Patient Acceptance, E,TB,D, VU,DU,NR by  at 5/10/2020 2341                   Point: Precautions (Done)     Description:   Instruct learner(s) on prescribed precautions during self-care and functional transfers.              Learning Progress Summary           Patient Acceptance, E,TB,D, VU,DU,NR by  at 5/10/2020 2341                   Point: Body mechanics (Done)     Description:   Instruct learner(s) on proper positioning and spine alignment during self-care, functional mobility activities and/or exercises.              Learning Progress Summary           Patient Acceptance, E,TB,D, VU,DU,NR by  at 5/10/2020 2341                               User Key     Initials Effective Dates Name Provider Type Discipline     12/26/18 -  Minda Husain OTR Occupational Therapist OT     04/06/17 -  Ingrid Price RN Registered Nurse Nurse                OT Recommendation and Plan     Plan of Care Review  Plan of Care Reviewed With: patient  Plan of Care Reviewed With: patient  Outcome Summary: Pt seen for ADL and bathroom mobility.  Pt requires guiding assistance and verbal and auditory cues due to impaired vision.   Outcome Measures     Row Name 05/11/20 1000             How much help from another is currently needed...    Putting on and taking off regular lower body clothing?  2  -LE       Bathing (including washing, rinsing, and drying)  2  -LE      Toileting (which includes using toilet bed pan or urinal)  3  -LE      Putting on and taking off regular upper body clothing  3  -LE      Taking care of personal grooming (such as brushing teeth)  3  -LE      Eating meals  3  -LE      AM-PAC 6 Clicks Score (OT)  16  -LE         Functional Assessment    Outcome Measure Options  AM-PAC 6 Clicks Daily Activity (OT)  -LE        User Key  (r) = Recorded By, (t) = Taken By, (c) = Cosigned By    Initials Name Provider Type    Renetta Nam OTR Occupational Therapist           Time Calculation:   Time Calculation- OT     Row Name 05/11/20 1020             Time Calculation- OT    OT Start Time  0934  -LE      OT Stop Time  0958  -LE      OT Time Calculation (min)  24 min  -LE      Total Timed Code Minutes- OT  24 minute(s)  -LE      OT Received On  05/11/20  -LE      OT - Next Appointment  05/12/20  -LE        User Key  (r) = Recorded By, (t) = Taken By, (c) = Cosigned By    Initials Name Provider Type    Renetta Nam OTR Occupational Therapist        Therapy Charges for Today     Code Description Service Date Service Provider Modifiers Qty    07408712125 HC OT SELF CARE/MGMT/TRAIN EA 15 MIN 5/11/2020 Renetta Ward OTR GO 2               ISRRAEL Arauz  5/11/2020

## 2020-05-11 NOTE — PLAN OF CARE
Patient is to be discharged tmrw and readmitted to our [facility] rehab. Patient has been up in chair for most of the day. No falls. Urinating well. Plavix ok to restart per neuro surgery. Will CTM NIH 2  Problem: Fall Risk (Adult)  Goal: Identify Related Risk Factors and Signs and Symptoms  Outcome: Ongoing (interventions implemented as appropriate)  Goal: Absence of Fall  Outcome: Ongoing (interventions implemented as appropriate)  Goal: Identify Related Risk Factors and Signs and Symptoms  Outcome: Ongoing (interventions implemented as appropriate)  Goal: Absence of Fall  Outcome: Ongoing (interventions implemented as appropriate)     Problem: Patient Care Overview  Goal: Plan of Care Review  Outcome: Ongoing (interventions implemented as appropriate)  Goal: Individualization and Mutuality  Outcome: Ongoing (interventions implemented as appropriate)  Goal: Discharge Needs Assessment  Outcome: Ongoing (interventions implemented as appropriate)  Goal: Interprofessional Rounds/Family Conf  Outcome: Ongoing (interventions implemented as appropriate)

## 2020-05-12 ENCOUNTER — HOSPITAL ENCOUNTER (INPATIENT)
Facility: HOSPITAL | Age: 85
LOS: 15 days | Discharge: HOME HEALTH | End: 2020-05-27
Attending: PHYSICAL MEDICINE & REHABILITATION | Admitting: PHYSICAL MEDICINE & REHABILITATION

## 2020-05-12 VITALS
OXYGEN SATURATION: 97 % | BODY MASS INDEX: 31.34 KG/M2 | HEIGHT: 64 IN | DIASTOLIC BLOOD PRESSURE: 60 MMHG | TEMPERATURE: 98 F | HEART RATE: 62 BPM | SYSTOLIC BLOOD PRESSURE: 161 MMHG | RESPIRATION RATE: 18 BRPM | WEIGHT: 183.6 LBS

## 2020-05-12 LAB
ANION GAP SERPL CALCULATED.3IONS-SCNC: 12.9 MMOL/L (ref 5–15)
BUN BLD-MCNC: 18 MG/DL (ref 8–23)
BUN/CREAT SERPL: 23.4 (ref 7–25)
CALCIUM SPEC-SCNC: 8.7 MG/DL (ref 8.6–10.5)
CHLORIDE SERPL-SCNC: 107 MMOL/L (ref 98–107)
CO2 SERPL-SCNC: 19.1 MMOL/L (ref 22–29)
CREAT BLD-MCNC: 0.77 MG/DL (ref 0.57–1)
DEPRECATED RDW RBC AUTO: 41 FL (ref 37–54)
ERYTHROCYTE [DISTWIDTH] IN BLOOD BY AUTOMATED COUNT: 11.8 % (ref 12.3–15.4)
GFR SERPL CREATININE-BSD FRML MDRD: 71 ML/MIN/1.73
GLUCOSE BLD-MCNC: 106 MG/DL (ref 65–99)
GLUCOSE BLDC GLUCOMTR-MCNC: 103 MG/DL (ref 70–130)
GLUCOSE BLDC GLUCOMTR-MCNC: 119 MG/DL (ref 70–130)
GLUCOSE BLDC GLUCOMTR-MCNC: 135 MG/DL (ref 70–130)
GLUCOSE BLDC GLUCOMTR-MCNC: 160 MG/DL (ref 70–130)
HCT VFR BLD AUTO: 28.9 % (ref 34–46.6)
HGB BLD-MCNC: 10.1 G/DL (ref 12–15.9)
MAGNESIUM SERPL-MCNC: 1.8 MG/DL (ref 1.6–2.4)
MCH RBC QN AUTO: 33 PG (ref 26.6–33)
MCHC RBC AUTO-ENTMCNC: 34.9 G/DL (ref 31.5–35.7)
MCV RBC AUTO: 94.4 FL (ref 79–97)
PLATELET # BLD AUTO: 288 10*3/MM3 (ref 140–450)
PMV BLD AUTO: 10.6 FL (ref 6–12)
POTASSIUM BLD-SCNC: 4 MMOL/L (ref 3.5–5.2)
RBC # BLD AUTO: 3.06 10*6/MM3 (ref 3.77–5.28)
SODIUM BLD-SCNC: 139 MMOL/L (ref 136–145)
WBC NRBC COR # BLD: 7.95 10*3/MM3 (ref 3.4–10.8)

## 2020-05-12 PROCEDURE — 85027 COMPLETE CBC AUTOMATED: CPT | Performed by: INTERNAL MEDICINE

## 2020-05-12 PROCEDURE — 82962 GLUCOSE BLOOD TEST: CPT

## 2020-05-12 PROCEDURE — 80048 BASIC METABOLIC PNL TOTAL CA: CPT | Performed by: INTERNAL MEDICINE

## 2020-05-12 PROCEDURE — 97110 THERAPEUTIC EXERCISES: CPT

## 2020-05-12 PROCEDURE — 83735 ASSAY OF MAGNESIUM: CPT | Performed by: INTERNAL MEDICINE

## 2020-05-12 RX ORDER — ACETAMINOPHEN 160 MG/5ML
650 SOLUTION ORAL EVERY 4 HOURS PRN
Status: DISCONTINUED | OUTPATIENT
Start: 2020-05-12 | End: 2020-05-13

## 2020-05-12 RX ORDER — CHOLECALCIFEROL (VITAMIN D3) 125 MCG
1000 CAPSULE ORAL DAILY
Status: DISCONTINUED | OUTPATIENT
Start: 2020-05-13 | End: 2020-05-27 | Stop reason: HOSPADM

## 2020-05-12 RX ORDER — VERAPAMIL HYDROCHLORIDE 240 MG/1
240 TABLET, FILM COATED, EXTENDED RELEASE ORAL NIGHTLY
Status: DISCONTINUED | OUTPATIENT
Start: 2020-05-12 | End: 2020-05-27 | Stop reason: HOSPADM

## 2020-05-12 RX ORDER — HYDRALAZINE HYDROCHLORIDE 50 MG/1
50 TABLET, FILM COATED ORAL 3 TIMES DAILY
Status: DISCONTINUED | OUTPATIENT
Start: 2020-05-12 | End: 2020-05-27 | Stop reason: HOSPADM

## 2020-05-12 RX ORDER — ASPIRIN 325 MG
325 TABLET ORAL DAILY
Status: DISCONTINUED | OUTPATIENT
Start: 2020-05-13 | End: 2020-05-27 | Stop reason: HOSPADM

## 2020-05-12 RX ORDER — ACETAMINOPHEN 325 MG/1
650 TABLET ORAL EVERY 4 HOURS PRN
Status: CANCELLED | OUTPATIENT
Start: 2020-05-12

## 2020-05-12 RX ORDER — ACETAMINOPHEN 650 MG/1
650 SUPPOSITORY RECTAL EVERY 4 HOURS PRN
Status: DISCONTINUED | OUTPATIENT
Start: 2020-05-12 | End: 2020-05-13

## 2020-05-12 RX ORDER — SODIUM CHLORIDE 0.9 % (FLUSH) 0.9 %
10 SYRINGE (ML) INJECTION AS NEEDED
Status: CANCELLED | OUTPATIENT
Start: 2020-05-12

## 2020-05-12 RX ORDER — ACETAMINOPHEN 325 MG/1
650 TABLET ORAL EVERY 4 HOURS PRN
Status: DISCONTINUED | OUTPATIENT
Start: 2020-05-12 | End: 2020-05-27 | Stop reason: HOSPADM

## 2020-05-12 RX ORDER — ACETAMINOPHEN 650 MG/1
650 SUPPOSITORY RECTAL EVERY 4 HOURS PRN
Status: CANCELLED | OUTPATIENT
Start: 2020-05-12

## 2020-05-12 RX ORDER — ACETAMINOPHEN 160 MG/5ML
650 SOLUTION ORAL EVERY 4 HOURS PRN
Status: CANCELLED | OUTPATIENT
Start: 2020-05-12

## 2020-05-12 RX ADMIN — HYDRALAZINE HYDROCHLORIDE 50 MG: 50 TABLET, FILM COATED ORAL at 20:10

## 2020-05-12 RX ADMIN — VERAPAMIL HYDROCHLORIDE 120 MG: 120 TABLET, FILM COATED, EXTENDED RELEASE ORAL at 09:35

## 2020-05-12 RX ADMIN — SODIUM CHLORIDE, PRESERVATIVE FREE 10 ML: 5 INJECTION INTRAVENOUS at 10:50

## 2020-05-12 RX ADMIN — HYDRALAZINE HYDROCHLORIDE 50 MG: 50 TABLET, FILM COATED ORAL at 09:34

## 2020-05-12 RX ADMIN — VERAPAMIL HYDROCHLORIDE 240 MG: 240 TABLET, FILM COATED, EXTENDED RELEASE ORAL at 20:09

## 2020-05-12 RX ADMIN — HYDRALAZINE HYDROCHLORIDE 50 MG: 50 TABLET, FILM COATED ORAL at 16:03

## 2020-05-12 RX ADMIN — SODIUM CHLORIDE, PRESERVATIVE FREE 10 ML: 5 INJECTION INTRAVENOUS at 10:39

## 2020-05-12 RX ADMIN — Medication 1000 MCG: at 09:35

## 2020-05-12 RX ADMIN — ASPIRIN 325 MG: 325 TABLET ORAL at 09:34

## 2020-05-12 NOTE — NURSING NOTE
Spoke with Dr. Aden, Pt will not discharge on Crestor as pt has intolerance to statin drugs and listed on allergies. Spoke with April/[facility]. Tamia Ivory RN

## 2020-05-12 NOTE — PLAN OF CARE
Problem: Patient Care Overview  Goal: Plan of Care Review  Outcome: Ongoing (interventions implemented as appropriate)  Flowsheets (Taken 5/12/2020 6002)  Outcome Summary: pt cont to show progress with mobility, cont to have significant visual deficit       ..Patient was wearing a face mask during this therapy encounter. Therapist used appropriate personal protective equipment including mask and gloves.  Mask used was standard procedure mask. Appropriate PPE was worn during the entire therapy session. Hand hygiene was completed before and after therapy session. Patient is not in enhanced droplet precautions.

## 2020-05-12 NOTE — DISCHARGE SUMMARY
PHYSICIAN DISCHARGE SUMMARY                                                                        [facility]    Patient Identification:  Name: Gela Thompson  Age: 85 y.o.  Sex: female  :  1934  MRN: [id number]  Primary Care Physician: Karla Mora MD    Admit date: 2020  Discharge date and time:20    Discharged Condition: good    Discharge Diagnoses:  Cerebrovascular accident (CMS/HCC)    ROBERTO (acute kidney injury) (CMS/[facility])    Hyperkalemia    Anemia, macrocytic    DM II (diabetes mellitus, type II), controlled (CMS/[facility])    HTN (hypertension)    Atrial fibrillation (CMS/[facility])     Patient Active Problem List   Diagnosis Code   • Anemia D64.9   • Chronic low back pain M54.5, G89.29   • Diabetes mellitus (CMS/HCC) E11.9   • Dyslipidemia E78.5   • Gastroesophageal reflux disease K21.9   • Hypertension I10   • Mitral valve insufficiency I34.0   • Osteoarthritis M19.90   • Adiposity E66.9   • Obstructive sleep apnea syndrome G47.33   • Cerebrovascular accident (CMS/[facility]) I63.9   • Uncontrolled hypertension I10   • ROBERTO (acute kidney injury) (CMS/[facility]) N17.9   • Hyperkalemia E87.5   • Anemia, macrocytic D53.9   • DM II (diabetes mellitus, type II), controlled (CMS/[facility]) E11.9   • HTN (hypertension) I10   • Atrial fibrillation (CMS/[facility]) I48.91       PMHX:   Past Medical History:   Diagnosis Date   • Abnormal echocardiogram    • Adiposity    • Anemia    • Cerebrovascular accident (CMS/[facility])    • Chest pain    • Chronic low back pain    • Diabetes mellitus (CMS/[facility])    • Dyslipidemia    • Dyspnea    • Gastroesophageal reflux disease    • H/O echocardiogram 2014   • Health care maintenance    • History of EKG 10/29/2015   • Hyperlipidemia    • Hypertension    • Mitral valve insufficiency    • Murmur    • ROMAN (obstructive sleep apnea)    • Osteoarthritis      PSHX:   Past Surgical History:   Procedure Laterality Date   •  CHOLECYSTECTOMY     • HYSTERECTOMY     • KNEE SURGERY Bilateral     TKA   • NEUROPLASTY      deompression median nerve at carpal tunnel bilateral   • SHOULDER SURGERY Left        Hospital Course: Gela Thompson  Was admitted after a fall; workup revealed bilateral acute strokes; she also had loss of vision; she was seen by neurology and; she also had new onset atrial fibrillation; she had a small petechial hemorrhage so neurosurgery was consulted; they recommended a follow up ct of her head which showed new changes; repeat ct is planned for 05/15shjose angel was also seen by cardiology and aspirin was continued; anticoagulation should be held for two weeks per their recommendation due to large strokes and fall risk; she will be transferring to acute rehab today    Consults:     Consults     Date and Time Order Name Status Description    5/7/2020 1131 Inpatient Neurosurgery Consult Completed     5/7/2020 1131 Inpatient Cardiology Consult Completed     5/6/2020 2113 Inpatient Neurology Consult Stroke Completed     5/6/2020 1710 Inpatient Neurology Consult Stroke Completed     5/6/2020 1710 Inpatient Neurology Consult Stroke Completed     5/6/2020 1654 Inpatient Neurology Consult Stroke Completed     5/6/2020 1537 [facility] (on-call MD unless specified) Details Completed     5/5/2020 1605 IP General Consult (Use specialty-specific consult if known) Completed     5/5/2020 1601 IP General Consult (Use specialty-specific consult if known) Completed         Results from last 7 days   Lab Units 05/12/20  0418   WBC 10*3/mm3 7.95   HEMOGLOBIN g/dL 10.1*   HEMATOCRIT % 28.9*   PLATELETS 10*3/mm3 288     Results from last 7 days   Lab Units 05/12/20  0418   SODIUM mmol/L 139   POTASSIUM mmol/L 4.0   CHLORIDE mmol/L 107   CO2 mmol/L 19.1*   BUN mg/dL 18   CREATININE mg/dL 0.77   GLUCOSE mg/dL 106*   CALCIUM mg/dL 8.7     Significant Diagnostic Studies: Labs in chart were reviewed.  Imaging Results (All)     Procedure Component Value Units  Date/Time    CT Head Without Contrast [[id number]] Collected:  05/10/20 0603     Updated:  05/10/20 0611    Narrative:       CT HEAD WO CONTRAST-     INDICATIONS: Bilateral strokes with petechial hemorrhage and mass effect     TECHNIQUE: Radiation dose reduction techniques were utilized, including  automated exposure control and exposure modulation based on body size.  Noncontrast head CT     COMPARISON: 05/09/2020      FINDINGS:     Similar appearing postinfarct changes in the bilateral occipital lobes,  again with demonstration of small hyperdensities compatible with  bilateral petechial hemorrhage/hemorrhagic conversion, appearance is  similar to prior exam. Associated mass effect related to edema at the  right occipital infarct, with partial effacement of the right lateral  ventricle, similar to prior exam. No new areas of infarct are noted.  Encephalomalacia/old infarct change again noted in right frontal lobe.      Mild periventricular hypodensities suggest chronic small vessel  ischemic change in a patient this age.      Ventricles, cisterns, cerebral sulci are stable.     The visualized paranasal sinuses, orbits, appear stable, with  redemonstration of right inferior orbital wall fracture, blood in the  right maxillary sinus.          Impression:        IMPRESSION:      No significant change.     This report was finalized on 5/10/2020 6:08 AM by Dr. Chuck Reed M.D.       CT Head Without Contrast [[id number]] Collected:  05/09/20 0848     Updated:  05/09/20 0902    Narrative:       CT HEAD WO CONTRAST-     INDICATIONS: Stroke     TECHNIQUE: Radiation dose reduction techniques were utilized, including  automated exposure control and exposure modulation based on body size.  Noncontrast head CT     COMPARISON: 05/08/2020     FINDINGS:     Evolving postinfarct changes in right occipital lobe are again noted; a  few tiny hyperdensities in this region, suspicious for petechial  hemorrhage/hemorrhagic  conversion, for example posteriorly on image 24  of series 1, continued follow-up recommended. Associated mass effect  noted, with partial effacement of the right lateral ventricle, similar  to prior exam. Subacute infarct changes in left occipital lobe again  seen, with tiny hyperdensities medially at the left occipital lobe  corresponding to petechial hemorrhage on the prior MRI.  Encephalomalacia/old infarct change again noted in right frontal lobe.           Mild periventricular hypodensities suggest chronic small vessel ischemic  change in a patient this age.           Ventricles, cisterns, cerebral sulci are stable.     The visualized paranasal sinuses, orbits, appear stable, with  redemonstration of right superior orbital wall fracture, bladder and the  right maxillary sinus.             Impression:          Evolving post infarct change in right more than left occipital lobes,  with tiny hyperdensities compatible with petechial hemorrhage in both  occipital lobes, continued close follow-up advised.     Discussed by telephone with patient's nurse, Nina, at 0856,  05/10/2020.     This report was finalized on 5/9/2020 8:59 AM by Dr. Chuck Reed M.D.       CT Head Without Contrast [[id number]] Collected:  05/08/20 0511     Updated:  05/08/20 0520    Narrative:       CT OF THE HEAD WITHOUT CONTRAST     HISTORY: Stroke     COMPARISON: 05/07/2020     TECHNIQUE: Axial CT imaging was obtained from the vertex of the skull  through the skull base. No IV contrast was administered.     FINDINGS:  Area of decreased attenuation is seen in the right territory.  Corresponds to this patient's known acute infarct. There is no  convincing evidence of hemorrhagic transformation. There is some mass  effect upon the occipital horn of the right lateral ventricle. This is  probably stable when compared to prior exam. There is a subacute infarct  seen within the left posterior cerebral artery distribution. Area  of  encephalomalacia is again noted within the right frontal lobe. There is  no midline shift. There is periventricular and deep white matter  microangiopathic change. Prior MRI demonstrates some petechial  hemorrhagic transformation within the subacute left occipital infarct.  This is probably visible on the current CT, although this is very  subtle.       Impression:          1. Evolving infarct identified within the right occipital lobe. No  definite hemorrhagic transformation is seen.     Radiation dose reduction techniques were utilized, including automated  exposure control and exposure modulation based on body size.     This report was finalized on 5/8/2020 5:17 AM by Dr. Lizeth Cortes M.D.       MRI Brain Without Contrast [[id number]] Collected:  05/07/20 0913     Updated:  05/07/20 1208    Narrative:       MRI OF THE BRAIN WITHOUT CONTRAST     CLINICAL HISTORY: Loss of peripheral vision in both eyes.     MRI of the brain was obtained with sagittal T1, axial T1, axial FLAIR,  axial T2, axial diffusion, and axial susceptibility weighted images.     COMPARISON: Comparison is made to previous CT angiogram of the head  dated 05/06/2020.     FINDINGS:     There is an area of restricted diffusion involving the medial aspect of  the right temporal lobe as well as the inferior and medial aspect of the  right occipital lobe within the right PCA distribution. The findings are  compatible with an acute infarct and this infarct measures up to  approximately 9.2 x 3.8 cm in greatest axial dimensions. Additionally,  there is a small area of restricted diffusion within the superior aspect  of the left frontal lobe involving the left superior and middle frontal  gyri measuring up to 7 mm in diameter compatible with a focus of acute  infarction within the left MCA distribution. Within the lateral aspect  of the right frontal lobe, there are findings consistent with a chronic  infarct within the right MCA distribution  involving the right middle  frontal gyrus. This area of encephalomalacia measures up to  approximately 2.2 x 1.3 cm in greatest axial dimensions. There are  findings compatible with a subacute infarct within the left PCA  distribution involving the medial aspect of the left occipital and  parietal lobes. This subacute infarct measures up to approximately 5.5 x  2.6 cm in greatest axial dimensions. On the susceptibility weighted  images, there is subtle susceptibility artifact identified within the  medial aspect of the left occipital lobe within the subacute left PCA  distribution infarct signifying petechial hemorrhagic transformation.  Within the left aspect of the brendan, there are findings compatible with a  chronic infarct which measures up to approximately 4-5 mm in diameter.     Mild changes of chronic small vessel ischemic phenomena are noted.     Otherwise, the ventricles, sulci, and cisterns are age appropriate. The  major intracranial flow related signal voids are unremarkable although  the previously identified known occluded distal right P2 segment is not  well evaluated.     There is mucosal thickening within the right maxillary sinus and  proteinaceous secretions are identified within the right maxillary  sinus. Mucosal thickening is seen within the frontal sinus and the  ethmoid air cells. Incidental note is made of a left mastoid effusion.       Impression:          There are findings consistent with an acute infarct within the right PCA  distribution involving the medial aspects of the right temporal and  occipital lobes as well as the inferior aspect of the right occipital  lobe. Additionally, there is a small acute infarct within the left  superior and middle frontal gyri involving the left MCA distribution. A  subacute infarct within the left PCA distribution is seen involving the  medial aspect of the left occipital and parietal lobes with subtle  petechial hemorrhagic transformation.     A  chronic infarct is seen within the lateral aspect of the right frontal  lobe within the right MCA distribution and there is a chronic infarct  noted within the basis pontis.     Mild changes of chronic small vessel ischemic phenomena are noted.     These findings were discussed with Dwayne Torres on 05/07/2020 at  approximately 7:56 AM.     Prominent changes of inflammatory paranasal sinus disease are identified  within the right maxillary sinus.     This report was finalized on 5/7/2020 12:05 PM by Dr. Nitin Mcnair M.D.       CT Angiogram Head [[id number]] Collected:  05/06/20 1733     Updated:  05/06/20 1802    Narrative:       CT ANGIOGRAM HEAD-, CT HEAD WO CONTRAST-, CT ANGIOGRAM NECK-     INDICATIONS: Stroke     TECHNIQUE: Radiation dose reduction techniques were utilized, including  automated exposure control and exposure modulation based on body  size.Noncontrast head CT was performed, followed by enhanced CT  angiography of the head and neck. Three-dimensional reconstructions were  created, reviewed, and assessed according to NASCET criteria.     COMPARISON: Noncontrast head CT from 05/05/2020     FINDINGS:     No acute intracranial hemorrhage, midline shift or mass effect.     Encephalomalacia is redemonstrated in the right frontal lobe. Subacute  infarct change is again apparent in the left parieto-occipital region,  with gyriform cortical enhancement in this region compatible with  postinfarct enhancement. No abnormal enhancement is noted elsewhere in  the brain. Basal ganglia mineralization is present.     Mild periventricular hypodensity suggests chronic small vessel ischemic  change in a patient this age.           Ventricles, cisterns, cerebral sulci are unremarkable for patient age.     Right inferior orbital wall fracture is redemonstrated, with small  inferior bulging of orbital fat, but no muscular entrapment, no  retrobulbar hematoma. Blood and mucosal thickening in the right  maxillary sinus.   The visualized paranasal sinuses, orbits, mastoid air  cells are otherwise unremarkable.      The CT angiography images show apparent arterial cutoff of the distal  right P2 segment (the side opposite the area of noted subacute infarct),  for example image 220 of the axial sequence series 1, image 91, 92  sagittal series 4. Scattered calcifications in the bilateral cervical  and intracranial carotid arteries, with estimated 50% narrowing at the  origin of the right ICA, estimated 50% narrowing at the supraclinoid  right ICA, estimated 50% narrowing at the origin of the right vertebral  artery, otherwise without high-grade stenosis (0-49% stenosis). The  lower aspect of the innominate artery is obscured by attenuation  artifact from dense venous opacification, limiting its assessment. No  aneurysm, or dissection in the cervical carotid or vertebral arteries,  or in the arteries at the base of the brain.        Partly included lung apices appear clear.     Degenerative changes in the cervical spine, better demonstrated on the  cervical spine CT from earlier today, are also demonstrated on this  exam.          Impression:       1. Apparent arterial cutoff of the distal right P2 segment (the side  opposite the area of noted subacute infarct). Scattered calcifications  in the bilateral cervical and intracranial carotid arteries, with  estimated 50% narrowing at the origin of the right ICA, estimated 50%  narrowing at the supraclinoid right ICA, estimated 50% narrowing at the  origin of the right vertebral artery.        2. No acute intracranial hemorrhage or hydrocephalus. Redemonstration of  right frontal lobe encephalomalacia, and left parieto-occipital subacute  infarct, which shows gyriform cortical enhancement, compatible with  postinfarct change. No abnormal enhancement elsewhere in brain.     For further evaluation, consider MRI.     Discussed by telephone with Dr. Cagle at 1750, Dr. Torres at  1751,  05/06/2020.        This report was finalized on 5/6/2020 5:58 PM by Dr. Chuck Reed M.D.       CT Head Without Contrast [[id number]] Collected:  05/06/20 1733     Updated:  05/06/20 1802    Narrative:       CT ANGIOGRAM HEAD-, CT HEAD WO CONTRAST-, CT ANGIOGRAM NECK-     INDICATIONS: Stroke     TECHNIQUE: Radiation dose reduction techniques were utilized, including  automated exposure control and exposure modulation based on body  size.Noncontrast head CT was performed, followed by enhanced CT  angiography of the head and neck. Three-dimensional reconstructions were  created, reviewed, and assessed according to NASCET criteria.     COMPARISON: Noncontrast head CT from 05/05/2020     FINDINGS:     No acute intracranial hemorrhage, midline shift or mass effect.     Encephalomalacia is redemonstrated in the right frontal lobe. Subacute  infarct change is again apparent in the left parieto-occipital region,  with gyriform cortical enhancement in this region compatible with  postinfarct enhancement. No abnormal enhancement is noted elsewhere in  the brain. Basal ganglia mineralization is present.     Mild periventricular hypodensity suggests chronic small vessel ischemic  change in a patient this age.           Ventricles, cisterns, cerebral sulci are unremarkable for patient age.     Right inferior orbital wall fracture is redemonstrated, with small  inferior bulging of orbital fat, but no muscular entrapment, no  retrobulbar hematoma. Blood and mucosal thickening in the right  maxillary sinus.  The visualized paranasal sinuses, orbits, mastoid air  cells are otherwise unremarkable.      The CT angiography images show apparent arterial cutoff of the distal  right P2 segment (the side opposite the area of noted subacute infarct),  for example image 220 of the axial sequence series 1, image 91, 92  sagittal series 4. Scattered calcifications in the bilateral cervical  and intracranial carotid arteries, with  estimated 50% narrowing at the  origin of the right ICA, estimated 50% narrowing at the supraclinoid  right ICA, estimated 50% narrowing at the origin of the right vertebral  artery, otherwise without high-grade stenosis (0-49% stenosis). The  lower aspect of the innominate artery is obscured by attenuation  artifact from dense venous opacification, limiting its assessment. No  aneurysm, or dissection in the cervical carotid or vertebral arteries,  or in the arteries at the base of the brain.        Partly included lung apices appear clear.     Degenerative changes in the cervical spine, better demonstrated on the  cervical spine CT from earlier today, are also demonstrated on this  exam.          Impression:       1. Apparent arterial cutoff of the distal right P2 segment (the side  opposite the area of noted subacute infarct). Scattered calcifications  in the bilateral cervical and intracranial carotid arteries, with  estimated 50% narrowing at the origin of the right ICA, estimated 50%  narrowing at the supraclinoid right ICA, estimated 50% narrowing at the  origin of the right vertebral artery.        2. No acute intracranial hemorrhage or hydrocephalus. Redemonstration of  right frontal lobe encephalomalacia, and left parieto-occipital subacute  infarct, which shows gyriform cortical enhancement, compatible with  postinfarct change. No abnormal enhancement elsewhere in brain.     For further evaluation, consider MRI.     Discussed by telephone with Dr. Cagle at 1750, Dr. Torres at 1751,  05/06/2020.        This report was finalized on 5/6/2020 5:58 PM by Dr. Chuck Reed M.D.       CT Angiogram Neck [[id number]] Collected:  05/06/20 1733     Updated:  05/06/20 1802    Narrative:       CT ANGIOGRAM HEAD-, CT HEAD WO CONTRAST-, CT ANGIOGRAM NECK-     INDICATIONS: Stroke     TECHNIQUE: Radiation dose reduction techniques were utilized, including  automated exposure control and exposure modulation based  on body  size.Noncontrast head CT was performed, followed by enhanced CT  angiography of the head and neck. Three-dimensional reconstructions were  created, reviewed, and assessed according to NASCET criteria.     COMPARISON: Noncontrast head CT from 05/05/2020     FINDINGS:     No acute intracranial hemorrhage, midline shift or mass effect.     Encephalomalacia is redemonstrated in the right frontal lobe. Subacute  infarct change is again apparent in the left parieto-occipital region,  with gyriform cortical enhancement in this region compatible with  postinfarct enhancement. No abnormal enhancement is noted elsewhere in  the brain. Basal ganglia mineralization is present.     Mild periventricular hypodensity suggests chronic small vessel ischemic  change in a patient this age.           Ventricles, cisterns, cerebral sulci are unremarkable for patient age.     Right inferior orbital wall fracture is redemonstrated, with small  inferior bulging of orbital fat, but no muscular entrapment, no  retrobulbar hematoma. Blood and mucosal thickening in the right  maxillary sinus.  The visualized paranasal sinuses, orbits, mastoid air  cells are otherwise unremarkable.      The CT angiography images show apparent arterial cutoff of the distal  right P2 segment (the side opposite the area of noted subacute infarct),  for example image 220 of the axial sequence series 1, image 91, 92  sagittal series 4. Scattered calcifications in the bilateral cervical  and intracranial carotid arteries, with estimated 50% narrowing at the  origin of the right ICA, estimated 50% narrowing at the supraclinoid  right ICA, estimated 50% narrowing at the origin of the right vertebral  artery, otherwise without high-grade stenosis (0-49% stenosis). The  lower aspect of the innominate artery is obscured by attenuation  artifact from dense venous opacification, limiting its assessment. No  aneurysm, or dissection in the cervical carotid or  "vertebral arteries,  or in the arteries at the base of the brain.        Partly included lung apices appear clear.     Degenerative changes in the cervical spine, better demonstrated on the  cervical spine CT from earlier today, are also demonstrated on this  exam.          Impression:       1. Apparent arterial cutoff of the distal right P2 segment (the side  opposite the area of noted subacute infarct). Scattered calcifications  in the bilateral cervical and intracranial carotid arteries, with  estimated 50% narrowing at the origin of the right ICA, estimated 50%  narrowing at the supraclinoid right ICA, estimated 50% narrowing at the  origin of the right vertebral artery.        2. No acute intracranial hemorrhage or hydrocephalus. Redemonstration of  right frontal lobe encephalomalacia, and left parieto-occipital subacute  infarct, which shows gyriform cortical enhancement, compatible with  postinfarct change. No abnormal enhancement elsewhere in brain.     For further evaluation, consider MRI.     Discussed by telephone with Dr. Cagle at 1750, Dr. Torres at 1751,  05/06/2020.        This report was finalized on 5/6/2020 5:58 PM by Dr. Chuck Reed M.D.           /58   Pulse 67   Temp 97.5 °F (36.4 °C) (Oral)   Resp 18   Ht 162.6 cm (64\")   Wt 83.3 kg (183 lb 9.6 oz)   SpO2 97%   BMI 31.51 kg/m²     Discharge Exam:  General Appearance:    Alert, cooperative, no distress, AAOx3                          Head:    Normocephalic, without obvious abnormality, atraumatic                          Eyes:    PERRL, conjunctiva/corneas clear, EOM's intact, both eyes                        Throat:   Lips, tongue, gums normal; oral mucosa pink and moist                          Neck:   Supple, symmetrical, trachea midline, no JVD                        Lungs:     Decreased breath sounds bilaterally, respirations unlabored                Chest Wall:    No tenderness or deformity                        " Heart:    Regular rate and rhythm, S1 and S2 normal, no murmur,no  Rub                                       or gallop                  Abdomen:     Soft, non-tender, bowel sounds active, no masses, no                                                        organomegaly                  Extremities:   Extremities normal, atraumatic, no cyanosis or edema, pulses                                           palpable in all extremities                             Skin:   Skin is warm and dry,  no rashes or palpable lesions                  Neurologic:   CNII-XII intact, motor strength grossly intact, sensation grossly                                           intact to light touch, no focal deficits noted     Disposition:  Rehab    Patient Instructions:      Discharge Medications      ASK your doctor about these medications      Instructions Start Date   CALCIUM 1000 + D PO   1 tablet, Oral, Daily      CeleBREX 100 MG capsule  Generic drug:  celecoxib   100 mg, Oral, Nightly      EPINEPHrine 0.3 MG/0.3ML solution auto-injector injection  Commonly known as:  EPIPEN   Once As Needed      fexofenadine 60 MG tablet  Commonly known as:  ALLEGRA   60 mg, Oral, Daily      furosemide 20 MG tablet  Commonly known as:  LASIX   TAKE ONE TABLET BY MOUTH DAILY AS NEEDED      hydrALAZINE 50 MG tablet  Commonly known as:  APRESOLINE   50 mg, Oral, 3 Times Daily      L-Lysine 500 MG tablet tablet   1 tablet, Oral, Daily      lisinopril 40 MG tablet  Commonly known as:  PRINIVIL,ZESTRIL   40 mg, Oral, Every 24 Hours Scheduled      Magnesium Oxide 500 MG capsule   1,000 mg, Oral, Daily      metFORMIN 500 MG tablet  Commonly known as:  GLUCOPHAGE   500 mg, Oral, 2 Times Daily With Meals      montelukast 10 MG tablet  Commonly known as:  SINGULAIR   10 mg, Oral, Nightly      MULTIVITAMINS PO   1 tablet, Oral, Daily      niacin 100 MG tablet   100 mg, Oral, Daily With Breakfast      PLAVIX PO   75 mg, Oral, Daily      potassium chloride 10 MEQ  CR tablet  Commonly known as:  K-DUR,KLOR-CON   TAKE ONE TABLET BY MOUTH DAILY AS NEEDED      spironolactone 50 MG tablet  Commonly known as:  ALDACTONE   50 mg, Oral, Daily      traMADol 50 MG tablet  Commonly known as:  ULTRAM   1 tablet, Oral, Daily PRN, Patient usually takes 1/2 tab and takes very seldomly      verapamil  MG CR tablet  Commonly known as:  CALAN-SR   240 mg, Oral, Nightly      verapamil  MG CR tablet  Commonly known as:  CALAN-SR   TAKE ONE TABLET BY MOUTH EVERY MORNING      vitamin B-12 1000 MCG tablet  Commonly known as:  CYANOCOBALAMIN   1 tablet, Oral, Daily, As directed             Future Appointments   Date Time Provider Department Center   5/26/2020  2:00 PM [facility] U[facility] CT 1  [facility] CT E [facility]   5/27/2020  2:00 PM Lizz Arenas APRN [facility]   8/12/2020  3:00 PM Chani Cortes APRN [facility]   9/2/2020  2:00 PM Monserrat Marsh MD [facility] None     Follow-up Information     Tomás Mcneill MD Follow up.    Specialty:  Neurosurgery  Why:  keep appointment for outpatient head CT scheduled May 26.  Follow-up in Dr. Mcneill's office on May 27, 2020 at 2 PM.  Contact information:  [address]  [place] [id number] [phone]             Karla Mora MD .    Specialty:  Pediatrics  Contact information:  [address] [facility] [id number] [phone]                 Discharge Order (From admission, onward)     Start     Ordered    05/12/20 1428  Discharge readmit patient  Once     Expected Discharge Date:  05/12/20    Discharge Disposition:  Rehab Facility or Unit ([facility]note 3[facility] - [facility] of hospitals in [place] [facility])    Physician of Record for Attribution - Please select from Treatment Team:  ESTEFANI PORTILLO [[id number]]    Review needed by CMO to determine Physician of Record:  No       Question Answer Comment   Physician of Record for Attribution - Please select from Treatment Team ESTEFANI PORTILLO    Review needed by CMO to determine Physician of Record No         05/12/20 1428    05/11/20 1414  Discharge readmit patient  Once,   Status:  Canceled     Expected Discharge Date:  05/11/20    Discharge Disposition:  Rehab Facility or Unit ([facility])    Physician of Record for Attribution - Please select from Treatment Team:  MACRINA PORTILLO [[id number]]    Review needed by CMO to determine Physician of Record:  No       Question Answer Comment   Physician of Record for Attribution - Please select from Treatment Team MACRINA PORTILLO    Review needed by CMO to determine Physician of Record No        05/11/20 1414                Total time spent discharging patient including evaluation,post hospitalization follow up,  medication and post hospitalization instructions and education total time exceeds 30 minutes.    Signed:  Macrina Portillo MD  5/12/2020  14:29

## 2020-05-12 NOTE — PLAN OF CARE
Pt currenlty stable.  Vision changes are the same.  Pt to be discharged to [facility] today. Will continue to monitor

## 2020-05-12 NOTE — PROGRESS NOTES
Continued Stay Note  [facility]     Patient Name: Gela Thompson  MRN: [id number]  Today's Date: 5/12/2020    Admit Date: 5/6/2020    Discharge Plan     Row Name 05/12/20 0928       Plan    Plan  [facility]- admit later today once BM and Cardio have been addressed    Patient/Family in Agreement with Plan  yes    Plan Comments  Spoke with April/[facility], before pt able to transfer a few things will need to be addressed, BM and Cardiology recommendations for HR/ BP meds. Spoke with Monserrat COLLADO ccp hospitalist coordinator/manager to notify Dr. Aden. Spoke with CARO Hoyos RN/CCP        Discharge Codes    No documentation.       Expected Discharge Date and Time     Expected Discharge Date Expected Discharge Time    May 12, 2020             Tamia Ivory RN

## 2020-05-12 NOTE — THERAPY TREATMENT NOTE
Patient Name: Gela Thompson  : 1934    MRN: [id number]                              Today's Date: 2020       Admit Date: 2020    Visit Dx:     ICD-10-CM ICD-9-CM   1. Cerebrovascular accident (CVA), unspecified mechanism (CMS/HCC) I63.9 434.91   2. Ataxia R27.0 781.3   3. Generalized weakness R53.1 780.79     Patient Active Problem List   Diagnosis   • Anemia   • Chronic low back pain   • Diabetes mellitus (CMS/HCC)   • Dyslipidemia   • Gastroesophageal reflux disease   • Hypertension   • Mitral valve insufficiency   • Osteoarthritis   • Adiposity   • Obstructive sleep apnea syndrome   • Cerebrovascular accident (CMS/HCC)   • Uncontrolled hypertension   • ROBERTO (acute kidney injury) (CMS/HCC)   • Hyperkalemia   • Anemia, macrocytic   • DM II (diabetes mellitus, type II), controlled (CMS/HCC)   • HTN (hypertension)   • Atrial fibrillation (CMS/HCC)     Past Medical History:   Diagnosis Date   • Abnormal echocardiogram    • Adiposity    • Anemia    • Cerebrovascular accident (CMS/HCC)    • Chest pain    • Chronic low back pain    • Diabetes mellitus (CMS/HCC)    • Dyslipidemia    • Dyspnea    • Gastroesophageal reflux disease    • H/O echocardiogram 2014   • Health care maintenance    • History of EKG 10/29/2015   • Hyperlipidemia    • Hypertension    • Mitral valve insufficiency    • Murmur    • ROMAN (obstructive sleep apnea)    • Osteoarthritis      Past Surgical History:   Procedure Laterality Date   • CHOLECYSTECTOMY     • HYSTERECTOMY     • KNEE SURGERY Bilateral     TKA   • NEUROPLASTY      deompression median nerve at carpal tunnel bilateral   • SHOULDER SURGERY Left      General Information     Row Name 20 0934          PT Evaluation Time/Intention    Document Type  therapy note (daily note)  -PC     Mode of Treatment  physical therapy  -PC     Row Name 20 0934          Cognitive Assessment/Intervention- PT/OT    Orientation Status (Cognition)  oriented x 4  -PC     Row Name  05/12/20 0934          Safety Issues, Functional Mobility    Impairments Affecting Function (Mobility)  visual/perceptual  -PC       User Key  (r) = Recorded By, (t) = Taken By, (c) = Cosigned By    Initials Name Provider Type    PC Zoë Love, PT Physical Therapist        Mobility     Row Name 05/12/20 0934          Bed Mobility Assessment/Treatment    Supine-Sit [place] (Bed Mobility)  supervision  -PC     Assistive Device (Bed Mobility)  bed rails  -PC     Row Name 05/12/20 0934          Sit-Stand Transfer    Sit-Stand [place] (Transfers)  contact guard;supervision  -PC     Assistive Device (Sit-Stand Transfers)  walker, 4-wheeled  -PC     Row Name 05/12/20 0934          Gait/Stairs Assessment/Training    Gait/Stairs Assessment/Training  gait/ambulation independence;gait/ambulation assistive device  -PC     Pi[place] Level (Gait)  minimum assist (75% patient effort);contact guard;verbal cues;nonverbal cues (demo/gesture)  -PC     Assistive Device (Gait)  walker, 4-wheeled  -PC     Distance in Feet (Gait)  300 ft, pt needed frequent verbal cues, running into objects, assist to steer walker  -PC     Pattern (Gait)  step-through  -PC     Deviations/Abnormal Patterns (Gait)  gait speed decreased  -PC     Right Sided Gait Deviations  heel strike decreased;foot drop/toe drag  -PC       User Key  (r) = Recorded By, (t) = Taken By, (c) = Cosigned By    Initials Name Provider Type    PC Zoë Love, PT Physical Therapist        Obj/Interventions    No documentation.       Goals/Plan    No documentation.       Clinical Impression     Row Name 05/12/20 0937          Plan of Care Review    Plan of Care Reviewed With  patient  -PC     Progress  improving  -PC     Outcome Summary  pt cont to show progress with mobility, cont to have significant visual deficit  -PC     Row Name 05/12/20 0937          Positioning and Restraints    Pre-Treatment Position  in bed  -PC     Post Treatment Position  chair  -PC      In Chair  reclined;call light within reach;encouraged to call for assist;exit alarm on  -PC       User Key  (r) = Recorded By, (t) = Taken By, (c) = Cosigned By    Initials Name Provider Type    PC Zoë Love PT Physical Therapist        Outcome Measures     Row Name 05/12/20 0938          How much help from another person do you currently need...    Turning from your back to your side while in flat bed without using bedrails?  4  -PC     Moving from lying on back to sitting on the side of a flat bed without bedrails?  4  -PC     Moving to and from a bed to a chair (including a wheelchair)?  3  -PC     Standing up from a chair using your arms (e.g., wheelchair, bedside chair)?  3  -PC     Climbing 3-5 steps with a railing?  3  -PC     To walk in hospital room?  3  -PC     AM-PAC 6 Clicks Score (PT)  20  -PC       User Key  (r) = Recorded By, (t) = Taken By, (c) = Cosigned By    Initials Name Provider Type    PC Zoë Love PT Physical Therapist        Physical Therapy Education                 Title: PT OT SLP Therapies (In Progress)     Topic: Physical Therapy (In Progress)     Point: Mobility training (Done)     Description:   Instruct learner(s) on safety and technique for assisting patient out of bed, chair or wheelchair.  Instruct in the proper use of assistive devices, such as walker, crutches, cane or brace.              Patient Friendly Description:   It's important to get you on your feet again, but we need to do so in a way that is safe for you. Falling has serious consequences, and your personal safety is the most important thing of all.        When it's time to get out of bed, one of us or a family member will sit next to you on the bed to give you support.     If your doctor or nurse tells you to use a walker, crutches, a cane, or a brace, be sure you use it every time you get out of bed, even if you think you don't need it.    Learning Progress Summary           Patient Acceptance, E,D,  DU,NR by  at 5/12/2020 0938    Acceptance, E,D, NR by  at 5/11/2020 1001    Acceptance, E,TB,D, VU,DU,NR by  at 5/10/2020 2341    Acceptance, E,D, VU,DU by  at 5/9/2020 1212    Acceptance, E, NR by  at 5/8/2020 0959    Acceptance, E, NR by  at 5/7/2020 1031                   Point: Home exercise program (In Progress)     Description:   Instruct learner(s) on appropriate technique for monitoring, assisting and/or progressing patient with therapeutic exercises and activities.              Learning Progress Summary           Patient Acceptance, E,D, NR by  at 5/11/2020 1001    Acceptance, E,TB,D, VU,DU,NR by  at 5/10/2020 2341    Acceptance, E,D, VU,DU by  at 5/9/2020 1212    Acceptance, E, NR by  at 5/8/2020 0959    Acceptance, E, NR by  at 5/7/2020 1031                   Point: Body mechanics (In Progress)     Description:   Instruct learner(s) on proper positioning and spine alignment for patient and/or caregiver during mobility tasks and/or exercises.              Learning Progress Summary           Patient Acceptance, E,D, NR by  at 5/11/2020 1001    Acceptance, E,TB,D, VU,DU,NR by  at 5/10/2020 2341    Acceptance, E,D, VU,DU by  at 5/9/2020 1212                   Point: Precautions (In Progress)     Description:   Instruct learner(s) on prescribed precautions during mobility and gait tasks              Learning Progress Summary           Patient Acceptance, E,D, NR by  at 5/11/2020 1001    Acceptance, E,TB,D, VU,DU,NR by  at 5/10/2020 2341    Acceptance, E,D, VU,DU by  at 5/9/2020 1212                               User Key     Initials Effective Dates Name Provider Type Discipline     04/03/18 -  Renetta Calvo, PT Physical Therapist PT     04/03/18 -  Zoë Love, PT Physical Therapist PT     04/06/17 -  Ingrid Price, RN Registered Nurse Nurse    PH 08/20/19 -  Lisbet Gomez PTA Physical Therapy Assistant PT              PT Recommendation and Plan     Outcome  Summary/Treatment Plan (PT)  Anticipated Discharge Disposition (PT): inpatient rehabilitation facility  Plan of Care Reviewed With: patient  Progress: improving  Outcome Summary: pt cont to show progress with mobility, cont to have significant visual deficit     Time Calculation:   PT Charges     Row Name 05/12/20 0939             Time Calculation    Start Time  0905  -PC      Stop Time  0929  -PC      Time Calculation (min)  24 min  -PC      PT Received On  05/12/20  -PC      PT - Next Appointment  05/13/20  -PC        User Key  (r) = Recorded By, (t) = Taken By, (c) = Cosigned By    Initials Name Provider Type    PC Zoë Love, PT Physical Therapist        Therapy Charges for Today     Code Description Service Date Service Provider Modifiers Qty    86522519544 HC PT THER PROC EA 15 MIN 5/11/2020 Zoë Love, PT GP 1    35708127283 HC PT THER PROC EA 15 MIN 5/12/2020 Zoë Love, PT GP 2          PT G-Codes  Outcome Measure Options: AM-PAC 6 Clicks Daily Activity (OT)  AM-PAC 6 Clicks Score (PT): 20  AM-PAC 6 Clicks Score (OT): 16  Modified Patrice Scale: 3 - Moderate disability.  Requiring some help, but able to walk without assistance.    Zoë Love, PALMA  5/12/2020

## 2020-05-12 NOTE — PROGRESS NOTES
Case Management Discharge Note      Final Note: [facility] today    Provided Post Acute Provider List?: N/A  N/A Provider List Comment: Patient declined at this time  Provided Post Acute Provider Quality & Resource List?: N/A  N/A Quality & Resource List Comment: Patient declined at this time    Destination      No service has been selected for the patient.      Durable Medical Equipment      No service has been selected for the patient.      Dialysis/Infusion      No service has been selected for the patient.      Home Medical Care      No service has been selected for the patient.      Therapy      No service has been selected for the patient.      Community Resources      No service has been selected for the patient.

## 2020-05-13 PROBLEM — I63.9 STROKE (CEREBRUM): Status: ACTIVE | Noted: 2020-05-13

## 2020-05-13 LAB
25(OH)D3 SERPL-MCNC: 39.6 NG/ML (ref 30–100)
GLUCOSE BLDC GLUCOMTR-MCNC: 109 MG/DL (ref 70–130)
GLUCOSE BLDC GLUCOMTR-MCNC: 134 MG/DL (ref 70–130)
GLUCOSE BLDC GLUCOMTR-MCNC: 152 MG/DL (ref 70–130)
GLUCOSE BLDC GLUCOMTR-MCNC: 166 MG/DL (ref 70–130)

## 2020-05-13 PROCEDURE — 97110 THERAPEUTIC EXERCISES: CPT

## 2020-05-13 PROCEDURE — 82306 VITAMIN D 25 HYDROXY: CPT | Performed by: PHYSICAL MEDICINE & REHABILITATION

## 2020-05-13 PROCEDURE — 92523 SPEECH SOUND LANG COMPREHEN: CPT

## 2020-05-13 PROCEDURE — 97166 OT EVAL MOD COMPLEX 45 MIN: CPT

## 2020-05-13 PROCEDURE — 97535 SELF CARE MNGMENT TRAINING: CPT

## 2020-05-13 PROCEDURE — 82962 GLUCOSE BLOOD TEST: CPT

## 2020-05-13 PROCEDURE — 97161 PT EVAL LOW COMPLEX 20 MIN: CPT

## 2020-05-13 RX ADMIN — HYDRALAZINE HYDROCHLORIDE 50 MG: 50 TABLET, FILM COATED ORAL at 22:23

## 2020-05-13 RX ADMIN — VERAPAMIL HYDROCHLORIDE 120 MG: 120 TABLET, FILM COATED, EXTENDED RELEASE ORAL at 07:29

## 2020-05-13 RX ADMIN — ASPIRIN 325 MG: 325 TABLET ORAL at 07:30

## 2020-05-13 RX ADMIN — HYDRALAZINE HYDROCHLORIDE 50 MG: 50 TABLET, FILM COATED ORAL at 07:30

## 2020-05-13 RX ADMIN — VERAPAMIL HYDROCHLORIDE 240 MG: 240 TABLET, FILM COATED, EXTENDED RELEASE ORAL at 22:23

## 2020-05-13 RX ADMIN — HYDRALAZINE HYDROCHLORIDE 50 MG: 50 TABLET, FILM COATED ORAL at 16:45

## 2020-05-13 RX ADMIN — Medication 1000 MCG: at 07:30

## 2020-05-14 LAB
GLUCOSE BLDC GLUCOMTR-MCNC: 104 MG/DL (ref 70–130)
GLUCOSE BLDC GLUCOMTR-MCNC: 123 MG/DL (ref 70–130)
GLUCOSE BLDC GLUCOMTR-MCNC: 141 MG/DL (ref 70–130)
GLUCOSE BLDC GLUCOMTR-MCNC: 185 MG/DL (ref 70–130)

## 2020-05-14 PROCEDURE — 82962 GLUCOSE BLOOD TEST: CPT

## 2020-05-14 PROCEDURE — 97110 THERAPEUTIC EXERCISES: CPT

## 2020-05-14 PROCEDURE — 93005 ELECTROCARDIOGRAM TRACING: CPT | Performed by: PHYSICAL MEDICINE & REHABILITATION

## 2020-05-14 PROCEDURE — 93010 ELECTROCARDIOGRAM REPORT: CPT | Performed by: INTERNAL MEDICINE

## 2020-05-14 PROCEDURE — 97535 SELF CARE MNGMENT TRAINING: CPT

## 2020-05-14 RX ORDER — CYCLOBENZAPRINE HCL 10 MG
5 TABLET ORAL 3 TIMES DAILY PRN
Status: DISCONTINUED | OUTPATIENT
Start: 2020-05-14 | End: 2020-05-23

## 2020-05-14 RX ORDER — CYCLOBENZAPRINE HCL 10 MG
5 TABLET ORAL ONCE
Status: COMPLETED | OUTPATIENT
Start: 2020-05-14 | End: 2020-05-14

## 2020-05-14 RX ADMIN — CYCLOBENZAPRINE 5 MG: 10 TABLET, FILM COATED ORAL at 20:41

## 2020-05-14 RX ADMIN — HYDRALAZINE HYDROCHLORIDE 50 MG: 50 TABLET, FILM COATED ORAL at 08:52

## 2020-05-14 RX ADMIN — Medication 1000 MCG: at 08:52

## 2020-05-14 RX ADMIN — HYDRALAZINE HYDROCHLORIDE 50 MG: 50 TABLET, FILM COATED ORAL at 20:41

## 2020-05-14 RX ADMIN — VERAPAMIL HYDROCHLORIDE 120 MG: 120 TABLET, FILM COATED, EXTENDED RELEASE ORAL at 08:52

## 2020-05-14 RX ADMIN — CYCLOBENZAPRINE 5 MG: 10 TABLET, FILM COATED ORAL at 03:40

## 2020-05-14 RX ADMIN — VERAPAMIL HYDROCHLORIDE 240 MG: 240 TABLET, FILM COATED, EXTENDED RELEASE ORAL at 20:41

## 2020-05-14 RX ADMIN — ASPIRIN 325 MG: 325 TABLET ORAL at 08:52

## 2020-05-14 RX ADMIN — HYDRALAZINE HYDROCHLORIDE 50 MG: 50 TABLET, FILM COATED ORAL at 16:16

## 2020-05-15 ENCOUNTER — APPOINTMENT (OUTPATIENT)
Dept: CT IMAGING | Facility: HOSPITAL | Age: 85
End: 2020-05-15

## 2020-05-15 LAB
GLUCOSE BLDC GLUCOMTR-MCNC: 103 MG/DL (ref 70–130)
GLUCOSE BLDC GLUCOMTR-MCNC: 132 MG/DL (ref 70–130)
GLUCOSE BLDC GLUCOMTR-MCNC: 182 MG/DL (ref 70–130)
GLUCOSE BLDC GLUCOMTR-MCNC: 93 MG/DL (ref 70–130)

## 2020-05-15 PROCEDURE — 97535 SELF CARE MNGMENT TRAINING: CPT

## 2020-05-15 PROCEDURE — 97130 THER IVNTJ EA ADDL 15 MIN: CPT

## 2020-05-15 PROCEDURE — 97129 THER IVNTJ 1ST 15 MIN: CPT

## 2020-05-15 PROCEDURE — 82962 GLUCOSE BLOOD TEST: CPT

## 2020-05-15 PROCEDURE — 97110 THERAPEUTIC EXERCISES: CPT

## 2020-05-15 RX ORDER — BISACODYL 10 MG
10 SUPPOSITORY, RECTAL RECTAL DAILY PRN
Status: DISCONTINUED | OUTPATIENT
Start: 2020-05-15 | End: 2020-05-27 | Stop reason: HOSPADM

## 2020-05-15 RX ORDER — AMOXICILLIN 250 MG
1 CAPSULE ORAL 2 TIMES DAILY
Status: DISCONTINUED | OUTPATIENT
Start: 2020-05-15 | End: 2020-05-27 | Stop reason: HOSPADM

## 2020-05-15 RX ADMIN — VERAPAMIL HYDROCHLORIDE 240 MG: 240 TABLET, FILM COATED, EXTENDED RELEASE ORAL at 20:18

## 2020-05-15 RX ADMIN — HYDRALAZINE HYDROCHLORIDE 50 MG: 50 TABLET, FILM COATED ORAL at 08:03

## 2020-05-15 RX ADMIN — VERAPAMIL HYDROCHLORIDE 120 MG: 120 TABLET, FILM COATED, EXTENDED RELEASE ORAL at 08:03

## 2020-05-15 RX ADMIN — HYDRALAZINE HYDROCHLORIDE 50 MG: 50 TABLET, FILM COATED ORAL at 16:40

## 2020-05-15 RX ADMIN — DOCUSATE SODIUM 50MG AND SENNOSIDES 8.6MG 1 TABLET: 8.6; 5 TABLET, FILM COATED ORAL at 20:18

## 2020-05-15 RX ADMIN — HYDRALAZINE HYDROCHLORIDE 50 MG: 50 TABLET, FILM COATED ORAL at 20:18

## 2020-05-15 RX ADMIN — CYCLOBENZAPRINE 5 MG: 10 TABLET, FILM COATED ORAL at 21:32

## 2020-05-15 RX ADMIN — Medication 1000 MCG: at 08:03

## 2020-05-15 RX ADMIN — ASPIRIN 325 MG: 325 TABLET ORAL at 08:03

## 2020-05-15 RX ADMIN — Medication 400 MG: at 12:47

## 2020-05-15 RX ADMIN — CYCLOBENZAPRINE 5 MG: 10 TABLET, FILM COATED ORAL at 08:03

## 2020-05-16 ENCOUNTER — APPOINTMENT (OUTPATIENT)
Dept: CT IMAGING | Facility: HOSPITAL | Age: 85
End: 2020-05-16

## 2020-05-16 LAB
GLUCOSE BLDC GLUCOMTR-MCNC: 109 MG/DL (ref 70–130)
GLUCOSE BLDC GLUCOMTR-MCNC: 123 MG/DL (ref 70–130)
GLUCOSE BLDC GLUCOMTR-MCNC: 128 MG/DL (ref 70–130)
GLUCOSE BLDC GLUCOMTR-MCNC: 142 MG/DL (ref 70–130)

## 2020-05-16 PROCEDURE — 70450 CT HEAD/BRAIN W/O DYE: CPT

## 2020-05-16 PROCEDURE — 97110 THERAPEUTIC EXERCISES: CPT

## 2020-05-16 PROCEDURE — 97535 SELF CARE MNGMENT TRAINING: CPT

## 2020-05-16 PROCEDURE — 97129 THER IVNTJ 1ST 15 MIN: CPT

## 2020-05-16 PROCEDURE — 82962 GLUCOSE BLOOD TEST: CPT

## 2020-05-16 RX ADMIN — DOCUSATE SODIUM 50MG AND SENNOSIDES 8.6MG 1 TABLET: 8.6; 5 TABLET, FILM COATED ORAL at 20:14

## 2020-05-16 RX ADMIN — VERAPAMIL HYDROCHLORIDE 240 MG: 240 TABLET, FILM COATED, EXTENDED RELEASE ORAL at 20:15

## 2020-05-16 RX ADMIN — Medication 1000 MCG: at 07:36

## 2020-05-16 RX ADMIN — HYDRALAZINE HYDROCHLORIDE 50 MG: 50 TABLET, FILM COATED ORAL at 17:50

## 2020-05-16 RX ADMIN — Medication 400 MG: at 07:36

## 2020-05-16 RX ADMIN — HYDRALAZINE HYDROCHLORIDE 50 MG: 50 TABLET, FILM COATED ORAL at 07:37

## 2020-05-16 RX ADMIN — VERAPAMIL HYDROCHLORIDE 120 MG: 120 TABLET, FILM COATED, EXTENDED RELEASE ORAL at 07:36

## 2020-05-16 RX ADMIN — ASPIRIN 325 MG: 325 TABLET ORAL at 07:37

## 2020-05-16 RX ADMIN — HYDRALAZINE HYDROCHLORIDE 50 MG: 50 TABLET, FILM COATED ORAL at 20:14

## 2020-05-17 LAB
GLUCOSE BLDC GLUCOMTR-MCNC: 114 MG/DL (ref 70–130)
GLUCOSE BLDC GLUCOMTR-MCNC: 142 MG/DL (ref 70–130)
GLUCOSE BLDC GLUCOMTR-MCNC: 183 MG/DL (ref 70–130)
GLUCOSE BLDC GLUCOMTR-MCNC: 199 MG/DL (ref 70–130)

## 2020-05-17 PROCEDURE — 99232 SBSQ HOSP IP/OBS MODERATE 35: CPT | Performed by: NURSE PRACTITIONER

## 2020-05-17 PROCEDURE — 99231 SBSQ HOSP IP/OBS SF/LOW 25: CPT | Performed by: NURSE PRACTITIONER

## 2020-05-17 PROCEDURE — 82962 GLUCOSE BLOOD TEST: CPT

## 2020-05-17 RX ADMIN — Medication 400 MG: at 07:39

## 2020-05-17 RX ADMIN — ASPIRIN 325 MG: 325 TABLET ORAL at 07:39

## 2020-05-17 RX ADMIN — HYDRALAZINE HYDROCHLORIDE 50 MG: 50 TABLET, FILM COATED ORAL at 07:39

## 2020-05-17 RX ADMIN — VERAPAMIL HYDROCHLORIDE 240 MG: 240 TABLET, FILM COATED, EXTENDED RELEASE ORAL at 21:12

## 2020-05-17 RX ADMIN — HYDRALAZINE HYDROCHLORIDE 50 MG: 50 TABLET, FILM COATED ORAL at 16:00

## 2020-05-17 RX ADMIN — Medication 1000 MCG: at 07:39

## 2020-05-17 RX ADMIN — VERAPAMIL HYDROCHLORIDE 120 MG: 120 TABLET, FILM COATED, EXTENDED RELEASE ORAL at 07:39

## 2020-05-17 RX ADMIN — HYDRALAZINE HYDROCHLORIDE 50 MG: 50 TABLET, FILM COATED ORAL at 21:12

## 2020-05-18 LAB
GLUCOSE BLDC GLUCOMTR-MCNC: 120 MG/DL (ref 70–130)
GLUCOSE BLDC GLUCOMTR-MCNC: 127 MG/DL (ref 70–130)
GLUCOSE BLDC GLUCOMTR-MCNC: 130 MG/DL (ref 70–130)
GLUCOSE BLDC GLUCOMTR-MCNC: 147 MG/DL (ref 70–130)

## 2020-05-18 PROCEDURE — 97535 SELF CARE MNGMENT TRAINING: CPT

## 2020-05-18 PROCEDURE — 97110 THERAPEUTIC EXERCISES: CPT

## 2020-05-18 PROCEDURE — 97530 THERAPEUTIC ACTIVITIES: CPT

## 2020-05-18 PROCEDURE — 82962 GLUCOSE BLOOD TEST: CPT

## 2020-05-18 PROCEDURE — 97129 THER IVNTJ 1ST 15 MIN: CPT

## 2020-05-18 PROCEDURE — 97130 THER IVNTJ EA ADDL 15 MIN: CPT

## 2020-05-18 RX ADMIN — HYDRALAZINE HYDROCHLORIDE 50 MG: 50 TABLET, FILM COATED ORAL at 08:30

## 2020-05-18 RX ADMIN — Medication 1000 MCG: at 08:31

## 2020-05-18 RX ADMIN — HYDRALAZINE HYDROCHLORIDE 50 MG: 50 TABLET, FILM COATED ORAL at 20:10

## 2020-05-18 RX ADMIN — VERAPAMIL HYDROCHLORIDE 240 MG: 240 TABLET, FILM COATED, EXTENDED RELEASE ORAL at 20:10

## 2020-05-18 RX ADMIN — VERAPAMIL HYDROCHLORIDE 120 MG: 120 TABLET, FILM COATED, EXTENDED RELEASE ORAL at 08:31

## 2020-05-18 RX ADMIN — HYDRALAZINE HYDROCHLORIDE 50 MG: 50 TABLET, FILM COATED ORAL at 16:46

## 2020-05-18 RX ADMIN — DOCUSATE SODIUM 50MG AND SENNOSIDES 8.6MG 1 TABLET: 8.6; 5 TABLET, FILM COATED ORAL at 08:30

## 2020-05-18 RX ADMIN — ASPIRIN 325 MG: 325 TABLET ORAL at 08:31

## 2020-05-18 RX ADMIN — Medication 400 MG: at 08:31

## 2020-05-19 LAB
GLUCOSE BLDC GLUCOMTR-MCNC: 117 MG/DL (ref 70–130)
GLUCOSE BLDC GLUCOMTR-MCNC: 135 MG/DL (ref 70–130)
GLUCOSE BLDC GLUCOMTR-MCNC: 138 MG/DL (ref 70–130)
GLUCOSE BLDC GLUCOMTR-MCNC: 193 MG/DL (ref 70–130)

## 2020-05-19 PROCEDURE — 97110 THERAPEUTIC EXERCISES: CPT

## 2020-05-19 PROCEDURE — 97535 SELF CARE MNGMENT TRAINING: CPT

## 2020-05-19 PROCEDURE — 97130 THER IVNTJ EA ADDL 15 MIN: CPT

## 2020-05-19 PROCEDURE — 97129 THER IVNTJ 1ST 15 MIN: CPT

## 2020-05-19 PROCEDURE — 82962 GLUCOSE BLOOD TEST: CPT

## 2020-05-19 RX ADMIN — CYCLOBENZAPRINE 5 MG: 10 TABLET, FILM COATED ORAL at 01:22

## 2020-05-19 RX ADMIN — HYDRALAZINE HYDROCHLORIDE 50 MG: 50 TABLET, FILM COATED ORAL at 08:56

## 2020-05-19 RX ADMIN — VERAPAMIL HYDROCHLORIDE 240 MG: 240 TABLET, FILM COATED, EXTENDED RELEASE ORAL at 19:54

## 2020-05-19 RX ADMIN — Medication 1000 MCG: at 08:56

## 2020-05-19 RX ADMIN — HYDRALAZINE HYDROCHLORIDE 50 MG: 50 TABLET, FILM COATED ORAL at 19:54

## 2020-05-19 RX ADMIN — DOCUSATE SODIUM 50MG AND SENNOSIDES 8.6MG 1 TABLET: 8.6; 5 TABLET, FILM COATED ORAL at 19:53

## 2020-05-19 RX ADMIN — Medication 400 MG: at 08:56

## 2020-05-19 RX ADMIN — VERAPAMIL HYDROCHLORIDE 120 MG: 120 TABLET, FILM COATED, EXTENDED RELEASE ORAL at 08:57

## 2020-05-19 RX ADMIN — HYDRALAZINE HYDROCHLORIDE 50 MG: 50 TABLET, FILM COATED ORAL at 16:04

## 2020-05-19 RX ADMIN — ASPIRIN 325 MG: 325 TABLET ORAL at 08:56

## 2020-05-20 LAB
GLUCOSE BLDC GLUCOMTR-MCNC: 128 MG/DL (ref 70–130)
GLUCOSE BLDC GLUCOMTR-MCNC: 144 MG/DL (ref 70–130)
GLUCOSE BLDC GLUCOMTR-MCNC: 148 MG/DL (ref 70–130)
GLUCOSE BLDC GLUCOMTR-MCNC: 91 MG/DL (ref 70–130)

## 2020-05-20 PROCEDURE — 97130 THER IVNTJ EA ADDL 15 MIN: CPT

## 2020-05-20 PROCEDURE — 97535 SELF CARE MNGMENT TRAINING: CPT

## 2020-05-20 PROCEDURE — 97129 THER IVNTJ 1ST 15 MIN: CPT

## 2020-05-20 PROCEDURE — 97110 THERAPEUTIC EXERCISES: CPT

## 2020-05-20 PROCEDURE — 82962 GLUCOSE BLOOD TEST: CPT

## 2020-05-20 RX ADMIN — HYDRALAZINE HYDROCHLORIDE 50 MG: 50 TABLET, FILM COATED ORAL at 13:47

## 2020-05-20 RX ADMIN — ASPIRIN 325 MG: 325 TABLET ORAL at 07:59

## 2020-05-20 RX ADMIN — Medication 1000 MCG: at 08:00

## 2020-05-20 RX ADMIN — HYDRALAZINE HYDROCHLORIDE 50 MG: 50 TABLET, FILM COATED ORAL at 07:59

## 2020-05-20 RX ADMIN — VERAPAMIL HYDROCHLORIDE 240 MG: 240 TABLET, FILM COATED, EXTENDED RELEASE ORAL at 20:49

## 2020-05-20 RX ADMIN — VERAPAMIL HYDROCHLORIDE 120 MG: 120 TABLET, FILM COATED, EXTENDED RELEASE ORAL at 07:59

## 2020-05-20 RX ADMIN — HYDRALAZINE HYDROCHLORIDE 50 MG: 50 TABLET, FILM COATED ORAL at 20:49

## 2020-05-20 RX ADMIN — Medication 400 MG: at 07:59

## 2020-05-20 RX ADMIN — CYCLOBENZAPRINE 5 MG: 10 TABLET, FILM COATED ORAL at 23:40

## 2020-05-21 LAB
GLUCOSE BLDC GLUCOMTR-MCNC: 114 MG/DL (ref 70–130)
GLUCOSE BLDC GLUCOMTR-MCNC: 130 MG/DL (ref 70–130)
GLUCOSE BLDC GLUCOMTR-MCNC: 150 MG/DL (ref 70–130)
GLUCOSE BLDC GLUCOMTR-MCNC: 203 MG/DL (ref 70–130)

## 2020-05-21 PROCEDURE — 97110 THERAPEUTIC EXERCISES: CPT

## 2020-05-21 PROCEDURE — 97535 SELF CARE MNGMENT TRAINING: CPT

## 2020-05-21 PROCEDURE — 82962 GLUCOSE BLOOD TEST: CPT

## 2020-05-21 PROCEDURE — 97130 THER IVNTJ EA ADDL 15 MIN: CPT

## 2020-05-21 PROCEDURE — 97129 THER IVNTJ 1ST 15 MIN: CPT

## 2020-05-21 RX ADMIN — HYDRALAZINE HYDROCHLORIDE 50 MG: 50 TABLET, FILM COATED ORAL at 08:06

## 2020-05-21 RX ADMIN — Medication 1000 MCG: at 08:06

## 2020-05-21 RX ADMIN — VERAPAMIL HYDROCHLORIDE 120 MG: 120 TABLET, FILM COATED, EXTENDED RELEASE ORAL at 08:06

## 2020-05-21 RX ADMIN — VERAPAMIL HYDROCHLORIDE 240 MG: 240 TABLET, FILM COATED, EXTENDED RELEASE ORAL at 21:29

## 2020-05-21 RX ADMIN — CYCLOBENZAPRINE 5 MG: 10 TABLET, FILM COATED ORAL at 23:32

## 2020-05-21 RX ADMIN — HYDRALAZINE HYDROCHLORIDE 50 MG: 50 TABLET, FILM COATED ORAL at 15:55

## 2020-05-21 RX ADMIN — Medication 400 MG: at 08:06

## 2020-05-21 RX ADMIN — HYDRALAZINE HYDROCHLORIDE 50 MG: 50 TABLET, FILM COATED ORAL at 21:29

## 2020-05-21 RX ADMIN — ASPIRIN 325 MG: 325 TABLET ORAL at 08:06

## 2020-05-21 NOTE — PROGRESS NOTES
Case Management Discharge Note      Final Note: [facility] ([facility]. ottoniel navarro/ccp    Provided Post Acute Provider List?: N/A  N/A Provider List Comment: Patient declined at this time  Provided Post Acute Provider Quality & Resource List?: N/A  N/A Quality & Resource List Comment: Patient declined at this time    Destination - Selection Complete      Service Provider Request Status Selected Services Address Phone Number Fax Number    [facility] Selected Inpatient Rehabilitation [address] [facility] [id number] [phone] --      Durable Medical Equipment      No service has been selected for the patient.      Dialysis/Infusion      No service has been selected for the patient.      Home Medical Care      No service has been selected for the patient.      Therapy      No service has been selected for the patient.      Community Resources      No service has been selected for the patient.             Final Discharge Disposition Code: 62 - inpatient rehab facility

## 2020-05-22 LAB
GLUCOSE BLDC GLUCOMTR-MCNC: 125 MG/DL (ref 70–130)
GLUCOSE BLDC GLUCOMTR-MCNC: 129 MG/DL (ref 70–130)
GLUCOSE BLDC GLUCOMTR-MCNC: 159 MG/DL (ref 70–130)
GLUCOSE BLDC GLUCOMTR-MCNC: 206 MG/DL (ref 70–130)

## 2020-05-22 PROCEDURE — 97110 THERAPEUTIC EXERCISES: CPT

## 2020-05-22 PROCEDURE — 97130 THER IVNTJ EA ADDL 15 MIN: CPT

## 2020-05-22 PROCEDURE — 97535 SELF CARE MNGMENT TRAINING: CPT

## 2020-05-22 PROCEDURE — 97129 THER IVNTJ 1ST 15 MIN: CPT

## 2020-05-22 PROCEDURE — 82962 GLUCOSE BLOOD TEST: CPT

## 2020-05-22 RX ORDER — CYCLOBENZAPRINE HCL 10 MG
5 TABLET ORAL ONCE
Status: COMPLETED | OUTPATIENT
Start: 2020-05-22 | End: 2020-05-22

## 2020-05-22 RX ADMIN — HYDRALAZINE HYDROCHLORIDE 50 MG: 50 TABLET, FILM COATED ORAL at 15:57

## 2020-05-22 RX ADMIN — Medication 1000 MCG: at 09:08

## 2020-05-22 RX ADMIN — HYDRALAZINE HYDROCHLORIDE 50 MG: 50 TABLET, FILM COATED ORAL at 21:10

## 2020-05-22 RX ADMIN — VERAPAMIL HYDROCHLORIDE 240 MG: 240 TABLET, FILM COATED, EXTENDED RELEASE ORAL at 21:28

## 2020-05-22 RX ADMIN — ASPIRIN 325 MG: 325 TABLET ORAL at 09:08

## 2020-05-22 RX ADMIN — Medication 400 MG: at 21:10

## 2020-05-22 RX ADMIN — Medication 400 MG: at 09:08

## 2020-05-22 RX ADMIN — DOCUSATE SODIUM 50MG AND SENNOSIDES 8.6MG 1 TABLET: 8.6; 5 TABLET, FILM COATED ORAL at 21:28

## 2020-05-22 RX ADMIN — CYCLOBENZAPRINE 5 MG: 10 TABLET, FILM COATED ORAL at 02:15

## 2020-05-22 RX ADMIN — VERAPAMIL HYDROCHLORIDE 120 MG: 120 TABLET, FILM COATED, EXTENDED RELEASE ORAL at 09:07

## 2020-05-22 RX ADMIN — HYDRALAZINE HYDROCHLORIDE 50 MG: 50 TABLET, FILM COATED ORAL at 09:08

## 2020-05-22 RX ADMIN — ACETAMINOPHEN 650 MG: 325 TABLET, FILM COATED ORAL at 01:38

## 2020-05-23 LAB
GLUCOSE BLDC GLUCOMTR-MCNC: 149 MG/DL (ref 70–130)
GLUCOSE BLDC GLUCOMTR-MCNC: 154 MG/DL (ref 70–130)
GLUCOSE BLDC GLUCOMTR-MCNC: 163 MG/DL (ref 70–130)
GLUCOSE BLDC GLUCOMTR-MCNC: 167 MG/DL (ref 70–130)

## 2020-05-23 PROCEDURE — 82962 GLUCOSE BLOOD TEST: CPT

## 2020-05-23 PROCEDURE — 97110 THERAPEUTIC EXERCISES: CPT | Performed by: PHYSICAL THERAPIST

## 2020-05-23 RX ORDER — METAXALONE 800 MG/1
400 TABLET ORAL 2 TIMES DAILY PRN
Status: DISCONTINUED | OUTPATIENT
Start: 2020-05-23 | End: 2020-05-27 | Stop reason: HOSPADM

## 2020-05-23 RX ADMIN — HYDRALAZINE HYDROCHLORIDE 50 MG: 50 TABLET, FILM COATED ORAL at 22:02

## 2020-05-23 RX ADMIN — HYDRALAZINE HYDROCHLORIDE 50 MG: 50 TABLET, FILM COATED ORAL at 15:34

## 2020-05-23 RX ADMIN — DOCUSATE SODIUM 50MG AND SENNOSIDES 8.6MG 1 TABLET: 8.6; 5 TABLET, FILM COATED ORAL at 09:15

## 2020-05-23 RX ADMIN — HYDRALAZINE HYDROCHLORIDE 50 MG: 50 TABLET, FILM COATED ORAL at 09:17

## 2020-05-23 RX ADMIN — ASPIRIN 325 MG: 325 TABLET ORAL at 09:16

## 2020-05-23 RX ADMIN — METAXALONE 400 MG: 800 TABLET ORAL at 22:06

## 2020-05-23 RX ADMIN — Medication 400 MG: at 09:17

## 2020-05-23 RX ADMIN — ACETAMINOPHEN 650 MG: 325 TABLET, FILM COATED ORAL at 04:50

## 2020-05-23 RX ADMIN — VERAPAMIL HYDROCHLORIDE 120 MG: 120 TABLET, FILM COATED, EXTENDED RELEASE ORAL at 09:16

## 2020-05-23 RX ADMIN — CYCLOBENZAPRINE 5 MG: 10 TABLET, FILM COATED ORAL at 01:07

## 2020-05-23 RX ADMIN — Medication 1000 MCG: at 09:16

## 2020-05-23 RX ADMIN — VERAPAMIL HYDROCHLORIDE 240 MG: 240 TABLET, FILM COATED, EXTENDED RELEASE ORAL at 22:02

## 2020-05-24 LAB
GLUCOSE BLDC GLUCOMTR-MCNC: 118 MG/DL (ref 70–130)
GLUCOSE BLDC GLUCOMTR-MCNC: 128 MG/DL (ref 70–130)
GLUCOSE BLDC GLUCOMTR-MCNC: 145 MG/DL (ref 70–130)
GLUCOSE BLDC GLUCOMTR-MCNC: 154 MG/DL (ref 70–130)

## 2020-05-24 PROCEDURE — 82962 GLUCOSE BLOOD TEST: CPT

## 2020-05-24 RX ADMIN — ASPIRIN 325 MG: 325 TABLET ORAL at 09:24

## 2020-05-24 RX ADMIN — VERAPAMIL HYDROCHLORIDE 240 MG: 240 TABLET, FILM COATED, EXTENDED RELEASE ORAL at 21:52

## 2020-05-24 RX ADMIN — VERAPAMIL HYDROCHLORIDE 120 MG: 120 TABLET, FILM COATED, EXTENDED RELEASE ORAL at 09:24

## 2020-05-24 RX ADMIN — Medication 1000 MCG: at 09:25

## 2020-05-24 RX ADMIN — METAXALONE 400 MG: 800 TABLET ORAL at 21:52

## 2020-05-24 RX ADMIN — HYDRALAZINE HYDROCHLORIDE 50 MG: 50 TABLET, FILM COATED ORAL at 16:05

## 2020-05-24 RX ADMIN — Medication 400 MG: at 09:25

## 2020-05-24 RX ADMIN — HYDRALAZINE HYDROCHLORIDE 50 MG: 50 TABLET, FILM COATED ORAL at 21:53

## 2020-05-24 RX ADMIN — HYDRALAZINE HYDROCHLORIDE 50 MG: 50 TABLET, FILM COATED ORAL at 09:25

## 2020-05-25 LAB
GLUCOSE BLDC GLUCOMTR-MCNC: 144 MG/DL (ref 70–130)
GLUCOSE BLDC GLUCOMTR-MCNC: 168 MG/DL (ref 70–130)
GLUCOSE BLDC GLUCOMTR-MCNC: 206 MG/DL (ref 70–130)
GLUCOSE BLDC GLUCOMTR-MCNC: 219 MG/DL (ref 70–130)

## 2020-05-25 PROCEDURE — 97116 GAIT TRAINING THERAPY: CPT | Performed by: PHYSICAL THERAPIST

## 2020-05-25 PROCEDURE — 97130 THER IVNTJ EA ADDL 15 MIN: CPT

## 2020-05-25 PROCEDURE — 82962 GLUCOSE BLOOD TEST: CPT

## 2020-05-25 PROCEDURE — 97129 THER IVNTJ 1ST 15 MIN: CPT

## 2020-05-25 PROCEDURE — 97110 THERAPEUTIC EXERCISES: CPT

## 2020-05-25 PROCEDURE — 97110 THERAPEUTIC EXERCISES: CPT | Performed by: PHYSICAL THERAPIST

## 2020-05-25 RX ADMIN — VERAPAMIL HYDROCHLORIDE 120 MG: 120 TABLET, FILM COATED, EXTENDED RELEASE ORAL at 09:12

## 2020-05-25 RX ADMIN — ASPIRIN 325 MG: 325 TABLET ORAL at 09:12

## 2020-05-25 RX ADMIN — Medication 400 MG: at 09:13

## 2020-05-25 RX ADMIN — Medication 1000 MCG: at 09:12

## 2020-05-25 RX ADMIN — HYDRALAZINE HYDROCHLORIDE 50 MG: 50 TABLET, FILM COATED ORAL at 15:44

## 2020-05-25 RX ADMIN — VERAPAMIL HYDROCHLORIDE 240 MG: 240 TABLET, FILM COATED, EXTENDED RELEASE ORAL at 21:46

## 2020-05-25 RX ADMIN — METAXALONE 400 MG: 800 TABLET ORAL at 21:46

## 2020-05-25 RX ADMIN — HYDRALAZINE HYDROCHLORIDE 50 MG: 50 TABLET, FILM COATED ORAL at 21:45

## 2020-05-25 RX ADMIN — Medication 400 MG: at 21:46

## 2020-05-25 RX ADMIN — HYDRALAZINE HYDROCHLORIDE 50 MG: 50 TABLET, FILM COATED ORAL at 09:12

## 2020-05-26 ENCOUNTER — APPOINTMENT (OUTPATIENT)
Dept: CT IMAGING | Facility: HOSPITAL | Age: 85
End: 2020-05-26

## 2020-05-26 LAB
GLUCOSE BLDC GLUCOMTR-MCNC: 133 MG/DL (ref 70–130)
GLUCOSE BLDC GLUCOMTR-MCNC: 135 MG/DL (ref 70–130)
GLUCOSE BLDC GLUCOMTR-MCNC: 140 MG/DL (ref 70–130)
GLUCOSE BLDC GLUCOMTR-MCNC: 190 MG/DL (ref 70–130)

## 2020-05-26 PROCEDURE — 97110 THERAPEUTIC EXERCISES: CPT

## 2020-05-26 PROCEDURE — 97129 THER IVNTJ 1ST 15 MIN: CPT

## 2020-05-26 PROCEDURE — 97130 THER IVNTJ EA ADDL 15 MIN: CPT

## 2020-05-26 PROCEDURE — 97535 SELF CARE MNGMENT TRAINING: CPT

## 2020-05-26 PROCEDURE — 82962 GLUCOSE BLOOD TEST: CPT

## 2020-05-26 RX ORDER — VALACYCLOVIR HYDROCHLORIDE 500 MG/1
2000 TABLET, FILM COATED ORAL EVERY 12 HOURS SCHEDULED
Status: COMPLETED | OUTPATIENT
Start: 2020-05-26 | End: 2020-05-27

## 2020-05-26 RX ADMIN — VERAPAMIL HYDROCHLORIDE 120 MG: 120 TABLET, FILM COATED, EXTENDED RELEASE ORAL at 08:08

## 2020-05-26 RX ADMIN — VALACYCLOVIR 2000 MG: 500 TABLET, FILM COATED ORAL at 18:52

## 2020-05-26 RX ADMIN — ACETAMINOPHEN 650 MG: 325 TABLET, FILM COATED ORAL at 00:14

## 2020-05-26 RX ADMIN — HYDRALAZINE HYDROCHLORIDE 50 MG: 50 TABLET, FILM COATED ORAL at 08:09

## 2020-05-26 RX ADMIN — Medication 400 MG: at 08:09

## 2020-05-26 RX ADMIN — ASPIRIN 325 MG: 325 TABLET ORAL at 08:09

## 2020-05-26 RX ADMIN — HYDRALAZINE HYDROCHLORIDE 50 MG: 50 TABLET, FILM COATED ORAL at 16:45

## 2020-05-26 RX ADMIN — METAXALONE 400 MG: 800 TABLET ORAL at 21:08

## 2020-05-26 RX ADMIN — VERAPAMIL HYDROCHLORIDE 240 MG: 240 TABLET, FILM COATED, EXTENDED RELEASE ORAL at 21:08

## 2020-05-26 RX ADMIN — HYDRALAZINE HYDROCHLORIDE 50 MG: 50 TABLET, FILM COATED ORAL at 21:08

## 2020-05-26 RX ADMIN — Medication 1000 MCG: at 08:09

## 2020-05-27 ENCOUNTER — TELEPHONE (OUTPATIENT)
Dept: CARDIOLOGY | Facility: CLINIC | Age: 85
End: 2020-05-27

## 2020-05-27 VITALS
BODY MASS INDEX: 31.24 KG/M2 | SYSTOLIC BLOOD PRESSURE: 130 MMHG | WEIGHT: 182.98 LBS | RESPIRATION RATE: 18 BRPM | HEART RATE: 70 BPM | TEMPERATURE: 97.8 F | OXYGEN SATURATION: 98 % | DIASTOLIC BLOOD PRESSURE: 64 MMHG | HEIGHT: 64 IN

## 2020-05-27 LAB
GLUCOSE BLDC GLUCOMTR-MCNC: 143 MG/DL (ref 70–130)
GLUCOSE BLDC GLUCOMTR-MCNC: 156 MG/DL (ref 70–130)

## 2020-05-27 PROCEDURE — 97110 THERAPEUTIC EXERCISES: CPT

## 2020-05-27 PROCEDURE — 82962 GLUCOSE BLOOD TEST: CPT

## 2020-05-27 RX ORDER — ASPIRIN 325 MG
325 TABLET ORAL DAILY
Qty: 90 TABLET | Refills: 0 | Status: SHIPPED | OUTPATIENT
Start: 2020-05-27 | End: 2020-06-15

## 2020-05-27 RX ORDER — LISINOPRIL 20 MG/1
20 TABLET ORAL
Qty: 30 TABLET | Refills: 5 | Status: SHIPPED | OUTPATIENT
Start: 2020-05-27 | End: 2020-11-03 | Stop reason: SDUPTHER

## 2020-05-27 RX ORDER — ACETAMINOPHEN 325 MG/1
650 TABLET ORAL EVERY 4 HOURS PRN
Start: 2020-05-27 | End: 2021-01-25

## 2020-05-27 RX ORDER — METAXALONE 800 MG/1
400 TABLET ORAL 2 TIMES DAILY PRN
Qty: 6 TABLET | Refills: 1 | Status: SHIPPED | OUTPATIENT
Start: 2020-05-27 | End: 2021-04-29 | Stop reason: ALTCHOICE

## 2020-05-27 RX ADMIN — VALACYCLOVIR 2000 MG: 500 TABLET, FILM COATED ORAL at 08:45

## 2020-05-27 RX ADMIN — HYDRALAZINE HYDROCHLORIDE 50 MG: 50 TABLET, FILM COATED ORAL at 07:51

## 2020-05-27 RX ADMIN — Medication 1000 MCG: at 07:50

## 2020-05-27 RX ADMIN — ASPIRIN 325 MG: 325 TABLET ORAL at 07:51

## 2020-05-27 RX ADMIN — METFORMIN HYDROCHLORIDE 500 MG: 500 TABLET ORAL at 07:50

## 2020-05-27 RX ADMIN — VERAPAMIL HYDROCHLORIDE 120 MG: 120 TABLET, FILM COATED, EXTENDED RELEASE ORAL at 07:50

## 2020-05-27 RX ADMIN — Medication 400 MG: at 07:51

## 2020-05-27 NOTE — TELEPHONE ENCOUNTER
I spoke with him.  He is going to restart lisinopril at 20 mg a day and monitor her blood pressure to determine if she needs to go up to 40.  Her creatinine is normal at last check on the 12th.

## 2020-06-03 NOTE — PROGRESS NOTES
Subjective   Patient ID: Gela Thompson is a 85 y.o. female is here today for a HFU on 5/6/20 for a CVA after a fall.  CT head done on 6/9/20.    Stroke   This is a new problem. The current episode started 1 to 4 weeks ago. Associated symptoms include a visual change.       The following portions of the patient's history were reviewed and updated as appropriate: allergies, current medications, past family history, past medical history, past social history, past surgical history and problem list.    Review of Systems   Musculoskeletal: Positive for gait problem.   All other systems reviewed and are negative.      Objective   Physical Exam   Constitutional: She is oriented to person, place, and time. She appears well-developed and well-nourished.   HENT:   Head: Normocephalic and atraumatic.   Right Ear: External ear normal.   Left Ear: External ear normal.   Eyes: Pupils are equal, round, and reactive to light. Conjunctivae and EOM are normal. Right eye exhibits no discharge. Left eye exhibits no discharge.   Neck: Normal range of motion. Neck supple. No tracheal deviation present.   Cardiovascular: Intact distal pulses.   Pulmonary/Chest: Effort normal. No stridor. No respiratory distress.   Musculoskeletal: Normal range of motion. She exhibits no edema, tenderness or deformity.   Neurological: She is alert and oriented to person, place, and time. She has normal strength and normal reflexes. She displays no atrophy, no tremor and normal reflexes. No cranial nerve deficit or sensory deficit. She exhibits normal muscle tone. She displays a negative Romberg sign. She displays no seizure activity. Coordination and gait normal.   No long tract signs    Bilateral blurry vision, stable    DOMINGO well. Walking with a rollator, mildly unsteady when getting up from chair but otherwise walking well with the rollator   Skin: Skin is warm and dry.   Psychiatric: She has a normal mood and affect. Her behavior is normal. Judgment  and thought content normal.   Nursing note and vitals reviewed.    Neurologic Exam     Mental Status   Oriented to person, place, and time.     Cranial Nerves     CN III, IV, VI   Pupils are equal, round, and reactive to light.  Extraocular motions are normal.     Motor Exam     Strength   Strength 5/5 throughout.       Assessment/Plan   Independent Review of Radiographic Studies:    CT from today was reviewed.  The petechial hemorrhage has nearly completely resolved. No new hemorrhage. No acute findings.   Medical Decision Making:    Ms. Thompson was first seen about a month ago. At that time she was diagnosed with R>L occipital strokes.  Her follow up CTs did show some petechial hemorrhage. We were asked to follow her for that and determine if/when she would be cleared for potential anticoagulation.  She was diagnosed with Afib during her hospital stay. They are considering plavix or possible Eliquis.     She is doing well clinically.  As expected her visual changes are fairly stable compared to when she left rehab but she does feel that she has noticed some mild improvement compared to when the stroke first occurred.  She is walking with a Rollator.  She has an appointment with ophthalmology in a few months to follow-up on her visual loss.  She has a follow-up appointment with medical neurology in August and also an appointment with cardiology.  I told her that from our standpoint she is cleared at any time for anticoagulation but we will defer to medical neurology and/or cardiology to make any specific recommendations.  I have sent a message to ELEUTERIO Covarrubias about the fact that she is cleared from our standpoint to determine if she needs to see them sooner than the currently scheduled appointment.  I asked that she contact the patient if they do in fact want to see her sooner.  From our standpoint no additional scans are necessary unless she has any acute change.  She can be seen as needed.  Diagnoses and all  orders for this visit:    Cerebrovascular accident (CVA), unspecified mechanism (CMS/HCC)    Petechial hemorrhage      Return if symptoms worsen or fail to improve.

## 2020-06-09 ENCOUNTER — HOSPITAL ENCOUNTER (OUTPATIENT)
Dept: CT IMAGING | Facility: HOSPITAL | Age: 85
Discharge: HOME | End: 2020-06-09
Admitting: NURSE PRACTITIONER

## 2020-06-09 ENCOUNTER — OFFICE VISIT (OUTPATIENT)
Dept: NEUROSURGERY | Facility: CLINIC | Age: 85
End: 2020-06-09

## 2020-06-09 DIAGNOSIS — R23.3 PETECHIAL HEMORRHAGE: ICD-10-CM

## 2020-06-09 DIAGNOSIS — I63.349 CEREBROVASCULAR ACCIDENT (CVA) DUE TO THROMBOSIS OF CEREBELLAR ARTERY, UNSPECIFIED BLOOD VESSEL LATERALITY (HCC): ICD-10-CM

## 2020-06-09 DIAGNOSIS — I63.9 CEREBROVASCULAR ACCIDENT (CVA), UNSPECIFIED MECHANISM (HCC): Primary | ICD-10-CM

## 2020-06-09 DIAGNOSIS — I63.349 CEREBROVASCULAR ACCIDENT (CVA) DUE TO THROMBOSIS OF CEREBELLAR ARTERY, UNSPECIFIED BLOOD VESSEL LATERALITY (HCC): Primary | ICD-10-CM

## 2020-06-09 PROCEDURE — 99213 OFFICE O/P EST LOW 20 MIN: CPT | Performed by: PHYSICIAN ASSISTANT

## 2020-06-09 PROCEDURE — 70450 CT HEAD/BRAIN W/O DYE: CPT

## 2020-06-10 ENCOUNTER — TELEPHONE (OUTPATIENT)
Dept: NEUROLOGY | Facility: CLINIC | Age: 85
End: 2020-06-10

## 2020-06-10 NOTE — TELEPHONE ENCOUNTER
PT'S DAUGHTER IN LAW WES[facility] CALLED, PT HAD CT SCAN WITHOUT CONTRAST YESTERDAY 6-9-20, ORDERED BY NAFISA FERNANDEZ 5-11-20; THEY ARE GETTING CALLS TODAY TO AMY CT SCAN ORDERED BY MUNIRA LEE AND WANT TO KNOW IF THIS IS A DUPLICATE REFERRAL OR IF PT NEEDS TO HAVE ANOTHER SCAN AT THIS TIME.    PLEASE CALL PT BACK TO CLARIFY: [phone], LEAVE VM IF NO ANSWER OR CALL WESLY [phone], LEAVE VM IF NO ANSWER

## 2020-06-11 NOTE — TELEPHONE ENCOUNTER
Left message duplicate order placed the day pt had CT Head, no need to schedule at this time. Call with questions.

## 2020-06-15 ENCOUNTER — TELEPHONE (OUTPATIENT)
Dept: NEUROLOGY | Facility: CLINIC | Age: 85
End: 2020-06-15

## 2020-06-15 DIAGNOSIS — I63.349 CEREBROVASCULAR ACCIDENT (CVA) DUE TO THROMBOSIS OF CEREBELLAR ARTERY, UNSPECIFIED BLOOD VESSEL LATERALITY (HCC): Primary | ICD-10-CM

## 2020-06-15 DIAGNOSIS — I48.91 ATRIAL FIBRILLATION, UNSPECIFIED TYPE (HCC): ICD-10-CM

## 2020-06-15 NOTE — TELEPHONE ENCOUNTER
Per ELEUTERIO Covarrubias, okay to start patient on Eliquis 5mg BID.  Left voicemail to let patient know about rx and dosing.

## 2020-06-15 NOTE — TELEPHONE ENCOUNTER
From: Saúl Fajardo MD  Sent: 6/11/2020   4:48 PM EDT  To: Vivian Hennessy PA-C, ELEUTERIO Caldera    I looked at her head CT from 6/9. Fine to go ahead and start her on Elinena Sanchez  Andre

## 2020-06-15 NOTE — PROGRESS NOTES
Week 1                       Week 2  Physical Therapy  Individual           300                          -    Signed by: Dirk Mullins RN

## 2020-06-15 NOTE — PROGRESS NOTES
PPS CMG Coordinator  Inpatient Rehabilitation Discharge    Mode of Locomotion: Walking.    Discharge Against Medical Advice:  No.  Discharge Information  Patient Discharged Alive:  Yes  Discharge Destination/Living Setting: Home with Home Health Services  Diagnosis for Interruption/Death: ICD    Impairment Group: Stroke: 01.9 Other Stroke    Comorbidities: ICD    Complications: ICD    QUALITY INDICATORS  Section J Health Conditions: Fall(s) Since Admission:  No    Section M. Skin Conditions Discharge:  Unhealed Pressure Ulcer(s) at Stage 1 or  Higher:  No    . Current Number of Unhealed Pressure Ulcers  Branch    Section N. Medication:  Medication Intervention: N/A - There were no potential clinically significant  medication issues identified since admission or patient is not taking any  medications.    Signed by: Dirk Mullins RN

## 2020-06-15 NOTE — TELEPHONE ENCOUNTER
Patient returned call.  Discussed discontinuing ASA and starting Eliquis.  Patient voiced understanding.

## 2020-06-15 NOTE — TELEPHONE ENCOUNTER
Per ELEUTERIO Covarrubias, okay to start patient on Eliquis 5mg BID and discontinue ASA 325mg.  Unable to reach patient. Left voicemail with information and to call back with questions.

## 2020-06-25 ENCOUNTER — OFFICE VISIT (OUTPATIENT)
Dept: CARDIOLOGY | Facility: CLINIC | Age: 85
End: 2020-06-25

## 2020-06-25 VITALS
HEIGHT: 64 IN | SYSTOLIC BLOOD PRESSURE: 152 MMHG | BODY MASS INDEX: 31.41 KG/M2 | WEIGHT: 184 LBS | OXYGEN SATURATION: 99 % | HEART RATE: 53 BPM | DIASTOLIC BLOOD PRESSURE: 88 MMHG

## 2020-06-25 DIAGNOSIS — I10 ESSENTIAL HYPERTENSION: Primary | ICD-10-CM

## 2020-06-25 DIAGNOSIS — I48.0 PAROXYSMAL ATRIAL FIBRILLATION (HCC): ICD-10-CM

## 2020-06-25 DIAGNOSIS — G47.33 OBSTRUCTIVE SLEEP APNEA SYNDROME: ICD-10-CM

## 2020-06-25 DIAGNOSIS — I63.349 CEREBROVASCULAR ACCIDENT (CVA) DUE TO THROMBOSIS OF CEREBELLAR ARTERY, UNSPECIFIED BLOOD VESSEL LATERALITY (HCC): ICD-10-CM

## 2020-06-25 PROCEDURE — 99214 OFFICE O/P EST MOD 30 MIN: CPT | Performed by: INTERNAL MEDICINE

## 2020-06-25 PROCEDURE — 93000 ELECTROCARDIOGRAM COMPLETE: CPT | Performed by: INTERNAL MEDICINE

## 2020-06-25 NOTE — PROGRESS NOTES
PATIENTINFORMATION    Date of Office Visit: 20  Encounter Provider: Monserrat Marsh MD  Place of Service: [facility] CARDIOLOGY  Patient Name: Gela Thompson  : 1934    Subjective:         Patient ID: Gela Thompson is a 85 y.o. female.      History of Present Illness    I saw her in 2011 for complaints of episodes of shortness of breath  which had been occurring for the last couple of months. I did a nuclear stress test which showed she is only able to exercise for 4 minutes 30 seconds on the Saeid protocol, but there was no evidence of ischemia. The echo showed normal LV function with an ejection fraction of 61% and stage I diastolic dysfunction with trace tricuspid regurgitation. She wore a 30-day event monitor during which she had none of her shortness of breath episodes.       She was following up with her primary doctor, Lyly Mora, and complained again of shortness of breath with exertion. Dr. Mora sent her for another echo, which was pretty unremarkable. She also had a CT scan of the chest which was noncontrasted and she had coronary artery calcification. These tests were performed in the summer of 2014.      In the , she continued to complain of dyspnea and so I did a nuclear stress test on her which showed no evidence of ischemia. She had recent repeat echo at [facility] in 2015, which continues to show normal LV systolic function. No comment was made on diastolic function. There was mild aortic regurgitation, moderate mitral regurgitation, and mild tricuspid regurgitation with a normal right ventricular systolic pressure. The prior years' echo showed no evidence of mitral regurgitation.      She followed up with Deja in Summary 2017 and was doing well on Pradaxa.  She wore a 24 hour monitor in 2017 which was normal.  I had reviewed her strips with Dr. Gibson and he did not feel  like she had atrial fibrillation and we discontinued Pradaxa, which was causing her GI upset.    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  While hospitalized she was diagnosed with paroxysmal atrial fibrillation.  She was started on Eliquis.    She is doing better.  She is at home.  She has been released from home physical therapy.  She still has vision issues though they are slightly improved.  She is going to see an ophthalmologist who is a specialist in stroke vision issues.  She is not having any bleeding issues on the Eliquis.  She has some occasional palpitations when she is laying on her left side.  It is better when she lays on her right side.  She denies any chest pain or shortness of breath.  She is compliant with her CPAP.    Review of Systems   Constitution: Negative for fever, malaise/fatigue, weight gain and weight loss.   HENT: Negative for ear pain, hearing loss, nosebleeds and sore throat.    Eyes: Positive for blurred vision, double vision, vision loss in left eye and vision loss in right eye. Negative for pain.   Cardiovascular:        See history of present illness.   Respiratory: Positive for shortness of breath. Negative for cough, sleep disturbances due to breathing, snoring and wheezing.    Endocrine: Negative for cold intolerance, heat intolerance and polyuria.   Skin: Positive for itching. Negative for poor wound healing and rash.   Musculoskeletal: Positive for joint pain, joint swelling and myalgias.   Gastrointestinal: Negative for abdominal pain, diarrhea, hematochezia, nausea and vomiting.   Genitourinary: Negative for hematuria and hesitancy.   Neurological: Positive for dizziness, numbness and paresthesias. Negative for seizures.   Psychiatric/Behavioral: Negative for depression. The patient is not nervous/anxious.            ECG 12 Lead  Date/Time: 6/25/2020 1:12 PM  Performed by:  Monserrat Marsh MD  Authorized by: Monserrat Marsh MD   Comparison: compared with previous ECG from 12/5/2018  Comparison to previous ECG: Atrial ectopy is new  Rhythm: sinus rhythm  BPM: 57  Conduction: conduction normal  ST Segments: ST segments normal  T Waves: T waves normal    Clinical impression: normal ECG             1. Echocardiogram performed 03/08/2012 showed normal LV size and function, EF 61%, stage I  diastolic dysfunction, trace tricuspid regurgitation.  2. Exercise nuclear stress test in March 2012 without evidence of ischemia. She exercised for 4 minutes 30 minutes on the Saeid protocol.  3. Pulmonary function tests, March 2012, were normal.   4. Transesophageal echocardiogram, May 2011, at [facility] showed normal LV systolic function, ejection fraction 50 to 60%, borderline left atrial enlargement, no significant valvular disease, and no cardiac source of embolus identified for her stroke.  5. Echocardiogram on 06/27/2014. Normal left ventricular size with an ejection fraction of 64%. No comment on diastolic function. No valvular heart disease.   6. CT of the chest on 09/18/2014. Heart size was normal and without effusion. Moderate atherosclerosis was noted of the coronary arteries. There was old healed granulomatous disease.   7. Nuclear stress test October 27, 2014. Normal scan showing no evidence of ischemia.  8. Echo from October 2015 at [facility]. Normal left ventricular systolic function. Ejection fraction 6065%. Mild aortic regurgitation. Moderate mitral regurgitation. Mild tricuspid regurgitation with a right ventricular systolic pressure of 28 mmHg.   9.  Echocardiogram in December 2018.  Echo showed normal LV function, grade 2 diastolic dysfunction and mild aortic regurgitation.  10.  Echo in January 2020.  Echo showed normal LV function, grade 1 diastolic dysfunction, mild aortic valve regurgitation.  11.  Echo in May 2020 showed normal LV function ejection fraction 60  "percent.  Mild to moderate aortic regurgitation and trace tricuspid regurgitation.        Objective:     /88 (BP Location: Left arm)   Pulse 53   Ht 162.6 cm (64\")   Wt 83.5 kg (184 lb)   SpO2 99%   BMI 31.58 kg/m²  Body mass index is 31.58 kg/m².     Physical Exam   Constitutional: She appears well-developed.   HENT:   Head: Normocephalic and atraumatic.   Eyes: Pupils are equal, round, and reactive to light. Conjunctivae and lids are normal. Lids are everted and swept, no foreign bodies found.   Neck: Normal range of motion. No JVD present. Carotid bruit is not present. No tracheal deviation present. No thyroid mass present.   Cardiovascular: Normal rate, regular rhythm and normal heart sounds.   Pulses:       Dorsalis pedis pulses are 2+ on the right side, and 2+ on the left side.   Pulmonary/Chest: Effort normal and breath sounds normal.   Abdominal: Normal appearance and bowel sounds are normal.   Musculoskeletal: Normal range of motion.   Neurological: She is alert. She has normal strength.   Skin: Skin is warm, dry and intact.   Psychiatric: She has a normal mood and affect. Her behavior is normal.   Vitals reviewed.          Assessment/Plan:       1.    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  Diagnosed with paroxysmal atrial fibrillation.  Currently on Eliquis.  2.  Paroxysmal atrial fibrillation.  On verapamil and Eliquis.  3.  Hypertension.  Blood pressure well-controlled.  4.  Obstructive sleep apnea compliant with CPAP  5.  History of stroke in 2011 with a TIA in 2016 and bilateral strokes in May 2020.  6.  Diabetes.  7.  Chest pain.  Most likely due to acid reflux.  Check echocardiogram.       Orders Placed This Encounter   Procedures   • ECG 12 Lead     This order was created via procedure documentation        Discharge Medications           Accurate as of June 25, 2020  1:13 PM. If you have any questions, ask your nurse or doctor.               Continue These Medications     "  Instructions Start Date   acetaminophen 325 MG tablet  Commonly known as:  TYLENOL   650 mg, Oral, Every 4 Hours PRN      apixaban 5 MG tablet tablet  Commonly known as:  ELIQUIS   5 mg, Oral, Every 12 Hours Scheduled      CALCIUM 1000 + D PO   1 tablet, Oral, Daily      EPINEPHrine 0.3 MG/0.3ML solution auto-injector injection  Commonly known as:  EPIPEN   Once As Needed      fexofenadine 60 MG tablet  Commonly known as:  ALLEGRA   60 mg, Oral, Daily      hydrALAZINE 50 MG tablet  Commonly known as:  APRESOLINE   50 mg, Oral, 3 Times Daily      L-Lysine 500 MG tablet tablet   1 tablet, Oral, Daily      lisinopril 20 MG tablet  Commonly known as:  PRINIVIL,ZESTRIL   20 mg, Oral, Every 24 Hours Scheduled      Magnesium Oxide 500 MG capsule   1,000 mg, Oral, Daily      metaxalone 800 MG tablet  Commonly known as:  SKELAXIN   400 mg, Oral, 2 Times Daily PRN      metFORMIN 500 MG tablet  Commonly known as:  GLUCOPHAGE   500 mg, Oral, Daily With Breakfast      MULTIVITAMINS PO   1 tablet, Oral, Daily      traMADol 50 MG tablet  Commonly known as:  ULTRAM   1 tablet, Oral, Daily PRN, Patient usually takes 1/2 tab and takes very seldomly      verapamil  MG CR tablet  Commonly known as:  CALAN-SR   240 mg, Oral, Nightly      verapamil  MG CR tablet  Commonly known as:  CALAN-SR   TAKE ONE TABLET BY MOUTH EVERY MORNING      vitamin B-12 1000 MCG tablet  Commonly known as:  CYANOCOBALAMIN   1 tablet, Oral, Daily, As directed         Stop These Medications    niacin 100 MG tablet  Stopped by:  MD Monserrat Ron MD  06/25/20  13:13

## 2020-07-30 RX ORDER — HYDRALAZINE HYDROCHLORIDE 50 MG/1
TABLET, FILM COATED ORAL
Qty: 90 TABLET | Refills: 2 | Status: SHIPPED | OUTPATIENT
Start: 2020-07-30 | End: 2021-02-08 | Stop reason: SDUPTHER

## 2020-08-12 ENCOUNTER — OFFICE VISIT (OUTPATIENT)
Dept: NEUROLOGY | Facility: CLINIC | Age: 85
End: 2020-08-12

## 2020-08-12 VITALS
BODY MASS INDEX: 30.56 KG/M2 | HEIGHT: 64 IN | WEIGHT: 179 LBS | HEART RATE: 64 BPM | OXYGEN SATURATION: 97 % | SYSTOLIC BLOOD PRESSURE: 118 MMHG | DIASTOLIC BLOOD PRESSURE: 58 MMHG

## 2020-08-12 DIAGNOSIS — I48.0 PAROXYSMAL ATRIAL FIBRILLATION (HCC): ICD-10-CM

## 2020-08-12 DIAGNOSIS — I63.433 CEREBROVASCULAR ACCIDENT (CVA) DUE TO BILATERAL EMBOLISM OF POSTERIOR CEREBRAL ARTERIES (HCC): Primary | ICD-10-CM

## 2020-08-12 DIAGNOSIS — I63.412 CEREBROVASCULAR ACCIDENT (CVA) DUE TO EMBOLISM OF LEFT MIDDLE CEREBRAL ARTERY (HCC): ICD-10-CM

## 2020-08-12 PROCEDURE — 99213 OFFICE O/P EST LOW 20 MIN: CPT | Performed by: NURSE PRACTITIONER

## 2020-08-12 RX ORDER — NIACIN 100 MG
100 TABLET ORAL DAILY
COMMUNITY
End: 2021-01-25

## 2020-08-12 NOTE — PROGRESS NOTES
DOS: 2020  NAME: Gela Thompson   : 1934  PCP: Karla Mora MD    Chief Complaint   Patient presents with   • Stroke      Referring MD: No ref. provider found    Neurological Problem:  85 y.o. right-handed female with a Hx of hypertension, ROMAN, hyperlipidemia, vertigo who presents today for stroke follow-up.  She is accompanied by her daughter.  History is about about the patient, daughter, and review of records as summarized below.    Interval History:  Ms. Thompson presented to [facility] on 2020 with complaints of sudden onset of dizziness x10 days, bilateral eye blurred vision, unsteady gait resulting in a fall, headache.  She underwent a CT head and a CT cervical spine that revealed a left parietal occipital subacute infarct, old right MCA infarct, right-sided periorbital hematoma, acute right orbital fracture with right maxillary sinus hemorrhage, no acute fracture, diffuse cervical spondylosis.  She was discharged home per the ER note however she was asked to return by her PCP after they reviewed her imaging results.  Upon arrival to the ER on her second presentation she obtain a CTA head and neck that revealed an arterial cutoff of the distal right P2 segment, scattered calcifications in bilateral cervical and intracranial carotid arteries with approximately 50% narrowing of the origin of the right ICA, 50% narrowing at the supraclinoid right ICA, 50% narrowing at the origin of the right vertebral artery, redemonstration of the left parietoccipital subacute infarct with gyriform cortical enhancement compatible with post infarct change, chronic right frontal lobe infarct.  She received a saline bolus and 300 mg aspirin rectally.  She was also found to have new onset atrial fibrillation on her EKG. 2D echo revealed a mildly increased LA volume, saline test negative, LV systolic function normal, EF 60%, limited imaging of valves, no AV stenosis present, moderate calcification  of AV.  She was not a TPA candidate due to time criteria.  MRI brain without also obtained and revealed an acute right PCA, left MCA, and subacute left PCA infarcts with subtle petechial hemorrhagic transformation within the left occipital and parietal lobes, chronic right frontal and basis pontis infarcts also noted with mild small vessel disease.  Given the petechial hemorrhage and mild mass-effect identified on prior imaging she had multiple repeat CT heads that revealed stable hemorrhagic transformation and no new areas of infarct with mild mass-effect.  She also underwent an EEG for concern for seizure and this revealed no focal or epileptiform activity noted.  We recommended continuing her aspirin and holding off on initiating anticoagulation given her large size of strokes and petechial hemorrhage.  Neurosurgery was following and scheduled a follow-up CT head on 5/26.  They saw the patient on 6/9 and reviewed her follow-up CT and stated patient was safe to start anticoagulation from their standpoint.  Dr. Fajardo reviewed her post discharge CT and stated that it was stable and also felt she was safe to start her anticoagulation and discontinue her aspirin.      Today she presents and has been doing very well since being discharged from inpatient rehab.  She reports she completed 2 weeks of inpatient rehab and was discharged.  She did not require any outpatient rehab.  She has been maintained on Eliquis 5 mg twice daily with no new stroke/TIA symptoms since being discharged from the hospital.  She continues to use her rolling walker and has had no falls since being discharged from rehab.  She does still have visual loss and reports that she only has about 10% of her vision.  She did see the ophthalmologist who prescribed her special prescription glasses that have helped her tremendously.  She does still deal with balance troubles that she has to take extra caution with now because of her visual loss.  She has  24-hour care at her home and her daughter arranges her medications.  She reports her BP is usually stable and occasionally she will check it at home.  She thinks she has had her cholesterol levels checked with her PCP but she is unsure.  Currently she is not on any statin therapy.  She does have troubles falling asleep and has noticed that she has had a left temporal headache that occurs at night which she associates to stress dealing with the decline in her 's health who currently has dementia that is progressing.  She has not tried to take anything for it but it usually will go away and not be present when she wakes up.  She reports that she did have some hallucinations in the hospital and may have had some at home but she thinks this may have been a shadow because of her vision loss.  Sometimes she will get disoriented and turned around in her house especially if it is dark but they have placed night lights and remove things that may easily trip her up to prevent falls.  She has been easily agitated and anxious because of the COVID-19 crisis and not being able to get out.  She is compliant with her CPAP machine every night.  She has a good appetite.  She does not drink as much water as she should.  She did follow-up with cardiology and they asked that she continue her Eliquis.  She has had no signs or symptoms of bleeding.     Review of Systems:        Review of Systems   Constitutional: Positive for activity change. Negative for appetite change and unexpected weight change.   HENT: Negative for facial swelling, trouble swallowing and voice change.    Eyes: Positive for visual disturbance. Negative for photophobia and pain.   Respiratory: Negative for chest tightness, shortness of breath and wheezing.    Cardiovascular: Negative for chest pain, palpitations and leg swelling.   Gastrointestinal: Negative for abdominal pain, nausea and vomiting.   Endocrine: Negative for cold intolerance and heat  "intolerance.   Musculoskeletal: Positive for gait problem. Negative for arthralgias, back pain, joint swelling, myalgias, neck pain and neck stiffness.   Neurological: Negative for dizziness, tremors, seizures, syncope, facial asymmetry, speech difficulty, weakness, light-headedness, numbness and headaches.   Hematological: Does not bruise/bleed easily.   Psychiatric/Behavioral: Positive for agitation, confusion and hallucinations. Negative for behavioral problems, decreased concentration, dysphoric mood, self-injury, sleep disturbance and suicidal ideas. The patient is nervous/anxious. The patient is not hyperactive.        \"The following portions of the patient's history were reviewed and updated as appropriate: allergies, current medications, past family history, past medical history, past social history, past surgical history and problem list.\"    Review and Interpretation of Imaging as described in interval history.    Laboratory Results:             Lab Results   Component Value Date    HGBA1C 6.40 (H) 05/07/2020     Lab Results   Component Value Date    CHOL 181 05/07/2020     Lab Results   Component Value Date    HDL 36 (L) 05/07/2020    HDL 52 09/19/2019    HDL 58 02/14/2019     Lab Results   Component Value Date     (H) 05/07/2020     (H) 09/19/2019     (H) 02/14/2019     Lab Results   Component Value Date    TRIG 124 05/07/2020    TRIG 132 09/19/2019    TRIG 109 02/14/2019     No components found for: CHOLHDL  No results found for: RPR  Lab Results   Component Value Date    TSH 1.050 05/07/2020     Lab Results   Component Value Date    WIOFTZNI75 >2,000 (H) 05/06/2020     Lab Results   Component Value Date    GLUCOSE 106 (H) 05/12/2020    BUN 18 05/12/2020    CREATININE 0.77 05/12/2020    EGFRIFNONA 71 05/12/2020    BCR 23.4 05/12/2020    K 4.0 05/12/2020    CO2 19.1 (L) 05/12/2020    CALCIUM 8.7 05/12/2020    ALBUMIN 4.20 05/06/2020    LABIL2 1.5 12/30/2019    AST 15 05/06/2020    " ALT 13 05/06/2020     Lab Results   Component Value Date    WBC 7.69 06/02/2020    HGB 11.7 (L) 06/02/2020    HCT 36.3 06/02/2020    .1 (H) 06/02/2020     06/02/2020     Lab Results   Component Value Date    INR 1.06 05/06/2020    INR 1.0 12/30/2019    PROTIME 13.6 05/06/2020    PROTIME 11.1 12/30/2019       Physical Examination:   mRS:   General Appearance:   Elderly female, pleasant, well developed, well nourished, well groomed, alert, and cooperative.  HEENT: Normocephalic. Atraumatic.   Cardiac: Regular rate and irregular rhythm.   Extremities:    No obvious edema.  Respiratory:   Even and unlabored.    Neurological examination:  Higher Integrative  Function: Oriented to time, place and person. Normal registration, recalled 1/3 of short-term memory words, normal attention span and concentration. Normal language including comprehension, spontaneous speech, repetition, reading, writing, naming and vocabulary. No neglect with normal visual-spatial function and construction. Normal fund of knowledge and higher integrative function.  CN II: abormal visual acuity and visual fields worse on the left  CN III IV VI: Extraocular movements are full without nystagmus.   CN V: Normal facial sensation and strength of muscles of mastication.  CN VII: Facial movements are symmetric. No weakness.  CN VIII:   Auditory acuity is normal.  CN IX & X:   Symmetric palatal movement.  CN XI: Sternocleidomastoid and trapezius are normal.  No weakness.  CN XII:   The tongue is midline.  No atrophy or fasciculations.  Motor: Normal muscle strength in uppers, 4/5 in lowers, no fasciculations, rigidity, spasticity, or abnormal movements.  Sensation: Normal to light touch, vibration, temperature, in arms and legs.   Station and Gait: Push from chair on one attempt to stand to rolling walker, stooped posture, short cautious steps  Coordination: Finger to nose test shows no dysmetria.  Rapid alternating movements are normal.      Impression/Assessment:    Ms. Thompson presents today for stroke follow-up.  She suffered bilateral posterior cerebral artery and left middle cerebral artery infarcts with petechial hemorrhagic transformation within the left occipital and parietal lobes secondary to new onset atrial fibrillation.  She also had chronic right frontal and basis pontis infarcts noted on MRI brain.  She was initiated on Eliquis 5 mg twice daily after a repeat CT head post discharge revealed stability of her petechial hemorrhagic transformation.  She has been tolerating well with no signs or symptoms of bleeding.  Currently she still remains with pretty significant visual field loss but actually did very well with reading NIH stroke scale chart.  Balance is still an issue and is now a little worsened due to her visual loss.  I encouraged her to continue using her walker and falls precautions were discussed especially since she is on Eliquis.  She voiced understanding.  She denies any new stroke/TIA symptoms since being discharged.  Her last LDL in the hospital was 120.  Goal would be less than 100.  I have asked that she follow with her PCP and obtain a repeat lipid panel as she was discharged home on Crestor but currently is not on any statin therapy.  I asked that she continue to follow-up with her ophthalmologist for eye exams.  We discussed her headache which sound stress related.  I asked that she let me know if she starts to have more frequent and intense headaches.  Right now she is not interested in starting any medication and will try over-the-counter Tylenol if pain worsens.  I asked that she try and get outside and do some physical activity which this will also help with her mood.  Her daughter agreed. We discussed at length his personal risk factors for stroke and importance of risk factor control for stroke prevention, including BP and cholesterol control.  He will monitor BP regularly and follow-up with PCP for continued  cholesterol surveillance.  We discussed stroke/TIA symptoms and importance of calling 911 if he were to experience any of these.  Follow-up in 1 year, sooner if symptoms warrant.    Plan:     Continue Eliquis, bleeding precautions discussed  Follow-up with PCP to obtain lipid panel, recommend statin therapy if patient is unable to achieve goal LDL <100.  Keep planned follow-up with ophthalmologist for visual field loss  Report to me if headaches worsen and become more frequent  Monitor BP regularly  Keep well-hydrated  Encourage regular physical activity as tolerated  Falls precautions discussed  Maintain well-balanced diet   Blood pressure control to <130/80   Goal LDL <100-recommend high dose statins   Serum glucose < 140   Call 911 for stroke any stroke symptoms   Follow-up in 1 year, sooner symptoms warrant.  Gela was seen today for stroke.    Diagnoses and all orders for this visit:    Cerebrovascular accident (CVA) due to bilateral embolism of posterior cerebral arteries (CMS/HCC)    Cerebrovascular accident (CVA) due to embolism of left middle cerebral artery (CMS/HCC)    Paroxysmal atrial fibrillation (CMS/HCC)        MDM  Reviewed: previous chart and vitals  Reviewed previous: labs, MRI, CT scan and ECG  Interpretation: labs, MRI and CT scan        ELEUTERIO Obando

## 2020-09-27 DIAGNOSIS — I10 ESSENTIAL HYPERTENSION: ICD-10-CM

## 2020-10-17 DIAGNOSIS — I63.349 CEREBROVASCULAR ACCIDENT (CVA) DUE TO THROMBOSIS OF CEREBELLAR ARTERY, UNSPECIFIED BLOOD VESSEL LATERALITY (HCC): ICD-10-CM

## 2020-10-17 DIAGNOSIS — I48.91 ATRIAL FIBRILLATION, UNSPECIFIED TYPE (HCC): ICD-10-CM

## 2020-10-19 RX ORDER — APIXABAN 5 MG/1
TABLET, FILM COATED ORAL
Qty: 60 TABLET | Refills: 2 | OUTPATIENT
Start: 2020-10-19

## 2020-11-02 ENCOUNTER — TELEPHONE (OUTPATIENT)
Dept: CARDIOLOGY | Facility: CLINIC | Age: 85
End: 2020-11-02

## 2020-11-02 NOTE — TELEPHONE ENCOUNTER
The patient is calling and says she has been trying to contact Dr Meeks office for a refill on her Lisinopril 20 mg and has not been able to get through. She would like to know if you can please send this in so that she does not run out.

## 2020-11-03 RX ORDER — LISINOPRIL 20 MG/1
20 TABLET ORAL
Qty: 30 TABLET | Refills: 5 | Status: SHIPPED | OUTPATIENT
Start: 2020-11-03 | End: 2021-04-29 | Stop reason: SDUPTHER

## 2021-01-25 ENCOUNTER — OFFICE VISIT (OUTPATIENT)
Dept: CARDIOLOGY | Facility: CLINIC | Age: 86
End: 2021-01-25

## 2021-01-25 VITALS
HEART RATE: 85 BPM | TEMPERATURE: 96.3 F | DIASTOLIC BLOOD PRESSURE: 82 MMHG | WEIGHT: 190 LBS | HEIGHT: 64 IN | OXYGEN SATURATION: 98 % | BODY MASS INDEX: 32.44 KG/M2 | SYSTOLIC BLOOD PRESSURE: 180 MMHG

## 2021-01-25 DIAGNOSIS — E78.5 DYSLIPIDEMIA: ICD-10-CM

## 2021-01-25 DIAGNOSIS — I10 ESSENTIAL HYPERTENSION: Primary | ICD-10-CM

## 2021-01-25 DIAGNOSIS — E87.5 HYPERKALEMIA: ICD-10-CM

## 2021-01-25 DIAGNOSIS — I63.433 CEREBROVASCULAR ACCIDENT (CVA) DUE TO BILATERAL EMBOLISM OF POSTERIOR CEREBRAL ARTERIES (HCC): ICD-10-CM

## 2021-01-25 DIAGNOSIS — I63.412 CEREBROVASCULAR ACCIDENT (CVA) DUE TO EMBOLISM OF LEFT MIDDLE CEREBRAL ARTERY (HCC): ICD-10-CM

## 2021-01-25 DIAGNOSIS — G47.33 OBSTRUCTIVE SLEEP APNEA SYNDROME: ICD-10-CM

## 2021-01-25 PROCEDURE — 93000 ELECTROCARDIOGRAM COMPLETE: CPT | Performed by: INTERNAL MEDICINE

## 2021-01-25 PROCEDURE — 99214 OFFICE O/P EST MOD 30 MIN: CPT | Performed by: INTERNAL MEDICINE

## 2021-01-25 RX ORDER — VALACYCLOVIR HYDROCHLORIDE 1 G/1
1000 TABLET, FILM COATED ORAL DAILY PRN
COMMUNITY
Start: 2021-01-07

## 2021-01-25 RX ORDER — LANOLIN ALCOHOL/MO/W.PET/CERES
CREAM (GRAM) TOPICAL DAILY
COMMUNITY

## 2021-01-25 RX ORDER — ERYTHROMYCIN 5 MG/G
OINTMENT OPHTHALMIC
COMMUNITY
Start: 2021-01-12

## 2021-01-25 NOTE — PROGRESS NOTES
CARDIOLOGY    Monserrat Marsh MD    ENCOUNTER DATE:  01/25/2021    Gela Thompson / 86 y.o. / female        CHIEF COMPLAINT / REASON FOR OFFICE VISIT     Hypertension (6 Month follow up)      HISTORY OF PRESENT ILLNESS       HPI    Gela Thompson is a 86 y.o. female     I saw her in February 2011 for complaints of episodes of shortness of breath  which had been occurring for the last couple of months. I did a nuclear stress test which showed she is only able to exercise for 4 minutes 30 seconds on the Saeid protocol, but there was no evidence of ischemia. The echo showed normal LV function with an ejection fraction of 61% and stage I diastolic dysfunction with trace tricuspid regurgitation. She wore a 30-day event monitor during which she had none of her shortness of breath episodes.       She was following up with her primary doctor, Lyly Mora, and complained again of shortness of breath with exertion. Dr. Mora sent her for another echo, which was pretty unremarkable. She also had a CT scan of the chest which was noncontrasted and she had coronary artery calcification. These tests were performed in the summer of 2014.      In the fall of 2014, she continued to complain of dyspnea and so I did a nuclear stress test on her which showed no evidence of ischemia. She had recent repeat echo at [facility] in October 2015, which continues to show normal LV systolic function. No comment was made on diastolic function. There was mild aortic regurgitation, moderate mitral regurgitation, and mild tricuspid regurgitation with a normal right ventricular systolic pressure. The prior years' echo showed no evidence of mitral regurgitation.      She followed up with Deja in Summary 2017 and was doing well on Pradaxa.  She wore a 24 hour monitor in December of 2017 which was normal.  I had reviewed her strips with Dr. Gibson and he did not feel like she had atrial fibrillation and  "we discontinued Pradaxa, which was causing her GI upset.     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  While hospitalized she was diagnosed with paroxysmal atrial fibrillation.  She was started on Eliquis.     Overall she has been doing well.  Her blood pressure is up and down though she has not been checking it regularly.  She had a lot of shoulder pain last night did not sleep well.  She has an occasional palpitation if she is laying on her left side.  She denies shortness of breath, edema, dizziness.  She does have fatigue.  She does not do any routine exercises.      The following portions of the patient's history were reviewed and updated as appropriate: allergies, current medications, past family history, past medical history, past social history, past surgical history and problem list.      VITAL SIGNS     Visit Vitals  /82 (BP Location: Left arm)   Pulse 85   Temp 96.3 °F (35.7 °C) (Infrared)   Ht 162.6 cm (64\")   Wt 86.2 kg (190 lb)   SpO2 98%   BMI 32.61 kg/m²         Wt Readings from Last 3 Encounters:   01/25/21 86.2 kg (190 lb)   08/12/20 81.2 kg (179 lb)   06/25/20 83.5 kg (184 lb)     Body mass index is 32.61 kg/m².      REVIEW OF SYSTEMS   Review of Systems   Constitution: Negative for fever, malaise/fatigue, weight gain and weight loss.   HENT: Negative for ear pain, hearing loss, nosebleeds and sore throat.    Eyes: Negative for double vision, pain, vision loss in left eye and vision loss in right eye.   Cardiovascular:        See history of present illness.   Respiratory: Negative for cough, shortness of breath, sleep disturbances due to breathing, snoring and wheezing.    Endocrine: Negative for cold intolerance, heat intolerance and polyuria.   Skin: Negative for itching, poor wound healing and rash.   Musculoskeletal: Negative for joint pain, joint swelling and myalgias. "   Gastrointestinal: Negative for abdominal pain, diarrhea, hematochezia, nausea and vomiting.   Genitourinary: Negative for hematuria and hesitancy.   Neurological: Negative for numbness, paresthesias and seizures.   Psychiatric/Behavioral: Negative for depression. The patient is not nervous/anxious.            PHYSICAL EXAMINATION     Vitals signs reviewed.   Constitutional:       Appearance: Normal appearance. Well-developed.   Eyes:      General: Lids are normal. Lids are everted, no foreign bodies appreciated.      Conjunctiva/sclera: Conjunctivae normal.      Pupils: Pupils are equal, round, and reactive to light.   HENT:      Head: Normocephalic and atraumatic.   Neck:      Musculoskeletal: Normal range of motion.      Thyroid: No thyroid mass.      Vascular: No carotid bruit or JVD.      Trachea: No tracheal deviation.   Pulmonary:      Effort: Pulmonary effort is normal.      Breath sounds: Normal breath sounds.   Cardiovascular:      Normal rate. Regular rhythm.   Pulses:     Dorsalis pedis: 2+ bilaterally.  Abdominal:      General: Bowel sounds are normal.   Musculoskeletal: Normal range of motion.   Skin:     General: Skin is warm and dry.   Neurological:      Mental Status: Alert.   Psychiatric:         Behavior: Behavior normal.           REVIEWED DATA       ECG 12 Lead    Date/Time: 1/25/2021 2:57 PM  Performed by: Monserrat Marsh MD  Authorized by: Monserrat Marsh MD   Comparison: compared with previous ECG from 6/25/2020  Similar to previous ECG  Comparison to previous ECG: No longer having premature atrial contractions  Rhythm: sinus rhythm  BPM: 73  Conduction: conduction normal  ST Segments: ST segments normal  T Waves: T waves normal    Clinical impression: normal ECG            1. Echocardiogram performed 03/08/2012 showed normal LV size and function, EF 61%, stage I  diastolic dysfunction, trace tricuspid regurgitation.  2. Exercise nuclear stress test in March 2012 without evidence of  ischemia. She exercised for 4 minutes 30 minutes on the Saeid protocol.  3. Pulmonary function tests, March 2012, were normal.   4. Transesophageal echocardiogram, May 2011, at [facility] showed normal LV systolic function, ejection fraction 50 to 60%, borderline left atrial enlargement, no significant valvular disease, and no cardiac source of embolus identified for her stroke.  5. Echocardiogram on 06/27/2014. Normal left ventricular size with an ejection fraction of 64%. No comment on diastolic function. No valvular heart disease.   6. CT of the chest on 09/18/2014. Heart size was normal and without effusion. Moderate atherosclerosis was noted of the coronary arteries. There was old healed granulomatous disease.   7. Nuclear stress test October 27, 2014. Normal scan showing no evidence of ischemia.  8. Echo from October 2015 at [facility]. Normal left ventricular systolic function. Ejection fraction 6065%. Mild aortic regurgitation. Moderate mitral regurgitation. Mild tricuspid regurgitation with a right ventricular systolic pressure of 28 mmHg.   9.  Echocardiogram in December 2018.  Echo showed normal LV function, grade 2 diastolic dysfunction and mild aortic regurgitation.  10.  Echo in January 2020.  Echo showed normal LV function, grade 1 diastolic dysfunction, mild aortic valve regurgitation.  11.  Echo in May 2020 showed normal LV function ejection fraction 60 percent.  Mild to moderate aortic regurgitation and trace tricuspid regurgitation.       Lipid Panel    Lipid Panel 5/7/20   Triglycerides 124   HDL Cholesterol 36 (A)   VLDL Cholesterol 24.8   LDL Cholesterol  120 (A)   LDL/HDL Ratio 3.34   (A) Abnormal value              Lab Results   Component Value Date    GLUCOSE 106 (H) 05/12/2020    BUN 18 05/12/2020    CREATININE 0.77 05/12/2020    EGFRIFNONA 71 05/12/2020    BCR 23.4 05/12/2020    K 4.0 05/12/2020    CO2 19.1 (L) 05/12/2020    CALCIUM 8.7 05/12/2020    ALBUMIN 4.20 05/06/2020     LABIL2 1.5 12/30/2019    AST 15 05/06/2020    ALT 13 05/06/2020       ASSESSMENT & PLAN      Diagnosis Plan   1. Essential hypertension     2. Dyslipidemia     3. Obstructive sleep apnea syndrome     4. Cerebrovascular accident (CVA) due to embolism of left middle cerebral artery (CMS/HCC)     5. Hyperkalemia     6. Cerebrovascular accident (CVA) due to bilateral embolism of posterior cerebral arteries (CMS/HCC)           1.    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  Diagnosed with paroxysmal atrial fibrillation.  Currently on Eliquis.  2.  Paroxysmal atrial fibrillation.  On verapamil and Eliquis.  3.  Hypertension.  Her blood pressure really bounces around.  I have asked her to start monitoring it at home and send me some numbers in a week or so to determine if I need to adjust her medications.  Her blood pressure was pretty low at a visit in the fall some little reluctant to just immediately up her medication.  4.  Obstructive sleep apnea compliant with CPAP  5.  History of stroke in 2011 with a TIA in 2016 and bilateral strokes in May 2020.  6.  Diabetes.  7.  Chest pain.  Not currently an issue.  She had an echo back in the spring when she was having chest pain.  It seems the chest pain was more related to acid reflux.     We will continue to monitor her blood pressure and I will see her back in 3 months.    Orders Placed This Encounter   Procedures   • ECG 12 Lead     This order was created via procedure documentation                MEDICATIONS         Discharge Medications          Accurate as of January 25, 2021  2:59 PM. If you have any questions, ask your nurse or doctor.            Continue These Medications      Instructions Start Date   apixaban 5 MG tablet tablet  Commonly known as: ELIQUIS   5 mg, Oral, Every 12 Hours Scheduled      CALCIUM 1000 + D PO   1 tablet, Oral, Daily      EPINEPHrine 0.3 MG/0.3ML solution auto-injector injection  Commonly known as: EPIPEN   Once As Needed       erythromycin 5 MG/GM ophthalmic ointment  Commonly known as: ROMYCIN   No dose, route, or frequency recorded.      hydrALAZINE 50 MG tablet  Commonly known as: APRESOLINE   TAKE ONE TABLET BY MOUTH THREE TIMES A DAY      L-Lysine 500 MG tablet tablet   1 tablet, Oral, Daily      lisinopril 20 MG tablet  Commonly known as: PRINIVIL,ZESTRIL   20 mg, Oral, Every 24 Hours Scheduled      Magnesium Oxide 500 MG capsule   1,000 mg, Oral, Daily      melatonin 3 MG tablet   Oral, Daily      metaxalone 800 MG tablet  Commonly known as: SKELAXIN   400 mg, Oral, 2 Times Daily PRN      metFORMIN 500 MG tablet  Commonly known as: GLUCOPHAGE   500 mg, Oral, Daily With Breakfast      MULTIVITAMINS PO   1 tablet, Oral, Daily      traMADol 50 MG tablet  Commonly known as: ULTRAM   1 tablet, Oral, Daily PRN, Patient usually takes 1/2 tab and takes very seldomly      valACYclovir 1000 MG tablet  Commonly known as: VALTREX   1,000 mg, Oral, Daily      verapamil  MG CR tablet  Commonly known as: CALAN-SR   240 mg, Oral, Nightly      verapamil  MG CR tablet  Commonly known as: CALAN-SR   TAKE ONE TABLET BY MOUTH EVERY MORNING      vitamin B-12 1000 MCG tablet  Commonly known as: CYANOCOBALAMIN   1 tablet, Oral, Daily, As directed         Stop These Medications    acetaminophen 325 MG tablet  Commonly known as: TYLENOL  Stopped by: Monserrat Marsh MD     fexofenadine 60 MG tablet  Commonly known as: ALLEGRA  Stopped by: Monserrat Marsh MD     niacin 100 MG tablet  Stopped by: MD Monserrat Ron MD  01/25/21  14:59 EST    **Dragon Disclaimer:   Much of this encounter note is an electronic transcription/translation of spoken language to printed text. The electronic translation of spoken language may permit erroneous, or at times, nonsensical words or phrases to be inadvertently transcribed. Although I have reviewed the note for such errors, some may still exist.

## 2021-02-08 ENCOUNTER — TELEPHONE (OUTPATIENT)
Dept: CARDIOLOGY | Facility: CLINIC | Age: 86
End: 2021-02-08

## 2021-02-08 RX ORDER — VERAPAMIL HYDROCHLORIDE 240 MG/1
240 TABLET, FILM COATED, EXTENDED RELEASE ORAL NIGHTLY
Qty: 90 TABLET
Start: 2021-02-08 | End: 2022-01-12 | Stop reason: SDUPTHER

## 2021-02-08 RX ORDER — HYDRALAZINE HYDROCHLORIDE 50 MG/1
75 TABLET, FILM COATED ORAL 3 TIMES DAILY
Qty: 90 TABLET | Refills: 2
Start: 2021-02-08 | End: 2021-02-18 | Stop reason: SDUPTHER

## 2021-02-08 NOTE — TELEPHONE ENCOUNTER
Called patient when her blood pressure gets above 160 systolic she reports hearing her heartbeat in her ear.  Otherwise she does not have any symptoms.  We will increase hydralazine from 50 mg 3 times a day to 75 mg 3 times a day.  She is to give us an update on her blood pressure readings in 1-2 weeks.  She verbalized understanding.

## 2021-02-08 NOTE — TELEPHONE ENCOUNTER
The patient called with her BP readings. All were done in the morning.       1/31/21  147/68    77    2/1/21  150/70    70    2/5/21  150/52    75    2/6/21  163/71     61    2/7/21  164/63      62    2/8/21  184/81     71  179/85     70

## 2021-02-18 DIAGNOSIS — I63.349 CEREBROVASCULAR ACCIDENT (CVA) DUE TO THROMBOSIS OF CEREBELLAR ARTERY, UNSPECIFIED BLOOD VESSEL LATERALITY (HCC): ICD-10-CM

## 2021-02-18 DIAGNOSIS — I48.91 ATRIAL FIBRILLATION, UNSPECIFIED TYPE (HCC): ICD-10-CM

## 2021-02-18 RX ORDER — HYDRALAZINE HYDROCHLORIDE 50 MG/1
75 TABLET, FILM COATED ORAL 3 TIMES DAILY
Qty: 135 TABLET | Refills: 4
Start: 2021-02-18 | End: 2021-03-24 | Stop reason: SDUPTHER

## 2021-02-18 RX ORDER — APIXABAN 5 MG/1
TABLET, FILM COATED ORAL
Qty: 60 TABLET | Refills: 8 | Status: SHIPPED | OUTPATIENT
Start: 2021-02-18 | End: 2021-10-21 | Stop reason: SDUPTHER

## 2021-03-02 DIAGNOSIS — Z23 IMMUNIZATION DUE: ICD-10-CM

## 2021-03-24 ENCOUNTER — TELEPHONE (OUTPATIENT)
Dept: CARDIOLOGY | Facility: CLINIC | Age: 86
End: 2021-03-24

## 2021-03-24 RX ORDER — HYDRALAZINE HYDROCHLORIDE 100 MG/1
100 TABLET, FILM COATED ORAL 3 TIMES DAILY
Qty: 90 TABLET | Refills: 11 | Status: SHIPPED | OUTPATIENT
Start: 2021-03-24 | End: 2021-10-04 | Stop reason: SDUPTHER

## 2021-03-24 NOTE — TELEPHONE ENCOUNTER
Please let her know that I would like her to increase the hydralazine to 100 mg 3 times a day.  I placed a prescription for this dose to go to her local pharmacy.

## 2021-03-24 NOTE — TELEPHONE ENCOUNTER
spk w/ pt regarding her elevated BP's. The systolic portion is elevated. Here are her most recent readings.  172/71, 168/67, 166/67  Her heart rate is in the 60's.    Pt takes Verapamil 120mg qam  Verapamil 240mg qhs  Hydralazine 50mg 1.5 tid  Lisinopril 20mg qd    Thoughts please?    Thank you,    Michelle Savage, CMA

## 2021-03-25 NOTE — TELEPHONE ENCOUNTER
The patient called about the instructions as to how she is to take the hydralazine.I tried to call her back and did not get an answer.

## 2021-03-25 NOTE — TELEPHONE ENCOUNTER
Called pt at both #'s to give her info that Hydralazine 100mg tid was sent to her pharmacy.  There was no answer, and no vm set up on either line. I'll try again later.

## 2021-04-29 ENCOUNTER — OFFICE VISIT (OUTPATIENT)
Dept: CARDIOLOGY | Facility: CLINIC | Age: 86
End: 2021-04-29

## 2021-04-29 VITALS
HEART RATE: 77 BPM | HEIGHT: 64 IN | WEIGHT: 185.8 LBS | SYSTOLIC BLOOD PRESSURE: 152 MMHG | DIASTOLIC BLOOD PRESSURE: 60 MMHG | BODY MASS INDEX: 31.72 KG/M2

## 2021-04-29 DIAGNOSIS — E87.5 HYPERKALEMIA: Primary | ICD-10-CM

## 2021-04-29 DIAGNOSIS — I63.412 CEREBROVASCULAR ACCIDENT (CVA) DUE TO EMBOLISM OF LEFT MIDDLE CEREBRAL ARTERY (HCC): ICD-10-CM

## 2021-04-29 DIAGNOSIS — I63.433 CEREBROVASCULAR ACCIDENT (CVA) DUE TO BILATERAL EMBOLISM OF POSTERIOR CEREBRAL ARTERIES (HCC): ICD-10-CM

## 2021-04-29 DIAGNOSIS — I10 ESSENTIAL HYPERTENSION: ICD-10-CM

## 2021-04-29 DIAGNOSIS — I48.0 PAROXYSMAL ATRIAL FIBRILLATION (HCC): ICD-10-CM

## 2021-04-29 PROCEDURE — 99214 OFFICE O/P EST MOD 30 MIN: CPT | Performed by: NURSE PRACTITIONER

## 2021-04-29 PROCEDURE — 93000 ELECTROCARDIOGRAM COMPLETE: CPT | Performed by: NURSE PRACTITIONER

## 2021-04-29 RX ORDER — LISINOPRIL 40 MG/1
40 TABLET ORAL
Qty: 90 TABLET | Refills: 1 | Status: SHIPPED | OUTPATIENT
Start: 2021-04-29 | End: 2021-10-21 | Stop reason: SDUPTHER

## 2021-04-29 NOTE — PROGRESS NOTES
Date of Office Visit: 21  Encounter Provider: ELEUTERIO Lombardi  Place of Service: [facility] CARDIOLOGY  Patient Name: Gela Thompson  :1934    Chief Complaint   Patient presents with   • Essential hypertension   • Stroke   • Sleep Apnea   • Follow-up   :     HPI: Gela Thompson is a 86 y.o. female  with history of coronary artery calcification noted on CT scan, paroxysmal atrial fibrillation, CVA, obstructive sleep apnea, diabetes, hypertension..      She is followed by Dr. Marsh.  I will visit with her for the first time that I have reviewed her medical record.    She has had issues of shortness of breath since .  She had an unremarkable echo and unremarkable 30-day event monitor.  In  she had a CT scan of the chest which was noncontrasted showing some coronary calcification.  She had a nuclear stress test which showed no evidence of ischemia in the fall .  She was on Pradaxa for some time but had some GI upset with that so it was stopped.  In May 2020 she sustained a severe acute right occipital infarct with the following facial fractures.  She later was treated with Eliquis due to paroxysmal atrial fibrillation.    She presents today accompanied by her daughter-in-law.  She has psoriasis and states she tried CBD oil that has helped tremendously.  Her blood pressure has been 150-170 at home and just rarely in the 140s.  She denies chest pain tightness pressure, palpitation, shortness breath, near-syncope or syncope.  She does not have any edema.    Allergies   Allergen Reactions   • Penicillins Hives     Patient hasn't taken PCNs for over 30 years.    • Morphine GI Intolerance   • Statins Myalgia     Pt stated that she gets muscle aches all over when she takes statins.    • Tape Rash           Family and social history reviewed.     ROS  All other systems were reviewed and are negative          Objective:     Vitals:    21 1401   BP: 152/60   BP  "Location: Right arm   Patient Position: Sitting   Pulse: 77   Weight: 84.3 kg (185 lb 12.8 oz)   Height: 162.6 cm (64\")     Body mass index is 31.89 kg/m².    PHYSICAL EXAM:  Constitutional:       General: Not in acute distress.     Appearance: Well-developed. Not diaphoretic.   HENT:      Head: Normocephalic.   Pulmonary:      Effort: Pulmonary effort is normal. No respiratory distress.      Breath sounds: Normal breath sounds. No wheezing. No rhonchi. No rales.   Cardiovascular:      Normal rate. Regular rhythm.   Pulses:     Radial: 2+ bilaterally.  Skin:     General: Skin is warm and dry. There is no cyanosis.      Findings: No rash.   Neurological:      Mental Status: Alert and oriented to person, place, and time.   Psychiatric:         Behavior: Behavior normal.         Thought Content: Thought content normal.         Judgment: Judgment normal.           ECG 12 Lead    Date/Time: 4/29/2021 5:41 PM  Performed by: Tiesha Hernandez APRN  Authorized by: Tiesha Hernandez APRN   Comparison: compared with previous ECG   Similar to previous ECG  Rhythm: sinus rhythm  Rate: normal  ST Segments: ST segments normal  T Waves: T waves normal  QRS axis: normal  Other findings: non-specific ST-T wave changes              Current Outpatient Medications   Medication Sig Dispense Refill   • Calcium Carb-Cholecalciferol (CALCIUM 1000 + D PO) Take 1 tablet by mouth Daily.     • Eliquis 5 MG tablet tablet TAKE ONE TABLET BY MOUTH EVERY 12 HOURS 60 tablet 8   • EPINEPHrine (EPIPEN) 0.3 MG/0.3ML solution auto-injector injection 1 (One) Time As Needed.     • erythromycin (ROMYCIN) 5 MG/GM ophthalmic ointment      • hydrALAZINE (APRESOLINE) 100 MG tablet Take 1 tablet by mouth 3 (Three) Times a Day. 90 tablet 11   • L-Lysine 500 MG tablet Take 1 tablet by mouth Daily.     • lisinopril (PRINIVIL,ZESTRIL) 40 MG tablet Take 1 tablet by mouth Daily. 90 tablet 1   • Magnesium Oxide 500 MG capsule Take 1,000 mg by mouth Daily.     • melatonin 3 " MG tablet Take  by mouth Daily.     • metFORMIN (GLUCOPHAGE) 500 MG tablet Take 1 tablet by mouth Daily With Breakfast. (Patient taking differently: Take 500 mg by mouth 2 (Two) Times a Day With Meals.)     • Multiple Vitamin (MULTIVITAMINS PO) Take 1 tablet by mouth Daily.     • traMADol (ULTRAM) 50 MG tablet Take 1 tablet by mouth Daily As Needed (Arthritis pain). Patient usually takes 1/2 tab and takes very seldomly     • valACYclovir (VALTREX) 1000 MG tablet Take 1,000 mg by mouth Daily As Needed.     • verapamil SR (CALAN-SR) 120 MG CR tablet TAKE ONE TABLET BY MOUTH EVERY MORNING 30 tablet 4   • verapamil SR (CALAN-SR) 240 MG CR tablet Take 1 tablet by mouth Every Night. 90 tablet    • vitamin B-12 (CYANOCOBALAMIN) 1000 MCG tablet Take 1 tablet by mouth Daily. As directed       No current facility-administered medications for this visit.     Assessment:       Diagnosis Plan   1. Hyperkalemia  Basic Metabolic Panel        Orders Placed This Encounter   Procedures   • Basic Metabolic Panel     Standing Status:   Future     Standing Expiration Date:   4/29/2022     Order Specific Question:   Release to patient     Answer:   Immediate         Plan:       1.  86-year-old female with paroxysmal atrial fibrillation.  She did not tolerate Pradaxa due to GI upset.  She is maintained on verapamil and Eliquis with high risk of embolic event and prior CVAs and TIA  2.  History of stroke in 2011 with TIA in 2016 and bilateral occipital strokes in May 2020-she requires long-term anticoagulation  3.  Hypertension blood pressure has been 150-170 systolic going to increase lisinopril from 20 mg to 40 mg and repeat BMP in 2 weeks at her PCP office.  Once that has resulted, I will call to discuss labs and reevaluate blood pressure  4.  Obstructive sleep apnea treated with CPAP  5.  Diabetes mellitus on oral therapy   6.  Evidence of coronary artery calcification on CTA of the chest noncontrasted in 2014.  She had a negative  perfusion stress test in the fall 2014.  No angina  7.  Mild to moderate mitral valve regurgitation on echo May 2020 no symptoms follow clinically at this time    Follow-up in 6 months            It has been a pleasure to participate in this patient's care.      Thank you,  ELEUTERIO Lombardi      **I used Dragon to dictate this note:**

## 2021-05-18 ENCOUNTER — TELEPHONE (OUTPATIENT)
Dept: CARDIOLOGY | Facility: CLINIC | Age: 86
End: 2021-05-18

## 2021-05-18 NOTE — TELEPHONE ENCOUNTER
----- Message from ELEUTERIO Lombardi sent at 4/29/2021  5:39 PM EDT -----  Call once labs from pcp complete to discuss and eval BP since increased lisinopril

## 2021-06-30 ENCOUNTER — TELEPHONE (OUTPATIENT)
Dept: CARDIOLOGY | Facility: CLINIC | Age: 86
End: 2021-06-30

## 2021-06-30 RX ORDER — SPIRONOLACTONE 25 MG/1
25 TABLET ORAL DAILY
Qty: 30 TABLET | Refills: 11 | Status: SHIPPED | OUTPATIENT
Start: 2021-06-30 | End: 2021-10-21 | Stop reason: SDUPTHER

## 2021-07-01 NOTE — TELEPHONE ENCOUNTER
Pt called to double check the name of the new medicine called in yesterday.  She said she thought she was given the wrong thing since it is a diuretic.  She thought she was going to stop one of her BP meds and take a new one.  She said she didn't need a diuretic.   I explained to her that according to the documentation that nothing has been stopped-only added- and  Spironolactone is used to treat high blood pressure as well as being used as a diuretic.  I reminded her to make sure she gets the labs done in 1 wk as directed by Dr. Marsh.  Pt verbalized understanding./prt

## 2021-07-16 ENCOUNTER — TELEPHONE (OUTPATIENT)
Dept: NEUROLOGY | Facility: CLINIC | Age: 86
End: 2021-07-16

## 2021-07-16 NOTE — TELEPHONE ENCOUNTER
Caller: PIYUSH TRUONG    Relationship to patient: DAUGHTER    Best call back number: [phone]    Chief complaint: PT'S DAUGHTER PIYUSH CALLED ABOUT APPT TODAY 7-16. SAYS THEY WON'T BE ABLE TO MAKE APPT TODAY AS THEY DIDN'T REALIZE SHE HAD ONE TODAY. SAYS PT HAS UPCOMING APPTS WITH PCP, OPTHALMOLOGIST, AND CARDIOLOGIST AND WOULD LIKE TO KNOW IF NAFISA THINKS ITS NECESSARY FOR HER TO COME IN IF THERE HAVE BEEN NO CHANGES WITH HER. PT ALSO STATED MOBILITY ISSUES.    CANCELLED TODAY'S APPT AND HAVE YET TO RESCHED. PLEASE ADVISE.

## 2021-07-19 NOTE — TELEPHONE ENCOUNTER
Pt cancelled appt with you last Friday. Daughter is wanting to know if you feel it necessary for pt to f/u here. Daughter sts they have her scheduled with her PCP, Ophthalmologist and cardiologist. Pt is still having issues with mobility.     Thank you

## 2021-08-05 ENCOUNTER — HOSPITAL ENCOUNTER (EMERGENCY)
Facility: HOSPITAL | Age: 86
Discharge: LEFT BEFORE DISCHARGE | End: 2021-08-06
Attending: EMERGENCY MEDICINE | Admitting: EMERGENCY MEDICINE

## 2021-08-05 ENCOUNTER — TELEPHONE (OUTPATIENT)
Dept: NEUROLOGY | Facility: CLINIC | Age: 86
End: 2021-08-05

## 2021-08-05 ENCOUNTER — APPOINTMENT (OUTPATIENT)
Dept: CT IMAGING | Facility: HOSPITAL | Age: 86
End: 2021-08-05

## 2021-08-05 DIAGNOSIS — I10 ESSENTIAL HYPERTENSION: ICD-10-CM

## 2021-08-05 DIAGNOSIS — Z86.73 HISTORY OF CVA (CEREBROVASCULAR ACCIDENT): ICD-10-CM

## 2021-08-05 DIAGNOSIS — Z79.01 ANTICOAGULATED: ICD-10-CM

## 2021-08-05 DIAGNOSIS — E11.9 TYPE 2 DIABETES MELLITUS WITHOUT COMPLICATION, WITHOUT LONG-TERM CURRENT USE OF INSULIN (HCC): ICD-10-CM

## 2021-08-05 DIAGNOSIS — H53.9 VISION CHANGES: Primary | ICD-10-CM

## 2021-08-05 DIAGNOSIS — I48.91 ATRIAL FIBRILLATION, UNSPECIFIED TYPE (HCC): ICD-10-CM

## 2021-08-05 LAB
ALBUMIN SERPL-MCNC: 4.6 G/DL (ref 3.5–5.2)
ALBUMIN/GLOB SERPL: 1.8 G/DL
ALP SERPL-CCNC: 75 U/L (ref 39–117)
ALT SERPL W P-5'-P-CCNC: 22 U/L (ref 1–33)
ANION GAP SERPL CALCULATED.3IONS-SCNC: 12.3 MMOL/L (ref 5–15)
AST SERPL-CCNC: 18 U/L (ref 1–32)
BACTERIA UR QL AUTO: ABNORMAL /HPF
BASOPHILS # BLD AUTO: 0.08 10*3/MM3 (ref 0–0.2)
BASOPHILS NFR BLD AUTO: 1 % (ref 0–1.5)
BILIRUB SERPL-MCNC: <0.2 MG/DL (ref 0–1.2)
BILIRUB UR QL STRIP: NEGATIVE
BUN SERPL-MCNC: 21 MG/DL (ref 8–23)
BUN/CREAT SERPL: 23.1 (ref 7–25)
CALCIUM SPEC-SCNC: 9.8 MG/DL (ref 8.6–10.5)
CHLORIDE SERPL-SCNC: 105 MMOL/L (ref 98–107)
CLARITY UR: CLEAR
CO2 SERPL-SCNC: 22.7 MMOL/L (ref 22–29)
COLOR UR: YELLOW
CREAT SERPL-MCNC: 0.91 MG/DL (ref 0.57–1)
DEPRECATED RDW RBC AUTO: 42 FL (ref 37–54)
EOSINOPHIL # BLD AUTO: 0.14 10*3/MM3 (ref 0–0.4)
EOSINOPHIL NFR BLD AUTO: 1.8 % (ref 0.3–6.2)
ERYTHROCYTE [DISTWIDTH] IN BLOOD BY AUTOMATED COUNT: 11.9 % (ref 12.3–15.4)
GFR SERPL CREATININE-BSD FRML MDRD: 59 ML/MIN/1.73
GLOBULIN UR ELPH-MCNC: 2.5 GM/DL
GLUCOSE SERPL-MCNC: 180 MG/DL (ref 65–99)
GLUCOSE UR STRIP-MCNC: NEGATIVE MG/DL
HCT VFR BLD AUTO: 35.8 % (ref 34–46.6)
HGB BLD-MCNC: 12 G/DL (ref 12–15.9)
HGB UR QL STRIP.AUTO: NEGATIVE
HYALINE CASTS UR QL AUTO: ABNORMAL /LPF
IMM GRANULOCYTES # BLD AUTO: 0.04 10*3/MM3 (ref 0–0.05)
IMM GRANULOCYTES NFR BLD AUTO: 0.5 % (ref 0–0.5)
INR PPP: 1.06 (ref 0.9–1.1)
KETONES UR QL STRIP: NEGATIVE
LEUKOCYTE ESTERASE UR QL STRIP.AUTO: ABNORMAL
LYMPHOCYTES # BLD AUTO: 2.77 10*3/MM3 (ref 0.7–3.1)
LYMPHOCYTES NFR BLD AUTO: 34.8 % (ref 19.6–45.3)
MCH RBC QN AUTO: 32.6 PG (ref 26.6–33)
MCHC RBC AUTO-ENTMCNC: 33.5 G/DL (ref 31.5–35.7)
MCV RBC AUTO: 97.3 FL (ref 79–97)
MONOCYTES # BLD AUTO: 0.57 10*3/MM3 (ref 0.1–0.9)
MONOCYTES NFR BLD AUTO: 7.2 % (ref 5–12)
NEUTROPHILS NFR BLD AUTO: 4.36 10*3/MM3 (ref 1.7–7)
NEUTROPHILS NFR BLD AUTO: 54.7 % (ref 42.7–76)
NITRITE UR QL STRIP: NEGATIVE
NRBC BLD AUTO-RTO: 0 /100 WBC (ref 0–0.2)
PH UR STRIP.AUTO: <=5 [PH] (ref 5–8)
PLATELET # BLD AUTO: 338 10*3/MM3 (ref 140–450)
PMV BLD AUTO: 10.4 FL (ref 6–12)
POTASSIUM SERPL-SCNC: 4.2 MMOL/L (ref 3.5–5.2)
PROT SERPL-MCNC: 7.1 G/DL (ref 6–8.5)
PROT UR QL STRIP: NEGATIVE
PROTHROMBIN TIME: 13.6 SECONDS (ref 11.7–14.2)
RBC # BLD AUTO: 3.68 10*6/MM3 (ref 3.77–5.28)
RBC # UR: ABNORMAL /HPF
REF LAB TEST METHOD: ABNORMAL
SODIUM SERPL-SCNC: 140 MMOL/L (ref 136–145)
SP GR UR STRIP: 1.02 (ref 1–1.03)
SQUAMOUS #/AREA URNS HPF: ABNORMAL /HPF
UROBILINOGEN UR QL STRIP: ABNORMAL
WBC # BLD AUTO: 7.96 10*3/MM3 (ref 3.4–10.8)
WBC UR QL AUTO: ABNORMAL /HPF

## 2021-08-05 PROCEDURE — 80053 COMPREHEN METABOLIC PANEL: CPT | Performed by: EMERGENCY MEDICINE

## 2021-08-05 PROCEDURE — 70450 CT HEAD/BRAIN W/O DYE: CPT

## 2021-08-05 PROCEDURE — 81001 URINALYSIS AUTO W/SCOPE: CPT | Performed by: EMERGENCY MEDICINE

## 2021-08-05 PROCEDURE — 96374 THER/PROPH/DIAG INJ IV PUSH: CPT

## 2021-08-05 PROCEDURE — 85610 PROTHROMBIN TIME: CPT | Performed by: EMERGENCY MEDICINE

## 2021-08-05 PROCEDURE — 99284 EMERGENCY DEPT VISIT MOD MDM: CPT

## 2021-08-05 PROCEDURE — 85025 COMPLETE CBC W/AUTO DIFF WBC: CPT | Performed by: EMERGENCY MEDICINE

## 2021-08-05 NOTE — TELEPHONE ENCOUNTER
Provider: NAFISA FERNANDEZ  Caller: KAITY TRUONG  Relationship to Patient: DAUGHTER  Reason for Call: PTS STATES SHE HAS NOTICED [facility]ANG[facility], IN THE LAST COUPLE WEEKS, THINGS SEEM DARKER AND CANT SEE A S CLEAR- PT HAD STROKE BACK IN 5/2020 AND SAW NAFISA FOR THAT AFTERWARDS.   When was the patient last seen: 8/12/2020  When did it start: A COUPLE WEEKS AGO  Where is it located: [facility]    SCHEDULING PT WITH FIRST AVAILABLE FOLLOW UP- IF THAT'S OKAT? PLEASE ADVISE.

## 2021-08-05 NOTE — ED TRIAGE NOTES
Pt to the ED from home with c/o blurry vision that she states she woke up with yesterday morning around 11:30. LKW about 11:45 Tuesday evening. Pt has a hx of a brain bleed in May of 2020 and a hx of TIAs. Pt placed in mask during triage. This RN wearing proper PPE, mask and glasses, during pt encounter. Hand hygiene performed before and after pt encounter.

## 2021-08-05 NOTE — TELEPHONE ENCOUNTER
Called and spoke with daughter-in-law, Indu Griggs reports pt woke up this morning and has lost almost all vision. Usually pt has issues seeing with lighting change but, this is something completely new. I advised pt be taken to ED for evaluation. Daughter-in-law verbalized understanding and will take pt to ED.

## 2021-08-06 VITALS
HEART RATE: 75 BPM | HEIGHT: 64 IN | BODY MASS INDEX: 31.76 KG/M2 | OXYGEN SATURATION: 98 % | SYSTOLIC BLOOD PRESSURE: 133 MMHG | TEMPERATURE: 98.2 F | RESPIRATION RATE: 16 BRPM | DIASTOLIC BLOOD PRESSURE: 55 MMHG | WEIGHT: 186 LBS

## 2021-08-06 PROBLEM — H53.9 VISION CHANGES: Status: ACTIVE | Noted: 2021-08-06

## 2021-08-06 PROCEDURE — G0378 HOSPITAL OBSERVATION PER HR: HCPCS

## 2021-08-06 PROCEDURE — 25010000002 HYDRALAZINE PER 20 MG: Performed by: PHYSICIAN ASSISTANT

## 2021-08-06 RX ORDER — HYDRALAZINE HYDROCHLORIDE 50 MG/1
100 TABLET, FILM COATED ORAL ONCE
Status: COMPLETED | OUTPATIENT
Start: 2021-08-06 | End: 2021-08-06

## 2021-08-06 RX ORDER — HYDRALAZINE HYDROCHLORIDE 20 MG/ML
10 INJECTION INTRAMUSCULAR; INTRAVENOUS ONCE
Status: COMPLETED | OUTPATIENT
Start: 2021-08-06 | End: 2021-08-06

## 2021-08-06 RX ADMIN — HYDRALAZINE HYDROCHLORIDE 100 MG: 50 TABLET, FILM COATED ORAL at 01:12

## 2021-08-06 RX ADMIN — HYDRALAZINE HYDROCHLORIDE 10 MG: 20 INJECTION INTRAMUSCULAR; INTRAVENOUS at 01:13

## 2021-08-06 NOTE — ED PROVIDER NOTES
01:01 EDT  Patient seen and examined with physician assistant.  Briefly patient presents for evaluation of blurred vision.  Patient had hemorrhagic CVA that left her vision significantly impaired.  Patient states today vision is worse than normal.  Has had no double vision.  Has had no visual field deficit.  Has no focal weakness or numbness.        On exam patient is alert cooperative in no distress.  Patient's pupils are reactive.  Patient has no focal visual field deficit.  Has no weakness or numbness in arms or legs.  Patient has normal speech          Plan will be admission for further evaluation including MRI          MD ATTESTATION NOTE    The PRASANTH and I have discussed this patient's history, physical exam, and treatment plan.  I have reviewed the documentation and personally had a face to face interaction with the patient. I affirm the documentation and agree with the treatment and plan.  The attached note describes my personal findings.      Patient was wearing a face mask when I entered the room and they continued to wear a mask throughout their stay in the ED.  I wore PPE, including  gloves, face mask with shield or face mask with goggles whenever I was in the room with patient.      Chance Castillo MD  08/06/21 0211

## 2021-08-06 NOTE — ED PROVIDER NOTES
EMERGENCY DEPARTMENT ENCOUNTER    Room Number:  04/04  Date of encounter:  8/6/2021  PCP: Karla Mora MD  Historian: Patient      HPI:  Chief Complaint: blurred vision       Context: Gela Thompson is a 86 y.o. female with past medical history of AF on Eliquis, HTN, T2DM, and prior CVA with chronic vision loss who presents to the ED c/o blurred vision.  Patient states that yesterday morning her vision, which is bad at baseline, became even worse.  Patient states that she has also had a mild headache, but denies any chest pain, shortness of breath, facial droop, slurred speech, or focal weakness.      PAST MEDICAL HISTORY  Active Ambulatory Problems     Diagnosis Date Noted   • Anemia 07/22/2013   • Chronic low back pain 10/07/2016   • Dyslipidemia 10/07/2016   • Gastroesophageal reflux disease 04/07/2015   • Essential hypertension 10/07/2016   • Mitral valve insufficiency 10/14/2015   • Osteoarthritis 10/07/2016   • Adiposity 10/07/2016   • Obstructive sleep apnea syndrome 10/07/2016   • Cerebrovascular accident (CMS/[facility]) 05/01/2011   • ROBERTO (acute kidney injury) (CMS/[facility]) 05/06/2020   • Hyperkalemia 05/06/2020   • Anemia, macrocytic 05/06/2020   • DM II (diabetes mellitus, type II), controlled (CMS/[facility]) 05/06/2020   • Paroxysmal atrial fibrillation (CMS/[facility]) 05/07/2020   • Stroke (cerebrum) (CMS/[facility]) 05/13/2020   • Petechial hemorrhage 06/09/2020     Resolved Ambulatory Problems     Diagnosis Date Noted   • No Resolved Ambulatory Problems     Past Medical History:   Diagnosis Date   • Abnormal echocardiogram    • Chest pain    • Diabetes mellitus (CMS/[facility])    • Dyspnea    • H/O echocardiogram 06/27/2014   • Health care maintenance    • History of EKG 10/29/2015   • Hyperlipidemia    • Hypertension    • Murmur    • Murmur    • ROMAN (obstructive sleep apnea)    • Peripheral neuropathy          PAST SURGICAL HISTORY  Past Surgical History:   Procedure Laterality Date   • CARPAL TUNNEL RELEASE     •  CHOLECYSTECTOMY     • HYSTERECTOMY     • KNEE SURGERY Bilateral     TKA   • NEUROPLASTY      deompression median nerve at carpal tunnel bilateral   • SHOULDER SURGERY Left          FAMILY HISTORY  Family History   Problem Relation Age of Onset   • Heart disease Mother    • Heart attack Mother    • Neuropathy Mother    • Heart disease Sister    • Stroke Father          SOCIAL HISTORY  Social History     Socioeconomic History   • Marital status:      Spouse name: Not on file   • Number of children: Not on file   • Years of education: Not on file   • Highest education level: Not on file   Tobacco Use   • Smoking status: Never Smoker   • Smokeless tobacco: Never Used   • Tobacco comment: caffeine use - 1 cup coffee, 1 glass tea daily    Vaping Use   • Vaping Use: Never used   Substance and Sexual Activity   • Alcohol use: No   • Drug use: No   • Sexual activity: Defer         ALLERGIES  Penicillins, Morphine, Statins, and Tape        REVIEW OF SYSTEMS  Review of Systems     All systems reviewed and negative except for those discussed in HPI.       PHYSICAL EXAM    I have reviewed the triage vital signs and nursing notes.    ED Triage Vitals   Temp Heart Rate Resp BP SpO2   08/05/21 1758 08/05/21 1758 08/05/21 1758 08/05/21 2007 08/05/21 1758   98.2 °F (36.8 °C) 93 16 163/67 98 %      Temp src Heart Rate Source Patient Position BP Location FiO2 (%)   -- -- 08/05/21 2007 08/05/21 2007 --     Sitting Left arm        Physical Exam  GENERAL: Alert and oriented x3, not distressed  HENT: nares patent  EYES: no scleral icterus  CV: regular rhythm, regular rate  RESPIRATORY: normal effort  ABDOMEN: soft  MUSCULOSKELETAL: no deformity  NEURO: alert, moves all extremities, no facial droop,  equal bilaterally, follows commands  SKIN: warm, dry, no rashes        LAB RESULTS  Recent Results (from the past 24 hour(s))   Comprehensive Metabolic Panel    Collection Time: 08/05/21  8:15 PM    Specimen: Blood   Result Value Ref  Range    Glucose 180 (H) 65 - 99 mg/dL    BUN 21 8 - 23 mg/dL    Creatinine 0.91 0.57 - 1.00 mg/dL    Sodium 140 136 - 145 mmol/L    Potassium 4.2 3.5 - 5.2 mmol/L    Chloride 105 98 - 107 mmol/L    CO2 22.7 22.0 - 29.0 mmol/L    Calcium 9.8 8.6 - 10.5 mg/dL    Total Protein 7.1 6.0 - 8.5 g/dL    Albumin 4.60 3.50 - 5.20 g/dL    ALT (SGPT) 22 1 - 33 U/L    AST (SGOT) 18 1 - 32 U/L    Alkaline Phosphatase 75 39 - 117 U/L    Total Bilirubin <0.2 0.0 - 1.2 mg/dL    eGFR Non African Amer 59 (L) >60 mL/min/1.73    Globulin 2.5 gm/dL    A/G Ratio 1.8 g/dL    BUN/Creatinine Ratio 23.1 7.0 - 25.0    Anion Gap 12.3 5.0 - 15.0 mmol/L   Protime-INR    Collection Time: 08/05/21  8:15 PM    Specimen: Blood   Result Value Ref Range    Protime 13.6 11.7 - 14.2 Seconds    INR 1.06 0.90 - 1.10   CBC Auto Differential    Collection Time: 08/05/21  8:15 PM    Specimen: Blood   Result Value Ref Range    WBC 7.96 3.40 - 10.80 10*3/mm3    RBC 3.68 (L) 3.77 - 5.28 10*6/mm3    Hemoglobin 12.0 12.0 - 15.9 g/dL    Hematocrit 35.8 34.0 - 46.6 %    MCV 97.3 (H) 79.0 - 97.0 fL    MCH 32.6 26.6 - 33.0 pg    MCHC 33.5 31.5 - 35.7 g/dL    RDW 11.9 (L) 12.3 - 15.4 %    RDW-SD 42.0 37.0 - 54.0 fl    MPV 10.4 6.0 - 12.0 fL    Platelets 338 140 - 450 10*3/mm3    Neutrophil % 54.7 42.7 - 76.0 %    Lymphocyte % 34.8 19.6 - 45.3 %    Monocyte % 7.2 5.0 - 12.0 %    Eosinophil % 1.8 0.3 - 6.2 %    Basophil % 1.0 0.0 - 1.5 %    Immature Grans % 0.5 0.0 - 0.5 %    Neutrophils, Absolute 4.36 1.70 - 7.00 10*3/mm3    Lymphocytes, Absolute 2.77 0.70 - 3.10 10*3/mm3    Monocytes, Absolute 0.57 0.10 - 0.90 10*3/mm3    Eosinophils, Absolute 0.14 0.00 - 0.40 10*3/mm3    Basophils, Absolute 0.08 0.00 - 0.20 10*3/mm3    Immature Grans, Absolute 0.04 0.00 - 0.05 10*3/mm3    nRBC 0.0 0.0 - 0.2 /100 WBC   Urinalysis With Microscopic If Indicated (No Culture) - Urine, Clean Catch    Collection Time: 08/05/21  8:19 PM    Specimen: Urine, Clean Catch   Result Value Ref  Range    Color, UA Yellow Yellow, Straw    Appearance, UA Clear Clear    pH, UA <=5.0 5.0 - 8.0    Specific Gravity, UA 1.018 1.005 - 1.030    Glucose, UA Negative Negative    Ketones, UA Negative Negative    Bilirubin, UA Negative Negative    Blood, UA Negative Negative    Protein, UA Negative Negative    Leuk Esterase, UA Small (1+) (A) Negative    Nitrite, UA Negative Negative    Urobilinogen, UA 0.2 E.U./dL 0.2 - 1.0 E.U./dL   Urinalysis, Microscopic Only - Urine, Clean Catch    Collection Time: 08/05/21  8:19 PM    Specimen: Urine, Clean Catch   Result Value Ref Range    RBC, UA 0-2 None Seen, 0-2 /HPF    WBC, UA 6-12 (A) None Seen, 0-2 /HPF    Bacteria, UA None Seen None Seen /HPF    Squamous Epithelial Cells, UA 0-2 None Seen, 0-2 /HPF    Hyaline Casts, UA 0-2 None Seen /LPF    Methodology Automated Microscopy        Ordered the above labs and independently reviewed the results.        RADIOLOGY  CT Head Without Contrast    Result Date: 8/5/2021  Patient: IONA TRUONG  Time Out: 21:27 Exam(s): CT HEAD Without Contrast EXAM:   CT Head Without Intravenous Contrast CLINICAL HISTORY:    Reason for exam: blurry vision. TECHNIQUE:   Axial computed tomography images of the head brain without intravenous contrast.  CTDI is 54.80 mGy and DLP is 990.80 mGy-cm.  This CT exam was performed according to the principle of ALARA (As Low As Reasonably Achievable) by using one or more of the following dose reduction techniques: automated exposure control, adjustment of the mA and or kV according to patient size, and or use of iterative reconstruction technique. COMPARISON:   CT head 6 9 20 FINDINGS:   Brain:  No acute intracranial hemorrhage, mass-effect, or edema.  Encephalomalacia in both occipital lobes consistent with chronic bilateral PCA territory infarcts.  Small chronic cortical infarct right frontal lobe.  Gray-white matter differentiation maintained.  Chronic lacunar infarct in the brendan.  Generalized parenchymal  volume loss.  Hypoattenuation in the periventricular deep cerebral white matter consistent with chronic small vessel ischemic disease.   Ventricles:  No hydrocephalus.  Ex vacuo enlargement of the lateral ventricles.   Bones joints:  No skull fracture.   Soft tissues:  Unremarkable.   Vasculature:  Intracranial atherosclerosis.   Sinuses:  Sinuses are clear.   Mastoid air cells:  Opacified left mastoid tip.  Mastoid air cells are otherwise clear.   Orbits:  Status post bilateral lens replacements. IMPRESSION:     1.  No acute intracranial process. 2.  Chronic infarcts in both occipital lobes and the right frontal lobe. 3.  Involutional and chronic small vessel ischemic changes.     Electronically signed by Drea Biggs M.D. on 08-05-21 at 2127      I ordered the above noted radiological studies. Reviewed by me and discussed with radiologist.  See dictation for official radiology interpretation.      PROCEDURES    Procedures      MEDICATIONS GIVEN IN ER    Medications   hydrALAZINE (APRESOLINE) injection 10 mg (10 mg Intravenous Given 8/6/21 0113)   hydrALAZINE (APRESOLINE) tablet 100 mg (100 mg Oral Given 8/6/21 0112)         PROGRESS, DATA ANALYSIS, CONSULTS, AND MEDICAL DECISION MAKING    All labs have been independently reviewed by me.  All radiology studies have been reviewed by me and discussed with radiologist dictating the report.   EKG's independently viewed and interpreted by me.  Discussion below represents my analysis of pertinent findings related to patient's condition, differential diagnosis, treatment plan and final disposition.    DDx: Includes but not limited to intracranial hemorrhage, CVA, vision changes    ED Course as of Aug 06 0416   Fri Aug 06, 2021   0009 Patient care discussed with Dr. Cortez, will admit.    [CHE]   2100 Patient called out to nurse voicing several concerns.  Spoke with the patient she states that she does not have her CPAP, refuses to use the \[facility]\"" CPAP and states that she  would not be able to sleep at all tonight.  Patient also states that she is unable to do MRIs that are not open and that even with medicines she is not able to tolerate a regular MRI.  Discussed with patient the importance of staying in the hospital for the completion of her evaluation, she expressed understanding but states that she would follow-up with her provider for an outpatient MRI.  Discussed the risks and benefits to include stroke, blindness, and death, patient is of sound mind, able to make judgments regarding her healthcare, and is refusing to stay in the hospital.  Patient was encouraged to return to care, but will be signed out AMA.    [CHE]      ED Course User Index  [CHE] Issac Anguiano PA       MDM: Patient with history of vision loss after prior CVA, will place in observation for MRI and further evaluation.    PPE: Both the patient and I wore a surgical mask throughout the entire patient encounter. I wore protective goggles.     AS OF 04:16 EDT VITALS:    BP - 133/55  HR - 75  TEMP - 98.2 °F (36.8 °C)  O2 SATS - 98%        DIAGNOSIS  Final diagnoses:   Vision changes   History of CVA (cerebrovascular accident)   Atrial fibrillation, unspecified type (CMS/[facility])   Anticoagulated   Essential hypertension   Type 2 diabetes mellitus without complication, without long-term current use of insulin (CMS/[facility])         DISPOSITION  ADMISSION    Discussed treatment plan and reason for admission with pt/family and admitting physician.  Pt/family voiced understanding of the plan for admission for further testing/treatment as needed.          Issac Anguiano PA  08/06/21 0231       Issac Anguiano PA  08/06/21 0416

## 2021-08-06 NOTE — DISCHARGE INSTRUCTIONS
Home, rest, home medicine as prescribed, follow up with your PCP today.  Return to care if symptoms worsen.

## 2021-08-11 ENCOUNTER — TELEPHONE (OUTPATIENT)
Dept: NEUROLOGY | Facility: CLINIC | Age: 86
End: 2021-08-11

## 2021-08-11 DIAGNOSIS — I63.349 CEREBROVASCULAR ACCIDENT (CVA) DUE TO THROMBOSIS OF CEREBELLAR ARTERY, UNSPECIFIED BLOOD VESSEL LATERALITY (HCC): Primary | ICD-10-CM

## 2021-08-11 NOTE — TELEPHONE ENCOUNTER
Provider: NAFISA FERNANDEZ     Caller: KAITY     Relationship to Patient: DAUGHTER    Reason for Call: IONA'S PCP IS WANTING NAFISA FERNANDEZ TO ORDER AN MRI OF THE BRAIN.  IONA WENT TO [facility] ER LAST WEEK, SHE DID NOT STAY THE NIGHT BECAUSE THEY DID NOT HAVE A BED.   KAITY STATES THAT THE HOSPITAL DID A CT ON HER  AND WANTED TO DO AN MRI BUT IONA REFUSED TO GET INTO A CLOSED MRI.     PLEASE SCHEDULE AN OPEN MRI FOR IONA AND ADVISE

## 2021-08-12 NOTE — TELEPHONE ENCOUNTER
Previous ER visit reviewed. MRI brain w/o ordered to be completed at [facility] for Open MRI. If this does not show anything acute she will need to f/u with eye doctor.

## 2021-08-12 NOTE — TELEPHONE ENCOUNTER
Chani's pt that you advised on last week. Pt's daughter called last week reporting visual changes. Pt went to ED, as advised, but left before having an MRI Brain. Pt was being admitted but, left AMA. PCP has seen pt since and wanted pt to call us to see if we'd want to order the MRI Brain (note in care everywhere). Pt would need order sent for an open MRI. Are you okay with ordering? I will place if approved.      Thank you

## 2021-08-19 ENCOUNTER — TELEPHONE (OUTPATIENT)
Dept: NEUROLOGY | Facility: CLINIC | Age: 86
End: 2021-08-19

## 2021-08-19 NOTE — TELEPHONE ENCOUNTER
Caller: KAITY TRUONG    Relationship: PT'S DAUGHTER    Best call back number: [phone]    Caller requesting test results: KAITY    What test was performed: MRI BRAIN WO    When was the test performed: 8-17-21    Where was the test performed: [facility]        Additional notes: WANT THE RESULTS OF THE MRI BRAIN WO

## 2021-08-20 NOTE — TELEPHONE ENCOUNTER
Informed daughter Gilberto has not completed report. Will call with results once received. Daughter verbalized understanding.

## 2021-08-25 DIAGNOSIS — I10 ESSENTIAL HYPERTENSION: ICD-10-CM

## 2021-08-26 ENCOUNTER — TELEPHONE (OUTPATIENT)
Dept: NEUROLOGY | Facility: CLINIC | Age: 86
End: 2021-08-26

## 2021-08-26 NOTE — TELEPHONE ENCOUNTER
----- Message from ELEUTERIO Cardenas sent at 8/26/2021  2:22 PM EDT -----  Full MRI report reviewed, shows previous strokes but no new strokes. F/U with eye doctor if she hasn't yet.

## 2021-08-26 NOTE — TELEPHONE ENCOUNTER
Called daughter, Kia, with MRI results. Kia verbalized understanding. Sts pt has f/u with eye doctor.

## 2021-10-04 NOTE — TELEPHONE ENCOUNTER
Rx Refill Note  Requested Prescriptions     Pending Prescriptions Disp Refills   • hydrALAZINE (APRESOLINE) 100 MG tablet 90 tablet 11     Sig: Take 1 tablet by mouth 3 (Three) Times a Day.      Last office visit with prescribing clinician: 1/25/2021      Next office visit with prescribing clinician: 10/29/2021     Syed Jorgensen MA  10/04/21, 16:28 EDT

## 2021-10-05 RX ORDER — HYDRALAZINE HYDROCHLORIDE 100 MG/1
100 TABLET, FILM COATED ORAL 3 TIMES DAILY
Qty: 90 TABLET | Refills: 11 | Status: SHIPPED | OUTPATIENT
Start: 2021-10-05 | End: 2021-12-07

## 2021-10-21 DIAGNOSIS — I63.349 CEREBROVASCULAR ACCIDENT (CVA) DUE TO THROMBOSIS OF CEREBELLAR ARTERY, UNSPECIFIED BLOOD VESSEL LATERALITY (HCC): ICD-10-CM

## 2021-10-21 DIAGNOSIS — I10 ESSENTIAL HYPERTENSION: ICD-10-CM

## 2021-10-21 DIAGNOSIS — I48.91 ATRIAL FIBRILLATION, UNSPECIFIED TYPE (HCC): ICD-10-CM

## 2021-10-21 RX ORDER — LISINOPRIL 40 MG/1
40 TABLET ORAL
Qty: 90 TABLET | Refills: 3 | Status: SHIPPED | OUTPATIENT
Start: 2021-10-21 | End: 2022-01-06 | Stop reason: SDUPTHER

## 2021-10-21 RX ORDER — SPIRONOLACTONE 25 MG/1
25 TABLET ORAL DAILY
Qty: 90 TABLET | Refills: 3 | Status: SHIPPED | OUTPATIENT
Start: 2021-10-21 | End: 2022-08-15

## 2021-10-21 NOTE — TELEPHONE ENCOUNTER
Pt is changing pharmacy and would like all of [facility].    Can you please sign RX?

## 2021-10-21 NOTE — TELEPHONE ENCOUNTER
Dr. Marsh,    Please call Dr. Liang regarding this patient's home dose of lisinopril.  She is being discharged and has been off of it in the hospital.    [phone]    Seema Tai RN  Triage MG     No

## 2021-12-07 RX ORDER — HYDRALAZINE HYDROCHLORIDE 100 MG/1
TABLET, FILM COATED ORAL
Qty: 90 TABLET | Refills: 11 | Status: SHIPPED | OUTPATIENT
Start: 2021-12-07 | End: 2022-08-15

## 2021-12-10 DIAGNOSIS — I10 ESSENTIAL HYPERTENSION: ICD-10-CM

## 2022-01-05 ENCOUNTER — TELEPHONE (OUTPATIENT)
Dept: CARDIOLOGY | Facility: CLINIC | Age: 87
End: 2022-01-05

## 2022-01-05 NOTE — TELEPHONE ENCOUNTER
Fernanda Garcia (nurse with Dr. Karla Mora' office) called re Lisinopril dosage.  She states patient would like Dr. Mora to take over this medication.  Fernanda Garcia would like you to call her at [phone] Ext. [phone] / GEOFF

## 2022-01-05 NOTE — TELEPHONE ENCOUNTER
Patient is calling asking for clarification on her Lisinopril. States she has been taking 25 Mg Lisinopril for several years. She however noticed on her last refill the dose has increased to 40 Mg. Patient is asking if 40 Mg is the correct dose or is she suppose to be taking 25 Mg?   Per Phone note from 5/18/2021 it looks as if the dosage was increased to 40 Mg at that time. Please advise

## 2022-01-06 RX ORDER — LISINOPRIL 40 MG/1
20 TABLET ORAL
Qty: 90 TABLET | Refills: 3
Start: 2022-01-06

## 2022-01-06 NOTE — TELEPHONE ENCOUNTER
Our June 30, 2021 telephone encounter reflects patient confirming lisinopril 40 mg.  I am okay with Dr. Mora's office taking it over.

## 2022-01-06 NOTE — TELEPHONE ENCOUNTER
Called and relayed Tiesha's message for Fernanda Garcia.  Fernanda Garcia states Dr. Mora is putting patient on Lisinopril 20 mg as she feels BP was too low on 40 mg.  Since Dr. Mora is a [place] provider med list reflects 40 mg, would you like me to change to 20 mg or wait until patient's next visit for confirmation./ GEOFF

## 2022-01-12 RX ORDER — VERAPAMIL HYDROCHLORIDE 240 MG/1
240 TABLET, FILM COATED, EXTENDED RELEASE ORAL NIGHTLY
Qty: 90 TABLET | Refills: 3 | Status: SHIPPED | OUTPATIENT
Start: 2022-01-12

## 2022-08-15 DIAGNOSIS — I63.349 CEREBROVASCULAR ACCIDENT (CVA) DUE TO THROMBOSIS OF CEREBELLAR ARTERY, UNSPECIFIED BLOOD VESSEL LATERALITY: ICD-10-CM

## 2022-08-15 DIAGNOSIS — I48.91 ATRIAL FIBRILLATION, UNSPECIFIED TYPE: ICD-10-CM

## 2022-08-15 RX ORDER — HYDRALAZINE HYDROCHLORIDE 100 MG/1
TABLET, FILM COATED ORAL
Qty: 90 TABLET | Refills: 9 | Status: SHIPPED | OUTPATIENT
Start: 2022-08-15

## 2022-08-15 RX ORDER — APIXABAN 5 MG/1
TABLET, FILM COATED ORAL
Qty: 180 TABLET | Refills: 2 | Status: SHIPPED | OUTPATIENT
Start: 2022-08-15 | End: 2022-10-04 | Stop reason: SDUPTHER

## 2022-08-15 RX ORDER — SPIRONOLACTONE 25 MG/1
TABLET ORAL
Qty: 90 TABLET | Refills: 2 | Status: SHIPPED | OUTPATIENT
Start: 2022-08-15

## 2022-10-04 ENCOUNTER — TELEPHONE (OUTPATIENT)
Dept: CARDIOLOGY | Facility: CLINIC | Age: 87
End: 2022-10-04

## 2022-10-04 DIAGNOSIS — I48.91 ATRIAL FIBRILLATION, UNSPECIFIED TYPE: ICD-10-CM

## 2022-10-04 DIAGNOSIS — I63.349 CEREBROVASCULAR ACCIDENT (CVA) DUE TO THROMBOSIS OF CEREBELLAR ARTERY, UNSPECIFIED BLOOD VESSEL LATERALITY: ICD-10-CM

## 2022-10-04 NOTE — TELEPHONE ENCOUNTER
Thanks. Sent new script. They can start splitting eliquis 5 mg for lower dose. Please inform patient/family

## 2022-10-04 NOTE — TELEPHONE ENCOUNTER
I have pulled patients most recent labs up in care everywhere.         BASIC METABOLIC PANEL  Order: [id number]  Component   Ref Range & Units 1 d ago   Sodium   136 - 145 mmol/L 136    Potassium   3.5 - 5.1 mmol/L 5.6 High     Chloride   98 - 107 mmol/L 100    Total CO2   22 - 29 mmol/L 19 Low     Anion Gap   5 - 13 (arb'U) 17 High     Comment: (note)   Calculation- Na - (Cl + CO2)   Glucose   71 - 139 mg/dL 130    BUN   10 - 20 mg/dL 39 High     Creatinine   0.55 - 1.02 mg/dL 1.52 High     BUN/Creatinine Ratio   RATIO 25.8    Est GFR by Clearance   >60 /1.73 m2 32 Low     Comment: (note)   Results do not take into account body mass.  Valid for patients 18   to 70 years of age.   Estimated GFR if -American   >60 /1.73 m2 39 Low     Comment: (note)   Results do not take into account body mass.  Valid for patients 18   to 70 years of age.   Calcium   8.4 - 10.2 mg/dL 10.5 High     Resulting Agency [facility]   Specimen Collected: 10/03/22 13:11 Last Resulted: 10/03/22 19:24   Received From: [facility]  Result Received: 10/04/22 13:45

## 2022-10-04 NOTE — TELEPHONE ENCOUNTER
Please obtain copy of the lab work. If creatinine is 1.4 or higher then yes, needs reduced at Eliquis 2.5 mg twice daily. Once lab values are verified then I will need you to call them and have them start splitting current 5 mg tablets to half tablet twice daily to achieve this dose. I will also sent a new prescription once we receive labs.

## 2022-10-04 NOTE — TELEPHONE ENCOUNTER
Patient daughter Kia is calling in regards to her Mother's recent lab work. States she was told by PCP that she is in acute renal failure and they needed to reach out to Dr Marsh and see if her Eliquis can be reduced? Please advise.

## 2022-10-05 NOTE — TELEPHONE ENCOUNTER
Patient called stating that the new prescription for Eliquis was filled at the [facility] pharmacy. She is requesting it be sent to [facility] pharmacy,

## 2022-12-01 DIAGNOSIS — I10 ESSENTIAL HYPERTENSION: ICD-10-CM

## 2023-02-23 DIAGNOSIS — I48.91 ATRIAL FIBRILLATION, UNSPECIFIED TYPE: ICD-10-CM

## 2023-02-23 DIAGNOSIS — I63.349 CEREBROVASCULAR ACCIDENT (CVA) DUE TO THROMBOSIS OF CEREBELLAR ARTERY, UNSPECIFIED BLOOD VESSEL LATERALITY: ICD-10-CM

## 2023-02-28 NOTE — TELEPHONE ENCOUNTER
Patient needs an appointment with APRN before any future refills will be given. Prescription was called in to [facility], patient uses [facility]    SCHEDULING: Please get patient in to see An APRN, it is not urgent, she just has not been seen since 2021.       Thanks  MAI Jain

## 2023-04-17 DIAGNOSIS — I48.91 ATRIAL FIBRILLATION, UNSPECIFIED TYPE: ICD-10-CM

## 2023-04-17 DIAGNOSIS — I63.349 CEREBROVASCULAR ACCIDENT (CVA) DUE TO THROMBOSIS OF CEREBELLAR ARTERY, UNSPECIFIED BLOOD VESSEL LATERALITY: ICD-10-CM

## 2023-04-17 NOTE — TELEPHONE ENCOUNTER
Rx Refill Note  Requested Prescriptions     Pending Prescriptions Disp Refills   • apixaban (Eliquis) 2.5 MG tablet tablet 60 tablet 0     Sig: Take 1 tablet by mouth 2 (Two) Times a Day. Please contact office to schedule a follow up appt [phone].      Last office visit with prescribing clinician: Visit date not found   Last telemedicine visit with prescribing clinician: Visit date not found   Next office visit with prescribing clinician: Visit date not found                         Would you like a call back once the refill request has been completed: [] Yes [] No    If the office needs to give you a call back, can they leave a voicemail: [] Yes [] No    Susy Galaviz MA  04/17/23, 13:58 EDT

## 2023-04-25 ENCOUNTER — TELEPHONE (OUTPATIENT)
Dept: CARDIOLOGY | Facility: CLINIC | Age: 88
End: 2023-04-25

## 2023-04-25 ENCOUNTER — APPOINTMENT (OUTPATIENT)
Dept: CT IMAGING | Facility: HOSPITAL | Age: 88
End: 2023-04-25
Payer: MEDICARE

## 2023-04-25 ENCOUNTER — APPOINTMENT (OUTPATIENT)
Dept: CARDIOLOGY | Facility: HOSPITAL | Age: 88
End: 2023-04-25
Payer: MEDICARE

## 2023-04-25 ENCOUNTER — APPOINTMENT (OUTPATIENT)
Dept: MRI IMAGING | Facility: HOSPITAL | Age: 88
End: 2023-04-25
Payer: MEDICARE

## 2023-04-25 ENCOUNTER — HOSPITAL ENCOUNTER (OUTPATIENT)
Facility: HOSPITAL | Age: 88
Setting detail: OBSERVATION
Discharge: HOME | End: 2023-04-25
Attending: EMERGENCY MEDICINE | Admitting: PHYSICIAN ASSISTANT
Payer: MEDICARE

## 2023-04-25 ENCOUNTER — APPOINTMENT (OUTPATIENT)
Dept: GENERAL RADIOLOGY | Facility: HOSPITAL | Age: 88
End: 2023-04-25
Payer: MEDICARE

## 2023-04-25 VITALS
RESPIRATION RATE: 18 BRPM | BODY MASS INDEX: 31.89 KG/M2 | SYSTOLIC BLOOD PRESSURE: 168 MMHG | OXYGEN SATURATION: 99 % | HEIGHT: 63 IN | HEART RATE: 65 BPM | TEMPERATURE: 97.7 F | WEIGHT: 180 LBS | DIASTOLIC BLOOD PRESSURE: 46 MMHG

## 2023-04-25 DIAGNOSIS — E11.9 TYPE 2 DIABETES MELLITUS WITHOUT COMPLICATION, WITHOUT LONG-TERM CURRENT USE OF INSULIN: ICD-10-CM

## 2023-04-25 DIAGNOSIS — Z86.73 HISTORY OF CVA (CEREBROVASCULAR ACCIDENT): ICD-10-CM

## 2023-04-25 DIAGNOSIS — R51.9 NONINTRACTABLE HEADACHE, UNSPECIFIED CHRONICITY PATTERN, UNSPECIFIED HEADACHE TYPE: Primary | ICD-10-CM

## 2023-04-25 DIAGNOSIS — E78.5 HYPERLIPIDEMIA, UNSPECIFIED HYPERLIPIDEMIA TYPE: ICD-10-CM

## 2023-04-25 DIAGNOSIS — I48.0 PAF (PAROXYSMAL ATRIAL FIBRILLATION): ICD-10-CM

## 2023-04-25 DIAGNOSIS — I10 HYPERTENSION, UNSPECIFIED TYPE: ICD-10-CM

## 2023-04-25 DIAGNOSIS — Z79.01 ANTICOAGULATED: ICD-10-CM

## 2023-04-25 PROBLEM — R47.01 APHASIA: Status: ACTIVE | Noted: 2023-04-25

## 2023-04-25 LAB
ALBUMIN SERPL-MCNC: 4.3 G/DL (ref 3.5–5.2)
ALBUMIN/GLOB SERPL: 1.5 G/DL
ALP SERPL-CCNC: 93 U/L (ref 39–117)
ALT SERPL W P-5'-P-CCNC: 17 U/L (ref 1–33)
ANION GAP SERPL CALCULATED.3IONS-SCNC: 14 MMOL/L (ref 5–15)
AORTIC DIMENSIONLESS INDEX: 0.8 (DI)
APTT PPP: 32.9 SECONDS (ref 22.7–35.4)
AST SERPL-CCNC: 14 U/L (ref 1–32)
BASOPHILS # BLD AUTO: 0.11 10*3/MM3 (ref 0–0.2)
BASOPHILS NFR BLD AUTO: 1.2 % (ref 0–1.5)
BH CV ECHO MEAS - ACS: 1.78 CM
BH CV ECHO MEAS - AI P1/2T: 755.4 MSEC
BH CV ECHO MEAS - AO MAX PG: 10.7 MMHG
BH CV ECHO MEAS - AO MEAN PG: 5.3 MMHG
BH CV ECHO MEAS - AO ROOT DIAM: 3 CM
BH CV ECHO MEAS - AO V2 MAX: 163.9 CM/SEC
BH CV ECHO MEAS - AO V2 VTI: 36 CM
BH CV ECHO MEAS - AVA(I,D): 2.5 CM2
BH CV ECHO MEAS - EDV(CUBED): 110.6 ML
BH CV ECHO MEAS - EDV(MOD-SP2): 70 ML
BH CV ECHO MEAS - EDV(MOD-SP4): 88 ML
BH CV ECHO MEAS - EF(MOD-BP): 62.8 %
BH CV ECHO MEAS - EF(MOD-SP2): 62.9 %
BH CV ECHO MEAS - EF(MOD-SP4): 63.6 %
BH CV ECHO MEAS - ESV(CUBED): 21.4 ML
BH CV ECHO MEAS - ESV(MOD-SP2): 26 ML
BH CV ECHO MEAS - ESV(MOD-SP4): 32 ML
BH CV ECHO MEAS - FS: 42.2 %
BH CV ECHO MEAS - IVS/LVPW: 0.94 CM
BH CV ECHO MEAS - IVSD: 1.12 CM
BH CV ECHO MEAS - LAT PEAK E' VEL: 5.5 CM/SEC
BH CV ECHO MEAS - LV DIASTOLIC VOL/BSA (35-75): 47.6 CM2
BH CV ECHO MEAS - LV MASS(C)D: 208.8 GRAMS
BH CV ECHO MEAS - LV MAX PG: 5.8 MMHG
BH CV ECHO MEAS - LV MEAN PG: 3 MMHG
BH CV ECHO MEAS - LV SYSTOLIC VOL/BSA (12-30): 17.3 CM2
BH CV ECHO MEAS - LV V1 MAX: 120.3 CM/SEC
BH CV ECHO MEAS - LV V1 VTI: 27.7 CM
BH CV ECHO MEAS - LVIDD: 4.8 CM
BH CV ECHO MEAS - LVIDS: 2.8 CM
BH CV ECHO MEAS - LVOT AREA: 3.3 CM2
BH CV ECHO MEAS - LVOT DIAM: 2.04 CM
BH CV ECHO MEAS - LVPWD: 1.2 CM
BH CV ECHO MEAS - MED PEAK E' VEL: 5.2 CM/SEC
BH CV ECHO MEAS - MV A DUR: 0.12 SEC
BH CV ECHO MEAS - MV A MAX VEL: 118.7 CM/SEC
BH CV ECHO MEAS - MV DEC SLOPE: 357.9 CM/SEC2
BH CV ECHO MEAS - MV DEC TIME: 0.23 MSEC
BH CV ECHO MEAS - MV E MAX VEL: 91.3 CM/SEC
BH CV ECHO MEAS - MV E/A: 0.77
BH CV ECHO MEAS - MV MAX PG: 5.2 MMHG
BH CV ECHO MEAS - MV MEAN PG: 2.45 MMHG
BH CV ECHO MEAS - MV P1/2T: 78.6 MSEC
BH CV ECHO MEAS - MV V2 VTI: 36.1 CM
BH CV ECHO MEAS - MVA(P1/2T): 2.8 CM2
BH CV ECHO MEAS - MVA(VTI): 2.5 CM2
BH CV ECHO MEAS - PA ACC TIME: 0.19 SEC
BH CV ECHO MEAS - PA PR(ACCEL): -6.6 MMHG
BH CV ECHO MEAS - PA V2 MAX: 100.8 CM/SEC
BH CV ECHO MEAS - PULM A REVS DUR: 0.13 SEC
BH CV ECHO MEAS - PULM A REVS VEL: 33.5 CM/SEC
BH CV ECHO MEAS - PULM DIAS VEL: 41.8 CM/SEC
BH CV ECHO MEAS - PULM S/D: 1.22
BH CV ECHO MEAS - PULM SYS VEL: 51 CM/SEC
BH CV ECHO MEAS - RAP SYSTOLE: 3 MMHG
BH CV ECHO MEAS - RV MAX PG: 3.1 MMHG
BH CV ECHO MEAS - RV V1 MAX: 87.5 CM/SEC
BH CV ECHO MEAS - RV V1 VTI: 20 CM
BH CV ECHO MEAS - RVSP: 18.6 MMHG
BH CV ECHO MEAS - SI(MOD-SP2): 23.8 ML/M2
BH CV ECHO MEAS - SI(MOD-SP4): 30.3 ML/M2
BH CV ECHO MEAS - SV(LVOT): 90.6 ML
BH CV ECHO MEAS - SV(MOD-SP2): 44 ML
BH CV ECHO MEAS - SV(MOD-SP4): 56 ML
BH CV ECHO MEAS - TR MAX PG: 15.6 MMHG
BH CV ECHO MEAS - TR MAX VEL: 197.8 CM/SEC
BH CV ECHO MEASUREMENTS AVERAGE E/E' RATIO: 17.07
BH CV ECHO SHUNT ASSESSMENT PERFORMED (HIDDEN SCRIPTING): 1
BH CV XLRA - RV BASE: 3.5 CM
BH CV XLRA - RV LENGTH: 6.5 CM
BH CV XLRA - RV MID: 3.3 CM
BH CV XLRA - TDI S': 13.1 CM/SEC
BILIRUB SERPL-MCNC: 0.2 MG/DL (ref 0–1.2)
BUN SERPL-MCNC: 19 MG/DL (ref 8–23)
BUN/CREAT SERPL: 22.6 (ref 7–25)
CALCIUM SPEC-SCNC: 9.4 MG/DL (ref 8.6–10.5)
CHLORIDE SERPL-SCNC: 100 MMOL/L (ref 98–107)
CHOLEST SERPL-MCNC: 241 MG/DL (ref 0–200)
CO2 SERPL-SCNC: 24 MMOL/L (ref 22–29)
CREAT SERPL-MCNC: 0.84 MG/DL (ref 0.57–1)
CRP SERPL-MCNC: <0.3 MG/DL (ref 0–0.5)
DEPRECATED RDW RBC AUTO: 43.2 FL (ref 37–54)
EGFRCR SERPLBLD CKD-EPI 2021: 66.9 ML/MIN/1.73
EOSINOPHIL # BLD AUTO: 0.22 10*3/MM3 (ref 0–0.4)
EOSINOPHIL NFR BLD AUTO: 2.4 % (ref 0.3–6.2)
ERYTHROCYTE [DISTWIDTH] IN BLOOD BY AUTOMATED COUNT: 13.3 % (ref 12.3–15.4)
ERYTHROCYTE [SEDIMENTATION RATE] IN BLOOD: 48 MM/HR (ref 0–30)
GEN 5 2HR TROPONIN T REFLEX: 22 NG/L
GLOBULIN UR ELPH-MCNC: 2.8 GM/DL
GLUCOSE BLDC GLUCOMTR-MCNC: 136 MG/DL (ref 70–130)
GLUCOSE BLDC GLUCOMTR-MCNC: 137 MG/DL (ref 70–130)
GLUCOSE SERPL-MCNC: 145 MG/DL (ref 65–99)
HBA1C MFR BLD: 8 % (ref 4.8–5.6)
HCT VFR BLD AUTO: 34 % (ref 34–46.6)
HDLC SERPL-MCNC: 50 MG/DL (ref 40–60)
HGB BLD-MCNC: 11.4 G/DL (ref 12–15.9)
HOLD SPECIMEN: NORMAL
IMM GRANULOCYTES # BLD AUTO: 0.03 10*3/MM3 (ref 0–0.05)
IMM GRANULOCYTES NFR BLD AUTO: 0.3 % (ref 0–0.5)
INR PPP: 1.03 (ref 0.9–1.1)
LDLC SERPL CALC-MCNC: 167 MG/DL (ref 0–100)
LDLC/HDLC SERPL: 3.28 {RATIO}
LEFT ATRIUM VOLUME INDEX: 29.7 ML/M2
LYMPHOCYTES # BLD AUTO: 3.42 10*3/MM3 (ref 0.7–3.1)
LYMPHOCYTES NFR BLD AUTO: 36.9 % (ref 19.6–45.3)
MAGNESIUM SERPL-MCNC: 1.8 MG/DL (ref 1.6–2.4)
MAXIMAL PREDICTED HEART RATE: 132 BPM
MCH RBC QN AUTO: 30.1 PG (ref 26.6–33)
MCHC RBC AUTO-ENTMCNC: 33.5 G/DL (ref 31.5–35.7)
MCV RBC AUTO: 89.7 FL (ref 79–97)
MONOCYTES # BLD AUTO: 1.05 10*3/MM3 (ref 0.1–0.9)
MONOCYTES NFR BLD AUTO: 11.3 % (ref 5–12)
NEUTROPHILS NFR BLD AUTO: 4.44 10*3/MM3 (ref 1.7–7)
NEUTROPHILS NFR BLD AUTO: 47.9 % (ref 42.7–76)
NRBC BLD AUTO-RTO: 0 /100 WBC (ref 0–0.2)
PLATELET # BLD AUTO: 365 10*3/MM3 (ref 140–450)
PMV BLD AUTO: 10.3 FL (ref 6–12)
POTASSIUM SERPL-SCNC: 3.7 MMOL/L (ref 3.5–5.2)
PROT SERPL-MCNC: 7.1 G/DL (ref 6–8.5)
PROTHROMBIN TIME: 13.6 SECONDS (ref 11.7–14.2)
QT INTERVAL: 653 MS
QT INTERVAL: 692 MS
RBC # BLD AUTO: 3.79 10*6/MM3 (ref 3.77–5.28)
SINUS: 3.5 CM
SODIUM SERPL-SCNC: 138 MMOL/L (ref 136–145)
STRESS TARGET HR: 112 BPM
TRIGL SERPL-MCNC: 134 MG/DL (ref 0–150)
TROPONIN T DELTA: -2 NG/L
TROPONIN T SERPL HS-MCNC: 24 NG/L
TSH SERPL DL<=0.05 MIU/L-ACNC: 2.03 UIU/ML (ref 0.27–4.2)
VLDLC SERPL-MCNC: 24 MG/DL (ref 5–40)
WBC NRBC COR # BLD: 9.27 10*3/MM3 (ref 3.4–10.8)

## 2023-04-25 PROCEDURE — 86140 C-REACTIVE PROTEIN: CPT | Performed by: STUDENT IN AN ORGANIZED HEALTH CARE EDUCATION/TRAINING PROGRAM

## 2023-04-25 PROCEDURE — 25510000001 IOPAMIDOL PER 1 ML: Performed by: EMERGENCY MEDICINE

## 2023-04-25 PROCEDURE — 93306 TTE W/DOPPLER COMPLETE: CPT

## 2023-04-25 PROCEDURE — 84484 ASSAY OF TROPONIN QUANT: CPT | Performed by: EMERGENCY MEDICINE

## 2023-04-25 PROCEDURE — 36415 COLL VENOUS BLD VENIPUNCTURE: CPT

## 2023-04-25 PROCEDURE — 93005 ELECTROCARDIOGRAM TRACING: CPT | Performed by: EMERGENCY MEDICINE

## 2023-04-25 PROCEDURE — 25510000001 PERFLUTREN (DEFINITY) 8.476 MG IN SODIUM CHLORIDE (PF) 0.9 % 10 ML INJECTION: Performed by: STUDENT IN AN ORGANIZED HEALTH CARE EDUCATION/TRAINING PROGRAM

## 2023-04-25 PROCEDURE — 93005 ELECTROCARDIOGRAM TRACING: CPT | Performed by: STUDENT IN AN ORGANIZED HEALTH CARE EDUCATION/TRAINING PROGRAM

## 2023-04-25 PROCEDURE — G0378 HOSPITAL OBSERVATION PER HR: HCPCS

## 2023-04-25 PROCEDURE — 83036 HEMOGLOBIN GLYCOSYLATED A1C: CPT | Performed by: STUDENT IN AN ORGANIZED HEALTH CARE EDUCATION/TRAINING PROGRAM

## 2023-04-25 PROCEDURE — 70551 MRI BRAIN STEM W/O DYE: CPT

## 2023-04-25 PROCEDURE — 84443 ASSAY THYROID STIM HORMONE: CPT | Performed by: STUDENT IN AN ORGANIZED HEALTH CARE EDUCATION/TRAINING PROGRAM

## 2023-04-25 PROCEDURE — 71045 X-RAY EXAM CHEST 1 VIEW: CPT

## 2023-04-25 PROCEDURE — 70450 CT HEAD/BRAIN W/O DYE: CPT

## 2023-04-25 PROCEDURE — 99285 EMERGENCY DEPT VISIT HI MDM: CPT

## 2023-04-25 PROCEDURE — 96365 THER/PROPH/DIAG IV INF INIT: CPT

## 2023-04-25 PROCEDURE — 93005 ELECTROCARDIOGRAM TRACING: CPT | Performed by: PHYSICIAN ASSISTANT

## 2023-04-25 PROCEDURE — 85730 THROMBOPLASTIN TIME PARTIAL: CPT | Performed by: EMERGENCY MEDICINE

## 2023-04-25 PROCEDURE — 70496 CT ANGIOGRAPHY HEAD: CPT

## 2023-04-25 PROCEDURE — 25010000002 MAGNESIUM SULFATE 2 GM/50ML SOLUTION: Performed by: STUDENT IN AN ORGANIZED HEALTH CARE EDUCATION/TRAINING PROGRAM

## 2023-04-25 PROCEDURE — 85610 PROTHROMBIN TIME: CPT | Performed by: EMERGENCY MEDICINE

## 2023-04-25 PROCEDURE — 85652 RBC SED RATE AUTOMATED: CPT | Performed by: STUDENT IN AN ORGANIZED HEALTH CARE EDUCATION/TRAINING PROGRAM

## 2023-04-25 PROCEDURE — 82962 GLUCOSE BLOOD TEST: CPT

## 2023-04-25 PROCEDURE — 99284 EMERGENCY DEPT VISIT MOD MDM: CPT

## 2023-04-25 PROCEDURE — 85025 COMPLETE CBC W/AUTO DIFF WBC: CPT | Performed by: EMERGENCY MEDICINE

## 2023-04-25 PROCEDURE — 70498 CT ANGIOGRAPHY NECK: CPT

## 2023-04-25 PROCEDURE — 80053 COMPREHEN METABOLIC PANEL: CPT | Performed by: EMERGENCY MEDICINE

## 2023-04-25 PROCEDURE — 83735 ASSAY OF MAGNESIUM: CPT | Performed by: STUDENT IN AN ORGANIZED HEALTH CARE EDUCATION/TRAINING PROGRAM

## 2023-04-25 PROCEDURE — 80061 LIPID PANEL: CPT | Performed by: STUDENT IN AN ORGANIZED HEALTH CARE EDUCATION/TRAINING PROGRAM

## 2023-04-25 RX ORDER — ACETAMINOPHEN 500 MG
1000 TABLET ORAL ONCE
Status: COMPLETED | OUTPATIENT
Start: 2023-04-25 | End: 2023-04-25

## 2023-04-25 RX ORDER — MAGNESIUM SULFATE HEPTAHYDRATE 40 MG/ML
2 INJECTION, SOLUTION INTRAVENOUS ONCE
Status: DISCONTINUED | OUTPATIENT
Start: 2023-04-25 | End: 2023-04-25

## 2023-04-25 RX ORDER — ACETAMINOPHEN 325 MG/1
650 TABLET ORAL EVERY 4 HOURS PRN
Status: DISCONTINUED | OUTPATIENT
Start: 2023-04-25 | End: 2023-04-25 | Stop reason: HOSPADM

## 2023-04-25 RX ORDER — ONDANSETRON 2 MG/ML
4 INJECTION INTRAMUSCULAR; INTRAVENOUS EVERY 6 HOURS PRN
Status: DISCONTINUED | OUTPATIENT
Start: 2023-04-25 | End: 2023-04-25

## 2023-04-25 RX ORDER — FUROSEMIDE 20 MG/1
TABLET ORAL
Status: ON HOLD | COMMUNITY
Start: 2023-04-18 | End: 2023-04-25

## 2023-04-25 RX ORDER — VERAPAMIL HYDROCHLORIDE 240 MG/1
240 TABLET, FILM COATED, EXTENDED RELEASE ORAL NIGHTLY
Status: DISCONTINUED | OUTPATIENT
Start: 2023-04-25 | End: 2023-04-25

## 2023-04-25 RX ORDER — RIBOFLAVIN (VITAMIN B2) 100 MG
25 TABLET ORAL SEE ADMIN INSTRUCTIONS
COMMUNITY

## 2023-04-25 RX ORDER — THIAMINE HCL 100 MG
TABLET ORAL
Status: ON HOLD | COMMUNITY
Start: 2023-03-22 | End: 2023-04-25

## 2023-04-25 RX ORDER — NITROGLYCERIN 0.4 MG/1
0.4 TABLET SUBLINGUAL
Status: DISCONTINUED | OUTPATIENT
Start: 2023-04-25 | End: 2023-04-25 | Stop reason: HOSPADM

## 2023-04-25 RX ORDER — VERAPAMIL HYDROCHLORIDE 120 MG/1
120 CAPSULE, EXTENDED RELEASE ORAL SEE ADMIN INSTRUCTIONS
COMMUNITY

## 2023-04-25 RX ORDER — CHOLECALCIFEROL (VITAMIN D3) 125 MCG
5 CAPSULE ORAL NIGHTLY PRN
Status: DISCONTINUED | OUTPATIENT
Start: 2023-04-25 | End: 2023-04-25 | Stop reason: HOSPADM

## 2023-04-25 RX ORDER — ONDANSETRON 4 MG/1
4 TABLET, FILM COATED ORAL EVERY 6 HOURS PRN
Status: DISCONTINUED | OUTPATIENT
Start: 2023-04-25 | End: 2023-04-25

## 2023-04-25 RX ORDER — MAGNESIUM SULFATE HEPTAHYDRATE 40 MG/ML
2 INJECTION, SOLUTION INTRAVENOUS ONCE
Status: COMPLETED | OUTPATIENT
Start: 2023-04-25 | End: 2023-04-25

## 2023-04-25 RX ORDER — HYDRALAZINE HYDROCHLORIDE 50 MG/1
100 TABLET, FILM COATED ORAL EVERY 8 HOURS SCHEDULED
Status: DISCONTINUED | OUTPATIENT
Start: 2023-04-25 | End: 2023-04-25 | Stop reason: HOSPADM

## 2023-04-25 RX ORDER — SODIUM CHLORIDE 0.9 % (FLUSH) 0.9 %
10 SYRINGE (ML) INJECTION AS NEEDED
Status: DISCONTINUED | OUTPATIENT
Start: 2023-04-25 | End: 2023-04-25 | Stop reason: HOSPADM

## 2023-04-25 RX ORDER — NICOTINE POLACRILEX 4 MG
15 LOZENGE BUCCAL
Status: DISCONTINUED | OUTPATIENT
Start: 2023-04-25 | End: 2023-04-25 | Stop reason: HOSPADM

## 2023-04-25 RX ORDER — MAGNESIUM SULFATE 1 G/100ML
1 INJECTION INTRAVENOUS ONCE
Status: DISCONTINUED | OUTPATIENT
Start: 2023-04-25 | End: 2023-04-25

## 2023-04-25 RX ORDER — HYDRALAZINE HYDROCHLORIDE 50 MG/1
100 TABLET, FILM COATED ORAL 3 TIMES DAILY
COMMUNITY

## 2023-04-25 RX ORDER — SODIUM CHLORIDE 9 MG/ML
40 INJECTION, SOLUTION INTRAVENOUS AS NEEDED
Status: DISCONTINUED | OUTPATIENT
Start: 2023-04-25 | End: 2023-04-25 | Stop reason: HOSPADM

## 2023-04-25 RX ORDER — DEXTROSE MONOHYDRATE 25 G/50ML
25 INJECTION, SOLUTION INTRAVENOUS
Status: DISCONTINUED | OUTPATIENT
Start: 2023-04-25 | End: 2023-04-25 | Stop reason: HOSPADM

## 2023-04-25 RX ORDER — FUROSEMIDE 20 MG/1
20 TABLET ORAL SEE ADMIN INSTRUCTIONS
COMMUNITY

## 2023-04-25 RX ORDER — IBUPROFEN 600 MG/1
1 TABLET ORAL
Status: DISCONTINUED | OUTPATIENT
Start: 2023-04-25 | End: 2023-04-25 | Stop reason: HOSPADM

## 2023-04-25 RX ORDER — INSULIN LISPRO 100 [IU]/ML
2-9 INJECTION, SOLUTION INTRAVENOUS; SUBCUTANEOUS
Status: DISCONTINUED | OUTPATIENT
Start: 2023-04-25 | End: 2023-04-25 | Stop reason: HOSPADM

## 2023-04-25 RX ORDER — VERAPAMIL HYDROCHLORIDE 240 MG/1
240 TABLET, FILM COATED, EXTENDED RELEASE ORAL EVERY 12 HOURS SCHEDULED
Status: DISCONTINUED | OUTPATIENT
Start: 2023-04-25 | End: 2023-04-25 | Stop reason: HOSPADM

## 2023-04-25 RX ORDER — SODIUM CHLORIDE 0.9 % (FLUSH) 0.9 %
10 SYRINGE (ML) INJECTION EVERY 12 HOURS SCHEDULED
Status: DISCONTINUED | OUTPATIENT
Start: 2023-04-25 | End: 2023-04-25 | Stop reason: HOSPADM

## 2023-04-25 RX ORDER — MECLIZINE HCL 12.5 MG/1
TABLET ORAL
Status: ON HOLD | COMMUNITY
Start: 2022-12-21 | End: 2023-04-25

## 2023-04-25 RX ADMIN — Medication 10 ML: at 11:39

## 2023-04-25 RX ADMIN — HYDRALAZINE HYDROCHLORIDE 100 MG: 50 TABLET ORAL at 07:50

## 2023-04-25 RX ADMIN — PERFLUTREN 2 ML: 6.52 INJECTION, SUSPENSION INTRAVENOUS at 09:42

## 2023-04-25 RX ADMIN — IOPAMIDOL 95 ML: 755 INJECTION, SOLUTION INTRAVENOUS at 09:57

## 2023-04-25 RX ADMIN — VERAPAMIL HYDROCHLORIDE 240 MG: 240 TABLET, FILM COATED, EXTENDED RELEASE ORAL at 14:47

## 2023-04-25 RX ADMIN — MAGNESIUM SULFATE HEPTAHYDRATE 2 G: 2 INJECTION, SOLUTION INTRAVENOUS at 06:41

## 2023-04-25 RX ADMIN — APIXABAN 2.5 MG: 2.5 TABLET, FILM COATED ORAL at 11:39

## 2023-04-25 RX ADMIN — VERAPAMIL HYDROCHLORIDE 120 MG: 120 TABLET, FILM COATED, EXTENDED RELEASE ORAL at 07:50

## 2023-04-25 RX ADMIN — ACETAMINOPHEN 1000 MG: 500 TABLET, FILM COATED ORAL at 05:46

## 2023-04-25 RX ADMIN — HYDRALAZINE HYDROCHLORIDE 100 MG: 50 TABLET ORAL at 14:46

## 2023-04-25 NOTE — H&P
[facility]   HISTORY AND PHYSICAL    Patient Name: Gela Thompson  : 1934  MRN: [id number]  Primary Care Physician:  Karla Mora MD  Date of admission: 2023    Subjective   Subjective     Chief Complaint:   Chief Complaint   Patient presents with   • Headache   • Blurred Vision         HPI:    Gela Thompson is a 88 y.o. female with a history of paroxysmal atrial fibrillation on Eliquis, CVA with no residual deficits, diabetes, hypertension, hyperlipidemia, and obstructive sleep apnea on CPAP who presents to [facility] ER with headache with neck stiffness and blurred vision and an episode of aphasia.  Patient states 3 days ago she started having a headache at her right forehead.  States it was coming and going but has been persistent yesterday.  She is also had some burning sensation of the right eye.  He states that at that eye feels more blurred than normal.  She states yesterday she was talking to her daughter was not able to get her words out.  She states she has had no other episodes of difficulty with her speech since.  She denies any numbness and tingling or focal weakness.  Denies any difficulty swallowing.  Denies any recent chest pain or shortness of breath.  Denies abdominal pain nausea and diarrhea.  She reports adherence to her home medications including Eliquis.    In the ER, CT head without shows no acute findings.  Initial troponin 24 with a repeat of 22.  EKG shows sinus, prolonged QT with a QTc of 698.  Patient given Tylenol in the ER.    Review of Systems   All systems were reviewed and negative except for: What is mentioned above in the HPI.    Personal History     Past Medical History:   Diagnosis Date   • Abnormal echocardiogram    • Adiposity    • Anemia    • Cerebrovascular accident    • Chest pain    • Chronic low back pain    • Diabetes mellitus    • Dyslipidemia    • Dyspnea    • Gastroesophageal reflux disease    • H/O echocardiogram 2014    • Health care maintenance    • History of EKG 10/29/2015   • Hyperlipidemia    • Hypertension    • Mitral valve insufficiency    • Murmur    • Murmur    • ROMAN (obstructive sleep apnea)    • Osteoarthritis    • Peripheral neuropathy        Past Surgical History:   Procedure Laterality Date   • CARPAL TUNNEL RELEASE     • CHOLECYSTECTOMY     • HYSTERECTOMY     • KNEE SURGERY Bilateral     TKA   • NEUROPLASTY      deompression median nerve at carpal tunnel bilateral   • SHOULDER SURGERY Left        Family History: family history includes Heart attack in her mother; Heart disease in her mother and sister; Neuropathy in her mother; Stroke in her father. Otherwise pertinent FHx was reviewed and not pertinent to current issue.    Social History:  reports that she has never smoked. She has never used smokeless tobacco. She reports that she does not drink alcohol and does not use drugs.    Home Medications:  Calcium Carb-Cholecalciferol, EPINEPHrine, L-Lysine, Magnesium Oxide, Multiple Vitamin, apixaban, erythromycin, hydrALAZINE, lisinopril, melatonin, metFORMIN, spironolactone, traMADol, valACYclovir, verapamil SR, and vitamin B-12    Allergies:  Allergies   Allergen Reactions   • Penicillins Hives     Patient hasn't taken PCNs for over 30 years.    • Versed [Midazolam] Other (See Comments)     Difficulty breathing   • Morphine GI Intolerance   • Statins Myalgia     Pt stated that she gets muscle aches all over when she takes statins.    • Tape Rash       Objective   Objective     Vitals:   Temp:  [98.2 °F (36.8 °C)] 98.2 °F (36.8 °C)  Heart Rate:  [68] 68  Resp:  [16] 16  BP: (148)/(64) 148/64  Physical Exam    Constitutional: 88-year-old female, well-nourished, no acute distress on room air   Eyes: PERRLA, sclerae anicteric, EOMI, mild conjunctival injection   HENT: NCAT, mucous membranes moist   Neck: Supple, no thyromegaly, no lymphadenopathy, trachea midline, no cervical spinal point tenderness   Respiratory: Clear  to auscultation bilaterally, nonlabored respirations    Cardiovascular: RRR, no murmurs, rubs, or gallops, palpable pedal pulses bilaterally   Gastrointestinal: Positive bowel sounds, soft, nontender, nondistended   Musculoskeletal: No bilateral ankle edema, no clubbing or cyanosis to extremities   Psychiatric: Appropriate affect, cooperative   Neurologic: Oriented x 3, strength symmetric in all extremities, Cranial Nerves grossly intact to confrontation, speech clear, no facial droop   Skin: No rashes     Result Review    Result Review:  I have personally reviewed the results from the time of this admission to 4/25/2023 05:26 EDT and agree with these findings:  [x]  Laboratory list / accordion  []  Microbiology  [x]  Radiology  [x]  EKG/Telemetry   []  Cardiology/Vascular   []  Pathology  [x]  Old records  []  Other:  Most notable findings include:     CT Head Without Contrast    Result Date: 4/25/2023  Patient: IONA TRUONG  Time Out: 04:08 Exam(s): CT HEAD Without Contrast EXAM:   CT Head Without Intravenous Contrast CLINICAL HISTORY:    Reason for exam: HA. TECHNIQUE:   Axial computed tomography images of the head brain without intravenous contrast.  CTDI is 55.97 mGy and DLP is 921.1 mGy-cm.  This CT exam was performed according to the principle of ALARA (As Low As Reasonably Achievable) by using one or more of the following dose reduction techniques: automated exposure control, adjustment of the mA and or kV according to patient size, and or use of iterative reconstruction technique. COMPARISON:   No relevant prior studies available. FINDINGS:   Brain:  No hemorrhage, herniation, or mass effect.  Chronic microvascular ischemic changes.  Old right frontal, left parietal and right occipital infarcts.   Ventricles:  No hydrocephalus.  Age related cerebral volume loss.   Bones joints:  Unremarkable.   Soft tissues:  Unremarkable.   Sinuses:  Unremarkable.   Mastoid air cells:  Clear. IMPRESSION:       No acute  hemorrhage, hydrocephalus, or mass effect.     Electronically signed by Adnie Gomez MD on 04-25-23 at 0408    XR Chest 1 View    Result Date: 4/25/2023  Patient: IONA THOMPSON  Time Out: 04:32 Exam(s): XR CXR 1 VIEW EXAM:   XR Chest, 1 View CLINICAL HISTORY:    Reason for exam: TIA. TECHNIQUE:   Frontal view of the chest. COMPARISON:   No relevant prior studies available. FINDINGS:   Lungs:  No consolidation or mass.  Slightly prominent interstitial markings in the lower lungs.   Pleural space:  No acute findings   Heart:  cardiomegaly.   Bones joints:  No acute findings. IMPRESSION:     Slightly prominent interstitial markings in the lower lungs.     Electronically signed by Andie Gomez MD on 04-25-23 at 0432      Assessment & Plan   Assessment / Plan     Brief Patient Summary:  Iona Thompson is a 88 y.o. female who is being admitted to the observation unit for further evaluation of headache with blurred vision and an episode of aphasia.  Patient also was noted with a prolonged QT on EKG.  Plan for further neuroimaging and consultation to neurology and cardiology.    Active Hospital Problems:  Active Hospital Problems    Diagnosis    • **Headache    • Aphasia      Plan:     Headache  Episode of aphasia  History of CVA 2020  -Patient reports headache for the past 3 days with associated neck stiffness, right eye burning and blurred vision  -CT head negative acute  -ESR/CRP pending  -CTA of the head and neck   -MRI of the brain without  -Echocardiogram  -Neurology consulted  -Neurochecks every 4 hours  -Continue Eliquis, statin not started as patient is on for Mcpherson twice daily  -Telemetry monitoring    Prolonged QT  -EKG in ER shows a QTc of 698, repeat EKG shows QTc 631  -Hold any QT prolonging medications  -2 g IV magnesium  -Cardiology consulted    Paroxysmal atrial fibrillation  -Normal sinus rhythm on exam  -Continue Eliquis    Hypertension  -Continue hydralazine and verapamil  -Monitor vital signs  every 4 hours    Diabetes  -Hold home metformin  -Sliding scale and Accu-Cheks with meals and at bedtime    Obstructive sleep apnea  -Patient uses CPAP at home  -Return O2 as needed    DVT prophylaxis:  Medical DVT prophylaxis orders are present.    CODE STATUS:    Code Status (Patient has no pulse and is not breathing): CPR (Attempt to Resuscitate)  Medical Interventions (Patient has pulse or is breathing): Full Support    Admission Status:  I believe this patient meets observation status.    85 minutes have been spent by [facility] providers in the care of this patient while under observation status.    I have discussed plan of care with patient including advance care plan and/or surrogate decision maker.  Patient advises that their daughter, Kia Thompson, will be their primary surrogate decision maker    I wore an face mask, eye protection, and gloves during this patient encounter. Patient also wearing a surgical mask. Hand hygeine performed before and after seeing the patient.      Electronically signed by Charlene Swenson PA-C, 04/25/23, 5:26 AM EDT.

## 2023-04-25 NOTE — DISCHARGE INSTRUCTIONS
Follow-up with cardiology in 1 to 2 weeks.  Follow-up with your primary care provider.  Return to the emergency department with worsening symptoms, uncontrolled pain, inability to tolerate oral liquids, fever greater than 101°F not controlled by Tylenol or as needed with emergent concerns.

## 2023-04-25 NOTE — CONSULTS
"[facility]  Consult Note                                                                              4/25/2023  Chance Castillo MD    Patient Identification:  Gela Thompson:   88 y.o.  female  1934     Date of Admission:4/25/2023    CC:   Headache x 3 days, Aphasia     Reason for Consult:   Prolonged QT     History of Present Illness:   Gela Thompson is an 88 year old female with a history of paroxysmal atrial fibrillation on eliquis, CVA (2020), diabetes, hypertension, and ROMAN on CPAP. 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 She presented to the ED with complaints of headache for the last three days. She also reports an episode of word finding difficulty when speaking with her sister. She denies any facial droop, slurred speech, associated weakness or numbness. On presentation to the ED she was noted to have a prolonged QT with a QTc of 698.      CT Head Without Contrast (4/25/2023) showed no acute changes.  XR Chest (4/25/2023) showed \"slightly prominent interstitial markings in the lower lungs\".     Gela Thompson is a patient of Dr. Marsh and was last seen by Tiesha MCCLELLAN in April 2021. She has had issues with shortness of breath since 2011. She had an unremarkable echo and unremarkable 30-day event monitor at that time. In 2014, she had a CT scan of the chest which was noncontrasted showing some coronary calcification. She had a nuclear stress test which showed no evidence of ischemia in the fall of 2014. She was on Pradaxa for some time but had some GI upset so that was stopped. In May 2020 she sustained severe bilateral occipital strokes which she requires long term anticoagulation for. She later was treated with Eliquis due to paroxysmal atrial fibrillation.  She denies any recent chest pain, shortness of breath more than her baseline.  She admits her blood pressure is at times been elevated at home times up to the 180s range.    ECHO 5/7/2020  • Left " "ventricular systolic function is normal. Calculated EF = 60%. Estimated EF was in agreement with the calculated EF. Estimated EF = 60%. Normal left ventricular cavity size and wall thickness noted. All left ventricular wall segments contract normally. Left ventricular diastolic function is normal.  • Left atrial volume is mildly increased. Saline test results are negative.  • The valve was poorly visualized but appears trileaflet. The aortic valve is abnormal in structure. There is moderate calcification of the aortic valve.Mild to moderate aortic valve regurgitation is present. No aortic valve stenosis is present.  • Mild MAC is present.  • The tricuspid valve is grossly normal. Trace tricuspid valve regurgitation is present. Estimated right ventricular systolic pressure from tricuspid regurgitation is normal (<35 mmHg). Calculated right ventricular systolic pressure from tricuspid regurgitation is 33.0 mmHg.  • Limited 2D imaging of cardiac valve    Holter Monitor 12/11/2017: A normal monitor study.    Nuclear Stress 10/27/2014: \"Normal 1 day rest/stress Myoview perfusion study showing no evidence of ischemia.\"     CT Chest without contrast 09/18/2014: \"Heart size is normal. No pericardial effusion. No mediastinal or hilar adenopathy. Multiple lymph nodes contain calcification consistent with old healed granulomatous disease. There is moderate atherosclerosis including coronary disease.\"    Past Medical History:  Past Medical History:   Diagnosis Date   • Abnormal echocardiogram    • Adiposity    • Anemia    • Cerebrovascular accident    • Chest pain    • Chronic low back pain    • Diabetes mellitus    • Dyslipidemia    • Dyspnea    • Gastroesophageal reflux disease    • H/O echocardiogram 06/27/2014   • Health care maintenance    • History of EKG 10/29/2015   • Hyperlipidemia    • Hypertension    • Mitral valve insufficiency    • Murmur    • Murmur    • ROMAN (obstructive sleep apnea)    • Osteoarthritis    • " Peripheral neuropathy        Past Surgical History:  Past Surgical History:   Procedure Laterality Date   • CARPAL TUNNEL RELEASE     • CHOLECYSTECTOMY     • HYSTERECTOMY     • KNEE SURGERY Bilateral     TKA   • NEUROPLASTY      deompression median nerve at carpal tunnel bilateral   • SHOULDER SURGERY Left        Allergies:  Allergies   Allergen Reactions   • Penicillins Hives     Patient hasn't taken PCNs for over 30 years.    • Versed [Midazolam] Other (See Comments)     Difficulty breathing   • Morphine GI Intolerance   • Statins Myalgia     Pt stated that she gets muscle aches all over when she takes statins.    • Tape Rash       Home Meds:  Medications Prior to Admission   Medication Sig Dispense Refill Last Dose   • apixaban (Eliquis) 2.5 MG tablet tablet Take 1 tablet by mouth 2 (Two) Times a Day. Please contact office to schedule a follow up appt [phone]. 60 tablet 0 4/24/2023 at 0800   • erythromycin (ROMYCIN) 5 MG/GM ophthalmic ointment    4/24/2023 at 0800   • furosemide (LASIX) 20 MG tablet    4/24/2023 at 0800   • glucose blood (Accu-Chek SmartView) test strip 1 each by Other route.      • hydrALAZINE (APRESOLINE) 100 MG tablet TAKE ONE TABLET BY MOUTH THREE TIMES DAILY (MORNING, NOON, AND BEDTIME) 90 tablet 9 4/24/2023 at 1200   • L-Lysine 500 MG tablet Take 1 tablet by mouth Daily.   Past Month   • meclizine (ANTIVERT) 12.5 MG tablet TAKE ONE TABLET BY MOUTH THREE TIMES DAILY AS NEEDED DIZZINESS      • metFORMIN (GLUCOPHAGE) 500 MG tablet Take 1 tablet by mouth Daily With Breakfast. (Patient taking differently: Take 1 tablet by mouth 2 (Two) Times a Day With Meals.)   4/24/2023 at 0800   • Riboflavin (B-2) 50 MG tablet take one half tablet on monday thursday 4/24/2023 at 0800   • traMADol (ULTRAM) 50 MG tablet Take 1 tablet by mouth Daily As Needed (Arthritis pain). Patient usually takes 1/2 tab and takes very seldomly   Past Month   • verapamil SR (CALAN-SR) 120 MG CR tablet TAKE ONE TABLET  BY MOUTH EVERY MORNING 90 tablet 3 4/24/2023 at 0800   • verapamil SR (CALAN-SR) 240 MG CR tablet Take 1 tablet by mouth Every Night. 90 tablet 3 Past Week   • Calcium Carb-Cholecalciferol (CALCIUM 1000 + D PO) Take 1 tablet by mouth Daily.      • EPINEPHrine (EPIPEN) 0.3 MG/0.3ML solution auto-injector injection 1 (One) Time As Needed.      • lisinopril (PRINIVIL,ZESTRIL) 40 MG tablet Take 0.5 tablets by mouth Daily. 90 tablet 3    • Magnesium Oxide 500 MG capsule Take 2 capsules by mouth Daily.      • melatonin 3 MG tablet Take  by mouth Daily.      • Multiple Vitamin (MULTIVITAMINS PO) Take 1 tablet by mouth Daily.      • spironolactone (ALDACTONE) 25 MG tablet TAKE ONE TABLET BY MOUTH DAILY 90 tablet 2    • valACYclovir (VALTREX) 1000 MG tablet Take 1 tablet by mouth Daily As Needed.      • vitamin B-12 (CYANOCOBALAMIN) 1000 MCG tablet Take 1 tablet by mouth Daily. As directed          Current Meds  Scheduled Meds:apixaban, 2.5 mg, Oral, BID  hydrALAZINE, 100 mg, Oral, Q8H  insulin lispro, 2-9 Units, Subcutaneous, TID With Meals  sodium chloride, 10 mL, Intravenous, Q12H  verapamil SR, 120 mg, Oral, QAM  verapamil SR, 240 mg, Oral, Nightly        Social History:   Social History     Socioeconomic History   • Marital status:    Tobacco Use   • Smoking status: Never   • Smokeless tobacco: Never   • Tobacco comments:     caffeine use - 1 cup coffee, 1 glass tea daily    Vaping Use   • Vaping Use: Never used   Substance and Sexual Activity   • Alcohol use: No   • Drug use: No   • Sexual activity: Defer       Family History:  Family History   Problem Relation Age of Onset   • Heart disease Mother    • Heart attack Mother    • Neuropathy Mother    • Heart disease Sister    • Stroke Father        REVIEW OF SYSTEMS:   CONSTITUTIONAL: No weight loss, fever, chills, weakness or fatigue.   HEENT: Eyes: No visual loss, blurred vision, double vision or yellow sclerae. Ears, Nose, Throat: No hearing loss, sneezing,  "congestion, runny nose or sore throat.   SKIN: No rash or itching.     RESPIRATORY: No shortness of breath, hemoptysis, cough or sputum.   GASTROINTESTINAL: No anorexia, nausea, vomiting or diarrhea. No abdominal pain, bright red blood per rectum or melena.  GENITOURINARY: No burning on urination, hematuria or increased frequency.  NEUROLOGICAL: No headache, dizziness, syncope, paralysis, ataxia, numbness or tingling in the extremities. No change in bowel or bladder control.   MUSCULOSKELETAL: No muscle, back pain, joint pain or stiffness.   HEMATOLOGIC: No anemia, bleeding or bruising.   LYMPHATICS: No enlarged nodes. No history of splenectomy.   PSYCHIATRIC: No history of depression, anxiety, hallucinations.   ENDOCRINOLOGIC: No reports of sweating, cold or heat intolerance. No polyuria or polydipsia.     Physical Exam    /53 (BP Location: Right arm, Patient Position: Lying)   Pulse 60   Temp 97.7 °F (36.5 °C) (Oral)   Resp 18   Ht 160 cm (63\")   Wt 81.6 kg (180 lb)   SpO2 99%   BMI 31.89 kg/m²     General Appearance Well developed, cooperative and well nourished and no acute distress   Head Normocephalic, without abnormality, atraumatic   Ears Ears appear intact with no abnormalities noted   Throat No oral lesions, no thrush, oral mucosa moist   Neck No adenopathy, supple, trachea midline, no thyromegaly, no carotid bruit, no JVD   Back No skin lesions, erythema or scars, no tenderness to percussion or palpation,range of motion is normal   Lungs Clear to auscultation,respirations regular, even and unlabored   Heart Regular rhythm and normal rate, normal S1 and S2, no murmur, no gallop, no rub, no click   Chest wall No abnormalities observed   Abdomen Normal bowel sounds, no masses, no hepatomegaly,    Extremities Moves all extremities well, no edema, no cyanosis, no redness   Pulses Pulses palpable and equal bilaterally. Normal radial, carotid, femoral, dorsalis pedis and posterior tibial pulses " bilaterally.    Skin No bleeding, bruising or rash   Psychiatric Alert and oriented x 3, normal mood and affect     Results from last 7 days   Lab Units 04/25/23  0336   SODIUM mmol/L 138   POTASSIUM mmol/L 3.7   CHLORIDE mmol/L 100   CO2 mmol/L 24.0   BUN mg/dL 19   CREATININE mg/dL 0.84   CALCIUM mg/dL 9.4   BILIRUBIN mg/dL 0.2   ALK PHOS U/L 93   ALT (SGPT) U/L 17   AST (SGOT) U/L 14   GLUCOSE mg/dL 145*     Results from last 7 days   Lab Units 04/25/23  0547 04/25/23  0336   HSTROP T ng/L 22* 24*     Results from last 7 days   Lab Units 04/25/23  0336   WBC 10*3/mm3 9.27   HEMOGLOBIN g/dL 11.4*   HEMATOCRIT % 34.0   PLATELETS 10*3/mm3 365     Results from last 7 days   Lab Units 04/25/23  0336   INR  1.03   APTT seconds 32.9     Results from last 7 days   Lab Units 04/25/23  0547   MAGNESIUM mg/dL 1.8     I personally viewed and interpreted the patient's EKG/Telemetry data    EKG 4/25/23 06:23 am       EKG 4/25/23 03:36 am      EKG 4/29/2021 01:48pm        I have reviewed HPI and ROS above.    Assessment and Plan  1.  Prolonged QTc interval.  Potassium borderline low normal at 3.7, TSH normal, magnesium normal.  CT head without obvious acute changes though neuro symptoms concerning and certainly CNS injury can prolong QT.  Check echo and have pharmacy review med list though no obvious offenders.  2.  Elevated troponin. No anginal symptoms.   3.  Headache with dysarthria.  Neuro eval in progress and waiting on brain MRI  4.  Coronary artery disease noted on prior coronary CT with negative stress test 2014  5.  Paroxysmal atrial fibrillation.  Maintaining sinus rhythm   6.  Hypertension, has been elevated at home recently in the 160's. Will increase verapamil to 240mg BID  7.  Obstructive sleep apnea, on CPAP. Pressure increased 3 weeks ago.  8.  Diabetes  9.  History of intracranial bleedings ago but unknown what she was on  10.  Blindness  11.  Prior stroke 2020      Mini Hammonds  4/25/2023  08:42 EDT    60min spent  in reviewing records, discussion and examination of the patient and discussion with other members of the patient's medical team.     Dictated utilizing Dragon dictation

## 2023-04-25 NOTE — CASE MANAGEMENT/SOCIAL WORK
Discharge Planning Assessment  [facility]     Patient Name: Gela Thompson  MRN: [id number]  Today's Date: 4/25/2023    Admit Date: 4/25/2023    Plan: Plans to return home at d/cKEATON Gentile RN   Discharge Needs Assessment     Row Name 04/25/23 1157       Living Environment    People in Home alone    Current Living Arrangements home    Primary Care Provided by other (see comments)  has private caregivers 24/7    Provides Primary Care For no one, unable/limited ability to care for self    Family Caregiver if Needed child(mamie), adult    Quality of Family Relationships supportive    Able to Return to Prior Arrangements yes       Resource/Environmental Concerns    Resource/Environmental Concerns none       Transition Planning    Patient/Family Anticipates Transition to home    Transportation Anticipated other (see comments)  either family or caregiver will transport at d/c       Discharge Needs Assessment    Equipment Currently Used at Home rollator;cpap;glucometer;bp cuff;grab bar;shower chair;other (see comments)  stair lift    Concerns to be Addressed no discharge needs identified    Anticipated Changes Related to Illness none    Equipment Needed After Discharge none    Provided Post Acute Provider List? N/A    Provided Post Acute Provider Quality & Resource List? N/A               Discharge Plan     Row Name 04/25/23 1200       Plan    Plan Plans to return home at d/Scot Gentile RN    Patient/Family in Agreement with Plan yes    Provided Post Acute Provider List? N/A    Provided Post Acute Provider Quality & Resource List? N/A    Plan Comments Spoke w/ patient at bedside w/ her permission. PPE used. Introduced self and explained role. All info on facesheet, including PCP as MARY Mora, verified. Lives alone in single story home w/ basement. Has private caregivers 24/7 who provide all care, meal prep, etc. Uses rollator, CPAP, BGM, grab bars, shower chair, B/P cuff and stair lift at home. Denies need for any other  DME or community resources at d/c. Uses [facility] and is able to obtain and pay for meds. Per chart, already enrolled in [facility]. To return home at d/c, w/ continuing 24/7 private caregivers; either caregiver or family will transport at d/c; agreeable w/ plan. CM will continue to follow-EILEEN Gentile RN              Continued Care and Services - Admitted Since 4/25/2023    Coordination has not been started for this encounter.          Demographic Summary     Row Name 04/25/23 1155       General Information    Admission Type observation    Arrived From emergency department    Required Notices Provided Observation Status Notice    Referral Source admission list    Reason for Consult discharge planning    Preferred Language English       Contact Information    Permission Granted to Share Info With                Functional Status     Row Name 04/25/23 1156       Functional Status    Usual Activity Tolerance fair    Current Activity Tolerance fair       Functional Status, IADL    IADL Comments has 24/7 private caregivers at home, who are responsible for all tasks listed above       Mental Status    General Appearance WDL WDL       Mental Status Summary    Recent Changes in Mental Status/Cognitive Functioning no changes               Psychosocial     Row Name 04/25/23 1157       Behavior WDL    Behavior WDL WDL       Emotion Mood WDL    Emotion/Mood/Affect WDL WDL       Speech WDL    Speech WDL WDL       Perceptual State WDL    Perceptual State WDL WDL       Thought Process WDL    Thought Process WDL WDL       Intellectual Performance WDL    Intellectual Performance WDL WDL               Abuse/Neglect    No documentation.                Legal    No documentation.                Substance Abuse    No documentation.                Patient Forms    No documentation.                   Leighann Gentile, ELLEN

## 2023-04-25 NOTE — ED PROVIDER NOTES
EMERGENCY DEPARTMENT ENCOUNTER    Room Number:  05/05  Date of encounter:  4/25/2023  PCP: Karla Mora MD  Patient Care Team:  Karla Mora MD as PCP - General   Independent Historians: Patient    HPI:  Chief Complaint: Headache  A complete HPI/ROS/PMH/PSH/SH/FH are unobtainable due to: N/A    Chronic or social conditions impacting patient care (social determinants of health): None    Context: Gela Thompson is a 88 y.o. female with past medical history of HTN, HLD, AF anticoagulated on Eliquis, T2DM, and prior stroke who arrives to the ED with complaint of headache.  Patient states that she has had a headache since Saturday morning upon waking at around 10 AM that has been associated with a stiff neck.  Patient also notes that yesterday at around 6:00 in the evening she had difficulty getting her words out, but did not notice if she had any facial droop or extremity weakness.  Patient presented to the ER today concerned about her persistent headache.    Review of prior external notes (non-ED): Previous ER visit 8/5/2021 was for worsening vision loss on history of chronic vision loss.  At that time patient had negative CT scan of the head showed nothing acute and patient refused to stay for MRI imaging and left AMA.    Review of prior external test results outside of this encounter: Previous CT of the head on 8/5/2021 showed no acute intracranial abnormality, chronic infarcts in bilateral occipital lobes and right frontal lobe.    PAST MEDICAL HISTORY  Active Ambulatory Problems     Diagnosis Date Noted   • Anemia 07/22/2013   • Chronic low back pain 10/07/2016   • Dyslipidemia 10/07/2016   • Gastroesophageal reflux disease 04/07/2015   • Essential hypertension 10/07/2016   • Mitral valve insufficiency 10/14/2015   • Osteoarthritis 10/07/2016   • Adiposity 10/07/2016   • Obstructive sleep apnea syndrome 10/07/2016   • Cerebrovascular accident 05/01/2011   • ROBERTO (acute kidney injury) 05/06/2020   •  Hyperkalemia 05/06/2020   • Anemia, macrocytic 05/06/2020   • DM II (diabetes mellitus, type II), controlled 05/06/2020   • Paroxysmal atrial fibrillation 05/07/2020   • Stroke (cerebrum) 05/13/2020   • Petechial hemorrhage 06/09/2020   • Vision changes 08/06/2021     Resolved Ambulatory Problems     Diagnosis Date Noted   • No Resolved Ambulatory Problems     Past Medical History:   Diagnosis Date   • Abnormal echocardiogram    • Chest pain    • Diabetes mellitus    • Dyspnea    • H/O echocardiogram 06/27/2014   • Health care maintenance    • History of EKG 10/29/2015   • Hyperlipidemia    • Hypertension    • Murmur    • Murmur    • ROMAN (obstructive sleep apnea)    • Peripheral neuropathy        The patient qualifies to receive the vaccine, but they have not yet received it.    PAST SURGICAL HISTORY  Past Surgical History:   Procedure Laterality Date   • CARPAL TUNNEL RELEASE     • CHOLECYSTECTOMY     • HYSTERECTOMY     • KNEE SURGERY Bilateral     TKA   • NEUROPLASTY      deompression median nerve at carpal tunnel bilateral   • SHOULDER SURGERY Left          FAMILY HISTORY  Family History   Problem Relation Age of Onset   • Heart disease Mother    • Heart attack Mother    • Neuropathy Mother    • Heart disease Sister    • Stroke Father          SOCIAL HISTORY  Social History     Socioeconomic History   • Marital status:    Tobacco Use   • Smoking status: Never   • Smokeless tobacco: Never   • Tobacco comments:     caffeine use - 1 cup coffee, 1 glass tea daily    Vaping Use   • Vaping Use: Never used   Substance and Sexual Activity   • Alcohol use: No   • Drug use: No   • Sexual activity: Defer         ALLERGIES  Penicillins, Versed [midazolam], Morphine, Statins, and Tape        REVIEW OF SYSTEMS  Review of Systems     All systems reviewed and negative except for those discussed in HPI.       PHYSICAL EXAM    I have reviewed the triage vital signs and nursing notes.    ED Triage Vitals [04/25/23 7985]    Temp Heart Rate Resp BP SpO2   98.2 °F (36.8 °C) 68 16 148/64 98 %      Temp src Heart Rate Source Patient Position BP Location FiO2 (%)   Oral -- -- -- --       Physical Exam    GENERAL: alert and oriented x4, not distressed  HENT: normocephalic, atraumatic, moist mucous membranes  EYES: no scleral icterus, PERRL, EOMI  CV: regular rhythm, regular rate, no murmurs, rubs, or gallops  RESPIRATORY: normal effort, CTAB  ABDOMEN: soft/nontender  MUSCULOSKELETAL: no deformity  NEURO: alert, moves all extremities, no facial asymmetry, speech normal, normal  bilaterally, normal strength of BLE, follows commands  SKIN: warm, dry, no rash   Psych: Appropriate mood and affect      Nursing notes and vital signs reviewed      LAB RESULTS  Recent Results (from the past 24 hour(s))   Comprehensive Metabolic Panel    Collection Time: 04/25/23  3:36 AM    Specimen: Blood   Result Value Ref Range    Glucose 145 (H) 65 - 99 mg/dL    BUN 19 8 - 23 mg/dL    Creatinine 0.84 0.57 - 1.00 mg/dL    Sodium 138 136 - 145 mmol/L    Potassium 3.7 3.5 - 5.2 mmol/L    Chloride 100 98 - 107 mmol/L    CO2 24.0 22.0 - 29.0 mmol/L    Calcium 9.4 8.6 - 10.5 mg/dL    Total Protein 7.1 6.0 - 8.5 g/dL    Albumin 4.3 3.5 - 5.2 g/dL    ALT (SGPT) 17 1 - 33 U/L    AST (SGOT) 14 1 - 32 U/L    Alkaline Phosphatase 93 39 - 117 U/L    Total Bilirubin 0.2 0.0 - 1.2 mg/dL    Globulin 2.8 gm/dL    A/G Ratio 1.5 g/dL    BUN/Creatinine Ratio 22.6 7.0 - 25.0    Anion Gap 14.0 5.0 - 15.0 mmol/L    eGFR 66.9 >60.0 mL/min/1.73   High Sensitivity Troponin T    Collection Time: 04/25/23  3:36 AM    Specimen: Blood   Result Value Ref Range    HS Troponin T 24 (H) <10 ng/L   Protime-INR    Collection Time: 04/25/23  3:36 AM    Specimen: Blood   Result Value Ref Range    Protime 13.6 11.7 - 14.2 Seconds    INR 1.03 0.90 - 1.10   aPTT    Collection Time: 04/25/23  3:36 AM    Specimen: Blood   Result Value Ref Range    PTT 32.9 22.7 - 35.4 seconds   CBC Auto Differential     Collection Time: 04/25/23  3:36 AM    Specimen: Blood   Result Value Ref Range    WBC 9.27 3.40 - 10.80 10*3/mm3    RBC 3.79 3.77 - 5.28 10*6/mm3    Hemoglobin 11.4 (L) 12.0 - 15.9 g/dL    Hematocrit 34.0 34.0 - 46.6 %    MCV 89.7 79.0 - 97.0 fL    MCH 30.1 26.6 - 33.0 pg    MCHC 33.5 31.5 - 35.7 g/dL    RDW 13.3 12.3 - 15.4 %    RDW-SD 43.2 37.0 - 54.0 fl    MPV 10.3 6.0 - 12.0 fL    Platelets 365 140 - 450 10*3/mm3    Neutrophil % 47.9 42.7 - 76.0 %    Lymphocyte % 36.9 19.6 - 45.3 %    Monocyte % 11.3 5.0 - 12.0 %    Eosinophil % 2.4 0.3 - 6.2 %    Basophil % 1.2 0.0 - 1.5 %    Immature Grans % 0.3 0.0 - 0.5 %    Neutrophils, Absolute 4.44 1.70 - 7.00 10*3/mm3    Lymphocytes, Absolute 3.42 (H) 0.70 - 3.10 10*3/mm3    Monocytes, Absolute 1.05 (H) 0.10 - 0.90 10*3/mm3    Eosinophils, Absolute 0.22 0.00 - 0.40 10*3/mm3    Basophils, Absolute 0.11 0.00 - 0.20 10*3/mm3    Immature Grans, Absolute 0.03 0.00 - 0.05 10*3/mm3    nRBC 0.0 0.0 - 0.2 /100 WBC   ECG 12 Lead    Collection Time: 04/25/23  3:36 AM   Result Value Ref Range    QT Interval 692 ms       Ordered the above labs and independently reviewed and interpreted the results by me.        RADIOLOGY  CT Head Without Contrast    Result Date: 4/25/2023  Patient: IONA TRUONG  Time Out: 04:08 Exam(s): CT HEAD Without Contrast EXAM:   CT Head Without Intravenous Contrast CLINICAL HISTORY:    Reason for exam: HA. TECHNIQUE:   Axial computed tomography images of the head brain without intravenous contrast.  CTDI is 55.97 mGy and DLP is 921.1 mGy-cm.  This CT exam was performed according to the principle of ALARA (As Low As Reasonably Achievable) by using one or more of the following dose reduction techniques: automated exposure control, adjustment of the mA and or kV according to patient size, and or use of iterative reconstruction technique. COMPARISON:   No relevant prior studies available. FINDINGS:   Brain:  No hemorrhage, herniation, or mass effect.  Chronic  microvascular ischemic changes.  Old right frontal, left parietal and right occipital infarcts.   Ventricles:  No hydrocephalus.  Age related cerebral volume loss.   Bones joints:  Unremarkable.   Soft tissues:  Unremarkable.   Sinuses:  Unremarkable.   Mastoid air cells:  Clear. IMPRESSION:       No acute hemorrhage, hydrocephalus, or mass effect.     Electronically signed by Andie Gomez MD on 04-25-23 at 0408    XR Chest 1 View    Result Date: 4/25/2023  Patient: IONA TRUONG  Time Out: 04:32 Exam(s): XR CXR 1 VIEW EXAM:   XR Chest, 1 View CLINICAL HISTORY:    Reason for exam: TIA. TECHNIQUE:   Frontal view of the chest. COMPARISON:   No relevant prior studies available. FINDINGS:   Lungs:  No consolidation or mass.  Slightly prominent interstitial markings in the lower lungs.   Pleural space:  No acute findings   Heart:  cardiomegaly.   Bones joints:  No acute findings. IMPRESSION:     Slightly prominent interstitial markings in the lower lungs.     Electronically signed by Andie Gomez MD on 04-25-23 at 0432      I ordered the above noted radiological studies.  These were independently interpreted and reviewed by me.  See dictation for official radiology interpretation.      PROCEDURES    Procedures      MEDICATIONS GIVEN IN ER    Medications   acetaminophen (TYLENOL) tablet 1,000 mg (has no administration in time range)         PROGRESS, DATA ANALYSIS, CONSULTS, AND MEDICAL DECISION MAKING    All labs have been independently reviewed by me.  All radiology studies have been reviewed by me and discussed with radiologist dictating the report.   EKG's independently viewed and interpreted by me.  Discussion below represents my analysis of pertinent findings related to patient's condition, differential diagnosis, treatment plan and final disposition.    DDx:  Includes, but is not limited to headache, tension headache, migraine headache, intracranial hemorrhage, stroke, TIA    After my initial assessment I am  concerned about CVA and patient may need hospitalization for neurology evaluation.    ED Course as of 04/25/23 0504   Tue Apr 25, 2023   0339 EKG independently viewed and contemporaneously interpreted by ED physician. Time: 3:36 AM.  Rate 61.  Interpretation: Normal sinus rhythm, normal axis, normal QRS, no acute ST changes. [JG]   0434 CT head images intimately viewed by me, my interpretation is no obvious acute intracranial hemorrhage, old areas of infarction, unchanged from prior. [CHE]   0435 WBC: 9.27 [CHE]   0435 Hemoglobin(!): 11.4 [CHE]   0435 Hematocrit: 34.0 [CHE]   0435 Platelets: 365 [CHE]   0435 Glucose(!): 145 [CHE]   0435 BUN: 19 [CHE]   0435 Creatinine: 0.84 [CHE]   0435 Sodium: 138 [CHE]   0435 Potassium: 3.7 [CHE]   0435 Chloride: 100 [CHE]   0435 CO2: 24.0 [CHE]   0435 HS Troponin T(!): 24 [CHE]   0447 Patient rechecked, resting in bed, exam unchanged.  Patient still complaining of headache.  Discussed results including CT scan that showed no acute intracranial hemorrhage as well as recommendation for admission.  Patient expresses understanding and agrees to plan. [CHE]      ED Course User Index  [CHE] Issac Anguiano PA  [JG] Colby Martinez MD       MDM: Patient presented to the ER with complaint of headache, neck stiffness, and difficulty with speech yesterday, will be placed in observation for neurology consultation and to rule out TIA.    PPE:  The patient wore a mask and I wore a mask and all appropriate PPE throughout the entire patient encounter.      AS OF 05:04 EDT VITALS:    BP - 148/64  HR - 68  TEMP - 98.2 °F (36.8 °C) (Oral)  O2 SATS - 98%      DIAGNOSIS  Final diagnoses:   Nonintractable headache, unspecified chronicity pattern, unspecified headache type   History of CVA (cerebrovascular accident)   Anticoagulated   PAF (paroxysmal atrial fibrillation)   Hypertension, unspecified type   Hyperlipidemia, unspecified hyperlipidemia type   Type 2 diabetes mellitus without complication, without  long-term current use of insulin         DISPOSITION  ADMISSION    Discussed treatment plan and reason for admission with pt/family and admitting physician.  Pt/family voiced understanding of the plan for admission for further testing/treatment as needed.         Note Disclaimer: At [facility], we believe that sharing information builds trust and better relationships. You are receiving this note because you recently visited [facility]. It is possible you will see health information before a provider has talked with you about it. This kind of information can be easy to misunderstand. To help you fully understand what it means for your health, we urge you to discuss this note with your provider.     Issac Anguiano PA  04/25/23 0758

## 2023-04-25 NOTE — ED PROVIDER NOTES
MD ATTESTATION NOTE  I wore full protective equipment throughout this patient encounter including an N95 face mask, googles, gown and gloves. Hand hygiene was performed before donning protective equipment and after removal when leaving the room.    The PRASANTH and I have discussed this patient's history, physical exam, and treatment plan. I have reviewed the documentation and personally had a face to face interaction with the patient. I affirm the PRASANTH documentation and agree with their diagnostics, findings, treatment, plan, and disposition.    I provided a substantive portion of the care of this patient.  I personally performed the physical exam, in its entirety.  The attached note describes my personal findings.    PCP: Karla Mora MD  Patient Care Team:  Karla Mora MD as PCP - General     Gela Thompson is a 88 y.o. female who presents to the ED c/o headache and difficulty with speech.  Patient reports that she has had a headache for the last 3 days.  Patient reports headache is right-sided, occasionally sharp, reports headache was coming going for the last 3 days, headache is constant today.  Patient denies any aggravating or ameliorating factors, no sensitivity to light or noise, no confusion, no nausea vomiting.  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  Patient reports that yesterday she believes that she had a TIA.  Patient reports that she was having word finding difficulty while speaking with her sister.  Patient denies any facial droop, no slurred speech, no associated weakness or numbness.  Patient does endorse stiff neck, reports that she has chronic stiffness in her neck but is worse since yesterday.  Patient denies any fever shakes chills or night sweats.    On exam:  General: NAD.  Head: NCAT.  ENT: nares patent, no scleral icterus  Neck: Supple, trachea midline.  No nuchal rigidity  Cardiac: regular rate and rhythm.  Lungs: normal  effort.  Abdomen: Soft, NTTP.   Extremities: Moves all extremities well, no peripheral edema  Neuro: Alert and oriented, no facial droop, speech clear, no dysarthria or aphasia, moves all extremities well, sensation intact light touch all extremities, no focal deficits  Psych: calm, cooperative  Skin: Warm, dry.    Medical Decision Making:  After the initial H&P, I discussed pertinent information from history and physical exam with patient/family.  Discussed differential diagnosis.  Discussed plan for ED evaluation/work-up/treatment.  All questions answered.  Patient/family is agreeable with plan.    ED Course as of 04/25/23 0607   Tue Apr 25, 2023   0339 EKG independently viewed and contemporaneously interpreted by ED physician. Time: 3:36 AM.  Rate 61.  Interpretation: Normal sinus rhythm, normal axis, normal QRS, no acute ST changes. [JG]   0434 CT head images intimately viewed by me, my interpretation is no obvious acute intracranial hemorrhage, old areas of infarction, unchanged from prior. [CHE]   0435 WBC: 9.27 [CHE]   0435 Hemoglobin(!): 11.4 [CHE]   0435 Hematocrit: 34.0 [CHE]   0435 Platelets: 365 [CHE]   0435 Glucose(!): 145 [CHE]   0435 BUN: 19 [CHE]   0435 Creatinine: 0.84 [CHE]   0435 Sodium: 138 [CHE]   0435 Potassium: 3.7 [CHE]   0435 Chloride: 100 [CHE]   0435 CO2: 24.0 [CHE]   0435 HS Troponin T(!): 24 [CHE]   0447 Patient rechecked, resting in bed, exam unchanged.  Patient still complaining of headache.  Discussed results including CT scan that showed no acute intracranial hemorrhage as well as recommendation for admission.  Patient expresses understanding and agrees to plan. [CHE]      ED Course User Index  [CHE] Issac Anguiano PA  [JG] Colby Martinez MD       Diagnosis  Final diagnoses:   Nonintractable headache, unspecified chronicity pattern, unspecified headache type   History of CVA (cerebrovascular accident)   Anticoagulated   PAF (paroxysmal atrial fibrillation)   Hypertension, unspecified type    Hyperlipidemia, unspecified hyperlipidemia type   Type 2 diabetes mellitus without complication, without long-term current use of insulin        Colby Martinez MD  04/25/23 0615

## 2023-04-25 NOTE — PROGRESS NOTES
[facility] Clinical Pharmacy Services: Medication Reconciliation     Gela Thompson is a 88 y.o. female presenting to [facility] for PAF (paroxysmal atrial fibrillation) [I48.0]  Anticoagulated [Z79.01]  History of CVA (cerebrovascular accident) [Z86.73]  Headache [R51.9]  Nonintractable headache, unspecified chronicity pattern, unspecified headache type [R51.9]  Type 2 diabetes mellitus without complication, without long-term current use of insulin [E11.9]  Hyperlipidemia, unspecified hyperlipidemia type [E78.5]  Hypertension, unspecified type [I10]       She  has a past medical history of Abnormal echocardiogram, Adiposity, Anemia, Cerebrovascular accident, Chest pain, Chronic low back pain, Diabetes mellitus, Dyslipidemia, Dyspnea, Gastroesophageal reflux disease, H/O echocardiogram (06/27/2014), Health care maintenance, History of EKG (10/29/2015), Hyperlipidemia, Hypertension, Mitral valve insufficiency, Murmur, Murmur, ROMAN (obstructive sleep apnea), Osteoarthritis, and Peripheral neuropathy.       Allergies as of 04/25/2023 - Reviewed 04/25/2023   Allergen Reaction Noted    Penicillins Hives 10/05/2016    Versed [midazolam] Other (See Comments) 04/25/2023    Morphine GI Intolerance 10/05/2016    Statins Myalgia 10/05/2016    Tape Rash 10/12/2018          Medication information was obtained from: Patients pill packs       Prior to Admission Medications       Prescriptions Last Dose Informant Patient Reported? Taking?    apixaban (ELIQUIS) 2.5 MG tablet tablet  Medication Bottle Yes Yes    Take 1 tablet by mouth 2 (Two) Times a Day.    furosemide (LASIX) 20 MG tablet  Medication Bottle Yes Yes    Take 1 tablet by mouth See Admin Instructions. Take one tablet by mouth on Mondays and Thursdays    hydrALAZINE (APRESOLINE) 50 MG tablet  Medication Bottle Yes Yes    Take 2 tablets by mouth 3 (Three) Times a Day.    metFORMIN (GLUCOPHAGE) 500 MG tablet  Medication Bottle Yes Yes    Take 1 tablet by  mouth 2 (Two) Times a Day With Meals.    Riboflavin (Vitamin B-2) 50 MG tablet  Medication Bottle Yes Yes    Take 25 mg by mouth See Admin Instructions. Take 1/2 tablet (25mg) by mouth on Mondays and Thursdays    verapamil ER (VERELAN) 120 MG 24 hr capsule  Medication Bottle Yes Yes    Take 1 capsule by mouth See Admin Instructions. Take one capsule by mouth every morning and two capsules by mouth at bedtime           Medication notes:        This medication list is complete to the best of my knowledge as of 4/25/2023       Please call if questions.     Yoly Meyer, [facility]  Clinical Pharmacist

## 2023-04-25 NOTE — PLAN OF CARE
Goal Outcome Evaluation:      Patient admitted to the observation unit for an unresolved headache for the last 4 days. Vss. Cardiology consulting for today. Will continue to monitor for any changes

## 2023-04-25 NOTE — ED NOTES
"Patient presents via EMS from home with reports of intermittent headache x 3 days. Patient reports an episode yesterday of about 10 minutes where patient had difficulty speaking. Patient states headache today is the \"worst of her life\", and reports blurry vision to her right eye. Patient reports hx of stroke and brain bleed, has lost 90% of vision from this. Patient reports taking eliquis daily. Patient speaking in clear complete sentences, moving all extremities.   "

## 2023-04-25 NOTE — CONSULTS
"Reason for Consultation: Neurological evaluation for aphasia    History taken from: patient chart    Chief complaint: headaches, and \"a TIA\"     SUBJECTIVE:    History of present illness: Glea Thompson is a 88 y.o. year old female with a history of hypertension, diabetes, and prior embolic appearing strokes (right occipital, left PCA, and right frontal), presenting for headache and several minutes of inability to find the words she wanted to say. Also feels some neck stiffness, but this has been a chronic symptom for her.  Patient is on apixaban and has been taking her medication.  She reports an allergy to statins, noting myalgias, and intolerance.    Review of Systems   Negative for dysarthria, negative for vision changes, negative for focal symptoms otherwise.     PATIENT HISTORY:  Past Medical History:   Diagnosis Date   • Abnormal echocardiogram    • Adiposity    • Anemia    • Cerebrovascular accident    • Chest pain    • Chronic low back pain    • Diabetes mellitus    • Dyslipidemia    • Dyspnea    • Gastroesophageal reflux disease    • H/O echocardiogram 06/27/2014   • Health care maintenance    • History of EKG 10/29/2015   • Hyperlipidemia    • Hypertension    • Mitral valve insufficiency    • Murmur    • Murmur    • ROMAN (obstructive sleep apnea)    • Osteoarthritis    • Peripheral neuropathy    ,   Past Surgical History:   Procedure Laterality Date   • CARPAL TUNNEL RELEASE     • CHOLECYSTECTOMY     • HYSTERECTOMY     • KNEE SURGERY Bilateral     TKA   • NEUROPLASTY      deompression median nerve at carpal tunnel bilateral   • SHOULDER SURGERY Left    ,   Family History   Problem Relation Age of Onset   • Heart disease Mother    • Heart attack Mother    • Neuropathy Mother    • Heart disease Sister    • Stroke Father    ,   Social History     Tobacco Use   • Smoking status: Never   • Smokeless tobacco: Never   • Tobacco comments:     caffeine use - 1 cup coffee, 1 glass tea daily    Vaping Use   • Vaping " Use: Never used   Substance Use Topics   • Alcohol use: No   • Drug use: No   ,   Prior to Admission medications    Medication Sig Start Date End Date Taking? Authorizing Provider   apixaban (ELIQUIS) 2.5 MG tablet tablet Take 1 tablet by mouth 2 (Two) Times a Day.   Yes Sandi Richardson MD   furosemide (LASIX) 20 MG tablet Take 1 tablet by mouth See Admin Instructions. Take one tablet by mouth on Mondays and Thursdays   Yes Sandi Richardson MD   hydrALAZINE (APRESOLINE) 50 MG tablet Take 2 tablets by mouth 3 (Three) Times a Day.   Yes Sandi Richardson MD   metFORMIN (GLUCOPHAGE) 500 MG tablet Take 1 tablet by mouth 2 (Two) Times a Day With Meals.   Yes Sandi Richardson MD   Riboflavin (Vitamin B-2) 50 MG tablet Take 25 mg by mouth See Admin Instructions. Take 1/2 tablet (25mg) by mouth on Mondays and Thursdays   Yes Sandi Richardson MD   verapamil ER (VERELAN) 120 MG 24 hr capsule Take 1 capsule by mouth See Admin Instructions. Take one capsule by mouth every morning and two capsules by mouth at bedtime   Yes Sandi Richardson MD   apixaban (Eliquis) 2.5 MG tablet tablet Take 1 tablet by mouth 2 (Two) Times a Day. Please contact office to schedule a follow up appt [phone].  Patient taking differently: Take 1 tablet by mouth 2 (Two) Times a Day. 4/17/23 4/25/23 Yes Monserrat Marsh MD   traMADol (ULTRAM) 50 MG tablet Take 1 tablet by mouth Daily As Needed (Arthritis pain). Patient usually takes 1/2 tab and takes very seldomly 10/21/14 4/25/23 Yes Sandi Richardson MD   Calcium Carb-Cholecalciferol (CALCIUM 1000 + D PO) Take 1 tablet by mouth Daily. 10/21/14 4/25/23  Sandi Richardson MD   EPINEPHrine (EPIPEN) 0.3 MG/0.3ML solution auto-injector injection 1 (One) Time As Needed.  4/25/23  Sandi Richardson MD   erythromycin (ROMYCIN) 5 MG/GM ophthalmic ointment  1/12/21 4/25/23  Sandi Richardson MD   furosemide (LASIX) 20 MG tablet  4/18/23 4/25/23  Dylan  MD Sandi   glucose blood test strip 1 each by Other route. 1/12/23 4/25/23  Sandi Richardson MD   hydrALAZINE (APRESOLINE) 100 MG tablet TAKE ONE TABLET BY MOUTH THREE TIMES DAILY (MORNING, NOON, AND BEDTIME) 8/15/22 4/25/23  Tiesha Hernandez APRN   L-Lysine 500 MG tablet Take 1 tablet by mouth Daily. 11/28/12 4/25/23  Sandi Richardson MD   lisinopril (PRINIVIL,ZESTRIL) 40 MG tablet Take 0.5 tablets by mouth Daily. 1/6/22 4/25/23  Tiesha Hernandez APRN   Magnesium Oxide 500 MG capsule Take 2 capsules by mouth Daily.  4/25/23  Sandi Richardson MD   meclizine (ANTIVERT) 12.5 MG tablet TAKE ONE TABLET BY MOUTH THREE TIMES DAILY AS NEEDED DIZZINESS 12/21/22 4/25/23  Sandi Richardson MD   melatonin 3 MG tablet Take  by mouth Daily.  4/25/23  Sandi Richardson MD   metFORMIN (GLUCOPHAGE) 500 MG tablet Take 1 tablet by mouth Daily With Breakfast.  Patient taking differently: Take 1 tablet by mouth 2 (Two) Times a Day With Meals. 5/28/20 4/25/23  Issac Liang MD   Multiple Vitamin (MULTIVITAMINS PO) Take 1 tablet by mouth Daily. 11/28/12 4/25/23  Sandi Richardson MD   Riboflavin (B-2) 50 MG tablet take one half tablet on monday thursday 3/22/23 4/25/23  Sandi Richardson MD   spironolactone (ALDACTONE) 25 MG tablet TAKE ONE TABLET BY MOUTH DAILY 8/15/22 4/25/23  Tiesha Hernandez APRN   valACYclovir (VALTREX) 1000 MG tablet Take 1 tablet by mouth Daily As Needed. 1/7/21 4/25/23  Sandi Richardson MD   verapamil SR (CALAN-SR) 120 MG CR tablet TAKE ONE TABLET BY MOUTH EVERY MORNING 12/5/22 4/25/23  Joanne Emanuel APRN   verapamil SR (CALAN-SR) 240 MG CR tablet Take 1 tablet by mouth Every Night. 1/12/22 4/25/23  Monserrat Marsh MD   vitamin B-12 (CYANOCOBALAMIN) 1000 MCG tablet Take 1 tablet by mouth Daily. As directed 11/28/12 4/25/23  Provider, Historical, MD    Allergies:  Penicillins, Versed [midazolam], Morphine, Statins, and Tape    Current Facility-Administered  Medications   Medication Dose Route Frequency Provider Last Rate Last Admin   • acetaminophen (TYLENOL) tablet 650 mg  650 mg Oral Q4H PRN Charlene Swenson PA-C       • apixaban (ELIQUIS) tablet 2.5 mg  2.5 mg Oral BID Charlene Swenson PA-C   2.5 mg at 04/25/23 1139   • dextrose (D50W) (25 g/50 mL) IV injection 25 g  25 g Intravenous Q15 Min PRN Charlene Swenson PA-C       • dextrose (GLUTOSE) oral gel 15 g  15 g Oral Q15 Min PRN Charlene Swenson PA-C       • glucagon (GLUCAGEN) injection 1 mg  1 mg Intramuscular Q15 Min PRN Charlene Swenson PA-C       • hydrALAZINE (APRESOLINE) tablet 100 mg  100 mg Oral Q8H Charlene Swenson PA-C   100 mg at 04/25/23 0750   • insulin lispro (HUMALOG/ADMELOG) injection 2-9 Units  2-9 Units Subcutaneous TID With Meals Charlene Swenson PA-C       • melatonin tablet 5 mg  5 mg Oral Nightly PRN Charlene Swenson PA-C       • nitroglycerin (NITROSTAT) SL tablet 0.4 mg  0.4 mg Sublingual Q5 Min PRN Charlene Swenson PA-C       • Pharmacy Consult   Does not apply Continuous PRN Gisell Hammonds MD       • sodium chloride 0.9 % flush 10 mL  10 mL Intravenous Q12H Charlene Swenson PA-C   10 mL at 04/25/23 1139   • sodium chloride 0.9 % flush 10 mL  10 mL Intravenous PRN Charlene Swenson PA-C       • sodium chloride 0.9 % infusion 40 mL  40 mL Intravenous PRN Charlene Swenson PA-C       • verapamil SR (CALAN-SR) CR tablet 240 mg  240 mg Oral Q12H Gisell Hammonds MD            ________________________________________________________        OBJECTIVE:     Neurologic Exam  Mental status: patient alert and fully oriented, naming and repetition intact.  Cranial nerves: pupils equal, round, reactive to light and accomodation; visual fields full to movement of fingers; EOMI; no ptosis or facial droop; facial sensation and strength intact throughout; hearing intact to voice; palate elevates symmetrically; trapezius 5/5 bilaterally; tongue midline on  protrusion and AROM intact.   Motor: 5/5 distally and proximally in all limbs except chronic RLE weakness in ADF.  Reflexes: 2-/4 at bicep/triceps/brachioradialis/patella/Achilles bilaterally; No Soares's. No ankle clonus.   Sensation: Intact to light touch distally in all limbs.   Gait/Coordination: FTN, finger tapping, normal bilaterally.   Other: N/a  ________________________________________________________   RESULTS REVIEW:    VITAL SIGNS:   Temp:  [97.7 °F (36.5 °C)-98.2 °F (36.8 °C)] 97.7 °F (36.5 °C)  Heart Rate:  [60-70] 70  Resp:  [16-18] 18  BP: (148-179)/(53-64) 179/57     LABS:      Lab 04/25/23  0336   WBC 9.27   HEMOGLOBIN 11.4*   HEMATOCRIT 34.0   PLATELETS 365   NEUTROS ABS 4.44   IMMATURE GRANS (ABS) 0.03   LYMPHS ABS 3.42*   MONOS ABS 1.05*   EOS ABS 0.22   MCV 89.7   SED RATE 48*   CRP <0.30   PROTIME 13.6   APTT 32.9         Lab 04/25/23  0547 04/25/23  0336   SODIUM  --  138   POTASSIUM  --  3.7   CHLORIDE  --  100   CO2  --  24.0   ANION GAP  --  14.0   BUN  --  19   CREATININE  --  0.84   EGFR  --  66.9   GLUCOSE  --  145*   CALCIUM  --  9.4   MAGNESIUM 1.8  --    HEMOGLOBIN A1C  --  8.00*   TSH  --  2.030         Lab 04/25/23  0336   TOTAL PROTEIN 7.1   ALBUMIN 4.3   GLOBULIN 2.8   ALT (SGPT) 17   AST (SGOT) 14   BILIRUBIN 0.2   ALK PHOS 93         Lab 04/25/23  0547 04/25/23  0336   HSTROP T 22* 24*   PROTIME  --  13.6   INR  --  1.03         Lab 04/25/23  0336   CHOLESTEROL 241*   LDL CHOL 167*   HDL CHOL 50   TRIGLYCERIDES 134                 Lab Results   Component Value Date    TSH 2.030 04/25/2023     (H) 04/25/2023    HGBA1C 8.00 (H) 04/25/2023    FLIZWUXZ23 1,386 01/12/2023       IMAGING STUDIES:  CT Head Without Contrast    Result Date: 4/25/2023  Electronically signed by Andie Gomze MD on 04-25-23 at 0408    CT Angiogram Neck    Result Date: 4/25/2023  1. CT scan of the head demonstrates no convincing acute intracranial abnormality. There is moderate size chronic infarct  in the medial right temporal occipital region compatible with an old infarct in the right posterior cerebral artery territory measuring up to 6.5 x 3.5 x 3 cm in anterior posterior and medial lateral and craniocaudal dimension. This infarct occurred back in 05/2020. There is an additional area of encephalomalacia involving the mid and superior medial left occipital lobe extending into the posterior inferior medial left parietal lobe in the distribution medial parietal occipital branches left PCA territory. This occurred prior to the MRI of the brain back in 05/2020. It was a subacute infarct at that time and this infarct measures 5.3 x 1.5 x 3 cm cyst in anterior posterior medial lateral and craniocaudal dimension. There is a 2.5 x 2 cm chronic infarct superior right frontal lobe in the right MCA territory. There is mild small vessel disease in cerebral white matter. The remainder of the head CT is within normal limits with no convincing acute infarct. No intracranial hemorrhage seen. If there remains clinical suspicion of acute infarct I recommend an MRI of the brain for more complete assessment. 2. There are calcified plaques in the aortic arch. There is noncalcified plaque that mildly narrows the proximal left subclavian artery. There is a calcified plaque that mildly narrows the right subclavian artery origin. There is a focal mild-to-moderate up to moderate stenosis left vertebral artery at the C3-4 cervical level where it appears to be indented by facet spurs. There is calcified plaque that mildly narrows the proximal intracranial segment-V4 segment left vertebral artery and an additional noncalcified plaque that at least mild-to-moderate up to moderately narrows the distal intracranial-V4 segment left vertebral artery just proximal to the vertebrobasilar junction. There is calcified plaque that moderately narrows the right vertebral artery origin. No additional right vertebral stenosis is seen. 3. There are  calcified plaques that moderately narrow the origin proximal cervical segments of the internal carotid arteries bilaterally by approximately 50% using the NASCET criteria. 4. There is some mild intracranial atherosclerotic disease with multifocal areas and mild narrowing of the P1 and P2 segment left posterior cerebral artery, P2 segment right posterior cerebral artery. There is moderate stenosis of proximal A1 segment of the left anterior cerebral artery and mild-to-moderate stenosis at the origin and one of the M2 branches of the right middle cerebral artery. The remainder of the CT angiogram of the head and neck is within normal limits.  Radiation dose reduction techniques were utilized, including automated exposure control and exposure modulation based on body size.       MRI Brain Without Contrast    Result Date: 4/25/2023  1. No acute intracranial abnormality is identified. 2. There is a 7 x 3.5 x 3 cm old medial right temporal occipital infarct in the right PCA territory that occurred back in May 2020. There is an additional 5.3 x 2 x 3.5 cm old medial left parietal occipital infarct in the left PCA territory that occurred prior to May 2020. There is an additional 2 x 2 cm old posterior superior right frontal infarct in the right MCA territory. There is mild small vessel disease in the cerebral white matter and a 5 mm old lacunar infarct in the left side of the brendan. 3. There is subtotal opacification of the left mastoid air cells with fluid.  The remainder of the MRI of the brain is normal, specifically no acute infarct seen. Results were communicated to Yoly Wills, the physician assistant in the [facility] observation unit taking care of the patient by telephone on 04/25/2023 at 1 PM.      XR Chest 1 View    Result Date: 4/25/2023  Electronically signed by Andie Gomez MD on 04-25-23 at 0432    CT Angiogram Head    Result Date: 4/25/2023  1. CT scan of the head demonstrates no  convincing acute intracranial abnormality. There is moderate size chronic infarct in the medial right temporal occipital region compatible with an old infarct in the right posterior cerebral artery territory measuring up to 6.5 x 3.5 x 3 cm in anterior posterior and medial lateral and craniocaudal dimension. This infarct occurred back in 05/2020. There is an additional area of encephalomalacia involving the mid and superior medial left occipital lobe extending into the posterior inferior medial left parietal lobe in the distribution medial parietal occipital branches left PCA territory. This occurred prior to the MRI of the brain back in 05/2020. It was a subacute infarct at that time and this infarct measures 5.3 x 1.5 x 3 cm cyst in anterior posterior medial lateral and craniocaudal dimension. There is a 2.5 x 2 cm chronic infarct superior right frontal lobe in the right MCA territory. There is mild small vessel disease in cerebral white matter. The remainder of the head CT is within normal limits with no convincing acute infarct. No intracranial hemorrhage seen. If there remains clinical suspicion of acute infarct I recommend an MRI of the brain for more complete assessment. 2. There are calcified plaques in the aortic arch. There is noncalcified plaque that mildly narrows the proximal left subclavian artery. There is a calcified plaque that mildly narrows the right subclavian artery origin. There is a focal mild-to-moderate up to moderate stenosis left vertebral artery at the C3-4 cervical level where it appears to be indented by facet spurs. There is calcified plaque that mildly narrows the proximal intracranial segment-V4 segment left vertebral artery and an additional noncalcified plaque that at least mild-to-moderate up to moderately narrows the distal intracranial-V4 segment left vertebral artery just proximal to the vertebrobasilar junction. There is calcified plaque that moderately narrows the right  vertebral artery origin. No additional right vertebral stenosis is seen. 3. There are calcified plaques that moderately narrow the origin proximal cervical segments of the internal carotid arteries bilaterally by approximately 50% using the NASCET criteria. 4. There is some mild intracranial atherosclerotic disease with multifocal areas and mild narrowing of the P1 and P2 segment left posterior cerebral artery, P2 segment right posterior cerebral artery. There is moderate stenosis of proximal A1 segment of the left anterior cerebral artery and mild-to-moderate stenosis at the origin and one of the M2 branches of the right middle cerebral artery. The remainder of the CT angiogram of the head and neck is within normal limits.  Radiation dose reduction techniques were utilized, including automated exposure control and exposure modulation based on body size.         I reviewed the patient's new clinical results, including primary imaging.      ________________________________________________________     PROBLEM LIST:    Headache    Aphasia    ASSESSMENT/PLAN:  TIA certainly possible given symptoms and underlying vascular risk  Comorbid diagnoses.  CT without evidence of hemorrhage, MRI without evidence of acute infarct either.  CTA study pending final report, echo pending and patient appears to be neurologically back to baseline with reassuring exam.  Recommend continuing her oral anticoagulant, and she is intolerant of statins.  If manger of work-up is negative, recommend follow-up with primary care in 2 to 4 weeks with return precautions.      Neurology following, thank you for the consult.  Appropriate PPE was worn for this encounter.  Dragon dictation software was utilized for this encounter.    I discussed the patient's findings and my recommendations with patient    Valentín Collins DO  04/25/23  14:07 EDT

## 2023-04-25 NOTE — PROGRESS NOTES
ED OBSERVATION PROGRESS/DISCHARGE SUMMARY    Date of Admission: 4/25/2023   LOS: 0 days   PCP: Karla Mora MD    Subjective  Patient feeling well this evening, voices no complaints.  Eager to go home.    Hospital Outcome: 88-year-old female admitted to the observation unit for further evaluation of headache, neck stiffness, blurred vision, and 1 episode of aphasia.  Patient has CTA of head and neck which shows changes from prior stroke but negative acute and MRI brain similarly shows prior stroke but again negative acute for any new infarction or intracranial abnormality.  Patient was seen by neurology, her neurological work-up is negative she can be discharged home with follow-up with PCP.    Patient was found to have QT prolongation, QTc 631.  Patient received 2 g magnesium IV.  Patient was seen by cardiology, they recommended echocardiogram.  Echo reviewed, EF 62.8%, mild calcification of the aortic valve.  I spoke with Blanca with cardiology who spoke with Dr. Hammonds.  Repeat EKG shows .  As this is less than 500, patient will be discharged home.  She will follow-up with Dr. Marsh her cardiologist in 1 to 2 weeks.    ROS:  General: no fevers, chills  Respiratory: no cough, dyspnea  Cardiovascular: no chest pain, palpitations  Abdomen: No abdominal pain, nausea, vomiting, or diarrhea  Neurologic: No focal weakness    Objective   Physical Exam:  I have reviewed the vital signs.  Temp:  [97.7 °F (36.5 °C)-98.2 °F (36.8 °C)] 97.7 °F (36.5 °C)  Heart Rate:  [60-68] 60  Resp:  [16-18] 18  BP: (148-174)/(53-64) 165/53  General Appearance:    Alert, cooperative, no distress  Head:    Normocephalic, atraumatic  Eyes:    Sclerae anicteric  Neck:   Supple, no mass  Lungs: Clear to auscultation bilaterally, respirations unlabored  Heart: Regular rate and rhythm, S1 and S2 normal, no murmur, rub or gallop  Abdomen:  Soft, non-tender, bowel sounds active, nondistended  Extremities: No clubbing, cyanosis, or edema to  lower extremities  Pulses:  2+ and symmetric in distal lower extremities  Skin: No rashes   Neurologic: Oriented x3, Normal strength to extremities    Results Review:    I have reviewed the labs, radiology results and diagnostic studies.    Results from last 7 days   Lab Units 04/25/23  0336   WBC 10*3/mm3 9.27   HEMOGLOBIN g/dL 11.4*   HEMATOCRIT % 34.0   PLATELETS 10*3/mm3 365     Results from last 7 days   Lab Units 04/25/23  0336   SODIUM mmol/L 138   POTASSIUM mmol/L 3.7   CHLORIDE mmol/L 100   CO2 mmol/L 24.0   BUN mg/dL 19   CREATININE mg/dL 0.84   CALCIUM mg/dL 9.4   BILIRUBIN mg/dL 0.2   ALK PHOS U/L 93   ALT (SGPT) U/L 17   AST (SGOT) U/L 14   GLUCOSE mg/dL 145*     Imaging Results (Last 24 Hours)     Procedure Component Value Units Date/Time    CT Angiogram Head [[id number]] Collected: 04/25/23 1221     Updated: 04/25/23 1702    Narrative:      STAT CONTRAST-ENHANCED CT ANGIOGRAM OF THE HEAD AND NECK ON 04/25/2023     CLINICAL HISTORY: Headache and blurred vision in right eye, pain and  aphasia     TECHNIQUE: Spiral CT images were obtained from the base of the skull to  the vertex both pre and post intravenous contrast and images were  reformatted and submitted in 3 mm thick axial sagittal and coronal CT  sections with brain algorithm and visceral spiral CT angiogram images  were obtained from the top of the aortic arch up through the great  vessels of the head and neck during arterial phase of contrast and  images were reformatted and submitted in 1 mm thick axial sagittal and  coronal CT section with soft tissue algorithm and 3D reconstructions  were performed to complete the CT angiogram of the head and neck.     This study is correlated to a prior CT angiogram of the head and neck  from [facility] on 05/06/2020 and a prior MRI of the  brain on 05/07/2020 as well as the most recent head CT earlier today on  04/25/2023 at 3:43 AM.     FINDINGS:  HEAD CT: Patient has a moderate size area of  encephalomalacia tracking  throughout the posterior medial right temporal lobe and the majority of  the right occipital lobe compatible with chronic infarct in the  distribution temporal occipital branch of the right posterior cerebral  artery territory. The chronic infarct measures 6.5 x 3.5 x 3 cm in  anterior posterior medial lateral craniocaudal dimension. This infarct  occurred back in 05/2020. Furthermore there is a moderate size area of  encephalomalacia involving the mid and superior medial left occipital  lobe compatible with an old infarct in mid and superior medial left  occipital lobe in the left PCA territory. This chronic infarct measures  5.3 x 1.5 x 3 cm in anterior posterior and medial lateral and  craniocaudal dimension. There is an additional 2.5 x 2 cm chronic  infarct involving the superior right frontal lobe in the right MCA  territory and there is some mild low-density periventricular white  matter consistent with mild small vessel disease. The remainder of the  brain parenchyma is normal in attenuation. The ventricles are normal in  size. I see no focal mass effect and no midline shift and no extra-axial  fluid collections are identified and there is no evidence of acute  intracranial hemorrhage. Following contrast no abnormal areas of  enhancement are seen in the head. The calvarium and skull base are  normal in appearance. Paranasal sinuses are clear. There is subtotal  opacification left mastoid air cells with fluid. The right mastoid and  middle ear cavity are clear.     CT ANGIOGRAM OF THE NECK: The nasopharynx, oropharynx, hypopharynx, true  cords and subglottic airway are normal in appearance. The thyroid gland  is unremarkable. The lung apices are clear. There is a small irregular  opacity in the posterior aspect of the right upper lobe that measures 8  x 4 mm in size since prior CT angiogram of the head and neck 05/06/2020  probably an area of pleural-parenchymal scarring. Otherwise  the lung  apices are clear. There are small nodules in the superficial lobes of  the parotid gland. Within the lateral aspect of the superficial lobe of  the right parotid gland is a 7 x 5 mm nodule unchanged and tiny nodule  in the lateral aspect of the left superficial lobe left parotid gland  unchanged. This may be intraparotid lymph nodes or tiny stable  pleomorphic adenomas. The  and submandibular spaces are  symmetric and normal in appearance. There are calcified plaques in the  aortic arch. There is anatomic origin of great vessels off the aortic  arch. Left subclavian artery origin is within normal limits. No stenosis  is seen in the left subclavian artery. The left vertebral artery origins  within normal limits and no stenosis is seen in the left vertebral  artery to the C3-4 level where facet spurs abut and mild to moderately  narrow its lumen. No additional stenosis is seen in the cervical segment  left vertebral artery. The left common carotid origin is within normal  limits and no stenosis seen in the left common carotid artery. There is  circumferential calcified plaque that involves the origin and proximal  cervical segment of the left internal carotid artery. It maximally  results in a 50% stenosis of the origin left internal carotid artery  using the NASCET criteria. The brachiocephalic artery origin is mildly  narrowed by calcified plaque. The remainder of the brachycephalic  arteries within normal limits. The calcified plaque mildly narrows the  right subclavian artery. No additional stenosis seen in the right  subclavian artery. There is calcified plaque that moderately narrows the  right vertebral artery origin. Beyond its origin the cervical segment of  the right vertebral artery is widely patent without stenosis. The right  common carotid origin is normal in appearance. Portion of the proximal  right common carotid arteries obscured by beam hardening artifact off  densely opacified  adjacent right subclavian and brachycephalic veins and  mid and distal right common carotid arteries normal in appearance with  calcified plaque involves the origin proximal cervical segment of the  right internal carotid artery maximally results in 50% stenosis. The  origin of the right internal carotid artery using NASCET criteria. The  remainder of the cervical segment of the right internal carotid arteries  within normal limits.     CT ANGIOGRAM OF THE HEAD: Calcified plaque mildly narrows the proximal  intracranial segment distal left vertebral artery. There is also  noncalcified plaque that mild to moderately narrows the very distal  intracranial segment-V4 segment left vertebral artery just proximal to  the vertebrobasilar junction. Otherwise intracranial segments of the  vertebral arteries are widely patent without stenosis to the  vertebrobasilar junction. There is multifocal mild narrowing of the P1  and P2 segment of the left posterior cerebral artery and the P2 segment  of the right posterior cerebral artery. There is a hypoplastic A1  segment of the right anterior cerebral artery which is normal anatomic  variation. There is focal moderate stenosis proximal aspect of the  dominant A1 segment of the left anterior cerebral artery. Otherwise the  A1 segments anterior cerebral arteries are normal in appearance. The  anterior communicating artery origins within normal limits. The A2  branches of the anterior cerebral arteries are within normal limits. The  M1 segments middle cerebral arteries and middle cerebral artery  bifurcations are within normal limits. There is a mild-to-moderate  stenosis at the origin of one of the M2 branches of the right middle  cerebral artery at the base of intracranial atherosclerosis.       Impression:      1. CT scan of the head demonstrates no convincing acute intracranial  abnormality. There is moderate size chronic infarct in the medial right  temporal occipital region  compatible with an old infarct in the right  posterior cerebral artery territory measuring up to 6.5 x 3.5 x 3 cm in  anterior posterior and medial lateral and craniocaudal dimension. This  infarct occurred back in 05/2020. There is an additional area of  encephalomalacia involving the mid and superior medial left occipital  lobe extending into the posterior inferior medial left parietal lobe in  the distribution medial parietal occipital branches of the left PCA  territory. This occurred prior to the MRI of the brain back in 05/2020.  It was a subacute infarct at that time and this chronic infarct measures  5.3 x 1.5 x 3 cm cyst in anterior posterior, medial lateral and  craniocaudal dimension. There is a 2.5 x 2 cm chronic infarct superior  right frontal lobe in the right MCA territory. There is mild small  vessel disease in cerebral white matter. The remainder of the head CT is  within normal limits with no convincing acute infarct and no  intracranial hemorrhage seen. If there remains clinical suspicion of  acute infarct I recommend an MRI of the brain for more complete  assessment.  2. There are calcified plaques in the aortic arch. There is noncalcified  plaque that mildly narrows the proximal left subclavian artery. There is  a calcified plaque that mildly narrows the right subclavian artery  origin. There is a focal mild-to-moderate up to moderate stenosis of the  left vertebral artery at the C3-4 cervical level where it appears to be  indented by facet spurs. There is calcified plaque that mildly narrows  the proximal intracranial segment-V4 segment of the left vertebral  artery and an additional noncalcified plaque that at least  mild-to-moderate up to moderately narrows the distal intracranial-V4  segment of the left vertebral artery just proximal to the left  vertebrobasilar junction. There is calcified plaque that moderately  narrows the right vertebral artery origin. No additional right  vertebral  stenosis is seen.  3. There are calcified plaques that moderately narrow the origin and the  proximal cervical segments of the internal carotid arteries bilaterally  by approximately 50% using the NASCET criteria.  4. There is some mild intracranial atherosclerotic disease with  multifocal areas and mild narrowing of the P1 and P2 segment left  posterior cerebral artery, P2 segment right posterior cerebral artery.  There is moderate stenosis of proximal A1 segment of the left anterior  cerebral artery and mild-to-moderate stenosis at the origin and one of  the M2 branches of the right middle cerebral artery. The remainder of  the CT angiogram of the head and neck is within normal limits.     Radiation dose reduction techniques were utilized, including automated  exposure control and exposure modulation based on body size.     This report was finalized on 4/25/2023 4:59 PM by Dr. Дмитрий Maharaj M.D.       CT Angiogram Neck [[id number]] Collected: 04/25/23 1221     Updated: 04/25/23 1702    Narrative:      STAT CONTRAST-ENHANCED CT ANGIOGRAM OF THE HEAD AND NECK ON 04/25/2023     CLINICAL HISTORY: Headache and blurred vision in right eye, pain and  aphasia     TECHNIQUE: Spiral CT images were obtained from the base of the skull to  the vertex both pre and post intravenous contrast and images were  reformatted and submitted in 3 mm thick axial sagittal and coronal CT  sections with brain algorithm and visceral spiral CT angiogram images  were obtained from the top of the aortic arch up through the great  vessels of the head and neck during arterial phase of contrast and  images were reformatted and submitted in 1 mm thick axial sagittal and  coronal CT section with soft tissue algorithm and 3D reconstructions  were performed to complete the CT angiogram of the head and neck.     This study is correlated to a prior CT angiogram of the head and neck  from [facility] on 05/06/2020 and a prior MRI of  the  brain on 05/07/2020 as well as the most recent head CT earlier today on  04/25/2023 at 3:43 AM.     FINDINGS:  HEAD CT: Patient has a moderate size area of encephalomalacia tracking  throughout the posterior medial right temporal lobe and the majority of  the right occipital lobe compatible with chronic infarct in the  distribution temporal occipital branch of the right posterior cerebral  artery territory. The chronic infarct measures 6.5 x 3.5 x 3 cm in  anterior posterior medial lateral craniocaudal dimension. This infarct  occurred back in 05/2020. Furthermore there is a moderate size area of  encephalomalacia involving the mid and superior medial left occipital  lobe compatible with an old infarct in mid and superior medial left  occipital lobe in the left PCA territory. This chronic infarct measures  5.3 x 1.5 x 3 cm in anterior posterior and medial lateral and  craniocaudal dimension. There is an additional 2.5 x 2 cm chronic  infarct involving the superior right frontal lobe in the right MCA  territory and there is some mild low-density periventricular white  matter consistent with mild small vessel disease. The remainder of the  brain parenchyma is normal in attenuation. The ventricles are normal in  size. I see no focal mass effect and no midline shift and no extra-axial  fluid collections are identified and there is no evidence of acute  intracranial hemorrhage. Following contrast no abnormal areas of  enhancement are seen in the head. The calvarium and skull base are  normal in appearance. Paranasal sinuses are clear. There is subtotal  opacification left mastoid air cells with fluid. The right mastoid and  middle ear cavity are clear.     CT ANGIOGRAM OF THE NECK: The nasopharynx, oropharynx, hypopharynx, true  cords and subglottic airway are normal in appearance. The thyroid gland  is unremarkable. The lung apices are clear. There is a small irregular  opacity in the posterior aspect of the right  upper lobe that measures 8  x 4 mm in size since prior CT angiogram of the head and neck 05/06/2020  probably an area of pleural-parenchymal scarring. Otherwise the lung  apices are clear. There are small nodules in the superficial lobes of  the parotid gland. Within the lateral aspect of the superficial lobe of  the right parotid gland is a 7 x 5 mm nodule unchanged and tiny nodule  in the lateral aspect of the left superficial lobe left parotid gland  unchanged. This may be intraparotid lymph nodes or tiny stable  pleomorphic adenomas. The  and submandibular spaces are  symmetric and normal in appearance. There are calcified plaques in the  aortic arch. There is anatomic origin of great vessels off the aortic  arch. Left subclavian artery origin is within normal limits. No stenosis  is seen in the left subclavian artery. The left vertebral artery origins  within normal limits and no stenosis is seen in the left vertebral  artery to the C3-4 level where facet spurs abut and mild to moderately  narrow its lumen. No additional stenosis is seen in the cervical segment  left vertebral artery. The left common carotid origin is within normal  limits and no stenosis seen in the left common carotid artery. There is  circumferential calcified plaque that involves the origin and proximal  cervical segment of the left internal carotid artery. It maximally  results in a 50% stenosis of the origin left internal carotid artery  using the NASCET criteria. The brachiocephalic artery origin is mildly  narrowed by calcified plaque. The remainder of the brachycephalic  arteries within normal limits. The calcified plaque mildly narrows the  right subclavian artery. No additional stenosis seen in the right  subclavian artery. There is calcified plaque that moderately narrows the  right vertebral artery origin. Beyond its origin the cervical segment of  the right vertebral artery is widely patent without stenosis. The  right  common carotid origin is normal in appearance. Portion of the proximal  right common carotid arteries obscured by beam hardening artifact off  densely opacified adjacent right subclavian and brachycephalic veins and  mid and distal right common carotid arteries normal in appearance with  calcified plaque involves the origin proximal cervical segment of the  right internal carotid artery maximally results in 50% stenosis. The  origin of the right internal carotid artery using NASCET criteria. The  remainder of the cervical segment of the right internal carotid arteries  within normal limits.     CT ANGIOGRAM OF THE HEAD: Calcified plaque mildly narrows the proximal  intracranial segment distal left vertebral artery. There is also  noncalcified plaque that mild to moderately narrows the very distal  intracranial segment-V4 segment left vertebral artery just proximal to  the vertebrobasilar junction. Otherwise intracranial segments of the  vertebral arteries are widely patent without stenosis to the  vertebrobasilar junction. There is multifocal mild narrowing of the P1  and P2 segment of the left posterior cerebral artery and the P2 segment  of the right posterior cerebral artery. There is a hypoplastic A1  segment of the right anterior cerebral artery which is normal anatomic  variation. There is focal moderate stenosis proximal aspect of the  dominant A1 segment of the left anterior cerebral artery. Otherwise the  A1 segments anterior cerebral arteries are normal in appearance. The  anterior communicating artery origins within normal limits. The A2  branches of the anterior cerebral arteries are within normal limits. The  M1 segments middle cerebral arteries and middle cerebral artery  bifurcations are within normal limits. There is a mild-to-moderate  stenosis at the origin of one of the M2 branches of the right middle  cerebral artery at the base of intracranial atherosclerosis.       Impression:      1. CT  scan of the head demonstrates no convincing acute intracranial  abnormality. There is moderate size chronic infarct in the medial right  temporal occipital region compatible with an old infarct in the right  posterior cerebral artery territory measuring up to 6.5 x 3.5 x 3 cm in  anterior posterior and medial lateral and craniocaudal dimension. This  infarct occurred back in 05/2020. There is an additional area of  encephalomalacia involving the mid and superior medial left occipital  lobe extending into the posterior inferior medial left parietal lobe in  the distribution medial parietal occipital branches of the left PCA  territory. This occurred prior to the MRI of the brain back in 05/2020.  It was a subacute infarct at that time and this chronic infarct measures  5.3 x 1.5 x 3 cm cyst in anterior posterior, medial lateral and  craniocaudal dimension. There is a 2.5 x 2 cm chronic infarct superior  right frontal lobe in the right MCA territory. There is mild small  vessel disease in cerebral white matter. The remainder of the head CT is  within normal limits with no convincing acute infarct and no  intracranial hemorrhage seen. If there remains clinical suspicion of  acute infarct I recommend an MRI of the brain for more complete  assessment.  2. There are calcified plaques in the aortic arch. There is noncalcified  plaque that mildly narrows the proximal left subclavian artery. There is  a calcified plaque that mildly narrows the right subclavian artery  origin. There is a focal mild-to-moderate up to moderate stenosis of the  left vertebral artery at the C3-4 cervical level where it appears to be  indented by facet spurs. There is calcified plaque that mildly narrows  the proximal intracranial segment-V4 segment of the left vertebral  artery and an additional noncalcified plaque that at least  mild-to-moderate up to moderately narrows the distal intracranial-V4  segment of the left vertebral artery just  proximal to the left  vertebrobasilar junction. There is calcified plaque that moderately  narrows the right vertebral artery origin. No additional right vertebral  stenosis is seen.  3. There are calcified plaques that moderately narrow the origin and the  proximal cervical segments of the internal carotid arteries bilaterally  by approximately 50% using the NASCET criteria.  4. There is some mild intracranial atherosclerotic disease with  multifocal areas and mild narrowing of the P1 and P2 segment left  posterior cerebral artery, P2 segment right posterior cerebral artery.  There is moderate stenosis of proximal A1 segment of the left anterior  cerebral artery and mild-to-moderate stenosis at the origin and one of  the M2 branches of the right middle cerebral artery. The remainder of  the CT angiogram of the head and neck is within normal limits.     Radiation dose reduction techniques were utilized, including automated  exposure control and exposure modulation based on body size.     This report was finalized on 4/25/2023 4:59 PM by Dr. Дмитрий Maharaj M.D.       MRI Brain Without Contrast [[id number]] Collected: 04/25/23 1347     Updated: 04/25/23 1347    Narrative:      MRI OF THE BRAIN WITHOUT CONTRAST ON 04/25/2023     CLINICAL HISTORY: Transient ischemic attack. Patient has headache,  episode of aphasia and blurred vision.     TECHNIQUE: Axial T1, FLAIR, fat-suppressed T2, axial diffusion and  gradient echo T2 and sagittal T1-weighted images were obtained of the  entire head.     This is correlated to a prior MRI of the brain from [facility] on 05/07/2020.     FINDINGS: There is some mild patchy T2 high signal in the  periventricular white matter consistent with mild small vessel disease.  There is a moderate size area of encephalomalacia tracking from the  posterior medial right temporal lobe throughout the medial and inferior  right occipital lobe that measures 7.4 x 3.5 x 3 cm in  anterior  posterior, medial lateral and cranial caudal dimension compatible with  an old right posterior cerebral artery territory infarct and this  occurred back in May 2020. There is also a moderate size area of chronic  encephalomalacia extending from the posterior inferior medial left  parietal lobe and in the mid superior medial left occipital lobe.  This  is in distribution of the medial parietal occipital branches of the left  PCA territory and measures 5.3 x 2 x 3.5 cm in anterior posterior,  medial lateral and cranial caudal dimension. There is an additional 2 x  2 cm area of encephalomalacia in the posterior superior right frontal  lobe compatible with an old infarct in the distribution of the posterior  superior frontal branch of the right MCA territory. The remainder of the  brain parenchyma is normal in signal intensity. Specifically no  diffusion-weighted abnormality is seen with no acute infarct identified.  On the gradient echo T2-weighted images there is some minimal signal  loss in the medial right occipital lobe compatible with some hemosiderin  deposition from old petechial hemorrhage into the old medial right  occipital infarct at this site.  Otherwise no additional acute or old  blood breakdown products are seen intracranially. The ventricles are  normal in size.  I see no mass effect and no midline shift and no  extra-axial fluid collections are identified. The paranasal sinuses are  clear.  There is subtotal opacification of the left mastoid air cells  with fluid.  The right mastoid and middle ear cavity are clear.  Good  flow voids are demonstrated within the cerebral vessels and in the dural  venous sinuses.       Impression:      1. No acute intracranial abnormality is identified.  2. There is a 7 x 3.5 x 3 cm old medial right temporal occipital infarct  in the right PCA territory that occurred back in May 2020. There is an  additional 5.3 x 2 x 3.5 cm old medial left parietal occipital  infarct  in the left PCA territory that occurred prior to May 2020. There is an  additional 2 x 2 cm old posterior superior right frontal infarct in the  right MCA territory. There is mild small vessel disease in the cerebral  white matter and a 5 mm old lacunar infarct in the left side of the  brendan.  3. There is subtotal opacification of the left mastoid air cells with  fluid.  The remainder of the MRI of the brain is normal, specifically no  acute infarct seen. Results were communicated to Yoly Wills, the  physician assistant in the [facility] observation unit  taking care of the patient by telephone on 04/25/2023 at 1 PM.        XR Chest 1 View [[id number]] Collected: 04/25/23 0433     Updated: 04/25/23 0433    Narrative:        Patient: IONA TRUONG  Time Out: 04:32  Exam(s): XR CXR 1 VIEW     EXAM:    XR Chest, 1 View    CLINICAL HISTORY:     Reason for exam: TIA.    TECHNIQUE:    Frontal view of the chest.    COMPARISON:    No relevant prior studies available.    FINDINGS:    Lungs:  No consolidation or mass.  Slightly prominent interstitial   markings in the lower lungs.    Pleural space:  No acute findings    Heart:  cardiomegaly.    Bones joints:  No acute findings.    IMPRESSION:     Slightly prominent interstitial markings in the lower   lungs.      Impression:          Electronically signed by Andie Gomez MD on 04-25-23 at 0432    CT Head Without Contrast [[id number]] Collected: 04/25/23 0409     Updated: 04/25/23 0409    Narrative:        Patient: IONA TRUONG  Time Out: 04:08  Exam(s): CT HEAD Without Contrast     EXAM:    CT Head Without Intravenous Contrast    CLINICAL HISTORY:     Reason for exam: HA.    TECHNIQUE:    Axial computed tomography images of the head brain without intravenous   contrast.  CTDI is 55.97 mGy and DLP is 921.1 mGy-cm.  This CT exam was   performed according to the principle of ALARA (As Low As Reasonably   Achievable) by using one or more of the  following dose reduction   techniques: automated exposure control, adjustment of the mA and or kV   according to patient size, and or use of iterative reconstruction   technique.    COMPARISON:    No relevant prior studies available.    FINDINGS:    Brain:  No hemorrhage, herniation, or mass effect.  Chronic   microvascular ischemic changes.  Old right frontal, left parietal and   right occipital infarcts.    Ventricles:  No hydrocephalus.  Age related cerebral volume loss.    Bones joints:  Unremarkable.    Soft tissues:  Unremarkable.    Sinuses:  Unremarkable.    Mastoid air cells:  Clear.    IMPRESSION:         No acute hemorrhage, hydrocephalus, or mass effect.      Impression:          Electronically signed by Andie Gomez MD on 04-25-23 at 0408        Echocardiogram 4/25/2023  Interpretation Summary       •  Left ventricular systolic function is normal. Calculated left ventricular EF = 62.8%  •  Left ventricular diastolic function is consistent with (grade Ia w/high LAP) impaired relaxation.  •  Saline test results are negative.  •  Estimated right ventricular systolic pressure from tricuspid regurgitation is normal (<35 mmHg).  •  There is mild calcification of the aortic valve. Mild aortic valve regurgitation is present. No aortic valve stenosis is present.      I have reviewed the medications.  ---------------------------------------------------------------------------------------------  Assessment & Plan   Assessment/Problem List    Headache    Aphasia      Plan:    Headache  Episode of aphasia  History of CVA 2020  -Patient reports headache for the past 3 days with associated neck stiffness, right eye burning and blurred vision  -CT head negative acute  -ESR/CRP pending  -CTA of the head and neck   -MRI of the brain without negative  -Echocardiogram  -Neurology consulted reviewed, work-up unremarkable and patient can be discharged home and follow-up with her PCP in 2 to 4 weeks  -Neurochecks every 4  hours  -Continue Eliquis  -Telemetry monitoring     Prolonged QT  -EKG in ER shows a QTc of 698, repeat EKG shows QTc 631  -Hold any QT prolonging medications  -2 g IV magnesium  -Cardiology consulted  -Repeat EKG shows QTc 467  -Discharge home, follow-up with Dr. Marsh cardiologist in 1 to 2 weeks     Paroxysmal atrial fibrillation  -Normal sinus rhythm on exam  -Continue Eliquis     Hypertension  -Continue hydralazine and verapamil  -Monitor vital signs every 4 hours     Diabetes  -Hold home metformin  -Sliding scale and Accu-Cheks with meals and at bedtime     Obstructive sleep apnea  -Patient uses CPAP at home  -Return O2 as needed    Disposition: Home    Follow-up after Discharge: PCP, cardiology    37 minutes has been spent by [facility] providers in the care of this patient while under observation status     This note will serve as discharge summary    CAROL Rogers 04/25/23 07:37 EDT

## 2023-04-26 LAB — QT INTERVAL: 467 MS

## 2023-04-28 ENCOUNTER — TELEPHONE (OUTPATIENT)
Dept: CARDIOLOGY | Facility: CLINIC | Age: 88
End: 2023-04-28

## 2023-04-28 NOTE — TELEPHONE ENCOUNTER
Caller:  CORY VERA    Relationship: SELF    Best call back number: [phone]    What is the best time to reach you: ANY    Who are you requesting to speak with (clinical staff, provider,  specific staff member): CLINICAL    Do you know the name of the person who called: PATIENT    What was the call regarding: PLEASE CALL THE PATIENT SHE HAS MEDICATION CONCERNS AND WANTS TO SPEAK TO SOMEONE ABOUT WHAT MEDICATIONS SHE SHOULD BE TAKEN.    PATIENT HAS BEEN PUT ON THE WAITING LIST AND SCHEDULED WITH AN APPOINTMENT FOR 5/22/23.    PATIENT WAS DISCHARGED FROM THE HOSPITAL ON 4.25.23 AND NEEDS AN EARLIER APPOINTMENT WITHIN TWO WEEKS OF HER DISCHARGE DATE.    PLEASE REVIEW AND ADVICE.

## 2023-05-01 NOTE — TELEPHONE ENCOUNTER
I called and left a message asking patient to return my call and leave the best number in which she can be reached. / GEOFF      Patient's LOV 04/29/21 with AL   Has follow up appt 5/22/23 with AL

## 2023-05-09 ENCOUNTER — OFFICE VISIT (OUTPATIENT)
Dept: CARDIOLOGY | Facility: CLINIC | Age: 88
End: 2023-05-09
Payer: MEDICARE

## 2023-05-09 VITALS
HEART RATE: 75 BPM | BODY MASS INDEX: 32.25 KG/M2 | DIASTOLIC BLOOD PRESSURE: 76 MMHG | RESPIRATION RATE: 18 BRPM | SYSTOLIC BLOOD PRESSURE: 130 MMHG | OXYGEN SATURATION: 97 % | HEIGHT: 63 IN | WEIGHT: 182 LBS

## 2023-05-09 DIAGNOSIS — I34.0 NONRHEUMATIC MITRAL VALVE REGURGITATION: ICD-10-CM

## 2023-05-09 DIAGNOSIS — I10 ESSENTIAL HYPERTENSION: ICD-10-CM

## 2023-05-09 DIAGNOSIS — I48.0 PAROXYSMAL ATRIAL FIBRILLATION: ICD-10-CM

## 2023-05-09 DIAGNOSIS — I63.412 CEREBROVASCULAR ACCIDENT (CVA) DUE TO EMBOLISM OF LEFT MIDDLE CEREBRAL ARTERY: Primary | ICD-10-CM

## 2023-05-09 DIAGNOSIS — E78.5 DYSLIPIDEMIA: ICD-10-CM

## 2023-05-09 NOTE — PROGRESS NOTES
CARDIOLOGY    Monserrat Marsh MD    ENCOUNTER DATE:  05/09/2023    Gela Thompson / 88 y.o. / female        CHIEF COMPLAINT / REASON FOR OFFICE VISIT     Heart Problem (Hospital follow up/4/25/2023 Headache, PAF and HTN )      HISTORY OF PRESENT ILLNESS       HPI    Gela Thompson is a 88 y.o. female     I saw her in February 2011 for complaints of episodes of shortness of breath  which had been occurring for the last couple of months. I did a nuclear stress test which showed she is only able to exercise for 4 minutes 30 seconds on the Saeid protocol, but there was no evidence of ischemia. The echo showed normal LV function with an ejection fraction of 61% and stage I diastolic dysfunction with trace tricuspid regurgitation. She wore a 30-day event monitor during which she had none of her shortness of breath episodes.       She was following up with her primary doctor, Lyly Mora, and complained again of shortness of breath with exertion. Dr. Mora sent her for another echo, which was pretty unremarkable. She also had a CT scan of the chest which was noncontrasted and she had coronary artery calcification. These tests were performed in the summer of 2014.      In the fall of 2014, she continued to complain of dyspnea and so I did a nuclear stress test on her which showed no evidence of ischemia. She had recent repeat echo at [facility] in October 2015, which continues to show normal LV systolic function. No comment was made on diastolic function. There was mild aortic regurgitation, moderate mitral regurgitation, and mild tricuspid regurgitation with a normal right ventricular systolic pressure. The prior years' echo showed no evidence of mitral regurgitation.      She followed up with Deja in Summary 2017 and was doing well on Pradaxa.  She wore a 24 hour monitor in December of 2017 which was normal.  I had reviewed her strips with Dr. Gibson and he did not feel  "like she had atrial fibrillation and we discontinued Pradaxa, which was causing her GI upset.     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  While hospitalized she was diagnosed with paroxysmal atrial fibrillation.  She was started on Eliquis.     She had a trip to the emergency room because of headaches in April 2023.  Echo during that time was unremarkable except for grade 1A diastolic dysfunction.  She had a neurology work-up but there were no acute changes.  She says her blood pressure has been well controlled at home.  She does not have any bleeding complications with the Eliquis.  In the hospital her QT interval was prolonged but it is normal today.       REVIEW OF SYSTEMS     Review of Systems   Constitutional: Positive for weight gain. Negative for chills, fever and weight loss.   Cardiovascular: Negative for leg swelling.   Respiratory: Negative for cough, snoring and wheezing.    Hematologic/Lymphatic: Negative for bleeding problem. Does not bruise/bleed easily.   Skin: Negative for color change.   Musculoskeletal: Positive for joint pain and myalgias. Negative for falls.   Gastrointestinal: Negative for melena.   Genitourinary: Negative for hematuria.   Neurological: Negative for excessive daytime sleepiness.   Psychiatric/Behavioral: Negative for depression. The patient is not nervous/anxious.          VITAL SIGNS     Visit Vitals  /76 (BP Location: Left arm, Patient Position: Sitting, Cuff Size: Adult)   Pulse 75   Resp 18   Ht 160 cm (63\")   Wt 82.6 kg (182 lb)   SpO2 97%   BMI 32.24 kg/m²         Wt Readings from Last 3 Encounters:   05/09/23 82.6 kg (182 lb)   04/25/23 81.6 kg (180 lb)   08/05/21 84.4 kg (186 lb)     Body mass index is 32.24 kg/m².      PHYSICAL EXAMINATION     Constitutional:       General: Not in acute distress.  Neck:      Vascular: No carotid bruit or JVD.   Pulmonary:      " Effort: Pulmonary effort is normal.      Breath sounds: Normal breath sounds.   Cardiovascular:      Normal rate. Regular rhythm.      Murmurs: There is no murmur.   Psychiatric:         Mood and Affect: Mood and affect normal.           REVIEWED DATA       ECG 12 Lead    Date/Time: 5/9/2023 12:30 PM  Performed by: Monserrat Marsh MD  Authorized by: Monserrat Marsh MD   Comparison: compared with previous ECG from 4/25/2023  Similar to previous ECG  Rhythm: sinus rhythm  BPM: 75  Conduction: conduction normal  ST Segments: ST segments normal  T Waves: T waves normal  Other findings: non-specific ST-T wave changes    Clinical impression: normal ECG            1. Echocardiogram performed 03/08/2012 showed normal LV size and function, EF 61%, stage I  diastolic dysfunction, trace tricuspid regurgitation.  2. Exercise nuclear stress test in March 2012 without evidence of ischemia. She exercised for 4 minutes 30 minutes on the Saeid protocol.  3. Pulmonary function tests, March 2012, were normal.   4. Transesophageal echocardiogram, May 2011, at [facility] showed normal LV systolic function, ejection fraction 50 to 60%, borderline left atrial enlargement, no significant valvular disease, and no cardiac source of embolus identified for her stroke.  5. Echocardiogram on 06/27/2014. Normal left ventricular size with an ejection fraction of 64%. No comment on diastolic function. No valvular heart disease.   6. CT of the chest on 09/18/2014. Heart size was normal and without effusion. Moderate atherosclerosis was noted of the coronary arteries. There was old healed granulomatous disease.   7. Nuclear stress test October 27, 2014. Normal scan showing no evidence of ischemia.  8. Echo from October 2015 at [facility]. Normal left ventricular systolic function. Ejection fraction 6065%. Mild aortic regurgitation. Moderate mitral regurgitation. Mild tricuspid regurgitation with a right ventricular systolic pressure of  28 mmHg.   9.  Echocardiogram in December 2018.  Echo showed normal LV function, grade 2 diastolic dysfunction and mild aortic regurgitation.  10.  Echo in January 2020.  Echo showed normal LV function, grade 1 diastolic dysfunction, mild aortic valve regurgitation.  11.  Echo in April 2023 showed normal LV function ejection fraction 60 percent.  Mild to moderate aortic regurgitation and trace tricuspid regurgitation.  Echocardiogram April 2023 showed ejection fraction of 63%, grade 1A diastolic dysfunction, negative saline study, mild aortic regurgitation.      Lipid Panel        4/25/2023    03:36   Lipid Panel   Total Cholesterol 241     Triglycerides 134     HDL Cholesterol 50     VLDL Cholesterol 24     LDL Cholesterol  167     LDL/HDL Ratio 3.28         Lab Results   Component Value Date    GLUCOSE 145 (H) 04/25/2023    BUN 19 04/25/2023    CREATININE 0.84 04/25/2023    EGFR 66.9 04/25/2023    BCR 22.6 04/25/2023    K 3.7 04/25/2023    CO2 24.0 04/25/2023    CALCIUM 9.4 04/25/2023    ALBUMIN 4.3 04/25/2023    BILITOT 0.2 04/25/2023    AST 14 04/25/2023    ALT 17 04/25/2023       ASSESSMENT & PLAN      Diagnosis Plan   1. Cerebrovascular accident (CVA) due to embolism of left middle cerebral artery        2. Essential hypertension        3. Nonrheumatic mitral valve regurgitation        4. Paroxysmal atrial fibrillation        5. Dyslipidemia            1.    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  Diagnosed with paroxysmal atrial fibrillation.  Currently on Eliquis.  Had a recent neurological work-up while hospitalized in April 2023 that did not show anything acute.  2.  Paroxysmal atrial fibrillation.  Currently in sinus rhythm.  Continue verapamil and Eliquis.  3.  Hypertension.    Blood pressure was elevated in the hospital but she said she did not get her medications correctly.  Blood pressure at home has been running like it is in the office today.  4.  Obstructive sleep apnea compliant  with CPAP  5.  History of stroke in 2011 with a TIA in 2016 and bilateral strokes in May 2020.  6.  Diabetes.  7.  Chest pain.  Not currently an issue.      I reviewed her echocardiogram images from her hospitalization.     Follow-up with Tiesha in 1 year.      Orders Placed This Encounter   Procedures   • ECG 12 Lead     This order was created via procedure documentation     Order Specific Question:   Release to patient     Answer:   Routine Release           MEDICATIONS         Discharge Medications          Accurate as of May 9, 2023 12:33 PM. If you have any questions, ask your nurse or doctor.            Continue These Medications      Instructions Start Date   apixaban 2.5 MG tablet tablet  Commonly known as: ELIQUIS   2.5 mg, Oral, 2 Times Daily      furosemide 20 MG tablet  Commonly known as: LASIX   20 mg, Oral, See Admin Instructions, Take one tablet by mouth on Mondays and Thursdays      hydrALAZINE 50 MG tablet  Commonly known as: APRESOLINE   100 mg, Oral, 3 Times Daily      metFORMIN 500 MG tablet  Commonly known as: GLUCOPHAGE   500 mg, Oral, 2 Times Daily With Meals      verapamil  MG 24 hr capsule  Commonly known as: VERELAN   120 mg, Oral, See Admin Instructions, Take one capsule by mouth every morning and two capsules by mouth at bedtime      Vitamin B-2 50 MG tablet   25 mg, Oral, See Admin Instructions, Take 1/2 tablet (25mg) by mouth on Mondays and Thursdays               Monserrat Marsh MD  05/09/23  12:33 EDT    Part of this note may be an electronic transcription/translation of spoken language to printed text using the Dragon dictation system.

## 2023-05-17 NOTE — TELEPHONE ENCOUNTER
Rx Refill Note  Requested Prescriptions     Pending Prescriptions Disp Refills   • apixaban (ELIQUIS) 2.5 MG tablet tablet 180 tablet 3     Sig: Take 1 tablet by mouth 2 (Two) Times a Day.      Last office visit with prescribing clinician: 5/9/2023   Last telemedicine visit with prescribing clinician: 4/28/2023   Next office visit with prescribing clinician: Visit date not found                         Would you like a call back once the refill request has been completed: [] Yes [] No    If the office needs to give you a call back, can they leave a voicemail: [] Yes [] No    Susy Galaviz MA  05/17/23, 09:20 EDT

## 2023-05-18 RX ORDER — HYDRALAZINE HYDROCHLORIDE 100 MG/1
TABLET, FILM COATED ORAL
Qty: 90 TABLET | Refills: 9 | OUTPATIENT
Start: 2023-05-18

## 2023-06-15 RX ORDER — HYDRALAZINE HYDROCHLORIDE 100 MG/1
TABLET, FILM COATED ORAL
Qty: 90 TABLET | Refills: 9 | Status: SHIPPED | OUTPATIENT
Start: 2023-06-15

## 2023-07-25 RX ORDER — LOSARTAN POTASSIUM 25 MG/1
25 TABLET ORAL DAILY
Qty: 30 TABLET | Refills: 0 | Status: SHIPPED | OUTPATIENT
Start: 2023-07-25

## 2023-07-25 NOTE — TELEPHONE ENCOUNTER
Patient called and stated that the losartan was not in her packaged prescriptions she received today, she is asking if I could send her a 30 day supply to the [facility] in [facility]. Advised patient that I would send that in today.         Rx Refill Note  Requested Prescriptions     Pending Prescriptions Disp Refills    losartan (COZAAR) 25 MG tablet 30 tablet 0     Sig: Take 1 tablet by mouth Daily.      Last office visit with prescribing clinician: 5/9/2023   Last telemedicine visit with prescribing clinician: Visit date not found   Next office visit with prescribing clinician: Visit date not found                         Would you like a call back once the refill request has been completed: [] Yes [] No    If the office needs to give you a call back, can they leave a voicemail: [] Yes [] No    Susy Galaviz MA  07/25/23, 15:51 EDT

## 2023-08-10 ENCOUNTER — OFFICE VISIT (OUTPATIENT)
Dept: CARDIOLOGY | Facility: CLINIC | Age: 88
End: 2023-08-10
Payer: MEDICARE

## 2023-08-10 VITALS
SYSTOLIC BLOOD PRESSURE: 144 MMHG | WEIGHT: 190 LBS | HEART RATE: 68 BPM | HEIGHT: 63 IN | BODY MASS INDEX: 33.66 KG/M2 | DIASTOLIC BLOOD PRESSURE: 74 MMHG

## 2023-08-10 DIAGNOSIS — I48.0 PAROXYSMAL ATRIAL FIBRILLATION: ICD-10-CM

## 2023-08-10 DIAGNOSIS — E78.5 DYSLIPIDEMIA: Primary | ICD-10-CM

## 2023-08-10 DIAGNOSIS — I34.0 NONRHEUMATIC MITRAL VALVE REGURGITATION: ICD-10-CM

## 2023-08-10 DIAGNOSIS — I10 ESSENTIAL HYPERTENSION: ICD-10-CM

## 2023-08-10 NOTE — PROGRESS NOTES
Subjective:     Encounter Date:08/10/2023      Patient ID: Gela Thompson is a 88 y.o. female.    Chief Complaint:follow up left arm numbness, chest pain  History of Present Illness  This is a 87 y/o female who follows with Dr. Marsh and is known to me. She has a pmhx of PAF anticoagulated with apixaban, hypertension and diabetes. In 2011, she underwent a nuclear stress test that showed poor exercise tolerance and no evidence of ischemia. Echocardiogram showed normal LV function with an EF of 61%, stage 1 diastolic dysfunction with trace tricuspid regurgitation. She wore a 30 day event monitor during which she had no shortness of breath episodes.    In 2014, she had a repeat echocardiogram which was pretty unremarkable. A CT of the chest showed coronary artery calcification. In the fall of 2014, she continued to have dyspnea so a nuclear stress test was performed which showed no evidence of ischemia. She had a repeat echocardiogram in October 2015 which showed a normal LV systolic function. No comment was made on diastolic function. There was mild aortic regurgitation, moderate mitral regurgitation, and mild tricuspid regurgitation with a normal right ventricular systolic pressure. The prior years' echo showed no evidence of mitral regurgitation.     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 She was diagnosed with atrial fibrillation and was started on Eliquis. Then in 2023, she was seen in the ED for headaches. Workup was unremarkable. It was noted in the hospital that her QT interval was prolonged.    I saw her in July 2023 when she had complaints of pain under her ribs and left arm numbness. Her BP was noted to be pretty elevated with readings in the 160-180s/60-70s. I started her on losartan and asked her to obtain labs in 1 week. She is here today for a follow up visit. She brings a BP log with her today and her BP has  been running in the 120-150s/70-80s and this is prior to taking her blood pressure medication. She reports feeling better. She denies any chest pain, shortness of breath, palpitations, or syncope. She has dizziness at times with position changes. She denies swelling in her lower extremities, orthopnea or PND.      I have reviewed and updated as appropriate allergies, current medications, past family history, past medical history, past surgical history and problem list.    Review of Systems   Constitutional: Negative for fever, malaise/fatigue, weight gain and weight loss.   HENT:  Negative for congestion, hoarse voice and sore throat.    Eyes:  Negative for blurred vision and double vision.   Cardiovascular:  Negative for chest pain, dyspnea on exertion, leg swelling, orthopnea, palpitations and syncope.   Respiratory:  Negative for cough, shortness of breath and wheezing.    Gastrointestinal:  Negative for abdominal pain, hematemesis, hematochezia and melena.   Genitourinary:  Negative for dysuria and hematuria.   Neurological:  Positive for dizziness and numbness. Negative for headaches and light-headedness.   Psychiatric/Behavioral:  Negative for depression. The patient is not nervous/anxious.        Current Outpatient Medications:     apixaban (ELIQUIS) 2.5 MG tablet tablet, Take 1 tablet by mouth 2 (Two) Times a Day., Disp: 180 tablet, Rfl: 3    furosemide (LASIX) 20 MG tablet, Take 1 tablet by mouth See Admin Instructions. Take one tablet by mouth on Mondays and Thursdays, Disp: , Rfl:     hydrALAZINE (APRESOLINE) 100 MG tablet, TAKE ONE TABLET BY MOUTH THREE TIMES DAILY (MORNING, NOON, AND BEDTIME), Disp: 90 tablet, Rfl: 9    losartan (COZAAR) 25 MG tablet, Take 1 tablet by mouth Daily., Disp: 30 tablet, Rfl: 0    metFORMIN (GLUCOPHAGE) 500 MG tablet, Take 1 tablet by mouth 2 (Two) Times a Day With Meals., Disp: , Rfl:     Riboflavin (Vitamin B-2) 50 MG tablet, Take 25 mg by mouth See Admin Instructions. Take  "1/2 tablet (25mg) by mouth on Mondays and Thursdays, Disp: , Rfl:     verapamil ER (VERELAN) 120 MG 24 hr capsule, Take 1 capsule by mouth See Admin Instructions. Take one capsule by mouth every morning, Disp: , Rfl:     verapamil SR (CALAN-SR) 240 MG CR tablet, Take 1 tablet by mouth every night at bedtime., Disp: , Rfl:     Past Medical History:   Diagnosis Date    Abnormal echocardiogram     Adiposity     Anemia     Cerebrovascular accident     Chest pain     Chronic low back pain     Diabetes mellitus     Dyslipidemia     Dyspnea     Gastroesophageal reflux disease     H/O echocardiogram 06/27/2014    Health care maintenance     History of EKG 10/29/2015    Hyperlipidemia     Hypertension     Mitral valve insufficiency     Murmur     Murmur     ROMAN (obstructive sleep apnea)     Osteoarthritis     Peripheral neuropathy        Past Surgical History:   Procedure Laterality Date    CARPAL TUNNEL RELEASE      CHOLECYSTECTOMY      HYSTERECTOMY      KNEE SURGERY Bilateral     TKA    NEUROPLASTY      deompression median nerve at carpal tunnel bilateral    SHOULDER SURGERY Left        Family History   Problem Relation Age of Onset    Heart disease Mother     Heart attack Mother     Neuropathy Mother     Heart disease Sister     Stroke Father        Social History     Tobacco Use    Smoking status: Never    Smokeless tobacco: Never    Tobacco comments:     caffeine use - 1 cup coffee, 1 glass tea daily    Vaping Use    Vaping Use: Never used   Substance Use Topics    Alcohol use: No    Drug use: No         ECG 12 Lead    Date/Time: 8/10/2023 3:33 PM  Performed by: Susie Eckert APRN  Authorized by: Susie Eckert APRN   Comparison: compared with previous ECG from 6/29/2023  Similar to previous ECG  Rhythm: sinus rhythm  Other findings: non-specific ST-T wave changes  Comments: No significant change from previous EKG           Objective:     Visit Vitals  /74   Pulse 68   Ht 160 cm (63\")   Wt 86.2 kg (190 lb) "   BMI 33.66 kg/mý             Physical Exam  Constitutional:       Appearance: Normal appearance. She is obese.   HENT:      Head: Normocephalic.   Neck:      Vascular: No carotid bruit.   Cardiovascular:      Rate and Rhythm: Normal rate and regular rhythm.      Chest Wall: PMI is not displaced.      Pulses: Normal pulses.           Radial pulses are 2+ on the right side and 2+ on the left side.        Posterior tibial pulses are 2+ on the right side and 2+ on the left side.      Heart sounds: Normal heart sounds. No murmur heard.    No friction rub. No gallop.   Pulmonary:      Effort: Pulmonary effort is normal.      Breath sounds: Normal breath sounds.   Abdominal:      General: Bowel sounds are normal. There is no distension.      Palpations: Abdomen is soft.   Musculoskeletal:      Right lower leg: No edema.      Left lower leg: No edema.   Skin:     General: Skin is warm and dry.      Capillary Refill: Capillary refill takes less than 2 seconds.   Neurological:      Mental Status: She is alert and oriented to person, place, and time.   Psychiatric:         Mood and Affect: Mood normal.         Behavior: Behavior normal.         Thought Content: Thought content normal.        Lab Review:   Lipid Panel          4/25/2023    03:36   Lipid Panel   Total Cholesterol 241    Triglycerides 134    HDL Cholesterol 50    VLDL Cholesterol 24    LDL Cholesterol  167    LDL/HDL Ratio 3.28        Cardiac Procedures:       Assessment:         Diagnoses and all orders for this visit:    1. Dyslipidemia (Primary)    2. Essential hypertension    3. Nonrheumatic mitral valve regurgitation    4. Paroxysmal atrial fibrillation            Plan:         HTN: Improved with addition of losartan. Her blood pressure is still a little borderline elevated at times. Ideally, I would like to see her lab work before determining if we can increase losartan. She is having labs performed at her PCP on Tuesday. I will check back for her BMP  results then and determine further course of action.  HLD  ROMAN: CPAP compliant  PAF: in sinus on EKG today. On verapamil and apixaban. Continue both.    Thank you for allowing me to participate in this patient's care. Please call with any questions or concerns. Ms. Thompson will follow up with Dr. Marsh in 3 months.          Your medication list            Accurate as of August 10, 2023  3:29 PM. If you have any questions, ask your nurse or doctor.                CONTINUE taking these medications        Instructions Last Dose Given Next Dose Due   apixaban 2.5 MG tablet tablet  Commonly known as: ELIQUIS      Take 1 tablet by mouth 2 (Two) Times a Day.       furosemide 20 MG tablet  Commonly known as: LASIX      Take 1 tablet by mouth See Admin Instructions. Take one tablet by mouth on Mondays and Thursdays       hydrALAZINE 100 MG tablet  Commonly known as: APRESOLINE      TAKE ONE TABLET BY MOUTH THREE TIMES DAILY (MORNING, NOON, AND BEDTIME)       losartan 25 MG tablet  Commonly known as: COZAAR      Take 1 tablet by mouth Daily.       metFORMIN 500 MG tablet  Commonly known as: GLUCOPHAGE      Take 1 tablet by mouth 2 (Two) Times a Day With Meals.       verapamil  MG 24 hr capsule  Commonly known as: VERELAN      Take 1 capsule by mouth See Admin Instructions. Take one capsule by mouth every morning       verapamil  MG CR tablet  Commonly known as: CALAN-SR      Take 1 tablet by mouth every night at bedtime.       Vitamin B-2 50 MG tablet      Take 25 mg by mouth See Admin Instructions. Take 1/2 tablet (25mg) by mouth on Mondays and Thursdays                  ELEUTERIO Mccallum  08/10/23  10:59 AM EDT

## 2023-08-22 DIAGNOSIS — I10 ESSENTIAL HYPERTENSION: Primary | ICD-10-CM

## 2023-08-22 RX ORDER — LISINOPRIL 5 MG/1
5 TABLET ORAL DAILY
Qty: 30 TABLET | Refills: 1 | Status: SHIPPED | OUTPATIENT
Start: 2023-08-22

## 2023-08-22 NOTE — PROGRESS NOTES
Spoke with patient regarding side effect of constipation with losartan. Will stop losartan and place on lisinopril at a lower dose than she was previously on. Will have her come back for repeat BMP and BP check in a couple of weeks.

## 2023-09-05 RX ORDER — LISINOPRIL 5 MG/1
TABLET ORAL
Qty: 30 TABLET | Refills: 1 | Status: SHIPPED | OUTPATIENT
Start: 2023-09-05

## 2023-09-13 ENCOUNTER — CLINICAL SUPPORT (OUTPATIENT)
Dept: CARDIOLOGY | Facility: CLINIC | Age: 88
End: 2023-09-13
Payer: MEDICARE

## 2023-09-13 RX ORDER — VALSARTAN 80 MG/1
80 TABLET ORAL DAILY
Qty: 30 TABLET | Refills: 11 | Status: SHIPPED | OUTPATIENT
Start: 2023-09-13

## 2023-09-13 NOTE — PROGRESS NOTES
Pt came in for a BP Check. She was changed from Losartan to Lisinopril 5mg qd to see if that would help with her constipation. She has been taking Senokot as well, which she says has helped x 2 weeks.  Her BP today is 130/58, HR 78.    She did state that she is having some dizziness this morning.    Thank you,    Michelle, MATTHIEU

## 2023-10-12 ENCOUNTER — TELEPHONE (OUTPATIENT)
Dept: CARDIOLOGY | Facility: CLINIC | Age: 88
End: 2023-10-12
Payer: MEDICARE

## 2023-10-12 NOTE — TELEPHONE ENCOUNTER
Patient of Dr Marsh who last saw Susie on 8/10/2023. States she would like to speak with Susie to discuss her medications.  She is due to be seen by Dr Marsh on 11/17/2023

## 2023-10-13 NOTE — TELEPHONE ENCOUNTER
Ms. Thompson' caretaker is going to call with an updated list of what she is taking at home. Apparently one of her BP pills was not included in her prepackaged meds but Ms Thompson can't read what is in the package. Her BP is fine so we do not need to make any changes. I just want to make sure our med list reflects what she is actually taking.

## 2023-10-16 NOTE — TELEPHONE ENCOUNTER
Spoke to patient. Everything is the same except the med she has been without is the 120mg of verapamil in the AM.     Elissa Ko RN  Triage [facility]

## 2023-11-15 NOTE — OUTREACH NOTE
Medical Week 3 Survey      Responses   Facility patient discharged from?  [facility]   Does the patient have one of the following disease processes/diagnoses(primary or secondary)?  Other   Week 3 attempt successful?  Yes   Call start time  1437   Call end time  1443   Discharge diagnosis  Uncontrolled hypertension   Meds reviewed with patient/caregiver?  Yes   Is the patient having any side effects they believe may be caused by any medication additions or changes?  Yes   Does the patient have all medications ordered at discharge?  Yes   Is the patient taking all medications as directed (includes completed medication regime)?  Yes   Does the patient have a primary care provider?   Yes   Does the patient have an appointment with their PCP within 7 days of discharge?  Yes   Has the patient kept scheduled appointments due by today?  Yes   Has home health visited the patient within 72 hours of discharge?  N/A   Psychosocial issues?  No   Did the patient receive a copy of their discharge instructions?  Yes   Nursing interventions  Reviewed instructions with patient   What is the patient's perception of their health status since discharge?  Improving   Is the patient/caregiver able to teach back signs and symptoms related to disease process for when to call PCP?  Yes   Is the patient/caregiver able to teach back signs and symptoms related to disease process for when to call 911?  Yes   Is the patient/caregiver able to teach back the hierarchy of who to call/visit for symptoms/problems? PCP, Specialist, Home health nurse, Urgent Care, ED, 911  Yes   Additional teach back comments  BP is more controlled, leg cramps still happening.  Will call PharmD to discuss meds.   Week 3 Call Completed?  Yes          Seema Ramires RN   Spoke to pharmacy stated that the pt lisinopril is too early to refill, pt can  on 21st

## 2023-11-17 ENCOUNTER — OFFICE VISIT (OUTPATIENT)
Dept: CARDIOLOGY | Facility: CLINIC | Age: 88
End: 2023-11-17
Payer: MEDICARE

## 2023-11-17 VITALS
BODY MASS INDEX: 32.36 KG/M2 | HEART RATE: 72 BPM | HEIGHT: 63 IN | SYSTOLIC BLOOD PRESSURE: 130 MMHG | OXYGEN SATURATION: 97 % | WEIGHT: 182.6 LBS | DIASTOLIC BLOOD PRESSURE: 48 MMHG

## 2023-11-17 DIAGNOSIS — E78.5 DYSLIPIDEMIA: ICD-10-CM

## 2023-11-17 DIAGNOSIS — I48.0 PAROXYSMAL ATRIAL FIBRILLATION: Primary | ICD-10-CM

## 2023-11-17 DIAGNOSIS — I34.0 NONRHEUMATIC MITRAL VALVE REGURGITATION: ICD-10-CM

## 2023-11-17 PROCEDURE — 1160F RVW MEDS BY RX/DR IN RCRD: CPT | Performed by: INTERNAL MEDICINE

## 2023-11-17 PROCEDURE — 93000 ELECTROCARDIOGRAM COMPLETE: CPT | Performed by: INTERNAL MEDICINE

## 2023-11-17 PROCEDURE — 99214 OFFICE O/P EST MOD 30 MIN: CPT | Performed by: INTERNAL MEDICINE

## 2023-11-17 PROCEDURE — 1159F MED LIST DOCD IN RCRD: CPT | Performed by: INTERNAL MEDICINE

## 2023-11-17 RX ORDER — OMEGA-3S/DHA/EPA/FISH OIL/D3 300MG-1000
400 CAPSULE ORAL DAILY
COMMUNITY

## 2023-11-17 RX ORDER — LISINOPRIL 5 MG/1
5 TABLET ORAL
COMMUNITY
End: 2023-11-17

## 2023-11-17 RX ORDER — LOSARTAN POTASSIUM 25 MG/1
25 TABLET ORAL DAILY
COMMUNITY

## 2023-11-17 NOTE — PROGRESS NOTES
CARDIOLOGY    Monserrat Marsh MD    ENCOUNTER DATE:  11/17/2023    Gela Thompson / 88 y.o. / female        CHIEF COMPLAINT / REASON FOR OFFICE VISIT     Follow-up      HISTORY OF PRESENT ILLNESS       HPI    Gela Thomspon is a 88 y.o. female     I saw her in February 2011 for complaints of episodes of shortness of breath  which had been occurring for the last couple of months. I did a nuclear stress test which showed she is only able to exercise for 4 minutes 30 seconds on the Saeid protocol, but there was no evidence of ischemia. The echo showed normal LV function with an ejection fraction of 61% and stage I diastolic dysfunction with trace tricuspid regurgitation. She wore a 30-day event monitor during which she had none of her shortness of breath episodes.       She was following up with her primary doctor, Lyly Mora, and complained again of shortness of breath with exertion. Dr. Mora sent her for another echo, which was pretty unremarkable. She also had a CT scan of the chest which was noncontrasted and she had coronary artery calcification. These tests were performed in the summer of 2014.      In the fall of 2014, she continued to complain of dyspnea and so I did a nuclear stress test on her which showed no evidence of ischemia. She had recent repeat echo at [facility] in October 2015, which continues to show normal LV systolic function. No comment was made on diastolic function. There was mild aortic regurgitation, moderate mitral regurgitation, and mild tricuspid regurgitation with a normal right ventricular systolic pressure. The prior years' echo showed no evidence of mitral regurgitation.      She followed up with Deja in Summary 2017 and was doing well on Pradaxa.  She wore a 24 hour monitor in December of 2017 which was normal.  I had reviewed her strips with Dr. Gibson and he did not feel like she had atrial fibrillation and we discontinued Pradaxa,  "which was causing her GI upset.     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  While hospitalized she was diagnosed with paroxysmal atrial fibrillation.  She was started on Eliquis.     She had a trip to the emergency room because of headaches in April 2023.  Echo during that time was unremarkable except for grade 1A diastolic dysfunction.  She had a neurology work-up but there were no acute changes.  She says her blood pressure has been well controlled at home.  She does not have any bleeding complications with the Eliquis.  In the hospital her QT interval was prolonged but it is normal today.    She comes in today for follow-up.  She brings in a list of her blood pressures.  She has been checking her blood pressures before she takes her medication and there mostly elevated.  She is not having chest pain or shortness of breath.  She is not having any bleeding problems with the Eliquis.      REVIEW OF SYSTEMS     Review of Systems   Constitutional: Negative for chills, fever, weight gain and weight loss.   Cardiovascular:  Negative for leg swelling.   Respiratory:  Negative for cough, snoring and wheezing.    Hematologic/Lymphatic: Negative for bleeding problem. Does not bruise/bleed easily.   Skin:  Negative for color change.   Musculoskeletal:  Negative for falls, joint pain and myalgias.   Gastrointestinal:  Negative for melena.   Genitourinary:  Negative for hematuria.   Neurological:  Negative for excessive daytime sleepiness.   Psychiatric/Behavioral:  Negative for depression. The patient is not nervous/anxious.          VITAL SIGNS     Visit Vitals  /48   Pulse 72   Ht 160 cm (63\")   Wt 82.8 kg (182 lb 9.6 oz)   SpO2 97%   BMI 32.35 kg/m²         Wt Readings from Last 3 Encounters:   11/17/23 82.8 kg (182 lb 9.6 oz)   08/10/23 86.2 kg (190 lb)   06/29/23 86.7 kg (191 lb 3.2 oz)     Body mass index is " 32.35 kg/m².      PHYSICAL EXAMINATION     Constitutional:       General: Not in acute distress.  Neck:      Vascular: No carotid bruit or JVD.   Pulmonary:      Effort: Pulmonary effort is normal.      Breath sounds: Normal breath sounds.   Cardiovascular:      Normal rate. Regular rhythm.      Murmurs: There is no murmur.   Psychiatric:         Mood and Affect: Mood and affect normal.           REVIEWED DATA       ECG 12 Lead    Date/Time: 11/17/2023 12:22 PM  Performed by: Monserrat Marsh MD    Authorized by: Monserrat Marsh MD  Comparison: compared with previous ECG from 8/10/2023  Rhythm: sinus rhythm  BPM: 72  Conduction: conduction normal  ST Segments: ST segments normal  T Waves: T waves normal    Clinical impression: normal ECG          1. Echocardiogram performed 03/08/2012 showed normal LV size and function, EF 61%, stage I  diastolic dysfunction, trace tricuspid regurgitation.  2. Exercise nuclear stress test in March 2012 without evidence of ischemia. She exercised for 4 minutes 30 minutes on the Saeid protocol.  3. Pulmonary function tests, March 2012, were normal.   4. Transesophageal echocardiogram, May 2011, at [facility] showed normal LV systolic function, ejection fraction 50 to 60%, borderline left atrial enlargement, no significant valvular disease, and no cardiac source of embolus identified for her stroke.  5. Echocardiogram on 06/27/2014. Normal left ventricular size with an ejection fraction of 64%. No comment on diastolic function. No valvular heart disease.   6. CT of the chest on 09/18/2014. Heart size was normal and without effusion. Moderate atherosclerosis was noted of the coronary arteries. There was old healed granulomatous disease.   7. Nuclear stress test October 27, 2014. Normal scan showing no evidence of ischemia.  8. Echo from October 2015 at [facility]. Normal left ventricular systolic function. Ejection fraction 6065%. Mild aortic regurgitation. Moderate mitral  regurgitation. Mild tricuspid regurgitation with a right ventricular systolic pressure of 28 mmHg.   9.  Echocardiogram in December 2018.  Echo showed normal LV function, grade 2 diastolic dysfunction and mild aortic regurgitation.  10.  Echo in January 2020.  Echo showed normal LV function, grade 1 diastolic dysfunction, mild aortic valve regurgitation.  11.  Echo in April 2023 showed normal LV function ejection fraction 60 percent.  Mild to moderate aortic regurgitation and trace tricuspid regurgitation.  12. Echocardiogram April 2023 showed ejection fraction of 63%, grade 1A diastolic dysfunction, negative saline study, mild aortic regurgitation.       Lipid Panel          4/25/2023    03:36   Lipid Panel   Total Cholesterol 241    Triglycerides 134    HDL Cholesterol 50    VLDL Cholesterol 24    LDL Cholesterol  167    LDL/HDL Ratio 3.28        Lab Results   Component Value Date    GLUCOSE 145 (H) 04/25/2023    BUN 19 04/25/2023    CREATININE 0.84 04/25/2023    EGFR 66.9 04/25/2023    BCR 22.6 04/25/2023    K 3.7 04/25/2023    CO2 24.0 04/25/2023    CALCIUM 9.4 04/25/2023    ALBUMIN 4.3 04/25/2023    BILITOT 0.2 04/25/2023    AST 14 04/25/2023    ALT 17 04/25/2023       ASSESSMENT & PLAN      Diagnosis Plan   1. Paroxysmal atrial fibrillation        2. Nonrheumatic mitral valve regurgitation        3. Dyslipidemia            1.    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  Diagnosed with paroxysmal atrial fibrillation.  Currently on Eliquis.  Neurological work-up while hospitalized in April 2023 did not show anything acute.  2.  Paroxysmal atrial fibrillation.  She is in sinus rhythm.  She is anticoagulated with Eliquis 2.5 mg a day.  3.  Hypertension.  I have asked her to start checking her blood pressure at least an hour after medication and keeping a log of it.  It looks like she is on both losartan and lisinopril.  I am going to stop the lisinopril.  4.  Obstructive sleep apnea compliant with  CPAP  5.  History of stroke in 2011 with a TIA in 2016 and bilateral strokes in May 2020.  6.  Diabetes.  7.  Chest pain.  Not currently an issue.       Follow-up with Tiesha in 3 months to reevaluate her blood pressure.        Orders Placed This Encounter   Procedures    ECG 12 Lead     This order was created via procedure documentation     Order Specific Question:   Release to patient     Answer:   Routine Release [[id number]]           MEDICATIONS         Discharge Medications            Accurate as of November 17, 2023 12:22 PM. If you have any questions, ask your nurse or doctor.                Changes to Medications        Instructions Start Date   verapamil  MG CR tablet  Commonly known as: CALAN-SR  What changed: Another medication with the same name was removed. Continue taking this medication, and follow the directions you see here.  Changed by: Monserrat Marsh MD   240 mg, Oral, Every Night at Bedtime             Continue These Medications        Instructions Start Date   apixaban 2.5 MG tablet tablet  Commonly known as: ELIQUIS   2.5 mg, Oral, 2 Times Daily      cholecalciferol 10 MCG (400 UNIT) tablet  Commonly known as: VITAMIN D3   400 Units, Oral, Daily      furosemide 20 MG tablet  Commonly known as: LASIX   20 mg, Oral, See Admin Instructions, Take one tablet by mouth on Mondays and Thursdays      glucose blood test strip   1 strip, Other, Daily      hydrALAZINE 100 MG tablet  Commonly known as: APRESOLINE   TAKE ONE TABLET BY MOUTH THREE TIMES DAILY (MORNING, NOON, AND BEDTIME)      losartan 25 MG tablet  Commonly known as: COZAAR   25 mg, Oral, Daily      metFORMIN 500 MG tablet  Commonly known as: GLUCOPHAGE   500 mg, Oral, 2 Times Daily With Meals      Vitamin B-2 50 MG tablet   25 mg, Oral, See Admin Instructions, Take 1/2 tablet (25mg) by mouth on Mondays and Thursdays             Stop These Medications      lisinopril 5 MG tablet  Commonly known as: PRINIVIL,ZESTRIL  Stopped by:  Monserrat Marsh MD     valsartan 80 MG tablet  Commonly known as: DIOVAN  Stopped by: MD Monserrat Ron MD  11/17/23  12:22 EST    Part of this note may be an electronic transcription/translation of spoken language to printed text using the Dragon dictation system.

## 2023-11-22 RX ORDER — LISINOPRIL 5 MG/1
TABLET ORAL
Qty: 30 TABLET | Refills: 1 | OUTPATIENT
Start: 2023-11-22

## 2024-03-13 RX ORDER — HYDRALAZINE HYDROCHLORIDE 100 MG/1
TABLET, FILM COATED ORAL
Qty: 90 TABLET | Refills: 5 | Status: SHIPPED | OUTPATIENT
Start: 2024-03-13

## 2024-05-08 ENCOUNTER — OFFICE VISIT (OUTPATIENT)
Dept: CARDIOLOGY | Facility: CLINIC | Age: 89
End: 2024-05-08
Payer: MEDICARE

## 2024-05-08 VITALS
BODY MASS INDEX: 32.78 KG/M2 | HEART RATE: 62 BPM | DIASTOLIC BLOOD PRESSURE: 52 MMHG | HEIGHT: 63 IN | WEIGHT: 185 LBS | SYSTOLIC BLOOD PRESSURE: 140 MMHG

## 2024-05-08 DIAGNOSIS — I34.0 NONRHEUMATIC MITRAL VALVE REGURGITATION: ICD-10-CM

## 2024-05-08 DIAGNOSIS — I48.0 PAROXYSMAL ATRIAL FIBRILLATION: Primary | ICD-10-CM

## 2024-05-08 PROCEDURE — 1160F RVW MEDS BY RX/DR IN RCRD: CPT | Performed by: NURSE PRACTITIONER

## 2024-05-08 PROCEDURE — 99214 OFFICE O/P EST MOD 30 MIN: CPT | Performed by: NURSE PRACTITIONER

## 2024-05-08 PROCEDURE — 1159F MED LIST DOCD IN RCRD: CPT | Performed by: NURSE PRACTITIONER

## 2024-05-08 PROCEDURE — 93000 ELECTROCARDIOGRAM COMPLETE: CPT | Performed by: NURSE PRACTITIONER

## 2024-05-08 RX ORDER — VERAPAMIL HYDROCHLORIDE 120 MG/1
120 TABLET, FILM COATED ORAL EVERY MORNING
COMMUNITY

## 2024-09-04 RX ORDER — HYDRALAZINE HYDROCHLORIDE 100 MG/1
TABLET, FILM COATED ORAL
Qty: 90 TABLET | Refills: 5 | Status: SHIPPED | OUTPATIENT
Start: 2024-09-04

## 2024-09-18 ENCOUNTER — TELEPHONE (OUTPATIENT)
Dept: CARDIOLOGY | Facility: CLINIC | Age: 89
End: 2024-09-18

## 2024-09-18 NOTE — TELEPHONE ENCOUNTER
[facility] is requesting a Cardiac Clearance for Pt to have an Endoscopy on 10/31/24.    She is currently on Eliquis. They would like any recommendations on stopping this medication prior.      Please fax [phone]

## 2024-09-18 NOTE — TELEPHONE ENCOUNTER
Please relay message to patient and fax to [facility].      This patient is at acceptable cardiovascular risk to proceed with endoscopy on 10/31/2024.  She may hold Eliquis 48 hours prior and resume Eliquis as soon as possible after endoscopy.  I would advise that she keep her appointment here in November to follow-up as well.      ELEUTERIO Lombardi  [facility]   [address] [place] [address] [address]  [place] [address]  Ph: [phone]  Fax: [phone]

## 2024-10-07 RX ORDER — APIXABAN 2.5 MG/1
2.5 TABLET, FILM COATED ORAL 2 TIMES DAILY
Qty: 180 TABLET | Refills: 1 | Status: SHIPPED | OUTPATIENT
Start: 2024-10-07

## 2024-11-11 ENCOUNTER — OFFICE VISIT (OUTPATIENT)
Dept: CARDIOLOGY | Facility: CLINIC | Age: 89
End: 2024-11-11
Payer: MEDICARE

## 2024-11-11 VITALS
DIASTOLIC BLOOD PRESSURE: 48 MMHG | HEART RATE: 69 BPM | OXYGEN SATURATION: 99 % | WEIGHT: 186.6 LBS | HEIGHT: 63 IN | BODY MASS INDEX: 33.06 KG/M2 | SYSTOLIC BLOOD PRESSURE: 132 MMHG

## 2024-11-11 DIAGNOSIS — I63.412 CEREBROVASCULAR ACCIDENT (CVA) DUE TO EMBOLISM OF LEFT MIDDLE CEREBRAL ARTERY: ICD-10-CM

## 2024-11-11 DIAGNOSIS — I48.0 PAROXYSMAL ATRIAL FIBRILLATION: Primary | ICD-10-CM

## 2024-11-11 DIAGNOSIS — G47.33 OBSTRUCTIVE SLEEP APNEA SYNDROME: ICD-10-CM

## 2024-11-11 PROCEDURE — 1160F RVW MEDS BY RX/DR IN RCRD: CPT | Performed by: INTERNAL MEDICINE

## 2024-11-11 PROCEDURE — 93000 ELECTROCARDIOGRAM COMPLETE: CPT | Performed by: INTERNAL MEDICINE

## 2024-11-11 PROCEDURE — 1159F MED LIST DOCD IN RCRD: CPT | Performed by: INTERNAL MEDICINE

## 2024-11-11 PROCEDURE — 99214 OFFICE O/P EST MOD 30 MIN: CPT | Performed by: INTERNAL MEDICINE

## 2024-11-11 NOTE — PROGRESS NOTES
CARDIOLOGY    Monserrat Marsh MD    ENCOUNTER DATE:  11/11/2024    Gela Thompson / 89 y.o. / female        CHIEF COMPLAINT / REASON FOR OFFICE VISIT     Follow-up      HISTORY OF PRESENT ILLNESS       HPI    Gela Thompson is a 89 y.o. female     I saw her in February 2011 for complaints of episodes of shortness of breath  which had been occurring for the last couple of months. I did a nuclear stress test which showed she is only able to exercise for 4 minutes 30 seconds on the Saeid protocol, but there was no evidence of ischemia. The echo showed normal LV function with an ejection fraction of 61% and stage I diastolic dysfunction with trace tricuspid regurgitation. She wore a 30-day event monitor during which she had none of her shortness of breath episodes.       She was following up with her primary doctor, Lyly Mora, and complained again of shortness of breath with exertion. Dr. Mora sent her for another echo, which was pretty unremarkable. She also had a CT scan of the chest which was noncontrasted and she had coronary artery calcification. These tests were performed in the summer of 2014.      In the fall of 2014, she continued to complain of dyspnea and so I did a nuclear stress test on her which showed no evidence of ischemia. She had recent repeat echo at [facility] in October 2015, which continues to show normal LV systolic function. No comment was made on diastolic function. There was mild aortic regurgitation, moderate mitral regurgitation, and mild tricuspid regurgitation with a normal right ventricular systolic pressure. The prior years' echo showed no evidence of mitral regurgitation.      She followed up with Deja in Summary 2017 and was doing well on Pradaxa.  She wore a 24 hour monitor in December of 2017 which was normal.  I had reviewed her strips with Dr. Gibson and he did not feel like she had atrial fibrillation and we discontinued Pradaxa,  "which was causing her GI upset.     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  While hospitalized she was diagnosed with paroxysmal atrial fibrillation.  She was started on Eliquis.     She had a trip to the emergency room because of headaches in April 2023.  Echo during that time was unremarkable except for grade 1A diastolic dysfunction.  She had a neurology work-up but there were no acute changes.  She says her blood pressure has been well controlled at home.  She does not have any bleeding complications with the Eliquis.  In the hospital her QT interval was prolonged but it is normal today.    She is here today for routine follow-up.  She feels an occasional twinge of chest discomfort.  She denies any palpitations or shortness of breath.    VITAL SIGNS     Visit Vitals  /48   Pulse 69   Ht 160 cm (63\")   Wt 84.6 kg (186 lb 9.6 oz)   SpO2 99%   BMI 33.05 kg/m²         Wt Readings from Last 3 Encounters:   11/11/24 84.6 kg (186 lb 9.6 oz)   05/08/24 83.9 kg (185 lb)   11/17/23 82.8 kg (182 lb 9.6 oz)     Body mass index is 33.05 kg/m².      PHYSICAL EXAMINATION     Constitutional:       General: Not in acute distress.  Neck:      Vascular: No carotid bruit or JVD.   Pulmonary:      Effort: Pulmonary effort is normal.      Breath sounds: Normal breath sounds.   Cardiovascular:      Normal rate. Regular rhythm.      Murmurs: There is no murmur.   Psychiatric:         Mood and Affect: Mood and affect normal.           REVIEWED DATA       ECG 12 Lead    Date/Time: 11/11/2024 2:59 PM  Performed by: Monserrat Marsh MD    Authorized by: Monserrat Marsh MD  Comparison: compared with previous ECG from 5/8/2024  Similar to previous ECG  Rhythm: sinus rhythm  BPM: 69  Conduction: conduction normal  Other findings: non-specific ST-T wave changes    Clinical impression: abnormal EKG            1. Echocardiogram " performed 03/08/2012 showed normal LV size and function, EF 61%, stage I  diastolic dysfunction, trace tricuspid regurgitation.  2. Exercise nuclear stress test in March 2012 without evidence of ischemia. She exercised for 4 minutes 30 minutes on the Saeid protocol.  3. Pulmonary function tests, March 2012, were normal.   4. Transesophageal echocardiogram, May 2011, at [facility] showed normal LV systolic function, ejection fraction 50 to 60%, borderline left atrial enlargement, no significant valvular disease, and no cardiac source of embolus identified for her stroke.  5. Echocardiogram on 06/27/2014. Normal left ventricular size with an ejection fraction of 64%. No comment on diastolic function. No valvular heart disease.   6. CT of the chest on 09/18/2014. Heart size was normal and without effusion. Moderate atherosclerosis was noted of the coronary arteries. There was old healed granulomatous disease.   7. Nuclear stress test October 27, 2014. Normal scan showing no evidence of ischemia.  8. Echo from October 2015 at [facility]. Normal left ventricular systolic function. Ejection fraction 6065%. Mild aortic regurgitation. Moderate mitral regurgitation. Mild tricuspid regurgitation with a right ventricular systolic pressure of 28 mmHg.   9.  Echocardiogram in December 2018.  Echo showed normal LV function, grade 2 diastolic dysfunction and mild aortic regurgitation.  10.  Echo in January 2020.  Echo showed normal LV function, grade 1 diastolic dysfunction, mild aortic valve regurgitation.  11.  Echo in April 2023 showed normal LV function ejection fraction 60 percent.  Mild to moderate aortic regurgitation and trace tricuspid regurgitation.  12. Echocardiogram April 2023 showed ejection fraction of 63%, grade 1A diastolic dysfunction, negative saline study, mild aortic regurgitation.    Lab Results   Component Value Date    GLUCOSE 145 (H) 04/25/2023    BUN 19 04/25/2023    CREATININE 0.84 04/25/2023      04/25/2023    K 3.7 04/25/2023     04/25/2023    CALCIUM 9.4 04/25/2023    PROTEINTOT 7.4 06/14/2024    ALBUMIN 4.6 06/14/2024    ALT 17 04/25/2023    AST 14 04/25/2023    ALKPHOS 93 04/25/2023    BILITOT 0.2 04/25/2023    GLOB 2.8 04/25/2023    AGRATIO 1.5 04/25/2023    BCR 22.6 04/25/2023    ANIONGAP 14.0 04/25/2023    EGFR 66.9 04/25/2023       ASSESSMENT & PLAN      Diagnosis Plan   1. Paroxysmal atrial fibrillation        2. Cerebrovascular accident (CVA) due to embolism of left middle cerebral artery        3. Obstructive sleep apnea syndrome            1.    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  Diagnosed with paroxysmal atrial fibrillation.  Currently on Eliquis.  Neurological work-up while hospitalized in April 2023 did not show anything acute.  2.  Paroxysmal atrial fibrillation.  She is in sinus rhythm today.  Continue Eliquis 2.5 mg a day.  CHADS-VASc Risk Assessment               7 Total Score    1 Hypertension    2 Age >/= 75    1 DM    2 PRIOR STROKE/TIA/THROMBO    1 Sex: Female    Criteria that do not apply:    CHF    Vascular Disease    Age 65-74            3.  Hypertension.  Blood pressure looks good today.  She has a wide pulse pressure but that is not new.  4.  Obstructive sleep apnea compliant with CPAP  5.  History of stroke in 2011 with a TIA in 2016 and bilateral strokes in May 2020.  6.  Diabetes.    Follow up in 6 months with Tiesha.      Orders Placed This Encounter   Procedures    ECG 12 Lead     This order was created via procedure documentation     Order Specific Question:   Release to patient     Answer:   Routine Release [[id number]]           MEDICATIONS         Discharge Medications            Accurate as of November 11, 2024  3:04 PM. If you have any questions, ask your nurse or doctor.                Continue These Medications        Instructions Start Date   cholecalciferol 10 MCG (400 UNIT) tablet  Commonly known as: VITAMIN D3   400 Units,  Daily      Eliquis 2.5 MG tablet tablet  Generic drug: apixaban   2.5 mg, Oral, 2 Times Daily      furosemide 20 MG tablet  Commonly known as: LASIX   20 mg, See Admin Instructions      hydrALAZINE 100 MG tablet  Commonly known as: APRESOLINE   TAKE ONE TABLET BY MOUTH THREE TIMES DAILY (MORNING, NOON, AND BEDTIME)      metFORMIN 500 MG tablet  Commonly known as: GLUCOPHAGE   500 mg, 2 Times Daily With Meals      verapamil  MG CR tablet  Commonly known as: CALAN-SR   240 mg, Every Night at Bedtime      verapamil 120 MG tablet  Commonly known as: CALAN   120 mg, Every Morning      Vitamin B-2 50 MG tablet   25 mg, See Admin Instructions                 Monserrat Marsh MD  11/11/24  15:04 EST    Part of this note may be an electronic transcription/translation of spoken language to printed text using the Dragon dictation system.

## 2025-01-02 NOTE — PROGRESS NOTES
"[facility] Clinical Pharmacy Services: Medication Review Consult     Gela Thompson has a pharmacy consult to review her home medications for Qtc prolonging risk per Dr. Hammonds's request.     Relevant clinical data and objective history reviewed:  88 y.o. female 160 cm (63\") 81.6 kg (180 lb)  Estimated Creatinine Clearance: 46.8 mL/min (by C-G formula based on SCr of 0.84 mg/dL).    Past Medical History:   Diagnosis Date    Abnormal echocardiogram     Adiposity     Anemia     Cerebrovascular accident     Chest pain     Chronic low back pain     Diabetes mellitus     Dyslipidemia     Dyspnea     Gastroesophageal reflux disease     H/O echocardiogram 06/27/2014    Health care maintenance     History of EKG 10/29/2015    Hyperlipidemia     Hypertension     Mitral valve insufficiency     Murmur     Murmur     ROMAN (obstructive sleep apnea)     Osteoarthritis     Peripheral neuropathy      is allergic to penicillins, versed [midazolam], morphine, statins, and tape.    Assessment/Plan  After review of this patient's outpatient and inpatient medication list, the QTc prolongation is unlikely to be due to a pharmacologic cause. However, one of her inpatient medications ondansetron can cause QTc prolongation. This has not been administered to the patient per the MAR.       Yoly Meyer [facility]  Clinical Pharmacist   " LMOM order was placed/wh

## 2025-02-10 ENCOUNTER — TELEPHONE (OUTPATIENT)
Dept: CARDIOLOGY | Facility: CLINIC | Age: OVER 89
End: 2025-02-10
Payer: MEDICARE

## 2025-02-10 NOTE — TELEPHONE ENCOUNTER
I called and spoke with patient and gave her recommendations and she verbalizes understanding.    Kina Brunner RN  [facility] Triage  02/10/25 14:36 EST

## 2025-02-10 NOTE — TELEPHONE ENCOUNTER
If it is only 1 foot/ankle and not both, then it is very unlikely cardiac related and I recommend she reach out to her primary doctor.

## 2025-02-10 NOTE — TELEPHONE ENCOUNTER
Patient called because her right ankle/foot are swelling over night. This has been going on for 1 month. She would like to get to the bottom of why this is happening because she has never had swelling before. She denies SOA or weight gain. She had no vitals to provide. Med list has been reviewed and is up to date.     Elissa Acosta RN  Triage MG

## 2025-02-19 RX ORDER — HYDRALAZINE HYDROCHLORIDE 100 MG/1
TABLET, FILM COATED ORAL
Qty: 90 TABLET | Refills: 5 | Status: SHIPPED | OUTPATIENT
Start: 2025-02-19

## 2025-05-27 ENCOUNTER — OFFICE VISIT (OUTPATIENT)
Dept: CARDIOLOGY | Age: OVER 89
End: 2025-05-27
Payer: MEDICARE

## 2025-05-27 VITALS
BODY MASS INDEX: 34.73 KG/M2 | WEIGHT: 196 LBS | SYSTOLIC BLOOD PRESSURE: 160 MMHG | HEART RATE: 71 BPM | DIASTOLIC BLOOD PRESSURE: 58 MMHG | HEIGHT: 63 IN

## 2025-05-27 DIAGNOSIS — I48.0 PAROXYSMAL ATRIAL FIBRILLATION: ICD-10-CM

## 2025-05-27 DIAGNOSIS — R94.31 PROLONGED Q-T INTERVAL ON ECG: Primary | ICD-10-CM

## 2025-05-27 PROCEDURE — 99214 OFFICE O/P EST MOD 30 MIN: CPT | Performed by: NURSE PRACTITIONER

## 2025-05-27 PROCEDURE — 1160F RVW MEDS BY RX/DR IN RCRD: CPT | Performed by: NURSE PRACTITIONER

## 2025-05-27 PROCEDURE — 1159F MED LIST DOCD IN RCRD: CPT | Performed by: NURSE PRACTITIONER

## 2025-05-27 PROCEDURE — 93000 ELECTROCARDIOGRAM COMPLETE: CPT | Performed by: NURSE PRACTITIONER

## 2025-05-27 NOTE — PROGRESS NOTES
Date of Office Visit: 25  Encounter Provider: ELEUTERIO Lombardi  Place of Service: [facility] CARDIOLOGY  Patient Name: Gela Thompson  :1934    Chief Complaint   Patient presents with    Paroxysmal atrial fibrillation    Hyperkalemia    Follow-up   :     HPI: Gela Thompson is a 89 y.o. female  with coronary artery calcification noted on CT scan, paroxysmal atrial fibrillation, CVA, obstructive sleep apnea, diabetes, hypertension, and aortic valve sclerosis...        She is followed by Dr. Monserrat Marsh.  I will visit with her in follow-up today and I have reviewed her medical record.     She has had issues of shortness of breath since .  She had an unremarkable echo and unremarkable 30-day event monitor.  In  she had a CT scan of the chest which was noncontrasted showing some coronary calcification.  She had a nuclear stress test which showed no evidence of ischemia in the fall .  She was on Pradaxa for some time but had some GI upset with that so it was stopped.  In May 2020 she sustained a severe acute right occipital infarct with the following facial fractures.  She later was treated with Eliquis due to paroxysmal atrial fibrillation.     Follow-up echo 2023 showed normal left ventricular systolic function, grade 1 diastolic dysfunction, negative saline study, mild calcification of the aortic valve with mild aortic valve insufficiency.    She presents today for reassessment.  She has swelling on and off on the right ankle and foot.  This is not worse and she gets more good days without swelling then bad days with swelling.  She does stretches daily and also marches in place to stay active.  No recent chest pain/twinges.  No shortness of breath.  No palpitation.  She is dizzy if she stands up too quickly but no falls.  She lives with a walker.  No added salt.  She lives with her son and daughter-in-law who help her a lot.           Allergies  "  Allergen Reactions    Penicillins Hives     Patient hasn't taken PCNs for over 30 years.     Losartan GI Intolerance     GI upset    Versed [Midazolam] Other (See Comments)     Difficulty breathing    Morphine GI Intolerance    Statins Myalgia     Pt stated that she gets muscle aches all over when she takes statins.     Tape Rash           Family and social history reviewed.     ROS  All other systems were reviewed and are negative          Objective:     Vitals:    05/27/25 1430   BP: 160/58   BP Location: Left arm   Patient Position: Sitting   Cuff Size: Large Adult   Pulse: 71   Weight: 88.9 kg (196 lb)   Height: 160 cm (63\")     Body mass index is 34.72 kg/m².    PHYSICAL EXAM:  Pulmonary:      Effort: Pulmonary effort is normal.      Breath sounds: Examination of the right-lower field reveals decreased breath sounds. Examination of the left-lower field reveals decreased breath sounds. Decreased breath sounds present.   Cardiovascular:      Normal rate. Regular rhythm.      Murmurs: There is a systolic murmur.   Edema:     Ankle: 1+ edema of the right ankle.          ECG 12 Lead    Date/Time: 5/27/2025 3:59 PM  Performed by: Tiesha Hernandez APRN    Authorized by: Tiesha Hernandez APRN  Comparison: compared with previous ECG   Similar to previous ECG  Rhythm: sinus rhythm  T flattening: I, aVL, V2, V3 and V5  Other findings: prolonged QTc interval    Clinical impression: abnormal EKG            Current Outpatient Medications   Medication Sig Dispense Refill    apixaban (Eliquis) 2.5 MG tablet tablet Take 1 tablet by mouth 2 (Two) Times a Day. 180 tablet 3    Cholecalciferol 10 MCG (400 UNIT) tablet Take 1 tablet by mouth Daily.      furosemide (LASIX) 20 MG tablet Take 1 tablet by mouth See Admin Instructions. Take one tablet by mouth on Mondays and Thursdays (Patient taking differently: See Admin Instructions. Take one tablet by mouth on Mondays and Thursdays)      hydrALAZINE (APRESOLINE) 100 MG tablet TAKE ONE " TABLET BY MOUTH THREE TIMES DAILY (MORNING, NOON, AND BEDTIME) 90 tablet 5    metFORMIN (GLUCOPHAGE) 500 MG tablet Take 1 tablet by mouth 2 (Two) Times a Day With Meals.      Riboflavin (Vitamin B-2) 50 MG tablet Take 25 mg by mouth See Admin Instructions. Take 1/2 tablet (25mg) by mouth on Mondays and Thursdays      verapamil (CALAN) 120 MG tablet Take 1 tablet by mouth Every Morning.      verapamil SR (CALAN-SR) 240 MG CR tablet Take 1 tablet by mouth every night at bedtime.       No current facility-administered medications for this visit.     Assessment:      No diagnosis found.     No orders of the defined types were placed in this encounter.        Plan:       1.  89-year-old female with paroxysmal atrial fibrillation.  She did not tolerate Pradaxa due to GI upset and bruising.  She is maintained on verapamil and Eliquis 2.5 mg twice daily with high risk of embolic event and prior CVAs and TIA.  She is doing well and stable  2.  History of stroke in 2011 with TIA in 2016 and bilateral occipital strokes in May 2020-she requires long-term anticoagulation and remains on Eliquis 2.5 mg twice daily-continue the same  3.  Hypertension blood pressure fluctuation appears adequate for her age.  No medicine changes recommended  4.  Obstructive sleep apnea treated with CPAP reports compliance  5.  Diabetes mellitus on oral therapy   6.  Evidence of coronary artery calcification on CTA of the chest noncontrasted in 2014.  She had a negative perfusion stress test in the fall 2014.  She has occasional sharp twinges in the chest which lasts just seconds, chronic and none recently.  She will call with any worsening chest discomfort.  7.  Mild mitral valve regurgitation on stress echo April 2023.    8.  Aortic valve calcification with mild insufficiency on echo April 2023.  I appreciate very mild cardiac murmur today   9.  History of hyperlipidemia but not on statin therapy.  At her age, I would not recommend starting statin  therapy unless she were to have a new event  12.  Lower extremity edema-she takes Lasix twice a week and she has more good days without swelling then bad days with swelling at this time so I did not recommend increasing Lasix at this time.  She will continue low-salt diet and elevation as needed.  13.  Borderline prolonged QTc interval.  Will need to continue following on EKG.            It has been a pleasure to participate in this patient's care.      Thank you,  ELEUTERIO Lombardi      **I used Dragon to dictate this note:**

## 2025-08-06 RX ORDER — HYDRALAZINE HYDROCHLORIDE 100 MG/1
TABLET, FILM COATED ORAL
Qty: 90 TABLET | Refills: 5 | Status: SHIPPED | OUTPATIENT
Start: 2025-08-06